# Patient Record
Sex: FEMALE | Race: WHITE | NOT HISPANIC OR LATINO | Employment: OTHER | URBAN - METROPOLITAN AREA
[De-identification: names, ages, dates, MRNs, and addresses within clinical notes are randomized per-mention and may not be internally consistent; named-entity substitution may affect disease eponyms.]

---

## 2017-01-17 ENCOUNTER — GENERIC CONVERSION - ENCOUNTER (OUTPATIENT)
Dept: OTHER | Facility: OTHER | Age: 62
End: 2017-01-17

## 2017-01-17 ENCOUNTER — ALLSCRIPTS OFFICE VISIT (OUTPATIENT)
Dept: OTHER | Facility: OTHER | Age: 62
End: 2017-01-17

## 2017-01-18 DIAGNOSIS — E03.9 HYPOTHYROIDISM: ICD-10-CM

## 2017-01-18 DIAGNOSIS — E78.5 HYPERLIPIDEMIA: ICD-10-CM

## 2017-01-18 DIAGNOSIS — R73.01 IMPAIRED FASTING GLUCOSE: ICD-10-CM

## 2017-01-27 ENCOUNTER — APPOINTMENT (OUTPATIENT)
Dept: LAB | Facility: CLINIC | Age: 62
End: 2017-01-27
Payer: COMMERCIAL

## 2017-01-27 ENCOUNTER — GENERIC CONVERSION - ENCOUNTER (OUTPATIENT)
Dept: FAMILY MEDICINE CLINIC | Facility: CLINIC | Age: 62
End: 2017-01-27

## 2017-01-27 ENCOUNTER — ALLSCRIPTS OFFICE VISIT (OUTPATIENT)
Dept: OTHER | Facility: OTHER | Age: 62
End: 2017-01-27

## 2017-01-27 DIAGNOSIS — R73.01 IMPAIRED FASTING GLUCOSE: ICD-10-CM

## 2017-01-27 DIAGNOSIS — E78.5 HYPERLIPIDEMIA: ICD-10-CM

## 2017-01-27 DIAGNOSIS — E03.9 HYPOTHYROIDISM: ICD-10-CM

## 2017-01-27 LAB
ALBUMIN SERPL BCP-MCNC: 3.4 G/DL (ref 3.5–5)
ALP SERPL-CCNC: 85 U/L (ref 46–116)
ALT SERPL W P-5'-P-CCNC: 35 U/L (ref 12–78)
ANION GAP SERPL CALCULATED.3IONS-SCNC: 8 MMOL/L (ref 4–13)
AST SERPL W P-5'-P-CCNC: 25 U/L (ref 5–45)
BILIRUB SERPL-MCNC: 0.84 MG/DL (ref 0.2–1)
BUN SERPL-MCNC: 21 MG/DL (ref 5–25)
CALCIUM SERPL-MCNC: 9.4 MG/DL (ref 8.3–10.1)
CHLORIDE SERPL-SCNC: 108 MMOL/L (ref 100–108)
CHOLEST SERPL-MCNC: 139 MG/DL (ref 50–200)
CO2 SERPL-SCNC: 25 MMOL/L (ref 21–32)
CREAT SERPL-MCNC: 0.85 MG/DL (ref 0.6–1.3)
GFR SERPL CREATININE-BSD FRML MDRD: >60 ML/MIN/1.73SQ M
GLUCOSE SERPL-MCNC: 104 MG/DL (ref 65–140)
HDLC SERPL-MCNC: 47 MG/DL (ref 40–60)
LDLC SERPL CALC-MCNC: 72 MG/DL (ref 0–100)
POTASSIUM SERPL-SCNC: 4.2 MMOL/L (ref 3.5–5.3)
PROT SERPL-MCNC: 7.4 G/DL (ref 6.4–8.2)
SODIUM SERPL-SCNC: 141 MMOL/L (ref 136–145)
TRIGL SERPL-MCNC: 99 MG/DL
TSH SERPL DL<=0.05 MIU/L-ACNC: 0.19 UIU/ML (ref 0.36–3.74)

## 2017-01-27 PROCEDURE — 36415 COLL VENOUS BLD VENIPUNCTURE: CPT

## 2017-01-27 PROCEDURE — 84443 ASSAY THYROID STIM HORMONE: CPT

## 2017-01-27 PROCEDURE — 80053 COMPREHEN METABOLIC PANEL: CPT

## 2017-01-27 PROCEDURE — 80061 LIPID PANEL: CPT

## 2017-01-30 ENCOUNTER — GENERIC CONVERSION - ENCOUNTER (OUTPATIENT)
Dept: OTHER | Facility: OTHER | Age: 62
End: 2017-01-30

## 2017-02-16 ENCOUNTER — GENERIC CONVERSION - ENCOUNTER (OUTPATIENT)
Dept: OTHER | Facility: OTHER | Age: 62
End: 2017-02-16

## 2017-02-17 ENCOUNTER — ALLSCRIPTS OFFICE VISIT (OUTPATIENT)
Dept: OTHER | Facility: OTHER | Age: 62
End: 2017-02-17

## 2017-02-24 ENCOUNTER — ALLSCRIPTS OFFICE VISIT (OUTPATIENT)
Dept: OTHER | Facility: OTHER | Age: 62
End: 2017-02-24

## 2017-03-07 ENCOUNTER — ANESTHESIA EVENT (OUTPATIENT)
Dept: GASTROENTEROLOGY | Facility: HOSPITAL | Age: 62
End: 2017-03-07
Payer: COMMERCIAL

## 2017-03-07 RX ORDER — THIAMINE MONONITRATE (VIT B1) 100 MG
100 TABLET ORAL DAILY
COMMUNITY
End: 2017-03-22

## 2017-03-07 RX ORDER — ZOLPIDEM TARTRATE 10 MG/1
10 TABLET ORAL
COMMUNITY
End: 2018-02-27 | Stop reason: ALTCHOICE

## 2017-03-07 RX ORDER — LEVOTHYROXINE SODIUM 0.15 MG/1
150 TABLET ORAL DAILY
COMMUNITY
End: 2017-03-22

## 2017-03-07 RX ORDER — ASCORBIC ACID 500 MG
500 TABLET ORAL 2 TIMES DAILY
COMMUNITY
End: 2018-04-12 | Stop reason: ALTCHOICE

## 2017-03-07 RX ORDER — FERROUS SULFATE 325(65) MG
65 TABLET ORAL
COMMUNITY
End: 2018-02-27 | Stop reason: ALTCHOICE

## 2017-03-07 RX ORDER — CLOBETASOL PROPIONATE 0.05 MG/G
1 GEL TOPICAL AS NEEDED
COMMUNITY
End: 2018-02-27 | Stop reason: SDUPTHER

## 2017-03-07 RX ORDER — PAROXETINE HYDROCHLORIDE 20 MG/1
20 TABLET, FILM COATED ORAL EVERY MORNING
COMMUNITY
End: 2018-02-27 | Stop reason: SDUPTHER

## 2017-03-07 RX ORDER — LANOLIN ALCOHOL/MO/W.PET/CERES
1000 CREAM (GRAM) TOPICAL DAILY
COMMUNITY
End: 2017-03-22

## 2017-03-07 RX ORDER — DICYCLOMINE HCL 20 MG
20 TABLET ORAL AS NEEDED
Status: ON HOLD | COMMUNITY
End: 2017-03-08 | Stop reason: ALTCHOICE

## 2017-03-07 RX ORDER — MULTIVITAMIN WITH IRON
100 TABLET ORAL DAILY
COMMUNITY
End: 2017-03-22

## 2017-03-07 RX ORDER — SIMVASTATIN 40 MG
40 TABLET ORAL
COMMUNITY
End: 2018-01-28 | Stop reason: SDUPTHER

## 2017-03-07 RX ORDER — DIAZEPAM 5 MG/1
5 TABLET ORAL EVERY 6 HOURS PRN
Status: ON HOLD | COMMUNITY
End: 2017-03-08 | Stop reason: ALTCHOICE

## 2017-03-07 RX ORDER — ASPIRIN 325 MG
325 TABLET ORAL DAILY
COMMUNITY
End: 2017-03-28 | Stop reason: HOSPADM

## 2017-03-07 RX ORDER — MELOXICAM 15 MG/1
15 TABLET ORAL DAILY
COMMUNITY
End: 2017-03-28 | Stop reason: HOSPADM

## 2017-03-07 RX ORDER — RIZATRIPTAN BENZOATE 10 MG/1
10 TABLET ORAL ONCE AS NEEDED
COMMUNITY
End: 2018-02-27 | Stop reason: SDUPTHER

## 2017-03-08 ENCOUNTER — ANESTHESIA (OUTPATIENT)
Dept: GASTROENTEROLOGY | Facility: HOSPITAL | Age: 62
End: 2017-03-08
Payer: COMMERCIAL

## 2017-03-08 ENCOUNTER — HOSPITAL ENCOUNTER (OUTPATIENT)
Facility: HOSPITAL | Age: 62
Setting detail: OUTPATIENT SURGERY
Discharge: HOME | End: 2017-03-08
Attending: SURGERY | Admitting: SURGERY
Payer: COMMERCIAL

## 2017-03-08 VITALS
DIASTOLIC BLOOD PRESSURE: 72 MMHG | HEART RATE: 6 BPM | WEIGHT: 266 LBS | SYSTOLIC BLOOD PRESSURE: 124 MMHG | BODY MASS INDEX: 40.32 KG/M2 | OXYGEN SATURATION: 96 % | TEMPERATURE: 97.3 F | HEIGHT: 68 IN | RESPIRATION RATE: 18 BRPM

## 2017-03-08 DIAGNOSIS — E66.9 OBESITY: ICD-10-CM

## 2017-03-08 PROCEDURE — 88305 TISSUE EXAM BY PATHOLOGIST: CPT | Performed by: SURGERY

## 2017-03-08 PROCEDURE — 88342 IMHCHEM/IMCYTCHM 1ST ANTB: CPT | Performed by: SURGERY

## 2017-03-08 RX ORDER — LIDOCAINE HYDROCHLORIDE 10 MG/ML
INJECTION, SOLUTION INFILTRATION; PERINEURAL AS NEEDED
Status: DISCONTINUED | OUTPATIENT
Start: 2017-03-08 | End: 2017-03-08 | Stop reason: SURG

## 2017-03-08 RX ORDER — PROPOFOL 10 MG/ML
INJECTION, EMULSION INTRAVENOUS AS NEEDED
Status: DISCONTINUED | OUTPATIENT
Start: 2017-03-08 | End: 2017-03-08 | Stop reason: SURG

## 2017-03-08 RX ORDER — SODIUM CHLORIDE 9 MG/ML
125 INJECTION, SOLUTION INTRAVENOUS CONTINUOUS
Status: DISCONTINUED | OUTPATIENT
Start: 2017-03-08 | End: 2017-03-08 | Stop reason: HOSPADM

## 2017-03-08 RX ADMIN — LIDOCAINE HYDROCHLORIDE 100 MG: 10 INJECTION, SOLUTION INFILTRATION; PERINEURAL at 08:00

## 2017-03-08 RX ADMIN — SODIUM CHLORIDE: 0.9 INJECTION, SOLUTION INTRAVENOUS at 07:57

## 2017-03-08 RX ADMIN — PROPOFOL 120 MG: 10 INJECTION, EMULSION INTRAVENOUS at 08:00

## 2017-03-08 RX ADMIN — SODIUM CHLORIDE 125 ML/HR: 0.9 INJECTION, SOLUTION INTRAVENOUS at 06:30

## 2017-03-17 ENCOUNTER — ALLSCRIPTS OFFICE VISIT (OUTPATIENT)
Dept: OTHER | Facility: OTHER | Age: 62
End: 2017-03-17

## 2017-03-20 ENCOUNTER — GENERIC CONVERSION - ENCOUNTER (OUTPATIENT)
Dept: OTHER | Facility: OTHER | Age: 62
End: 2017-03-20

## 2017-03-22 ENCOUNTER — ANESTHESIA EVENT (OUTPATIENT)
Dept: PERIOP | Facility: HOSPITAL | Age: 62
DRG: 621 | End: 2017-03-22
Payer: COMMERCIAL

## 2017-03-23 ENCOUNTER — ALLSCRIPTS OFFICE VISIT (OUTPATIENT)
Dept: OTHER | Facility: OTHER | Age: 62
End: 2017-03-23

## 2017-03-27 ENCOUNTER — ANESTHESIA (OUTPATIENT)
Dept: PERIOP | Facility: HOSPITAL | Age: 62
DRG: 621 | End: 2017-03-27
Payer: COMMERCIAL

## 2017-03-27 ENCOUNTER — HOSPITAL ENCOUNTER (INPATIENT)
Facility: HOSPITAL | Age: 62
LOS: 1 days | Discharge: HOME | DRG: 621 | End: 2017-03-28
Attending: SURGERY | Admitting: SURGERY
Payer: COMMERCIAL

## 2017-03-27 DIAGNOSIS — E66.01 MORBID (SEVERE) OBESITY DUE TO EXCESS CALORIES (HCC): ICD-10-CM

## 2017-03-27 DIAGNOSIS — E78.5 HYPERLIPIDEMIA: ICD-10-CM

## 2017-03-27 PROCEDURE — A9270 NON-COVERED ITEM OR SERVICE: HCPCS | Performed by: ANESTHESIOLOGY

## 2017-03-27 PROCEDURE — C9113 INJ PANTOPRAZOLE SODIUM, VIA: HCPCS | Performed by: SURGERY

## 2017-03-27 PROCEDURE — 88307 TISSUE EXAM BY PATHOLOGIST: CPT | Performed by: SURGERY

## 2017-03-27 PROCEDURE — 0DB64Z3 EXCISION OF STOMACH, PERCUTANEOUS ENDOSCOPIC APPROACH, VERTICAL: ICD-10-PCS | Performed by: SURGERY

## 2017-03-27 PROCEDURE — 0DJ08ZZ INSPECTION OF UPPER INTESTINAL TRACT, VIA NATURAL OR ARTIFICIAL OPENING ENDOSCOPIC: ICD-10-PCS | Performed by: SURGERY

## 2017-03-27 RX ORDER — MORPHINE SULFATE 4 MG/ML
4 INJECTION, SOLUTION INTRAMUSCULAR; INTRAVENOUS EVERY 2 HOUR PRN
Status: DISCONTINUED | OUTPATIENT
Start: 2017-03-27 | End: 2017-03-28 | Stop reason: HOSPADM

## 2017-03-27 RX ORDER — SODIUM CHLORIDE, SODIUM LACTATE, POTASSIUM CHLORIDE, CALCIUM CHLORIDE 600; 310; 30; 20 MG/100ML; MG/100ML; MG/100ML; MG/100ML
125 INJECTION, SOLUTION INTRAVENOUS CONTINUOUS
Status: DISCONTINUED | OUTPATIENT
Start: 2017-03-27 | End: 2017-03-28

## 2017-03-27 RX ORDER — ACETAMINOPHEN 160 MG/5ML
325 SUSPENSION, ORAL (FINAL DOSE FORM) ORAL EVERY 4 HOURS PRN
Status: DISCONTINUED | OUTPATIENT
Start: 2017-03-27 | End: 2017-03-28 | Stop reason: HOSPADM

## 2017-03-27 RX ORDER — SODIUM CHLORIDE 9 MG/ML
125 INJECTION, SOLUTION INTRAVENOUS CONTINUOUS
Status: DISCONTINUED | OUTPATIENT
Start: 2017-03-27 | End: 2017-03-27

## 2017-03-27 RX ORDER — PANTOPRAZOLE SODIUM 40 MG/1
40 INJECTION, POWDER, FOR SOLUTION INTRAVENOUS
Status: DISCONTINUED | OUTPATIENT
Start: 2017-03-27 | End: 2017-03-28 | Stop reason: HOSPADM

## 2017-03-27 RX ORDER — LIDOCAINE HYDROCHLORIDE 10 MG/ML
INJECTION, SOLUTION INFILTRATION; PERINEURAL AS NEEDED
Status: DISCONTINUED | OUTPATIENT
Start: 2017-03-27 | End: 2017-03-27 | Stop reason: SURG

## 2017-03-27 RX ORDER — OXYCODONE HCL 5 MG/5 ML
5 SOLUTION, ORAL ORAL EVERY 4 HOURS PRN
Status: DISCONTINUED | OUTPATIENT
Start: 2017-03-27 | End: 2017-03-28 | Stop reason: HOSPADM

## 2017-03-27 RX ORDER — GLYCOPYRROLATE 0.2 MG/ML
INJECTION INTRAMUSCULAR; INTRAVENOUS AS NEEDED
Status: DISCONTINUED | OUTPATIENT
Start: 2017-03-27 | End: 2017-03-27 | Stop reason: SURG

## 2017-03-27 RX ORDER — EPHEDRINE SULFATE 50 MG/ML
INJECTION, SOLUTION INTRAVENOUS AS NEEDED
Status: DISCONTINUED | OUTPATIENT
Start: 2017-03-27 | End: 2017-03-27 | Stop reason: SURG

## 2017-03-27 RX ORDER — ONDANSETRON 2 MG/ML
4 INJECTION INTRAMUSCULAR; INTRAVENOUS EVERY 4 HOURS PRN
Status: DISCONTINUED | OUTPATIENT
Start: 2017-03-27 | End: 2017-03-28 | Stop reason: HOSPADM

## 2017-03-27 RX ORDER — ACETAMINOPHEN 160 MG/5ML
320 SUSPENSION, ORAL (FINAL DOSE FORM) ORAL EVERY 4 HOURS PRN
Status: DISCONTINUED | OUTPATIENT
Start: 2017-03-27 | End: 2017-03-28 | Stop reason: HOSPADM

## 2017-03-27 RX ORDER — FENTANYL CITRATE/PF 50 MCG/ML
50 SYRINGE (ML) INJECTION
Status: DISCONTINUED | OUTPATIENT
Start: 2017-03-27 | End: 2017-03-27 | Stop reason: HOSPADM

## 2017-03-27 RX ORDER — BUPIVACAINE HYDROCHLORIDE AND EPINEPHRINE 5; 5 MG/ML; UG/ML
INJECTION, SOLUTION PERINEURAL AS NEEDED
Status: DISCONTINUED | OUTPATIENT
Start: 2017-03-27 | End: 2017-03-27 | Stop reason: HOSPADM

## 2017-03-27 RX ORDER — OXYCODONE HCL 5 MG/5 ML
10 SOLUTION, ORAL ORAL EVERY 4 HOURS PRN
Status: DISCONTINUED | OUTPATIENT
Start: 2017-03-27 | End: 2017-03-28 | Stop reason: HOSPADM

## 2017-03-27 RX ORDER — FENTANYL CITRATE 50 UG/ML
INJECTION, SOLUTION INTRAMUSCULAR; INTRAVENOUS AS NEEDED
Status: DISCONTINUED | OUTPATIENT
Start: 2017-03-27 | End: 2017-03-27 | Stop reason: SURG

## 2017-03-27 RX ORDER — MEPERIDINE HYDROCHLORIDE 50 MG/ML
12.5 INJECTION INTRAMUSCULAR; INTRAVENOUS; SUBCUTANEOUS AS NEEDED
Status: DISCONTINUED | OUTPATIENT
Start: 2017-03-27 | End: 2017-03-27 | Stop reason: HOSPADM

## 2017-03-27 RX ORDER — SCOLOPAMINE TRANSDERMAL SYSTEM 1 MG/1
1 PATCH, EXTENDED RELEASE TRANSDERMAL ONCE AS NEEDED
Status: DISCONTINUED | OUTPATIENT
Start: 2017-03-27 | End: 2017-03-27

## 2017-03-27 RX ORDER — PROMETHAZINE HYDROCHLORIDE 25 MG/ML
25 INJECTION, SOLUTION INTRAMUSCULAR; INTRAVENOUS EVERY 4 HOURS PRN
Status: DISCONTINUED | OUTPATIENT
Start: 2017-03-27 | End: 2017-03-28 | Stop reason: HOSPADM

## 2017-03-27 RX ORDER — MIDAZOLAM HYDROCHLORIDE 1 MG/ML
INJECTION INTRAMUSCULAR; INTRAVENOUS AS NEEDED
Status: DISCONTINUED | OUTPATIENT
Start: 2017-03-27 | End: 2017-03-27 | Stop reason: SURG

## 2017-03-27 RX ORDER — PROPRANOLOL HYDROCHLORIDE 20 MG/1
40 TABLET ORAL DAILY
Status: DISCONTINUED | OUTPATIENT
Start: 2017-03-28 | End: 2017-03-28 | Stop reason: HOSPADM

## 2017-03-27 RX ORDER — MORPHINE SULFATE 2 MG/ML
2 INJECTION, SOLUTION INTRAMUSCULAR; INTRAVENOUS EVERY 2 HOUR PRN
Status: DISCONTINUED | OUTPATIENT
Start: 2017-03-27 | End: 2017-03-28 | Stop reason: HOSPADM

## 2017-03-27 RX ORDER — PROPRANOLOL HYDROCHLORIDE 20 MG/1
20 TABLET ORAL EVERY 12 HOURS SCHEDULED
Status: DISCONTINUED | OUTPATIENT
Start: 2017-03-28 | End: 2017-03-27

## 2017-03-27 RX ORDER — ONDANSETRON 2 MG/ML
INJECTION INTRAMUSCULAR; INTRAVENOUS AS NEEDED
Status: DISCONTINUED | OUTPATIENT
Start: 2017-03-27 | End: 2017-03-27 | Stop reason: SURG

## 2017-03-27 RX ORDER — PROPOFOL 10 MG/ML
INJECTION, EMULSION INTRAVENOUS AS NEEDED
Status: DISCONTINUED | OUTPATIENT
Start: 2017-03-27 | End: 2017-03-27 | Stop reason: SURG

## 2017-03-27 RX ORDER — ROCURONIUM BROMIDE 10 MG/ML
INJECTION, SOLUTION INTRAVENOUS AS NEEDED
Status: DISCONTINUED | OUTPATIENT
Start: 2017-03-27 | End: 2017-03-27 | Stop reason: SURG

## 2017-03-27 RX ADMIN — MORPHINE SULFATE 4 MG: 4 INJECTION, SOLUTION INTRAMUSCULAR; INTRAVENOUS at 11:13

## 2017-03-27 RX ADMIN — SCOPALAMINE 1 PATCH: 1 PATCH, EXTENDED RELEASE TRANSDERMAL at 07:36

## 2017-03-27 RX ADMIN — SODIUM CHLORIDE 125 ML/HR: 0.9 INJECTION, SOLUTION INTRAVENOUS at 07:37

## 2017-03-27 RX ADMIN — NEOSTIGMINE METHYLSULFATE 4 MG: 1 INJECTION INTRAMUSCULAR; INTRAVENOUS; SUBCUTANEOUS at 09:24

## 2017-03-27 RX ADMIN — SODIUM CHLORIDE, SODIUM LACTATE, POTASSIUM CHLORIDE, AND CALCIUM CHLORIDE 125 ML/HR: .6; .31; .03; .02 INJECTION, SOLUTION INTRAVENOUS at 19:44

## 2017-03-27 RX ADMIN — ENOXAPARIN SODIUM 40 MG: 40 INJECTION SUBCUTANEOUS at 07:57

## 2017-03-27 RX ADMIN — FENTANYL CITRATE 100 MCG: 50 INJECTION, SOLUTION INTRAMUSCULAR; INTRAVENOUS at 08:22

## 2017-03-27 RX ADMIN — CEFAZOLIN SODIUM 2000 MG: 2 SOLUTION INTRAVENOUS at 08:38

## 2017-03-27 RX ADMIN — EPHEDRINE SULFATE 10 MG: 50 INJECTION, SOLUTION INTRAMUSCULAR; INTRAVENOUS; SUBCUTANEOUS at 08:34

## 2017-03-27 RX ADMIN — EPHEDRINE SULFATE 10 MG: 50 INJECTION, SOLUTION INTRAMUSCULAR; INTRAVENOUS; SUBCUTANEOUS at 08:39

## 2017-03-27 RX ADMIN — MIDAZOLAM HYDROCHLORIDE 2 MG: 1 INJECTION, SOLUTION INTRAMUSCULAR; INTRAVENOUS at 08:18

## 2017-03-27 RX ADMIN — PROPOFOL 180 MG: 10 INJECTION, EMULSION INTRAVENOUS at 08:28

## 2017-03-27 RX ADMIN — EPHEDRINE SULFATE 15 MG: 50 INJECTION, SOLUTION INTRAMUSCULAR; INTRAVENOUS; SUBCUTANEOUS at 08:33

## 2017-03-27 RX ADMIN — ROCURONIUM BROMIDE 50 MG: 10 INJECTION, SOLUTION INTRAVENOUS at 08:28

## 2017-03-27 RX ADMIN — PANTOPRAZOLE SODIUM 40 MG: 40 INJECTION, POWDER, FOR SOLUTION INTRAVENOUS at 11:13

## 2017-03-27 RX ADMIN — SODIUM CHLORIDE, SODIUM LACTATE, POTASSIUM CHLORIDE, AND CALCIUM CHLORIDE 125 ML/HR: .6; .31; .03; .02 INJECTION, SOLUTION INTRAVENOUS at 10:07

## 2017-03-27 RX ADMIN — LIDOCAINE HYDROCHLORIDE 40 MG: 10 INJECTION, SOLUTION INFILTRATION; PERINEURAL at 08:28

## 2017-03-27 RX ADMIN — GLYCOPYRROLATE 1 MG: 0.2 INJECTION, SOLUTION INTRAMUSCULAR; INTRAVENOUS at 09:24

## 2017-03-27 RX ADMIN — FENTANYL CITRATE 50 MCG: 50 INJECTION, SOLUTION INTRAMUSCULAR; INTRAVENOUS at 09:19

## 2017-03-27 RX ADMIN — FENTANYL CITRATE 50 MCG: 50 INJECTION, SOLUTION INTRAMUSCULAR; INTRAVENOUS at 09:04

## 2017-03-27 RX ADMIN — SODIUM CHLORIDE: 0.9 INJECTION, SOLUTION INTRAVENOUS at 08:35

## 2017-03-27 RX ADMIN — MORPHINE SULFATE 4 MG: 4 INJECTION, SOLUTION INTRAMUSCULAR; INTRAVENOUS at 15:06

## 2017-03-27 RX ADMIN — ONDANSETRON HYDROCHLORIDE 4 MG: 2 INJECTION, SOLUTION INTRAVENOUS at 08:33

## 2017-03-27 RX ADMIN — FENTANYL CITRATE 50 MCG: 50 INJECTION, SOLUTION INTRAMUSCULAR; INTRAVENOUS at 09:35

## 2017-03-27 RX ADMIN — DEXAMETHASONE SODIUM PHOSPHATE 4 MG: 10 INJECTION INTRAMUSCULAR; INTRAVENOUS at 08:33

## 2017-03-27 RX ADMIN — FENTANYL CITRATE 50 MCG: 50 INJECTION, SOLUTION INTRAMUSCULAR; INTRAVENOUS at 08:53

## 2017-03-28 VITALS
HEART RATE: 53 BPM | SYSTOLIC BLOOD PRESSURE: 120 MMHG | DIASTOLIC BLOOD PRESSURE: 59 MMHG | OXYGEN SATURATION: 95 % | RESPIRATION RATE: 18 BRPM | TEMPERATURE: 98.3 F | HEIGHT: 67 IN | BODY MASS INDEX: 35.66 KG/M2 | WEIGHT: 227.2 LBS

## 2017-03-28 LAB
ANION GAP SERPL CALCULATED.3IONS-SCNC: 4 MMOL/L (ref 4–13)
BUN SERPL-MCNC: 15 MG/DL (ref 5–25)
CALCIUM SERPL-MCNC: 8.9 MG/DL (ref 8.3–10.1)
CHLORIDE SERPL-SCNC: 106 MMOL/L (ref 100–108)
CO2 SERPL-SCNC: 28 MMOL/L (ref 21–32)
CREAT SERPL-MCNC: 0.76 MG/DL (ref 0.6–1.3)
ERYTHROCYTE [DISTWIDTH] IN BLOOD BY AUTOMATED COUNT: 13.4 % (ref 11.6–15.1)
GFR SERPL CREATININE-BSD FRML MDRD: >60 ML/MIN/1.73SQ M
GLUCOSE SERPL-MCNC: 102 MG/DL (ref 65–140)
HCT VFR BLD AUTO: 35.9 % (ref 34.8–46.1)
HGB BLD-MCNC: 11.9 G/DL (ref 11.5–15.4)
MCH RBC QN AUTO: 30.6 PG (ref 26.8–34.3)
MCHC RBC AUTO-ENTMCNC: 33.1 G/DL (ref 31.4–37.4)
MCV RBC AUTO: 92 FL (ref 82–98)
PLATELET # BLD AUTO: 249 THOUSANDS/UL (ref 149–390)
PMV BLD AUTO: 11.5 FL (ref 8.9–12.7)
POTASSIUM SERPL-SCNC: 3.9 MMOL/L (ref 3.5–5.3)
RBC # BLD AUTO: 3.89 MILLION/UL (ref 3.81–5.12)
SODIUM SERPL-SCNC: 138 MMOL/L (ref 136–145)
WBC # BLD AUTO: 10.15 THOUSAND/UL (ref 4.31–10.16)

## 2017-03-28 PROCEDURE — 80048 BASIC METABOLIC PNL TOTAL CA: CPT | Performed by: SURGERY

## 2017-03-28 PROCEDURE — A9270 NON-COVERED ITEM OR SERVICE: HCPCS | Performed by: SURGERY

## 2017-03-28 PROCEDURE — A9270 NON-COVERED ITEM OR SERVICE: HCPCS | Performed by: PHYSICIAN ASSISTANT

## 2017-03-28 PROCEDURE — C9113 INJ PANTOPRAZOLE SODIUM, VIA: HCPCS | Performed by: SURGERY

## 2017-03-28 PROCEDURE — 85027 COMPLETE CBC AUTOMATED: CPT | Performed by: SURGERY

## 2017-03-28 RX ORDER — PROPRANOLOL HYDROCHLORIDE 40 MG/1
40 TABLET ORAL DAILY
Qty: 30 TABLET | Refills: 0 | Status: SHIPPED | OUTPATIENT
Start: 2017-03-28 | End: 2018-02-27 | Stop reason: ALTCHOICE

## 2017-03-28 RX ORDER — OMEPRAZOLE 20 MG/1
20 CAPSULE, DELAYED RELEASE ORAL DAILY
Qty: 30 CAPSULE | Refills: 0
Start: 2017-03-28 | End: 2018-02-27 | Stop reason: ALTCHOICE

## 2017-03-28 RX ORDER — OXYCODONE HYDROCHLORIDE AND ACETAMINOPHEN 5; 325 MG/1; MG/1
1 TABLET ORAL EVERY 4 HOURS PRN
Qty: 30 TABLET | Refills: 0
Start: 2017-03-28 | End: 2018-02-27 | Stop reason: ALTCHOICE

## 2017-03-28 RX ADMIN — PANTOPRAZOLE SODIUM 40 MG: 40 INJECTION, POWDER, FOR SOLUTION INTRAVENOUS at 08:34

## 2017-03-28 RX ADMIN — OXYCODONE HYDROCHLORIDE 5 MG: 5 SOLUTION ORAL at 09:41

## 2017-03-28 RX ADMIN — PROPRANOLOL HYDROCHLORIDE 40 MG: 20 TABLET ORAL at 08:33

## 2017-03-28 RX ADMIN — ACETAMINOPHEN 325 MG: 160 SUSPENSION ORAL at 09:39

## 2017-03-28 RX ADMIN — SODIUM CHLORIDE, SODIUM LACTATE, POTASSIUM CHLORIDE, AND CALCIUM CHLORIDE 125 ML/HR: .6; .31; .03; .02 INJECTION, SOLUTION INTRAVENOUS at 03:46

## 2017-03-28 RX ADMIN — ENOXAPARIN SODIUM 40 MG: 40 INJECTION SUBCUTANEOUS at 09:32

## 2017-03-28 RX ADMIN — LEVOTHYROXINE SODIUM 137 MCG: 112 TABLET ORAL at 05:09

## 2017-03-29 ENCOUNTER — GENERIC CONVERSION - ENCOUNTER (OUTPATIENT)
Dept: OTHER | Facility: OTHER | Age: 62
End: 2017-03-29

## 2017-03-31 ENCOUNTER — GENERIC CONVERSION - ENCOUNTER (OUTPATIENT)
Dept: OTHER | Facility: OTHER | Age: 62
End: 2017-03-31

## 2017-04-06 ENCOUNTER — ALLSCRIPTS OFFICE VISIT (OUTPATIENT)
Dept: OTHER | Facility: OTHER | Age: 62
End: 2017-04-06

## 2017-04-06 DIAGNOSIS — E03.9 HYPOTHYROIDISM: ICD-10-CM

## 2017-04-20 ENCOUNTER — GENERIC CONVERSION - ENCOUNTER (OUTPATIENT)
Dept: OTHER | Facility: OTHER | Age: 62
End: 2017-04-20

## 2017-05-09 ENCOUNTER — GENERIC CONVERSION - ENCOUNTER (OUTPATIENT)
Dept: OTHER | Facility: OTHER | Age: 62
End: 2017-05-09

## 2017-05-12 ENCOUNTER — APPOINTMENT (OUTPATIENT)
Dept: LAB | Facility: CLINIC | Age: 62
End: 2017-05-12
Payer: COMMERCIAL

## 2017-05-12 DIAGNOSIS — E03.9 HYPOTHYROIDISM: ICD-10-CM

## 2017-05-12 LAB
T4 FREE SERPL-MCNC: 1.6 NG/DL (ref 0.76–1.46)
TSH SERPL DL<=0.05 MIU/L-ACNC: 0.32 UIU/ML (ref 0.36–3.74)

## 2017-05-12 PROCEDURE — 84439 ASSAY OF FREE THYROXINE: CPT

## 2017-05-12 PROCEDURE — 84443 ASSAY THYROID STIM HORMONE: CPT

## 2017-05-12 PROCEDURE — 36415 COLL VENOUS BLD VENIPUNCTURE: CPT

## 2017-05-14 ENCOUNTER — GENERIC CONVERSION - ENCOUNTER (OUTPATIENT)
Dept: OTHER | Facility: OTHER | Age: 62
End: 2017-05-14

## 2017-06-02 ENCOUNTER — GENERIC CONVERSION - ENCOUNTER (OUTPATIENT)
Dept: OTHER | Facility: OTHER | Age: 62
End: 2017-06-02

## 2017-06-09 ENCOUNTER — ALLSCRIPTS OFFICE VISIT (OUTPATIENT)
Dept: OTHER | Facility: OTHER | Age: 62
End: 2017-06-09

## 2017-06-09 DIAGNOSIS — K91.2 POSTSURGICAL MALABSORPTION, NOT ELSEWHERE CLASSIFIED: ICD-10-CM

## 2017-06-09 DIAGNOSIS — Z98.84 BARIATRIC SURGERY STATUS: ICD-10-CM

## 2017-06-09 DIAGNOSIS — Z00.00 ENCOUNTER FOR GENERAL ADULT MEDICAL EXAMINATION WITHOUT ABNORMAL FINDINGS: ICD-10-CM

## 2017-06-09 DIAGNOSIS — R73.01 IMPAIRED FASTING GLUCOSE: ICD-10-CM

## 2017-06-09 DIAGNOSIS — F41.8 OTHER SPECIFIED ANXIETY DISORDERS: ICD-10-CM

## 2017-06-09 DIAGNOSIS — E78.5 HYPERLIPIDEMIA: ICD-10-CM

## 2017-06-11 ENCOUNTER — GENERIC CONVERSION - ENCOUNTER (OUTPATIENT)
Dept: OTHER | Facility: OTHER | Age: 62
End: 2017-06-11

## 2017-07-12 ENCOUNTER — GENERIC CONVERSION - ENCOUNTER (OUTPATIENT)
Dept: OTHER | Facility: OTHER | Age: 62
End: 2017-07-12

## 2017-07-19 ENCOUNTER — GENERIC CONVERSION - ENCOUNTER (OUTPATIENT)
Dept: OTHER | Facility: OTHER | Age: 62
End: 2017-07-19

## 2017-08-14 ENCOUNTER — APPOINTMENT (OUTPATIENT)
Dept: LAB | Facility: CLINIC | Age: 62
End: 2017-08-14
Payer: COMMERCIAL

## 2017-08-14 ENCOUNTER — ALLSCRIPTS OFFICE VISIT (OUTPATIENT)
Dept: OTHER | Facility: OTHER | Age: 62
End: 2017-08-14

## 2017-08-14 DIAGNOSIS — Z01.419 ENCOUNTER FOR GYNECOLOGICAL EXAMINATION WITHOUT ABNORMAL FINDING: ICD-10-CM

## 2017-08-14 DIAGNOSIS — Z00.00 ENCOUNTER FOR GENERAL ADULT MEDICAL EXAMINATION WITHOUT ABNORMAL FINDINGS: ICD-10-CM

## 2017-08-14 DIAGNOSIS — F41.8 OTHER SPECIFIED ANXIETY DISORDERS: ICD-10-CM

## 2017-08-14 DIAGNOSIS — E03.9 HYPOTHYROIDISM: ICD-10-CM

## 2017-08-14 DIAGNOSIS — K91.2 POSTSURGICAL MALABSORPTION, NOT ELSEWHERE CLASSIFIED: ICD-10-CM

## 2017-08-14 DIAGNOSIS — R73.01 IMPAIRED FASTING GLUCOSE: ICD-10-CM

## 2017-08-14 DIAGNOSIS — E78.5 HYPERLIPIDEMIA: ICD-10-CM

## 2017-08-14 DIAGNOSIS — Z98.84 BARIATRIC SURGERY STATUS: ICD-10-CM

## 2017-08-14 DIAGNOSIS — R79.89 OTHER SPECIFIED ABNORMAL FINDINGS OF BLOOD CHEMISTRY: ICD-10-CM

## 2017-08-14 LAB
25(OH)D3 SERPL-MCNC: 63.8 NG/ML (ref 30–100)
ALBUMIN SERPL BCP-MCNC: 3.4 G/DL (ref 3.5–5)
ALP SERPL-CCNC: 75 U/L (ref 46–116)
ALT SERPL W P-5'-P-CCNC: 15 U/L (ref 12–78)
ANION GAP SERPL CALCULATED.3IONS-SCNC: 10 MMOL/L (ref 4–13)
AST SERPL W P-5'-P-CCNC: 17 U/L (ref 5–45)
BILIRUB SERPL-MCNC: 0.81 MG/DL (ref 0.2–1)
BUN SERPL-MCNC: 16 MG/DL (ref 5–25)
CALCIUM SERPL-MCNC: 9.6 MG/DL (ref 8.3–10.1)
CHLORIDE SERPL-SCNC: 107 MMOL/L (ref 100–108)
CHOLEST SERPL-MCNC: 139 MG/DL (ref 50–200)
CO2 SERPL-SCNC: 24 MMOL/L (ref 21–32)
CREAT SERPL-MCNC: 0.63 MG/DL (ref 0.6–1.3)
ERYTHROCYTE [DISTWIDTH] IN BLOOD BY AUTOMATED COUNT: 13.7 % (ref 11.6–15.1)
EST. AVERAGE GLUCOSE BLD GHB EST-MCNC: 103 MG/DL
FERRITIN SERPL-MCNC: 445 NG/ML (ref 8–388)
FOLATE SERPL-MCNC: >20 NG/ML (ref 3.1–17.5)
GFR SERPL CREATININE-BSD FRML MDRD: 96 ML/MIN/1.73SQ M
GLUCOSE P FAST SERPL-MCNC: 79 MG/DL (ref 65–99)
HBA1C MFR BLD: 5.2 % (ref 4.2–6.3)
HCT VFR BLD AUTO: 38.1 % (ref 34.8–46.1)
HDLC SERPL-MCNC: 47 MG/DL (ref 40–60)
HGB BLD-MCNC: 12.7 G/DL (ref 11.5–15.4)
LDLC SERPL CALC-MCNC: 68 MG/DL (ref 0–100)
MCH RBC QN AUTO: 30.8 PG (ref 26.8–34.3)
MCHC RBC AUTO-ENTMCNC: 33.3 G/DL (ref 31.4–37.4)
MCV RBC AUTO: 92 FL (ref 82–98)
PLATELET # BLD AUTO: 265 THOUSANDS/UL (ref 149–390)
PMV BLD AUTO: 11.8 FL (ref 8.9–12.7)
POTASSIUM SERPL-SCNC: 3.7 MMOL/L (ref 3.5–5.3)
PROT SERPL-MCNC: 7 G/DL (ref 6.4–8.2)
PTH-INTACT SERPL-MCNC: 32.7 PG/ML (ref 14–72)
RBC # BLD AUTO: 4.13 MILLION/UL (ref 3.81–5.12)
SODIUM SERPL-SCNC: 141 MMOL/L (ref 136–145)
TRIGL SERPL-MCNC: 122 MG/DL
VIT B12 SERPL-MCNC: 732 PG/ML (ref 100–900)
WBC # BLD AUTO: 5 THOUSAND/UL (ref 4.31–10.16)

## 2017-08-14 PROCEDURE — 85027 COMPLETE CBC AUTOMATED: CPT

## 2017-08-14 PROCEDURE — 83970 ASSAY OF PARATHORMONE: CPT

## 2017-08-14 PROCEDURE — 82728 ASSAY OF FERRITIN: CPT

## 2017-08-14 PROCEDURE — 82306 VITAMIN D 25 HYDROXY: CPT

## 2017-08-14 PROCEDURE — 80053 COMPREHEN METABOLIC PANEL: CPT

## 2017-08-14 PROCEDURE — 84425 ASSAY OF VITAMIN B-1: CPT

## 2017-08-14 PROCEDURE — G0145 SCR C/V CYTO,THINLAYER,RESCR: HCPCS | Performed by: OBSTETRICS & GYNECOLOGY

## 2017-08-14 PROCEDURE — 82746 ASSAY OF FOLIC ACID SERUM: CPT

## 2017-08-14 PROCEDURE — 83036 HEMOGLOBIN GLYCOSYLATED A1C: CPT

## 2017-08-14 PROCEDURE — 84590 ASSAY OF VITAMIN A: CPT

## 2017-08-14 PROCEDURE — 36415 COLL VENOUS BLD VENIPUNCTURE: CPT

## 2017-08-14 PROCEDURE — 82607 VITAMIN B-12: CPT

## 2017-08-14 PROCEDURE — 80061 LIPID PANEL: CPT

## 2017-08-15 ENCOUNTER — LAB REQUISITION (OUTPATIENT)
Dept: LAB | Facility: HOSPITAL | Age: 62
End: 2017-08-15
Payer: COMMERCIAL

## 2017-08-15 DIAGNOSIS — Z01.419 ENCOUNTER FOR GYNECOLOGICAL EXAMINATION WITHOUT ABNORMAL FINDING: ICD-10-CM

## 2017-08-17 ENCOUNTER — APPOINTMENT (OUTPATIENT)
Dept: LAB | Facility: CLINIC | Age: 62
End: 2017-08-17
Payer: COMMERCIAL

## 2017-08-17 ENCOUNTER — ALLSCRIPTS OFFICE VISIT (OUTPATIENT)
Dept: OTHER | Facility: OTHER | Age: 62
End: 2017-08-17

## 2017-08-17 DIAGNOSIS — E03.9 HYPOTHYROIDISM: ICD-10-CM

## 2017-08-17 LAB
TSH SERPL DL<=0.05 MIU/L-ACNC: 0.38 UIU/ML (ref 0.36–3.74)
VIT A SERPL-MCNC: 32 UG/DL (ref 26–82)
VIT B1 BLD-SCNC: 176 NMOL/L (ref 66.5–200)

## 2017-08-17 PROCEDURE — 36415 COLL VENOUS BLD VENIPUNCTURE: CPT

## 2017-08-17 PROCEDURE — 84443 ASSAY THYROID STIM HORMONE: CPT

## 2017-08-18 ENCOUNTER — GENERIC CONVERSION - ENCOUNTER (OUTPATIENT)
Dept: OTHER | Facility: OTHER | Age: 62
End: 2017-08-18

## 2017-08-21 LAB
LAB AP GYN PRIMARY INTERPRETATION: NORMAL
Lab: NORMAL

## 2017-08-22 ENCOUNTER — GENERIC CONVERSION - ENCOUNTER (OUTPATIENT)
Dept: OTHER | Facility: OTHER | Age: 62
End: 2017-08-22

## 2017-08-24 ENCOUNTER — GENERIC CONVERSION - ENCOUNTER (OUTPATIENT)
Dept: OTHER | Facility: OTHER | Age: 62
End: 2017-08-24

## 2017-09-21 ENCOUNTER — APPOINTMENT (OUTPATIENT)
Dept: LAB | Facility: CLINIC | Age: 62
End: 2017-09-21
Payer: COMMERCIAL

## 2017-09-21 ENCOUNTER — GENERIC CONVERSION - ENCOUNTER (OUTPATIENT)
Dept: OTHER | Facility: OTHER | Age: 62
End: 2017-09-21

## 2017-09-21 DIAGNOSIS — R79.89 OTHER SPECIFIED ABNORMAL FINDINGS OF BLOOD CHEMISTRY: ICD-10-CM

## 2017-09-21 LAB
FERRITIN SERPL-MCNC: 489 NG/ML (ref 8–388)
IRON SERPL-MCNC: 61 UG/DL (ref 50–170)
TRANSFERRIN SERPL-MCNC: 145 MG/DL (ref 200–400)

## 2017-09-21 PROCEDURE — 84466 ASSAY OF TRANSFERRIN: CPT

## 2017-09-21 PROCEDURE — 82728 ASSAY OF FERRITIN: CPT

## 2017-09-21 PROCEDURE — 36415 COLL VENOUS BLD VENIPUNCTURE: CPT

## 2017-09-21 PROCEDURE — 83540 ASSAY OF IRON: CPT

## 2017-10-11 ENCOUNTER — APPOINTMENT (OUTPATIENT)
Dept: LAB | Facility: CLINIC | Age: 62
End: 2017-10-11
Payer: COMMERCIAL

## 2017-10-11 ENCOUNTER — GENERIC CONVERSION - ENCOUNTER (OUTPATIENT)
Dept: OTHER | Facility: OTHER | Age: 62
End: 2017-10-11

## 2017-10-11 ENCOUNTER — ALLSCRIPTS OFFICE VISIT (OUTPATIENT)
Dept: OTHER | Facility: OTHER | Age: 62
End: 2017-10-11

## 2017-10-11 DIAGNOSIS — R79.89 OTHER SPECIFIED ABNORMAL FINDINGS OF BLOOD CHEMISTRY: ICD-10-CM

## 2017-10-11 LAB
FERRITIN SERPL-MCNC: 481 NG/ML (ref 8–388)
IRON SATN MFR SERPL: 19 %
IRON SERPL-MCNC: 41 UG/DL (ref 50–170)
TIBC SERPL-MCNC: 216 UG/DL (ref 250–450)

## 2017-10-11 PROCEDURE — 83550 IRON BINDING TEST: CPT

## 2017-10-11 PROCEDURE — 36415 COLL VENOUS BLD VENIPUNCTURE: CPT

## 2017-10-11 PROCEDURE — 82728 ASSAY OF FERRITIN: CPT

## 2017-10-11 PROCEDURE — 81256 HFE GENE: CPT

## 2017-10-11 PROCEDURE — 83540 ASSAY OF IRON: CPT

## 2017-10-13 NOTE — CONSULTS
Assessment  1  High serum ferritin (790 99) (R79 89)    Plan  High serum ferritin    · (1) HEMOCHROMATOSIS MUTATION; Status:Active; Requested for:11Oct2017;    Perform:[facility]; Due:11Oct2018; Ordered;For:High serum ferritin; Ordered By:Josefina Kaba);   · (1) IRON PANEL; Status:Resulted - Requires Verification;   Done: 04YIX3905 12:10PM   Due:11Oct2018; Ordered;For:High serum ferritin; Ordered By:Josefina Kaba); · Follow-up visit in 1 month Evaluation and Treatment  Follow-up  Status: Complete  Done:  31ETT3064 09:45AM   Ordered; For: High serum ferritin; Ordered By: Amelia Vasquez) Performed:  Due: 58HFR2780; Last Updated By: Barbara Jarvis; 10/11/2017 11:43:05 AM    Discussion/Summary    Increased ferritin after sleeve gastric surgery in March 2017, with normal hemoglobin of 12 7, WBC 5000, platelets 779027, ferritin level in August 2017 of 444, increased to 489 in September 2017 it might be related to fusion bariatric vitamins, I could not find previous ferritin on this patientused to have iron supplements prior to her surgery as welldoes not drink winehistory negative for hemochromatosis however I will do hemochromatosis gene profile, and repeat iron panel with CBC in 6 weeks I will keep you updated        Chief Complaint  Elevated ferritin      History of Present Illness  75-year-old  female with history of hemorrhoidectomy in 2015, dyslipidemia, hypothyroidism, status post sleeve gastric surgery in March 2017, had been on bariatric vitamins 4 time a day with calcium, simvastatin, Paxil and Synthroidwas found to have persistent mildly elevated ferritinSeptember 2017 ferritin of 489 ( 8-388) August 2017 ferritin 445 ( 8-388)61, normal liver enzymes, creatinine, hemoglobin 12 7, hematocrit 38, MCV 92, platelets 479423, WBC 5000, I do not have differentialdoes not drink alcohol , she does not smoke cigarettesfamily history of hemochromatosis such as early coronary artery disease, cirrhosis of the liver, liver cancer, pancreatic insufficiency      Review of Systems    Constitutional: No fever, no chills, feels well, no tiredness, no recent weight gain or weight loss  Eyes: No complaints of eye pain, no red eyes, no eyesight problems, no discharge, no dry eyes, no itching of eyes  ENT: no complaints of earache, no loss of hearing, no nose bleeds, no nasal discharge, no sore throat, no hoarseness  Cardiovascular: No complaints of slow heart rate, no fast heart rate, no chest pain, no palpitations, no leg claudication, no lower extremity edema  Respiratory: No complaints of shortness of breath, no wheezing, no cough, no SOB on exertion, no orthopnea, no PND  Gastrointestinal: No complaints of abdominal pain, no constipation, no nausea or vomiting, no diarrhea, no bloody stools  Genitourinary: No complaints of dysuria, no incontinence, no pelvic pain, no dysmenorrhea, no vaginal discharge or bleeding  Musculoskeletal: No complaints of arthralgias, no myalgias, no joint swelling or stiffness, no limb pain or swelling  Integumentary: No complaints of skin rash or lesions, no itching, no skin wounds, no breast pain or lump  Neurological: No complaints of headache, no confusion, no convulsions, no numbness, no dizziness or fainting, no tingling, no limb weakness, no difficulty walking  Psychiatric: Not suicidal, no sleep disturbance, no anxiety or depression, no change in personality, no emotional problems  Endocrine: No complaints of proptosis, no hot flashes, no muscle weakness, no deepening of the voice, no feelings of weakness  Hematologic/Lymphatic: No complaints of swollen glands, no swollen glands in the neck, does not bleed easily, does not bruise easily  ROS reviewed  Active Problems  1  Depression with anxiety (300 4) (F41 8)   2  Dyslipidemia (272 4) (E78 5)   3  Elevated fasting glucose (790 21) (R73 01)   4   Encounter for routine gynecological examination (V72 31) (Z01 419)   5  Encounter for screening mammogram for malignant neoplasm of breast (V76 12)   (Z12 31)   6  High serum ferritin (790 99) (R79 89)   7  Hypothyroidism (244 9) (E03 9)   8  Insomnia (780 52) (G47 00)   9  Migraine headache (346 90) (G43 909)   10  Pap smear, as part of routine gynecological examination (V76 2) (Z01 419)   11  Postgastrectomy malabsorption (579 3) (K91 2,Z90 3)   12  Preoperative cardiovascular examination (V72 81) (Z01 810)   13  Screening for colon cancer (V76 51) (Z12 11)   14  Screening for lipoid disorders (V77 91) (Z13 220)   15  Seborrheic keratosis (702 19) (L82 1)   16  Status post laparoscopic sleeve gastrectomy (V45 86) (Z98 84)   17  Transient ischemic attack (435 9) (G45 9)   18  Urge incontinence of urine (788 31) (N39 41)   19  Visit for pre-operative examination (V72 84) (Z01 818)   20  Visit for screening mammogram (V76 12) (Z12 31)   21  Vulvar atrophy (624 1) (N90 5)   22  Well adult on routine health check (V70 0) (Z00 00)    Past Medical History  1  History of Acute bronchitis due to other specified organisms (466 0) (J20 8)   2  History of Acute bronchitis, unspecified organism (466 0) (J20 9)   3  History of Acute upper respiratory infection (465 9) (J06 9)   4  History of Allergic dermatitis (692 9) (L23 9)   5  History of Blood tests prior to treatment or procedure (V72 63) (Z01 812)   6  History of Encounter for screening mammogram for malignant neoplasm of breast   (V76 12) (Z12 31)   7  History of acute bronchitis (V12 69) (Z87 09)   8  History of acute conjunctivitis (V12 49) (Z86 69)   9  History of arthritis (V13 4) (Z87 39)   10  History of fatigue (V13 89) (Z87 898)   11  History of insomnia (V13 89) (Z87 898)   12  History of obesity (V13 89) (Z86 39)   13  History of Morbid or severe obesity due to excess calories (278 01) (E66 01)   14  History of Myalgia (729 1) (M79 1)   15   History of Pap smear, as part of routine gynecological examination (V76 2) (Z01 419)    The active problems and past medical history were reviewed and updated today  Surgical History  1  History of Cataract Surgery   2  History of Cholecystectomy Laparoscopic   3  History of Eye Surgery   4  History of Gastrectomy Sleeve   5  History of Hemorrhoidectomy   6  History of Knee Surgery   7  History of Knee Surgery Left    The surgical history was reviewed and updated today  Family History  Mother    1  Family history of diabetes mellitus (V18 0) (Z83 3)   2  Family history of skin cancer (V16 8) (Z80 8)  Father    3  Family history of Alzheimer Disease   4  Family history of Congestive Heart Failure   5  Family history of diabetes mellitus (V18 0) (Z83 3)  Maternal Grandmother    6  Family history of Adenocarcinoma In Situ In Villous Adenoma Of The Breast    The family history was reviewed and updated today  Social History   · Denied: History of Alcohol Use (History)   · Daily Coffee Consumption (___ Cups/Day)   · Denied: History of Drug Use   · Exercise Frequency (Times/Week)   · Never A Smoker   · Two children  The social history was reviewed and updated today  Current Meds   1  Bariatric Fusion Oral Tablet Chewable; Therapy: (Recorded:06Apr2017) to Recorded   2  Calcium + D TABS; Therapy: (Recorded:18Jun2013) to Recorded   3  Clobetasol Propionate 0 05 % External Cream; apply outside vagina bid for 2 wks; Therapy: 71FTQ2918 to (Last EA:84RHK2041)  Requested for: 89BSO5212 Ordered   4  Eszopiclone 2 MG Oral Tablet; TAKE 1 TABLET AT BEDTIME AS NEEDED FOR SLEEP; Therapy: 50FCZ7653 to (Evaluate:31Mar2018); Last Rx:02Oct2017 Ordered   5  Levothyroxine Sodium 125 MCG Oral Tablet; take 1 tablet by mouth daily; Therapy: 86XKZ0245 to (Evaluate:08Jun2018)  Requested for: 87EOY5768; Last   Rx:13Jun2017 Ordered   6  PARoxetine HCl - 20 MG Oral Tablet; take 1 tablet by mouth every day;    Therapy: 79DIK9687 to (021 2033) Requested for: 07RPA5032; Last   GV:83MJK9972 Ordered   7  Rizatriptan Benzoate 10 MG Oral Tablet; TAKE 1 TABLET AS NEEDED FOR HEADACHE,   MAY REPEAT IN 2 HOURS, NO MORE THAN 3 TABLETS PER DAY; Therapy: 49REN7368 to (Last Rx:30Jan2017)  Requested for: 30Jan2017 Ordered   8  Simvastatin 40 MG Oral Tablet; TAKE 1 TABLET BY MOUTH DAILY EVERY EVENING; Therapy: 16XHJ5149 to )  Requested for: 02WWQ2640; Last   Rx:27Jan2017 Ordered    The medication list was reviewed and updated today  Allergies  1  No Known Drug Allergies  2  No Known Environmental Allergies   3  No Known Food Allergies    Vitals  Vital Signs    Recorded: 11BHL8350 11:22AM   Temperature 97 8 F   Heart Rate 76   Respiration 16   Systolic 367   Diastolic 82   Height 5 ft 7 in   Weight 158 lb 6 0 oz   BMI Calculated 24 81   BSA Calculated 1 83   O2 Saturation 99     Physical Exam    Constitutional   General appearance: No acute distress, well appearing and well nourished  Eyes   Conjunctiva and lids: Abnormal   Eye Lids: ptosis on the right-and-lid lag on the right  Pupils and irises: Equal, round and reactive to light  Ears, Nose, Mouth, and Throat   External inspection of ears and nose: Normal     Oropharynx: Normal with no erythema, edema, exudate or lesions  Pulmonary   Auscultation of lungs: Clear to auscultation  Cardiovascular   Auscultation of heart: Normal rate and rhythm, normal S1 and S2, without murmurs  Examination of extremities for edema and/or varicosities: Normal     Carotid pulses: Normal     Abdomen   Abdomen: Non-tender, no masses  Liver and spleen: No hepatomegaly or splenomegaly  Lymphatic   Palpation of lymph nodes in neck: No lymphadenopathy  Musculoskeletal   Gait and station: Normal     Digits and nails: Normal without clubbing or cyanosis  Inspection/palpation of joints, bones, and muscles: Normal     Skin   Skin and subcutaneous tissue: Normal without rashes or lesions  Neurologic   Cranial nerves: Cranial nerves 2-12 intact  Reflexes: 2+ and symmetric  Sensation: No sensory loss  Psychiatric   Orientation to person, place, and time: Normal     Mood and affect: Normal         ECOG 0       Results/Data  (1) IRON PANEL [id number] 12:10PM Ludmila Lees    Order Number: [id number]     Test Name Result Flag Reference   IRON 41 ug/dL L    Patients treated with metal-binding drugs (ie  Deferoxamine) may have depressed iron values  FERRITIN 481 ng/mL H 8-388   TOTAL IRON BINDING CAPACITY 216 ug/dL L 250-450   IRON SATURATION 19 %         Future Appointments    Date/Time Provider Specialty Site   11/08/2017 09:45 AM HENRI Zhou  Hematology Oncology [facility]   12/13/2017 09:30 AM HENRI Traore  [facility]   04/12/2018 10:00 AM Norma Barreto [facility] General Surgery [facility]   08/17/2018 09:00 AM Eusebio Landis MD Obstetrics/Gynecology [facility]     Signatures   Electronically signed by :  HENRI Murphy ; Oct 11 2017  5:28PM EST                       (Author)

## 2017-10-16 LAB — HFE GENE MUT ANL BLD/T: NORMAL

## 2017-11-08 ENCOUNTER — GENERIC CONVERSION - ENCOUNTER (OUTPATIENT)
Dept: OTHER | Facility: OTHER | Age: 62
End: 2017-11-08

## 2017-12-21 ENCOUNTER — GENERIC CONVERSION - ENCOUNTER (OUTPATIENT)
Dept: OTHER | Facility: OTHER | Age: 62
End: 2017-12-21

## 2018-01-09 NOTE — CONSULTS
headache, no confusion, no convulsions, no numbness, no dizziness or fainting, no tingling, no limb weakness, no difficulty walking  Psychiatric: Not suicidal, no sleep disturbance, no anxiety or depression, no change in personality, no emotional problems  Endocrine: No complaints of proptosis, no hot flashes, no muscle weakness, no deepening of the voice, no feelings of weakness  Hematologic/Lymphatic: No complaints of swollen glands, no swollen glands in the neck, does not bleed easily, does not bruise easily  ROS reviewed  Active Problems    1  Depression with anxiety (300 4) (F41 8)   2  Dyslipidemia (272 4) (E78 5)   3  Elevated fasting glucose (790 21) (R73 01)   4  Encounter for routine gynecological examination (V72 31) (Z01 419)   5  Encounter for screening mammogram for malignant neoplasm of breast (V76 12)   (Z12 31)   6  High serum ferritin (790 99) (R79 89)   7  Hypothyroidism (244 9) (E03 9)   8  Insomnia (780 52) (G47 00)   9  Migraine headache (346 90) (G43 909)   10  Pap smear, as part of routine gynecological examination (V76 2) (Z01 419)   11  Postgastrectomy malabsorption (579 3) (K91 2,Z90 3)   12  Preoperative cardiovascular examination (V72 81) (Z01 810)   13  Screening for colon cancer (V76 51) (Z12 11)   14  Screening for lipoid disorders (V77 91) (Z13 220)   15  Seborrheic keratosis (702 19) (L82 1)   16  Status post laparoscopic sleeve gastrectomy (V45 86) (Z98 84)   17  Transient ischemic attack (435 9) (G45 9)   18  Urge incontinence of urine (788 31) (N39 41)   19  Visit for pre-operative examination (V72 84) (Z01 818)   20  Visit for screening mammogram (V76 12) (Z12 31)   21  Vulvar atrophy (624 1) (N90 5)   22   Well adult on routine health check (V70 0) (Z00 00)    Past Medical History    · History of Acute bronchitis due to other specified organisms (466 0) (J20 8)   · History of Acute bronchitis, unspecified organism (466 0) (J20 9)   · History of Acute upper respiratory infection (465 9) (J06 9)   · History of Allergic dermatitis (692 9) (L23 9)   · History of Blood tests prior to treatment or procedure (V72 63) (M11 682)   · History of Encounter for screening mammogram for malignant neoplasm of breast  (V76 12) (Z12 31)   · History of acute bronchitis (V12 69) (Z87 09)   · History of acute conjunctivitis (V12 49) (Z86 69)   · History of arthritis (V13 4) (Z87 39)   · History of fatigue (V13 89) (F89 061)   · History of insomnia (V13 89) (D65 781)   · History of obesity (V13 89) (Z86 39)   · History of Morbid or severe obesity due to excess calories (278 01) (E66 01)   · History of Myalgia (729 1) (M79 1)   · History of Pap smear, as part of routine gynecological examination (V76 2) (Z01 419)    The active problems and past medical history were reviewed and updated today  Surgical History    · History of Cataract Surgery   · History of Cholecystectomy Laparoscopic   · History of Eye Surgery   · History of Gastrectomy Sleeve   · History of Hemorrhoidectomy   · History of Knee Surgery   · History of Knee Surgery Left    The surgical history was reviewed and updated today  Family History    · Family history of diabetes mellitus (V18 0) (Z83 3)   · Family history of skin cancer (V16 8) (Z80 8)    · Family history of Alzheimer Disease   · Family history of Congestive Heart Failure   · Family history of diabetes mellitus (V18 0) (Z83 3)    · Family history of Adenocarcinoma In Situ In Villous Adenoma Of The Breast    The family history was reviewed and updated today  Social History    · Denied: History of Alcohol Use (History)   · Daily Coffee Consumption (___ Cups/Day)   · Denied: History of Drug Use   · Exercise Frequency (Times/Week)   · Never A Smoker   · Two children  The social history was reviewed and updated today  Current Meds   1  Bariatric Fusion Oral Tablet Chewable; Therapy: (Recorded:06Apr2017) to Recorded   2   Calcium + D TABS; Therapy: (Recorded:18Jun2013) to Recorded   3  Clobetasol Propionate 0 05 % External Cream; apply outside vagina bid for 2 wks; Therapy: 22ZIT3534 to (Last OW:04DCA0636)  Requested for: 94HOS7020 Ordered   4  Eszopiclone 2 MG Oral Tablet; TAKE 1 TABLET AT BEDTIME AS NEEDED FOR SLEEP; Therapy: 19DOL9530 to (Evaluate:31Mar2018); Last Rx:02Oct2017 Ordered   5  Levothyroxine Sodium 125 MCG Oral Tablet; take 1 tablet by mouth daily; Therapy: 60EOG1603 to (Evaluate:08Jun2018)  Requested for: 76RFJ0584; Last   Rx:13Jun2017 Ordered   6  PARoxetine HCl - 20 MG Oral Tablet; take 1 tablet by mouth every day; Therapy: 56ELK1518 to (TOCSPEQF:81OPJ2314)  Requested for: 22YQF3089; Last   Rx:27Jan2017 Ordered   7  Rizatriptan Benzoate 10 MG Oral Tablet; TAKE 1 TABLET AS NEEDED FOR HEADACHE,   MAY REPEAT IN 2 HOURS, NO MORE THAN 3 TABLETS PER DAY; Therapy: 63LGC2624 to (Last Rx:30Jan2017)  Requested for: 30Jan2017 Ordered   8  Simvastatin 40 MG Oral Tablet; TAKE 1 TABLET BY MOUTH DAILY EVERY EVENING; Therapy: 12EKY9679 to 97 991270)  Requested for: 70GGT9332; Last   Rx:27Jan2017 Ordered    The medication list was reviewed and updated today  Allergies    1  No Known Drug Allergies    2  No Known Environmental Allergies   3  No Known Food Allergies    Vitals   Recorded: 35SBL4800 11:22AM   Temperature 97 8 F   Heart Rate 76   Respiration 16   Systolic 705   Diastolic 82   Height 5 ft 7 in   Weight 158 lb 6 0 oz   BMI Calculated 24 81   BSA Calculated 1 83   O2 Saturation 99     Physical Exam    Constitutional   General appearance: No acute distress, well appearing and well nourished  Eyes   Conjunctiva and lids: Abnormal   Eye Lids: ptosis on the right and lid lag on the right  Pupils and irises: Equal, round and reactive to light  Ears, Nose, Mouth, and Throat   External inspection of ears and nose: Normal     Oropharynx: Normal with no erythema, edema, exudate or lesions      Pulmonary Auscultation of lungs: Clear to auscultation  Cardiovascular   Auscultation of heart: Normal rate and rhythm, normal S1 and S2, without murmurs  Examination of extremities for edema and/or varicosities: Normal     Carotid pulses: Normal     Abdomen   Abdomen: Non-tender, no masses  Liver and spleen: No hepatomegaly or splenomegaly  Lymphatic   Palpation of lymph nodes in neck: No lymphadenopathy  Musculoskeletal   Gait and station: Normal     Digits and nails: Normal without clubbing or cyanosis  Inspection/palpation of joints, bones, and muscles: Normal     Skin   Skin and subcutaneous tissue: Normal without rashes or lesions  Neurologic   Cranial nerves: Cranial nerves 2-12 intact  Reflexes: 2+ and symmetric  Sensation: No sensory loss  Psychiatric   Orientation to person, place, and time: Normal     Mood and affect: Normal         ECOG 0       Results/Data  (1) IRON PANEL [id number] 12:10PM Jonathan Lees    Order Number: [id number]     Test Name Result Flag Reference   IRON 41 ug/dL L    Patients treated with metal-binding drugs (ie  Deferoxamine) may have depressed iron values  FERRITIN 481 ng/mL H 8-388   TOTAL IRON BINDING CAPACITY 216 ug/dL L 250-450   IRON SATURATION 19 %         Assessment    1  High serum ferritin (790 99) (R79 89)    Plan  High serum ferritin    · (1) HEMOCHROMATOSIS MUTATION; Status:Active; Requested for:11Oct2017;    Perform:[facility]; Due:11Oct2018; Ordered;  For:High serum ferritin; Ordered By:Kaylie Kaba);   · (1) IRON PANEL; Status:Resulted - Requires Verification;   Done: 03QPK8119 12:10PM   Due:11Oct2018; Ordered;  For:High serum ferritin; Ordered By:Kaylie Kaba); · Follow-up visit in 1 month Evaluation and Treatment  Follow-up  Status: Complete  Done:  68IEY2088 09:45AM   Ordered;  For: High serum ferritin; Ordered By: Jory March) Performed:  Due: 53OJJ5546; Last Updated By: Michelle Borden; 10/11/2017 11:43:05 AM    Discussion/Summary    Increased ferritin after sleeve gastric surgery in March 2017, with normal hemoglobin of 12 7, WBC 5000, platelets 203801, ferritin level in August 2017 of 444, increased to 489 in September 2017 it might be related to fusion bariatric vitamins, I could not find previous ferritin on this patient  She used to have iron supplements prior to her surgery as well  She does not drink wine  family history negative for hemochromatosis however I will do hemochromatosis gene profile, and repeat iron panel with CBC in 6 weeks I will keep you updated  Signatures   Electronically signed by :  HENRI Black ; Oct 11 2017  5:28PM EST                       (Author)

## 2018-01-09 NOTE — PROGRESS NOTES
Message  Pt left voicemail message requesting to schedule early morning appt in December  Called pt back, left voicemail message informing pt that I scheduled her for Tuesday, 12/13/16 at 8:00am  Provided contact # if pt needs to reschedule  Active Problems    1  Acute bronchitis, unspecified organism (466 0) (J20 9)   2  Blood tests prior to treatment or procedure (V72 63) (Z01 812)   3  Depression with anxiety (300 4) (F41 8)   4  Dyslipidemia (272 4) (E78 5)   5  Elevated fasting glucose (790 21) (R73 01)   6  Encounter for routine gynecological examination (V72 31) (Z01 419)   7  Encounter for screening mammogram for malignant neoplasm of breast (V76 12)   (Z12 31)   8  Fatigue (780 79) (R53 83)   9  Hypothyroidism (244 9) (E03 9)   10  Insomnia (780 52) (G47 00)   11  Irritable bowel syndrome (564 1) (K58 9)   12  Joint pain, knee (719 46) (M25 569)   13  Migraine headache (346 90) (G43 909)   14  Morbid obesity (278 01) (E66 01)   15  Pap smear, as part of routine gynecological examination (V76 2) (Z01 419)   16  Screening for lipoid disorders (V77 91) (Z13 220)   17  Seborrheic keratosis (702 19) (L82 1)   18  Skin lesions (709 9) (L98 9)   19  Transient ischemic attack (435 9) (G45 9)   20  Urge incontinence of urine (788 31) (N39 41)   21  Visit for pre-operative examination (V72 84) (Z01 818)   22  Visit for screening mammogram (V76 12) (Z12 31)   23  Vulvar atrophy (624 1) (N90 5)   24  Well adult on routine health check (V70 0) (Z00 00)    Current Meds   1  Phyllis Aspirin 325 MG Oral Tablet Delayed Release; Therapy: (Recorded:31Rnn9174) to Recorded   2  Calcium + D TABS; Therapy: (Recorded:38Qtx7473) to Recorded   3  Clobetasol Propionate 0 05 % External Cream (Temovate); apply outside vagina bid for   2 wks; Therapy: 58BNB3297 to (Last BX:88ZBF3711)  Requested for: 18XCF3661 Ordered   4  Cranberry TABS; Therapy: (Recorded:72Lff8281) to Recorded   5   Dicyclomine HCl - 20 MG Oral Tablet; TAKE 1 TABLET 4 TIMES DAILY AS NEEDED; Therapy: 08MFH8521 to (Evaluate:20Jan2017)  Requested for: 32YEB0187; Last   Rx:26Jan2016 Ordered   6  Fish Oil CAPS; Therapy: (Recorded:18Jun2013) to Recorded   7  Iron 65 MG TABS; Therapy: (Recorded:06Oum2799) to Recorded   8  Levothyroxine Sodium 150 MCG Oral Tablet; take 1 tablet by mouth every morning; Therapy: 18XSX0957 to (Evaluate:20Jan2017)  Requested for: 27ZIH2247; Last   Rx:26Jan2016 Ordered   9  Meloxicam 15 MG Oral Tablet; take 1 tablet by mouth daily; Therapy: 44TWV0559 to (Evaluate:13Feb2017)  Requested for: 00QBX2623; Last   Rx:19Feb2016 Ordered   10  PARoxetine HCl - 20 MG Oral Tablet; take 1 tablet by mouth every day; Therapy: 32OUV3579 to ([phone])  Requested for: 38FHA4084; Last    Rx:19Feb2016 Ordered   11  Propranolol HCl ER 80 MG Oral Capsule Extended Release 24 Hour; take 1 capsule by    mouth daily; Therapy: 14RXQ7779 to (Evaluate:20Jan2017)  Requested for: 52IOX5757; Last    Rx:26Jan2016 Ordered   12  Rizatriptan Benzoate 10 MG Oral Tablet (Maxalt); TAKE 1 TABLET AS NEEDED FOR    HEADACHE, MAY REPEAT IN 2 HOURS, NO MORE THAN 3 TABLETS PER DAY; Therapy: 11RDO2082 to (Last Rx:06Jun2016)  Requested for: 06Jun2016 Ordered   13  Simvastatin 40 MG Oral Tablet; TAKE 1 TABLET BY MOUTH DAILY EVERY EVENING; Therapy: 94HNI4140 to (Evaluate:07Jan2017)  Requested for: 12SMH8266; Last    Rx:14Jan2016 Ordered   14  Vitamin B1 TABS; Therapy: (Recorded:41Cyi4306) to Recorded   15  Vitamin B-12 1000 MCG Oral Tablet; Therapy: (Recorded:61Qgj3625) to Recorded   16  Vitamin C 500 MG Oral Capsule; Therapy: (Recorded:46Hkq7966) to Recorded   17  YL Vitamin B-6 100 MG Oral Tablet; Therapy: (Recorded:18Jun2013) to Recorded   18  Zolpidem Tartrate 10 MG Oral Tablet; TAKE 1 TABLET BY MOUTH EVERY NIGHT AT    BEDTIME;     Therapy: 49NZA7783 to (Evaluate:46Uch7514)  Requested for: 71Xdc9290; Last    Rx:61Jvs7029 Ordered    Allergies 1  No Known Drug Allergies    2  No Known Environmental Allergies   3   No Known Food Allergies    Signatures   Electronically signed by : SHONDA Barahona RD; Nov 21 2016 12:02PM EST                       (Author)

## 2018-01-10 NOTE — RESULT NOTES
Discussion/Summary   NORMAL RANGE FOR NOW     KAMERON Ely     Verified Results  (1) TSH WITH FT4 REFLEX 39Xrp6302 11:10AM Coy Duffy    Order Number: [id number]     Test Name Result Flag Reference   TSH 0 383 uIU/mL  0 358-3 740   Patients undergoing fluorescein dye angiography may retain small amounts of fluorescein in the body for 48-72 hours post procedure  Samples containing fluorescein can produce falsely depressed TSH values  If the patient had this procedure,a specimen should be resubmitted post fluorescein clearance            The recommended reference ranges for TSH during pregnancy are as follows:  First trimester 0 1 to 2 5 uIU/mL  Second trimester  0 2 to 3 0 uIU/mL  Third trimester 0 3 to 3 0 uIU/m

## 2018-01-11 NOTE — PROGRESS NOTES
Message  Received referral to schedule pt for 5 of 6  Spoke to pt at checkout and scheduled for Friday, 2/17/17 at 9:30am      Active Problems    1  Acute bronchitis, unspecified organism (466 0) (J20 9)   2  Blood tests prior to treatment or procedure (V72 63) (Z01 812)   3  Depression with anxiety (300 4) (F41 8)   4  Dyslipidemia (272 4) (E78 5)   5  Elevated fasting glucose (790 21) (R73 01)   6  Encounter for routine gynecological examination (V72 31) (Z01 419)   7  Encounter for screening mammogram for malignant neoplasm of breast (V76 12)   (Z12 31)   8  Fatigue (780 79) (R53 83)   9  Hypothyroidism (244 9) (E03 9)   10  Insomnia (780 52) (G47 00)   11  Irritable bowel syndrome (564 1) (K58 9)   12  Joint pain, knee (719 46) (M25 569)   13  Migraine headache (346 90) (G43 909)   14  Morbid obesity (278 01) (E66 01)   15  Pap smear, as part of routine gynecological examination (V76 2) (Z01 419)   16  Screening for lipoid disorders (V77 91) (Z13 220)   17  Seborrheic keratosis (702 19) (L82 1)   18  Skin lesions (709 9) (L98 9)   19  Transient ischemic attack (435 9) (G45 9)   20  Urge incontinence of urine (788 31) (N39 41)   21  Visit for pre-operative examination (V72 84) (Z01 818)   22  Visit for screening mammogram (V76 12) (Z12 31)   23  Vulvar atrophy (624 1) (N90 5)   24  Well adult on routine health check (V70 0) (Z00 00)    Current Meds   1  Phyllis Aspirin 325 MG TBEC; Therapy: (Recorded:07Jdz8318) to Recorded   2  Calcium + D TABS; Therapy: (Recorded:18Jun2013) to Recorded   3  Clobetasol Propionate 0 05 % External Cream (Temovate); apply outside vagina bid for   2 wks; Therapy: 86HDC7041 to (Last SK:23HQH9521)  Requested for: 84BYI6813 Ordered   4  Cranberry TABS; Therapy: (Recorded:18Fym5941) to Recorded   5  Fish Oil CAPS; Therapy: (Recorded:91Eoo8755) to Recorded   6  Iron 65 MG TABS; Therapy: (Recorded:50Cwl0957) to Recorded   7   Levothyroxine Sodium 150 MCG Oral Tablet; take 1 tablet by mouth every morning; Therapy: 64OLU1669 to (Evaluate:20Jan2017)  Requested for: 71XWM3114; Last   Rx:26Jan2016 Ordered   8  Meloxicam 15 MG Oral Tablet; take 1 tablet by mouth daily; Therapy: 88VLM0797 to (Evaluate:13Feb2017)  Requested for: 98ASP6722; Last   Rx:19Feb2016 Ordered   9  PARoxetine HCl - 20 MG Oral Tablet; take 1 tablet by mouth every day; Therapy: 94UWY2251 to ([phone])  Requested for: 22MJZ1555; Last   Rx:19Feb2016 Ordered   10  Propranolol HCl ER 80 MG Oral Capsule Extended Release 24 Hour; take 1 capsule by    mouth daily; Therapy: 69TAK6563 to (Evaluate:20Jan2017)  Requested for: 48WDC7850; Last    Rx:26Jan2016 Ordered   11  Rizatriptan Benzoate 10 MG Oral Tablet (Maxalt); TAKE 1 TABLET AS NEEDED FOR    HEADACHE, MAY REPEAT IN 2 HOURS, NO MORE THAN 3 TABLETS PER DAY; Therapy: 54NAF6895 to (Last Rx:06Jun2016)  Requested for: 06Jun2016 Ordered   12  Simvastatin 40 MG Oral Tablet; TAKE 1 TABLET BY MOUTH DAILY EVERY EVENING; Therapy: 08EZC5525 to (Evaluate:07Jan2017)  Requested for: 44WMM1952; Last    Rx:14Jan2016 Ordered   13  Vitamin B1 TABS; Therapy: (Recorded:11Hwg9797) to Recorded   14  Vitamin B-12 1000 MCG Oral Tablet; Therapy: (Recorded:72Ffa0415) to Recorded   15  Vitamin C 500 MG Oral Capsule; Therapy: (Recorded:11Iwj0295) to Recorded   16  YL Vitamin B-6 100 MG Oral Tablet; Therapy: (Recorded:47Yip0211) to Recorded   17  Zolpidem Tartrate 10 MG Oral Tablet; TAKE 1 TABLET BY MOUTH EVERY NIGHT AT    BEDTIME; Therapy: 50QOM8353 to (Evaluate:70Gza5556)  Requested for: 86Tqh9402; Last    Rx:04Jxx2143 Ordered    Allergies    1  No Known Drug Allergies    2  No Known Environmental Allergies   3   No Known Food Allergies    Signatures   Electronically signed by : SHONDA Pope RD; Jan 17 2017 10:57AM EST                       (Author)

## 2018-01-11 NOTE — PROGRESS NOTES
Discussion/Summary  Discussion Summary:   Assess: 11 1# wt loss since previous visit  Total 34  2# wt loss from start of program  No changes in Dx or Rx noted  PT has been food journaling on her computer on [employer] com  Pt has been decreasing her portion sizes and using measuring cups to portion her food  Pt is including a lean protein food with each meal and snack  Pt is doing well with 30/60 rule, chewing well, and eating slowly  Pt drinks only flavored water, but states she is drinking a total of 4 cups per day  Pt states she gets a good amount of activity during her activities of daily living, but is not yet engaged in structured physical activity  Pt has some knee pain due to B/L TKR  Pt would like to look into local swimming pools and classes  Diagnose: Overweight/Obesity related to positive energy balance as evidenced by BMI >30 and pt's self-reported energy intake  (Continued-Improving)  Intervene: Reviewed post-operative portion sizes with pt using food models and measuring cups  Discussed post-operative diet progression and importance of adequate protein intake  Discussed post-operative taste changes  Provided pt with samples of chewable bariatric vitamins to try  Discussed local aquatic programs pt can look into  Encouraged pt to explore these options prior to next appointment  (Pt lives in [place] and will try [facility])  Reviewed workflow with pt: Pt has weigh-ins until March  Pt has apt with PCP in January to get approval letter  Pt will schedule cardiac clearance for February or March  Pt has EGD scheduled for March  Pt verbalized understanding, no further questions at present, expect good compliance  Monitor/Evaluate: Will monitor food journals, weight, pt's reported compliance with goals at next appointment  Pt has follow-up apt with Dr David Peralta in January  Pt has contact information for future questions/concerns  Active Problems    1   Acute bronchitis, unspecified organism (466 0) (J20 9)   2  Blood tests prior to treatment or procedure (V72 63) (Z01 812)   3  Depression with anxiety (300 4) (F41 8)   4  Dyslipidemia (272 4) (E78 5)   5  Elevated fasting glucose (790 21) (R73 01)   6  Encounter for routine gynecological examination (V72 31) (Z01 419)   7  Encounter for screening mammogram for malignant neoplasm of breast (V76 12)   (Z12 31)   8  Fatigue (780 79) (R53 83)   9  Hypothyroidism (244 9) (E03 9)   10  Insomnia (780 52) (G47 00)   11  Irritable bowel syndrome (564 1) (K58 9)   12  Joint pain, knee (719 46) (M25 569)   13  Migraine headache (346 90) (G43 909)   14  Morbid obesity (278 01) (E66 01)   15  Pap smear, as part of routine gynecological examination (V76 2) (Z01 419)   16  Screening for lipoid disorders (V77 91) (Z13 220)   17  Seborrheic keratosis (702 19) (L82 1)   18  Skin lesions (709 9) (L98 9)   19  Transient ischemic attack (435 9) (G45 9)   20  Urge incontinence of urine (788 31) (N39 41)   21  Visit for pre-operative examination (V72 84) (Z01 818)   22  Visit for screening mammogram (V76 12) (Z12 31)   23  Vulvar atrophy (624 1) (N90 5)   24  Well adult on routine health check (V70 0) (Z00 00)    Past Medical History    1  History of Acute upper respiratory infection (465 9) (J06 9)   2  History of Allergic dermatitis (692 9) (L23 9)   3  History of Encounter for screening mammogram for malignant neoplasm of breast   (V76 12) (Z12 31)   4  History of acute bronchitis (V12 69) (Z87 09)   5  History of acute conjunctivitis (V12 49) (Z86 69)   6  History of arthritis (V13 4) (Z87 39)   7  History of insomnia (V13 89) (Z87 898)   8  History of Myalgia (729 1) (M79 1)   9  History of Pap smear, as part of routine gynecological examination (V76 2) (Z01 419)    Surgical History    1  History of Cataract Surgery   2  History of Cholecystectomy Laparoscopic   3  History of Eye Surgery   4  History of Hemorrhoidectomy   5  History of Knee Surgery   6   History of Knee Surgery Left    Family History  Mother    1  Family history of Diabetes Mellitus (V18 0)   2  Family history of Skin Cancer (V16 8)  Father    3  Family history of Alzheimer Disease   4  Family history of Congestive Heart Failure   5  Family history of Diabetes Mellitus (V18 0)  Maternal Grandmother    6  Family history of Adenocarcinoma In Situ In Villous Adenoma Of The Breast    Social History    · Denied: History of Alcohol Use (History)   · Daily Coffee Consumption (___ Cups/Day)   · Denied: History of Drug Use   · Exercise Frequency (Times/Week)   · Never A Smoker   · Two children    Current Meds   1  Phyllis Aspirin 325 MG TBEC; Therapy: (Recorded:19Sep2016) to Recorded   2  Calcium + D TABS; Therapy: (Recorded:18Jun2013) to Recorded   3  Clobetasol Propionate 0 05 % External Cream; apply outside vagina bid for 2 wks; Therapy: 47FQM9646 to (Last QW:12JHT6439)  Requested for: 10TSQ5635 Ordered   4  Cranberry TABS; Therapy: (Recorded:19Sep2016) to Recorded   5  Dicyclomine HCl - 20 MG Oral Tablet; TAKE 1 TABLET 4 TIMES DAILY AS NEEDED; Therapy: 24XNV4117 to (Evaluate:20Jan2017)  Requested for: 71LXS7532; Last   Rx:26Jan2016 Ordered   6  Fish Oil CAPS; Therapy: (Recorded:18Jun2013) to Recorded   7  Iron 65 MG TABS; Therapy: (Recorded:19Sep2016) to Recorded   8  Levothyroxine Sodium 150 MCG Oral Tablet; take 1 tablet by mouth every morning; Therapy: 21AGL4792 to (Evaluate:20Jan2017)  Requested for: 89LFJ3459; Last   Rx:26Jan2016 Ordered   9  Meloxicam 15 MG Oral Tablet; take 1 tablet by mouth daily; Therapy: 08AQF1570 to (Evaluate:38Lvl9066)  Requested for: 24YZF1571; Last   Rx:19Feb2016 Ordered   10  PARoxetine HCl - 20 MG Oral Tablet; take 1 tablet by mouth every day; Therapy: 27GDC6244 to (67 [phone])  Requested for: 48YTQ8255; Last    Rx:19Feb2016 Ordered   11  Propranolol HCl ER 80 MG Oral Capsule Extended Release 24 Hour; take 1 capsule by    mouth daily;     Therapy: 36Hqz2012 to (Evaluate:20Jan2017)  Requested for: 90CAG0669; Last    Rx:26Jan2016 Ordered   12  Rizatriptan Benzoate 10 MG Oral Tablet; TAKE 1 TABLET AS NEEDED FOR HEADACHE,    MAY REPEAT IN 2 HOURS, NO MORE THAN 3 TABLETS PER DAY; Therapy: 88LPG8779 to (Last Rx:06Jun2016)  Requested for: 06Jun2016 Ordered   13  Simvastatin 40 MG Oral Tablet; TAKE 1 TABLET BY MOUTH DAILY EVERY EVENING; Therapy: 97EYZ9393 to (Evaluate:07Jan2017)  Requested for: 44DUT1516; Last    Rx:14Jan2016 Ordered   14  Vitamin B1 TABS; Therapy: (Recorded:71Abv4952) to Recorded   15  Vitamin B-12 1000 MCG Oral Tablet; Therapy: (Recorded:18Vns3752) to Recorded   16  Vitamin C 500 MG Oral Capsule; Therapy: (Recorded:50Pvm1151) to Recorded   17  YL Vitamin B-6 100 MG Oral Tablet; Therapy: (Recorded:18Jun2013) to Recorded   18  Zolpidem Tartrate 10 MG Oral Tablet; TAKE 1 TABLET BY MOUTH EVERY NIGHT AT    BEDTIME; Therapy: 72DTO7139 to (Evaluate:92Dkd5332)  Requested for: 57Ieg0412; Last    Rx:08Lkz8877 Ordered    Allergies    1  No Known Drug Allergies    2  No Known Environmental Allergies   3  No Known Food Allergies    Vitals  Signs   Recorded: 14Jkn4769 08:39AM   Height: 5 ft 7 5 in  Weight: 247 lb 6 4 oz  BMI Calculated: 38 18  BSA Calculated: 2 23    Future Appointments    Date/Time Provider Specialty Site   01/17/2017 10:00 AM HENRI Pavon   Bariatric Medicine [facility]   08/14/2017 10:30 AM Jerrell Smith MD Obstetrics/Gynecology [facility]   01/27/2017 11:00 AM Iver Closs, DO Family Medicine [facility]     Signatures   Electronically signed by : SHONDA Crabtree RD; Dec 13 2016  8:40AM EST                       (Author)    Electronically signed by : HENRI Ni ; Dec 15 2016  3:19PM EST                       (Validation)

## 2018-01-11 NOTE — RESULT NOTES
Dear Elizabeth David,   Your test results have returned and are listed below:      Discussion/Summary  Your results show some abnormalities  Your iron stores are elevated, which may indicate an inherited iron storage disorder known as hemochromatosis  Please follow up with hematology for further evaluation  Enclosed is a list of hematologists who work with [facility]   After your appointment, please forward your evaluation and treatment plan to our office  There were no other abnormalities  Your next office visit is scheduled for September 14, 2017 at 9:30am       If you have any questions, please don't hesitate to call the office  Sincerely,      Signatures   Electronically signed by : SHONDA Olvera RD;  Aug 22 2017  9:12AM EST                       (Author)    Electronically signed by : HENRI Manrique ; Sep  5 2017 10:05AM EST                       (Validation)

## 2018-01-11 NOTE — PROGRESS NOTES
Chief Complaint  Chief Complaint Free Text Note Form: Guerline Dias was called for her pre-operative check-in  She did not answer  A voicemail was left asking her to call back at her earliest convenience with any behavioral concerns she may be having related to following the liquid diet or implementing behavioral changes after surgery  She was encouraged to check-in at any time with behavioral, dietary, or medical concerns for continued support throughout this process  Active Problems    1  Acute bronchitis due to other specified organisms (466 0) (J20 8)   2  Blood tests prior to treatment or procedure (V72 63) (Z01 812)   3  Depression with anxiety (300 4) (F41 8)   4  Dyslipidemia (272 4) (E78 5)   5  Elevated fasting glucose (790 21) (R73 01)   6  Encounter for routine gynecological examination (V72 31) (Z01 419)   7  Encounter for screening mammogram for malignant neoplasm of breast (V76 12)   (Z12 31)   8  Hypothyroidism (244 9) (E03 9)   9  Insomnia (780 52) (G47 00)   10  Irritable bowel syndrome (564 1) (K58 9)   11  Joint pain, knee (719 46) (M25 569)   12  Migraine headache (346 90) (G43 909)   13  Morbid or severe obesity due to excess calories (278 01) (E66 01)   14  Pap smear, as part of routine gynecological examination (V76 2) (Z01 419)   15  Preoperative cardiovascular examination (V72 81) (Z01 810)   16  Screening for colon cancer (V76 51) (Z12 11)   17  Screening for lipoid disorders (V77 91) (Z13 220)   18  Seborrheic keratosis (702 19) (L82 1)   19  Skin lesions (709 9) (L98 9)   20  Transient ischemic attack (435 9) (G45 9)   21  Urge incontinence of urine (788 31) (N39 41)   22  Visit for pre-operative examination (V72 84) (Z01 818)   23  Visit for screening mammogram (V76 12) (Z12 31)   24  Vulvar atrophy (624 1) (N90 5)   25  Well adult on routine health check (V70 0) (Z00 00)    Past Medical History    1  History of Acute bronchitis, unspecified organism (466 0) (J20 9)   2   History of Acute upper respiratory infection (465 9) (J06 9)   3  History of Allergic dermatitis (692 9) (L23 9)   4  History of Encounter for screening mammogram for malignant neoplasm of breast   (V76 12) (Z12 31)   5  History of acute bronchitis (V12 69) (Z87 09)   6  History of acute conjunctivitis (V12 49) (Z86 69)   7  History of arthritis (V13 4) (Z87 39)   8  History of fatigue (V13 89) (Z87 898)   9  History of insomnia (V13 89) (Z87 898)   10  History of Myalgia (729 1) (M79 1)   11  History of Pap smear, as part of routine gynecological examination (V76 2) (Z01 419)    Surgical History    1  History of Cataract Surgery   2  History of Cholecystectomy Laparoscopic   3  History of Eye Surgery   4  History of Hemorrhoidectomy   5  History of Knee Surgery   6  History of Knee Surgery Left    Family History  Mother    1  Family history of diabetes mellitus (V18 0) (Z83 3)   2  Family history of skin cancer (V16 8) (Z80 8)  Father    3  Family history of Alzheimer Disease   4  Family history of Congestive Heart Failure   5  Family history of diabetes mellitus (V18 0) (Z83 3)  Maternal Grandmother    6  Family history of Adenocarcinoma In Situ In Villous Adenoma Of The Breast    Social History    · Denied: History of Alcohol Use (History)   · Daily Coffee Consumption (___ Cups/Day)   · Denied: History of Drug Use   · Exercise Frequency (Times/Week)   · Never A Smoker   · Two children    Current Meds   1  Phyllis Aspirin 325 MG TBEC; Take 1 tablet daily; Therapy: (Recorded:03Aoi6425) to Recorded   2  Calcium + D TABS; Therapy: (Recorded:18Jun2013) to Recorded   3  Clobetasol Propionate 0 05 % External Cream (Temovate); apply outside vagina bid for 2   wks; Therapy: 32CBJ0397 to (Last BI:72VBG5140)  Requested for: 85NXZ3019 Ordered   4  Cranberry TABS; Therapy: (Recorded:20Wrd9536) to Recorded   5  Fish Oil CAPS; Therapy: (Recorded:18Jun2013) to Recorded   6  Iron 65 MG TABS;    Therapy: (Recorded:01Aob2636) to Recorded 7  Levothyroxine Sodium 137 MCG Oral Tablet; TAKE 1 TABLET DAILY ON AN EMPTY   STOMACH; Therapy: 96EPR9312 to (Last Rx:33Cbh9550)  Requested for: 36PHE9053 Ordered   8  Meloxicam 15 MG Oral Tablet; take 1 tablet by mouth daily; Therapy: 88TJQ8472 to (PZKKKJMS:11FPY5788)  Requested for: 26CCJ7948; Last   Rx:27Jan2017 Ordered   9  PARoxetine HCl - 20 MG Oral Tablet; take 1 tablet by mouth every day; Therapy: 75YTH9676 to (SLLGCNII:97AJC1256)  Requested for: 19ETD8692; Last   Rx:27Jan2017 Ordered   10  Propranolol HCl ER 80 MG Oral Capsule Extended Release 24 Hour; take 1 capsule by    mouth daily; Therapy: 03EDL7387 to (RNDOPQLL:44ACZ5303)  Requested for: 91KOD1210; Last    Rx:27Jan2017 Ordered   11  Rizatriptan Benzoate 10 MG Oral Tablet (Maxalt); TAKE 1 TABLET AS NEEDED FOR    HEADACHE, MAY REPEAT IN 2 HOURS, NO MORE THAN 3 TABLETS PER DAY; Therapy: 73KAA5330 to (Last Rx:30Jan2017)  Requested for: 30Jan2017 Ordered   12  Simvastatin 40 MG Oral Tablet; TAKE 1 TABLET BY MOUTH DAILY EVERY EVENING; Therapy: 66BKT9813 to (DXESECUE:98JFH7312)  Requested for: 24MFG9377; Last    Rx:27Jan2017 Ordered   13  Vitamin B-Complex Oral Tablet; Therapy: (Recorded:42Gwf8094) to Recorded   14  Vitamin C 500 MG Oral Capsule; Therapy: (Recorded:61Wyq7711) to Recorded   15  Zolpidem Tartrate 10 MG Oral Tablet; TAKE 1 TABLET BY MOUTH EVERY NIGHT AT    BEDTIME; Therapy: 93EON6053 to (Rosa Maxim)  Requested for: 01NZA6483; Last    Rx:27Jan2017 Ordered    Allergies    1  No Known Drug Allergies    2  No Known Environmental Allergies   3  No Known Food Allergies    Future Appointments    Date/Time Provider Specialty Site   03/23/2017 01:30 PM HENRI Branham  [address] [place] [address]   04/06/2017 10:45 AM HENRI Branham   [facility] [place]   08/14/2017 10:30 AM Vic Cabrera MD Obstetrics/Gynecology [facility]     Signatures Electronically signed by : Thanh Almonte, ; Mar 20 2017 11:06AM EST                       (Author)

## 2018-01-12 VITALS
HEART RATE: 76 BPM | BODY MASS INDEX: 34.18 KG/M2 | DIASTOLIC BLOOD PRESSURE: 74 MMHG | SYSTOLIC BLOOD PRESSURE: 122 MMHG | WEIGHT: 218.25 LBS | RESPIRATION RATE: 16 BRPM

## 2018-01-12 VITALS
WEIGHT: 236.6 LBS | TEMPERATURE: 98.3 F | BODY MASS INDEX: 35.86 KG/M2 | SYSTOLIC BLOOD PRESSURE: 114 MMHG | HEIGHT: 68 IN | DIASTOLIC BLOOD PRESSURE: 72 MMHG | HEART RATE: 72 BPM

## 2018-01-12 VITALS
WEIGHT: 192 LBS | HEIGHT: 67 IN | TEMPERATURE: 98 F | DIASTOLIC BLOOD PRESSURE: 64 MMHG | RESPIRATION RATE: 18 BRPM | SYSTOLIC BLOOD PRESSURE: 108 MMHG | BODY MASS INDEX: 30.13 KG/M2 | HEART RATE: 70 BPM

## 2018-01-12 NOTE — PROGRESS NOTES
Message  Pt left voicemail message this am returning call to schedule December appointment  Returned pt's call, left another message on voicemail with contact #  Active Problems    1  Acute bronchitis, unspecified organism (466 0) (J20 9)   2  Blood tests prior to treatment or procedure (V72 63) (Z01 812)   3  Depression with anxiety (300 4) (F41 8)   4  Dyslipidemia (272 4) (E78 5)   5  Elevated fasting glucose (790 21) (R73 01)   6  Encounter for routine gynecological examination (V72 31) (Z01 419)   7  Encounter for screening mammogram for malignant neoplasm of breast (V76 12)   (Z12 31)   8  Fatigue (780 79) (R53 83)   9  Hypothyroidism (244 9) (E03 9)   10  Insomnia (780 52) (G47 00)   11  Irritable bowel syndrome (564 1) (K58 9)   12  Joint pain, knee (719 46) (M25 569)   13  Migraine headache (346 90) (G43 909)   14  Morbid obesity (278 01) (E66 01)   15  Pap smear, as part of routine gynecological examination (V76 2) (Z01 419)   16  Screening for lipoid disorders (V77 91) (Z13 220)   17  Seborrheic keratosis (702 19) (L82 1)   18  Skin lesions (709 9) (L98 9)   19  Transient ischemic attack (435 9) (G45 9)   20  Urge incontinence of urine (788 31) (N39 41)   21  Visit for pre-operative examination (V72 84) (Z01 818)   22  Visit for screening mammogram (V76 12) (Z12 31)   23  Vulvar atrophy (624 1) (N90 5)   24  Well adult on routine health check (V70 0) (Z00 00)    Current Meds   1  Phyllis Aspirin 325 MG Oral Tablet Delayed Release; Therapy: (Recorded:60Xhv9054) to Recorded   2  Calcium + D TABS; Therapy: (Recorded:18Jun2013) to Recorded   3  Clobetasol Propionate 0 05 % External Cream (Temovate); apply outside vagina bid for   2 wks; Therapy: 16ZWZ0890 to (Last AT:50IYF1803)  Requested for: 41FLI3035 Ordered   4  Cranberry TABS; Therapy: (Recorded:71Rlt0525) to Recorded   5  Dicyclomine HCl - 20 MG Oral Tablet; TAKE 1 TABLET 4 TIMES DAILY AS NEEDED;    Therapy: 33NZQ8214 to (Evaluate:20Jan2017) Requested for: 54XMF8726; Last   Rx:26Jan2016 Ordered   6  Fish Oil CAPS; Therapy: (Recorded:18Jun2013) to Recorded   7  Iron 65 MG TABS; Therapy: (Recorded:20Pxq6513) to Recorded   8  Levothyroxine Sodium 150 MCG Oral Tablet; take 1 tablet by mouth every morning; Therapy: 01RZQ7566 to (Evaluate:20Jan2017)  Requested for: 94LIT5828; Last   Rx:26Jan2016 Ordered   9  Meloxicam 15 MG Oral Tablet; take 1 tablet by mouth daily; Therapy: 74XYX2730 to (Evaluate:13Feb2017)  Requested for: 85VCN7642; Last   Rx:19Feb2016 Ordered   10  PARoxetine HCl - 20 MG Oral Tablet; take 1 tablet by mouth every day; Therapy: 75NBK2597 to (Evelina Jo)  Requested for: 52GTR5825; Last    Rx:19Feb2016 Ordered   11  Propranolol HCl ER 80 MG Oral Capsule Extended Release 24 Hour; take 1 capsule by    mouth daily; Therapy: 64KRN3931 to (Evaluate:20Jan2017)  Requested for: 51MCK6575; Last    Rx:26Jan2016 Ordered   12  Rizatriptan Benzoate 10 MG Oral Tablet (Maxalt); TAKE 1 TABLET AS NEEDED FOR    HEADACHE, MAY REPEAT IN 2 HOURS, NO MORE THAN 3 TABLETS PER DAY; Therapy: 54YKD9901 to (Last Rx:06Jun2016)  Requested for: 06Jun2016 Ordered   13  Simvastatin 40 MG Oral Tablet; TAKE 1 TABLET BY MOUTH DAILY EVERY EVENING; Therapy: 28FDD4603 to (Evaluate:07Jan2017)  Requested for: 38EVS2967; Last    Rx:14Jan2016 Ordered   14  Vitamin B1 TABS; Therapy: (Recorded:25Kdo6015) to Recorded   15  Vitamin B-12 1000 MCG Oral Tablet; Therapy: (Recorded:71Vxk8713) to Recorded   16  Vitamin C 500 MG Oral Capsule; Therapy: (Recorded:41Ivq2863) to Recorded   17  YL Vitamin B-6 100 MG Oral Tablet; Therapy: (Recorded:18Jun2013) to Recorded   18  Zolpidem Tartrate 10 MG Oral Tablet; TAKE 1 TABLET BY MOUTH EVERY NIGHT AT    BEDTIME; Therapy: 60BYW9874 to (Evaluate:11Feb2017)  Requested for: 53Ycw8371; Last    Rx:69Wye7182 Ordered    Allergies    1  No Known Drug Allergies    2  No Known Environmental Allergies   3   No Known Food Allergies    Signatures   Electronically signed by : SHONDA Burnett RD; Nov 17 2016  2:38PM EST                       (Author)

## 2018-01-12 NOTE — RESULT NOTES
Verified Results  (1) THIN PREP PAP WITH IMAGING 83Bjm7329 11:30AM Pily Powell     Test Name Result Flag Reference   LAB AP CASE REPORT (Report)     Gynecologic Cytology Report            Case: [id number]                  Authorizing Provider: Migue Samuel MD     Collected:      08/14/2017           First Screen:     GABE Yepez Received:      08/17/2017 0908        Rescreen:       Claudean Gaunt, CT                              Specimen:  LIQUID-BASED PAP, SCREENING, Cervix   LAB AP GYN PRIMARY INTERPRETATION      Negative for intraepithelial lesion or malignancy  Electronically signed by Claudean Gaunt, CT on 8/21/2017 at 11:30 AM   LAB AP GYN SPECIMEN ADEQUACY      Satisfactory for evaluation  (See note)   LAB AP GYN NOTE      No endocervical cells identified  It is difficult to differentiate between   squamous metaplastic cells and parabasal cells in atrophic specimens due   to numerous causes including menopause, postpartum changes and   progestational agents  LAB AP GYN ADDITIONAL INFORMATION (Report)     [facility] FDA approved ,  and ThinPrep Imaging System are   utilized with strict adherence to the 's instruction manual to   prepare gynecologic and non-gynecologic cytology specimens for the   production of ThinPrep slides as well as for gynecologic ThinPrep imaging  These processes have been validated by our laboratory and/or by the     The Pap test is not a diagnostic procedure and should not be used as the   sole means to detect cervical cancer  It is only a screening procedure to   aid in the detection of cervical cancer and its precursors  Both   false-negative and false-positive results have been experienced  Your   patient's test result should be interpreted in this context together with   the history and clinical findings     LAB AP LMP

## 2018-01-13 VITALS
HEIGHT: 68 IN | HEART RATE: 68 BPM | DIASTOLIC BLOOD PRESSURE: 64 MMHG | BODY MASS INDEX: 34.98 KG/M2 | WEIGHT: 230.8 LBS | SYSTOLIC BLOOD PRESSURE: 104 MMHG | TEMPERATURE: 96.8 F

## 2018-01-13 VITALS
BODY MASS INDEX: 35.68 KG/M2 | HEIGHT: 68 IN | DIASTOLIC BLOOD PRESSURE: 64 MMHG | SYSTOLIC BLOOD PRESSURE: 100 MMHG | WEIGHT: 235.44 LBS | HEART RATE: 68 BPM

## 2018-01-13 VITALS
DIASTOLIC BLOOD PRESSURE: 66 MMHG | BODY MASS INDEX: 27 KG/M2 | WEIGHT: 172 LBS | HEIGHT: 67 IN | SYSTOLIC BLOOD PRESSURE: 116 MMHG

## 2018-01-13 VITALS — BODY MASS INDEX: 31.7 KG/M2 | WEIGHT: 202.4 LBS

## 2018-01-13 NOTE — PROGRESS NOTES
History of Present Illness  Bariatric MNT [facility] Surgery Screening Preop [facility]:     She was on time  the appointment lasted: 45 minutes  Her surgeon is Dr Delia Jensen  The patient was present at the session  The diagnosis/reason for the appointment is: She has Grade III Obesity with a BMI of 43 4  Diet Order: Nutritional Assesment for Bariatric Surgery  Her goal weight is 197 5#-209 5$    She has the following comorbidities: mixed hyperlipidemia, hypercholesterolemia and arthritis, Hypothyroid, lap elvis, hemorrhoid surgery, knee replacement, Depression  Labs: [facility] She was reminded to have her labs drawn   She does not need to monitor her glucose  She does not have a meter to test her blood glucose  Exercise Frequency:  She does not exercise  Relationship to food: She eats when she is bored and grazes  She knows the difference between being comfortably full and uncomfortably full and knows the difference between being stuffed and comfortably full  She felt/thought they felt her best at 150-180# pounds she last weighed this 28years old  She completed a food journal She drinks 3 cups of water daily She drinks 0 caffienated beverages daily Her motivators are:pt wants to improve health and quality of life  Her obesity/being overweight is related to positive energy balance and is evidenced by: BMI=43 4, pt reports sedentary lifestyle and overeating/grazing  Knowledge Deficit Prior to Education  She is new to the diet  Barriers to education:  She has no barriers to education  Medical Nutrition Therapy Intervention: Individualized Meal Plan, Daily Food /Exercise Diary, Lifestyle /Behavior Modification Techniques, Basic Pathophysiology of Obesity, Checklist of the Overview of Lesson Plans and Surgical changes to Stomach/GI  Area's Reviewed: Post - surgery goal weight, Web forum, Lifestyle Cledaniela Hook Modification Techniques, [employer] in [place] and Pre- surgery goals Angie Cools     Brief Review of Vitamins, diet progression for post-op and Pathophysiology of Obesity  Her comprehension of the presented material was good  Her receptivity to the presented material was good  Her motivation was good  Provided: Nutrition Guidelines for Gastric Surgery, Bariatric Program Pre- Surgery Nutrition and Goals  Goals: Her set goal for physical activity is walk , for 30-60 minutes, 5-7 days a week  Review [employer] in [place]   Post Surgery: She will adhere to the diet progression: remain hydrated, consume adequate protein; and take vitamins as outlined in guidelines  Patient is a 64year old who is here for nutritional evaluation for weight loss surgery  I reviewed co-morbidities and medications with patient  She first recalls having problems with weight gain at the age of [age]  She has dieted in the past with variable success but would regain the weight back  (Refer to diet history for details)  For her personal pre-op goals she will: begin practicing 30/60 rule and begin walking daily  She was not interested in working on a specific number of calories, but plans to food journal to track her intake  She was instructed on the importance of consistent vitamin and mineral intake after her surgery to prevent deficiencies  She currently takes 2000 IU Vitamin D3, 100mg vitamin B6, 100mg Vitamin B1, 1000mcg Vitamin B12, 1200mg Fish Oil twice daily, 500mg Vitamin C twice daily, 1200 mg Calcium daily, cranberry supplement 3 times daily, and 65mg Iron daily  Reviewed importance of support after surgery and discussed the [employer], [employer], pep rally and support group  Patient states adequate knowledge of nutrition, exercise and behavior modification required for long term success  Pt  is recommended for surgery and is aware to practice lifestyle modification, complete all six lesson plans in bariatric manual, and complete two-week total liquid diet to lose weight prior to surgery   Patient is aware that she has 6 months of weigh-ins required by her insurance  Recommendations: She was provided contact information for any questions  She is recommended for surgery  ~He/She needs additional education  She states adequate knowledge of nutrition, exercise, and behavior modification  She will attend a Team Meeting approximately 2-3 weeks prior to surgery  Active Problems    1  Acute bronchitis, unspecified organism (466 0) (J20 9)   2  Depression with anxiety (300 4) (F41 8)   3  Dyslipidemia (272 4) (E78 5)   4  Elevated fasting glucose (790 21) (R73 01)   5  Encounter for routine gynecological examination (V72 31) (Z01 419)   6  Encounter for screening mammogram for malignant neoplasm of breast (V76 12)   (Z12 31)   7  Fatigue (780 79) (R53 83)   8  Hypothyroidism (244 9) (E03 9)   9  Insomnia (780 52) (G47 00)   10  Irritable bowel syndrome (564 1) (K58 9)   11  Joint pain, knee (719 46) (M25 569)   12  Migraine headache (346 90) (G43 909)   13  Morbid obesity (278 01) (E66 01)   14  Pap smear, as part of routine gynecological examination (V76 2) (Z01 419)   15  Screening for lipoid disorders (V77 91) (Z13 220)   16  Seborrheic keratosis (702 19) (L82 1)   17  Skin lesions (709 9) (L98 9)   18  Transient ischemic attack (435 9) (G45 9)   19  Urge incontinence of urine (788 31) (N39 41)   20  Visit for pre-operative examination (V72 84) (Z01 818)   21  Visit for screening mammogram (V76 12) (Z12 31)   22  Vulvar atrophy (624 1) (N90 5)   23  Well adult on routine health check (V70 0) (Z00 00)    Past Medical History    1  History of Acute upper respiratory infection (465 9) (J06 9)   2  History of Allergic dermatitis (692 9) (L23 9)   3  History of Encounter for screening mammogram for malignant neoplasm of breast   (V76 12) (Z12 31)   4  History of acute bronchitis (V12 69) (Z87 09)   5  History of acute conjunctivitis (V12 49) (Z86 69)   6  History of arthritis (V13 4) (Z87 39)   7  History of insomnia (V13 89) (Z87 898)   8   History of Myalgia (729 1) (M79 1)   9  History of Pap smear, as part of routine gynecological examination (V76 2) (Z01 419)    Surgical History    1  History of Cataract Surgery   2  History of Cholecystectomy Laparoscopic   3  History of Eye Surgery   4  History of Hemorrhoidectomy   5  History of Knee Surgery   6  History of Knee Surgery Left    Family History  Mother    1  Family history of Diabetes Mellitus (V18 0)   2  Family history of Skin Cancer (V16 8)  Father    3  Family history of Alzheimer Disease   4  Family history of Congestive Heart Failure   5  Family history of Diabetes Mellitus (V18 0)  Maternal Grandmother    6  Family history of Adenocarcinoma In Situ In Villous Adenoma Of The Breast    Social History    · Denied: History of Alcohol Use (History)   · Daily Coffee Consumption (___ Cups/Day)   · Denied: History of Drug Use   · Exercise Frequency (Times/Week)   · Never A Smoker   · Two children    Current Meds   1  Phyllis Aspirin 325 MG Oral Tablet Delayed Release; Therapy: (Recorded:19Sep2016) to Recorded   2  Calcium + D TABS; Therapy: (Recorded:18Jun2013) to Recorded   3  Clobetasol Propionate 0 05 % External Cream; apply outside vagina bid for 2 wks; Therapy: 40GXG0573 to (Last LL:36DVE9110)  Requested for: 77UND6806 Ordered   4  Cranberry TABS; Therapy: (Recorded:19Sep2016) to Recorded   5  Diazepam 5 MG Oral Tablet; Therapy: 19HAC0508 to Recorded   6  Dicyclomine HCl - 20 MG Oral Tablet; TAKE 1 TABLET 4 TIMES DAILY AS NEEDED; Therapy: 69TWC5640 to (Evaluate:20Jan2017)  Requested for: 84INU7493; Last   Rx:26Jan2016 Ordered   7  Fish Oil CAPS; Therapy: (Recorded:18Jun2013) to Recorded   8  Iron 65 MG TABS; Therapy: (Recorded:19Sep2016) to Recorded   9  Levothyroxine Sodium 150 MCG Oral Tablet; take 1 tablet by mouth every morning; Therapy: 70ZFP8243 to (Evaluate:20Jan2017)  Requested for: 87ASM9571; Last   Rx:26Jan2016 Ordered   10   Meloxicam 15 MG Oral Tablet; take 1 tablet by mouth daily; Therapy: 47NRI2693 to (Evaluate:13Feb2017)  Requested for: 05OAZ7088; Last    Rx:19Feb2016 Ordered   11  PARoxetine HCl - 20 MG Oral Tablet; take 1 tablet by mouth every day; Therapy: 01OEK3280 to (Evaluate:13Feb2017)  Requested for: 77MAU3466; Last    Rx:19Feb2016 Ordered   12  Propranolol HCl ER 80 MG Oral Capsule Extended Release 24 Hour; take 1 capsule by    mouth daily; Therapy: 06YHD1916 to (Evaluate:20Jan2017)  Requested for: 41ZNK3303; Last    Rx:26Jan2016 Ordered   13  Rizatriptan Benzoate 10 MG Oral Tablet; TAKE 1 TABLET AS NEEDED FOR HEADACHE,    MAY REPEAT IN 2 HOURS, NO MORE THAN 3 TABLETS PER DAY; Therapy: 48SPM2747 to (Last Rx:06Jun2016)  Requested for: 06Jun2016 Ordered   14  Simvastatin 40 MG Oral Tablet; TAKE 1 TABLET BY MOUTH DAILY EVERY EVENING; Therapy: 07BIA1701 to (Evaluate:07Jan2017)  Requested for: 94VQJ6595; Last    Rx:14Jan2016 Ordered   15  Vitamin B1 TABS; Therapy: (Recorded:67Ftv4123) to Recorded   16  Vitamin B-12 1000 MCG Oral Tablet; Therapy: (Recorded:51Bwi3578) to Recorded   17  Vitamin C 500 MG Oral Capsule; Therapy: (Recorded:86Zgn7002) to Recorded   18  YL Vitamin B-6 100 MG Oral Tablet; Therapy: (Recorded:94Skw1306) to Recorded   19  Zolpidem Tartrate 10 MG Oral Tablet; TAKE 1 TABLET BY MOUTH EVERY NIGHT AT    BEDTIME; Therapy: 65RPQ9047 to (Evaluate:11Feb2017)  Requested for: 44Mnj8479; Last    Rx:23Hcb5830 Ordered    Allergies    1  No Known Drug Allergies    2  No Known Environmental Allergies   3  No Known Food Allergies    Vitals  Signs   Recorded: 81YJZ3592 11:16AM   Height: 5 ft 7 5 in  Weight: 281 lb 8 oz  BMI Calculated: 43 44  BSA Calculated: 2 35    Future Appointments    Date/Time Provider Specialty Site   10/14/2016 10:30 AM HENRI Lee  General Surgery [facility]   11/15/2016 09:45 AM HENRI Agarwal   Bariatric Medicine [facility]   08/14/2017 10:30 AM Renae Rojas, Sonja Alvarez MD Obstetrics/Gynecology [facility]   01/27/2017 11:00 AM Ailyn Fletcher DO Family Medicine [facility]     Signatures   Electronically signed by : SHONDA Bolivar RD; Sep 19 2016 11:16AM EST                       (Author)    Electronically signed by : HENRI Tierney ; Sep 22 2016  1:03PM EST                       (Validation)

## 2018-01-13 NOTE — MISCELLANEOUS
Message  3/31/2017 @ 1130- post op follow up phone call completed  Pt is sipping liquids, using IS as instructed, reinforced importance of using IS to help prevent pneumonia  Ambulating about home without difficulty  Minimal pain, not using Percocet  Reaffirmed examples of liquid diet over the next week  Pt stated understanding about discharge instructions and medication adjustments  Pt tolerating self administration of lovenox without difficulty  Pt educated [facility] [id number] [facility]  Follow up appt with surgeon scheduled for next week  Instructed to call with any additional questions or concerns  Active Problems    1  Acute bronchitis due to other specified organisms (466 0) (J20 8)   2  Blood tests prior to treatment or procedure (V72 63) (Z01 812)   3  Depression with anxiety (300 4) (F41 8)   4  Dyslipidemia (272 4) (E78 5)   5  Elevated fasting glucose (790 21) (R73 01)   6  Encounter for routine gynecological examination (V72 31) (Z01 419)   7  Encounter for screening mammogram for malignant neoplasm of breast (V76 12)   (Z12 31)   8  Hypothyroidism (244 9) (E03 9)   9  Insomnia (780 52) (G47 00)   10  Irritable bowel syndrome (564 1) (K58 9)   11  Joint pain, knee (719 46) (M25 569)   12  Migraine headache (346 90) (G43 909)   13  Morbid or severe obesity due to excess calories (278 01) (E66 01)   14  Obesity (278 00) (E66 9)   15  Pap smear, as part of routine gynecological examination (V76 2) (Z01 419)   16  Preoperative cardiovascular examination (V72 81) (Z01 810)   17  Screening for colon cancer (V76 51) (Z12 11)   18  Screening for lipoid disorders (V77 91) (Z13 220)   19  Seborrheic keratosis (702 19) (L82 1)   20  Skin lesions (709 9) (L98 9)   21  Transient ischemic attack (435 9) (G45 9)   22  Urge incontinence of urine (788 31) (N39 41)   23  Visit for pre-operative examination (V72 84) (Z01 818)   24  Visit for screening mammogram (V76 12) (Z12 31)   25   Vulvar atrophy (624 1) AT    BEDTIME; Therapy: 71OWF8527 to (Minesh Ochoa)  Requested for: 21RDT1004; Last    Rx:27Jan2017 Ordered    Allergies    1  No Known Drug Allergies    2  No Known Environmental Allergies   3   No Known Food Allergies    Signatures   Electronically signed by : Yisel Zepeda, ; Mar 31 2017 11:37AM EST                       (Author)

## 2018-01-13 NOTE — RESULT NOTES
Discussion/Summary   Your August labs have been reviewed  Your Ferritin (iron stores) looks hihg at 445  This level can be up at times when there is inflammation in the body and may not be an "iron problem"  I would like to repeat your full iron studies in a couple months and will give you a lab slip for this at your September visit  IF you are taking any vitamins other than recommended bariatric supplements, please bring the bottles in so that we can review it with you at your visit  Your folate level- a B vitamin is high at > 20-this is generally safe and o k  Your hemoglobin A 1c is a report card of your blood sugar control over the past 3 months  Your level is in the ânot diabetic rangeâ at 5 2% -this is good unless you are having symptoms of low blood sugars-like feeling shaky between meals which is improved with eating  You can also review this with your PCP or Endocrinologist at a routine visit      Your albumin level is slightly low  at 3 4   Albumin is a protein in the blood which can be affected by many things  SOMETIMES it means you are not eating enough protein in your diet  Please keep your routine office visit  If you do not have a scheduled routine appointment, please call the office to schedule it  Our office number is [phone]  If you have questions about your results, this will be discussed with you at your upcoming  visit  If you have gotten your most recent labs after your recent visit and have questions, please call the office  I look forward to seeing you at your next visit  Most men need at least 70 grams of protein in their diet daily  Most women need at least 60 grams of protein daily          Verified Results  (1) CBC/ PLT (NO DIFF) 73IVH2541 08:08AM [place] [facility] Order Number: [id number]     Test Name Result Flag Reference   HEMATOCRIT 38 1 %  34 8-46 1   HEMOGLOBIN 12 7 g/dL  11 5-15 4   MCHC 33 3 g/dL  31 4-37 4   MCH 30 8 pg 26 8-34 3   MCV 92 fL  82-98   PLATELET COUNT 386 Thousands/uL  149-390   RBC COUNT 4 13 Million/uL  3 81-5 12   RDW 13 7 %  11 6-15 1   WBC COUNT 5 00 Thousand/uL  4 31-10 16   MPV 11 8 fL  8 9-12 7     (1) COMPREHENSIVE METABOLIC PANEL 02BKB3822 38:62WU [facility] Order Number: [id number]     Test Name Result Flag Reference   SODIUM 141 mmol/L  136-145   POTASSIUM 3 7 mmol/L  3 5-5 3   CHLORIDE 107 mmol/L  100-108   CARBON DIOXIDE 24 mmol/L  21-32   ANION GAP (CALC) 10 mmol/L  4-13   BLOOD UREA NITROGEN 16 mg/dL  5-25   CREATININE 0 63 mg/dL  0 60-1 30   Standardized to IDMS reference method   CALCIUM 9 6 mg/dL  8 3-10 1   BILI, TOTAL 0 81 mg/dL  0 20-1 00   ALK PHOSPHATAS 75 U/L     ALT (SGPT) 15 U/L  12-78   Specimen collection should occur prior to Sulfasalazine and/or Sulfapyridine administration due to the potential for falsely depressed results  AST(SGOT) 17 U/L  5-45   Specimen collection should occur prior to Sulfasalazine administration due to the potential for falsely depressed results  ALBUMIN 3 4 g/dL L 3 5-5 0   TOTAL PROTEIN 7 0 g/dL  6 4-8 2   eGFR 96 ml/min/1 73sq m     [facility] Disease Education Program recommendations are as follows:  GFR calculation is accurate only with a steady state creatinine  Chronic Kidney disease less than 60 ml/min/1 73 sq  meters  Kidney failure less than 15 ml/min/1 73 sq  meters  GLUCOSE FASTING 79 mg/dL  65-99   Specimen collection should occur prior to Sulfasalazine administration due to the potential for falsely depressed results  Specimen collection should occur prior to Sulfapyridine administration due to the potential for falsely elevated results       (1) FERRITIN [id number] 08:08AM [facility] Order Number: [id number]     Test Name Result Flag Reference   FERRITIN 445 ng/mL H 8-388     (1) FOLATE [id number] 08:08AM [facility] Order Number: [id number]     Test Name Result Flag Reference   FOLATE >20 0 ng/mL H 3 1-17 5     (1) HEMOGLOBIN A1C 14Aug2017 08:08AM [facility] Do Order Number: [id number]     Test Name Result Flag Reference   HEMOGLOBIN A1C 5 2 %  4 2-6 3   EST  AVG  GLUCOSE 103 mg/dl       (1) LIPID PANEL, FASTING 14Aug2017 08:08AM [facility] Order Number: [id number]     Test Name Result Flag Reference   CHOLESTEROL 139 mg/dL     HDL,DIRECT 47 mg/dL  40-60   Specimen collection should occur prior to Metamizole administration due to the potential for falsley depressed results  LDL CHOLESTEROL CALCULATED 68 mg/dL  0-100   Triglyceride:        Normal ??? ??? ??? ??? ??? ??? ??? <150 mg/dl   ??? ??? ???Borderline High ??? ??? 150-199 mg/dl   ??? ??? ? ?? High ??? ??? ??? ??? ??? ??? ??? 200-499 mg/dl   ??? ??? ? ??Very High ??? ??? ??? ??? ??? >499 mg/dl      Cholesterol:       Desirable ??? ??? ??? ??? <200 mg/dl   ??? ??? Borderline High ??? 200-239 mg/dl   ??? ??? High ??? ??? ??? ??? ??? ??? >239 mg/dl      HDL Cholesterol:       High ??? ???>59 mg/dL   ??? ??? Low ??? ??? <41 mg/dL      This screening LDL is a calculated result  It does not have the accuracy of the Direct Measured LDL in the monitoring of patients with hyperlipidemia and/or statin therapy  Direct Measure LDL (FTJ693) must be ordered separately in these patients  TRIGLYCERIDES 122 mg/dL  <=150   Specimen collection should occur prior to N-Acetylcysteine or Metamizole administration due to the potential for falsely depressed results       (1) PTH N-TERMINAL (INTACT) 39Idq8980 08:08AM [facility] Do Order Number: [id number]     Test Name Result Flag Reference   PARATHYROID HORMONE INTACT 32 7 pg/mL  14 0-72 0     (1) VITAMIN A 71Nvn5771 08:08AM Amber [facility]oca Order Number: [id number]     Test Name Result Flag Reference   VITAMIN A 32 ug/dL  26 - 82   Performed at:  [address] [address] [id number]  : Nils Isbell MD, Phone:  [phone]     (1) VITAMIN B1, WHOLE BLOOD 46JFS0361 08:08AM [facility] Order Number: [id number]     Test Name Result Flag Reference   VITAMIN B1, WHOLE BLOOD 176 0 nmol/L  66 5 - 200 0   Performed at:  [address] [facility]  [address]  [id number]  : Nils Isbell MD, Phone:  [phone]     (1) VITAMIN B12 62MVF7692 08:08AM [facility] Order Number: [id number]     Test Name Result Flag Reference   VITAMIN B12 732 pg/mL  100-900     (1) VITAMIN D 25-HYDROXY [id number] 08:08AM [facility] Order Number: [id number]     Test Name Result Flag Reference   VIT D 25-HYDROX 63 8 ng/mL  30 0-100 0   This assay is a certified procedure of the CDC Vitamin D Standardization Certification Program (VDSCP)     Deficiency <20ng/ml   Insufficiency 20-30ng/ml   Sufficient  ng/ml     *Patients undergoing fluorescein dye angiography may retain small amounts of fluorescein in the body for 48-72 hours post procedure  Samples containing fluorescein can produce falsely elevated Vitamin D values  If the patient had this procedure, a specimen should be resubmitted post fluorescein clearance

## 2018-01-14 VITALS
BODY MASS INDEX: 27.25 KG/M2 | WEIGHT: 173.6 LBS | RESPIRATION RATE: 16 BRPM | HEIGHT: 67 IN | HEART RATE: 68 BPM | SYSTOLIC BLOOD PRESSURE: 86 MMHG | DIASTOLIC BLOOD PRESSURE: 56 MMHG

## 2018-01-14 VITALS
SYSTOLIC BLOOD PRESSURE: 102 MMHG | RESPIRATION RATE: 16 BRPM | BODY MASS INDEX: 36.18 KG/M2 | HEIGHT: 67 IN | TEMPERATURE: 98 F | WEIGHT: 230.5 LBS | HEART RATE: 64 BPM | DIASTOLIC BLOOD PRESSURE: 80 MMHG

## 2018-01-14 VITALS
HEIGHT: 67 IN | OXYGEN SATURATION: 99 % | TEMPERATURE: 97.8 F | SYSTOLIC BLOOD PRESSURE: 120 MMHG | HEART RATE: 76 BPM | DIASTOLIC BLOOD PRESSURE: 82 MMHG | RESPIRATION RATE: 16 BRPM | WEIGHT: 158.38 LBS | BODY MASS INDEX: 24.86 KG/M2

## 2018-01-14 VITALS — BODY MASS INDEX: 35.46 KG/M2 | HEIGHT: 68 IN | WEIGHT: 234 LBS

## 2018-01-14 VITALS
TEMPERATURE: 97.9 F | DIASTOLIC BLOOD PRESSURE: 70 MMHG | HEART RATE: 76 BPM | BODY MASS INDEX: 35.2 KG/M2 | WEIGHT: 232.25 LBS | HEIGHT: 68 IN | SYSTOLIC BLOOD PRESSURE: 100 MMHG | RESPIRATION RATE: 20 BRPM

## 2018-01-14 NOTE — PROGRESS NOTES
Assessment    1  Well adult on routine health check (V70 0) (Z00 00)    Plan  Depression with anxiety    · PARoxetine HCl - 20 MG Oral Tablet; take 1 tablet by mouth every day  Dyslipidemia    · Simvastatin 40 MG Oral Tablet; TAKE 1 TABLET BY MOUTH DAILY EVERY  EVENING  Hypothyroidism    · Levothyroxine Sodium 150 MCG Oral Tablet; take 1 tablet by mouth every  morning  Insomnia    · Zolpidem Tartrate 10 MG Oral Tablet; TAKE 1 TABLET BY MOUTH EVERY  NIGHT AT BEDTIME  Joint pain, knee    · Meloxicam 15 MG Oral Tablet; take 1 tablet by mouth daily  Migraine headache    · Propranolol HCl ER 80 MG Oral Capsule Extended Release 24 Hour; take 1  capsule by mouth daily   · Rizatriptan Benzoate 10 MG Oral Tablet (Maxalt); TAKE 1 TABLET AS NEEDED  FOR HEADACHE, MAY REPEAT IN 2 HOURS, NO MORE THAN 3 TABLETS PER DAY  Well adult on routine health check    · Follow-up visit in 1 year Evaluation and Treatment  Follow-up  Status: Hold For -  Scheduling  Requested for: 45XND5775   · Decreasing the stress in your life may help your condition improve ; Status:Complete;    Done: 12SUJ7412 11:19AM   · Eat a low fat and low cholesterol diet ; Status:Complete;   Done: 07HQR1150 11:19AM   · There are many exercise options for seniors ; Status:Complete;   Done: 07CCZ2844  11:19AM   · Use a sun block product with an SPF of 15 or more ; Status:Complete;   Done:  93VRE4711 11:19AM   · Call [phone] if: You find a new or different kind of lump in your breast ;  Status:Complete;   Done: 18YMO5595 11:19AM   · Call [phone] if: You have any bleeding from the vagina ; Status:Complete;    Done: 03DUK0060 11:19AM   · Call [phone] if: You have any warning signs of skin cancer ; Status:Complete;    Done: 00NIC6743 11:19AM   · Call 911 if:  You experience a new kind of chest pain (angina) or pressure ;  Status:Complete;   Done: 22WSG5776 11:19AM    Discussion/Summary  health maintenance visit Currently, she eats a healthy diet and has an adequate exercise regimen  cervical cancer screening is current Breast cancer screening: mammogram is current  Advice and education were given regarding aerobic exercise  Chief Complaint  Patient here for Annual Wellness exam      History of Present Illness  HM, Adult Female: The patient is being seen for a health maintenance evaluation  General Health: The patient's health since the last visit is described as good  She has regular dental visits  She denies vision problems  She denies hearing loss  Immunizations status: up to date  Lifestyle:  She consumes a diverse and healthy diet  She exercises regularly  She does not use tobacco  She denies alcohol use  She denies drug use  Reproductive health: the patient is postmenopausal    Screening: cancer screening reviewed and updated  Cervical cancer screening includes a pap smear performed last year  Breast cancer screening includes a mammogram performed last year  Colorectal cancer screening includes a colonoscopy performed within the past ten years  metabolic screening reviewed and updated  Metabolic screening includes lipid profile performed within the past five years, glucose screening performed last year and thyroid function test performed last year  Review of Systems    Constitutional: as noted in HPI  Eyes: No complaints of eye pain, no red eyes, no eyesight problems, no discharge, no dry eyes, no itching of eyes  ENT: no complaints of earache, no loss of hearing, no nose bleeds, no nasal discharge, no sore throat, no hoarseness  Cardiovascular: No complaints of slow heart rate, no fast heart rate, no chest pain, no palpitations, no leg claudication, no lower extremity edema  Respiratory: No complaints of shortness of breath, no wheezing, no cough, no SOB on exertion, no orthopnea, no PND  Gastrointestinal: No complaints of abdominal pain, no constipation, no nausea or vomiting, no diarrhea, no bloody stools     Genitourinary: No complaints of dysuria, no incontinence, no pelvic pain, no dysmenorrhea, no vaginal discharge or bleeding  Musculoskeletal: No complaints of arthralgias, no myalgias, no joint swelling or stiffness, no limb pain or swelling  Integumentary: No complaints of skin rash or lesions, no itching, no skin wounds, no breast pain or lump  Neurological: No complaints of headache, no confusion, no convulsions, no numbness, no dizziness or fainting, no tingling, no limb weakness, no difficulty walking  Psychiatric: Not suicidal, no sleep disturbance, no anxiety or depression, no change in personality, no emotional problems  Endocrine: No complaints of proptosis, no hot flashes, no muscle weakness, no deepening of the voice, no feelings of weakness  Hematologic/Lymphatic: No complaints of swollen glands, no swollen glands in the neck, does not bleed easily, does not bruise easily  Active Problems    1  Blood tests prior to treatment or procedure (V72 63) (Z01 812)   2  Depression with anxiety (300 4) (F41 8)   3  Dyslipidemia (272 4) (E78 5)   4  Elevated fasting glucose (790 21) (R73 01)   5  Encounter for routine gynecological examination (V72 31) (Z01 419)   6  Encounter for screening mammogram for malignant neoplasm of breast (V76 12)   (Z12 31)   7  Fatigue (780 79) (R53 83)   8  Hypothyroidism (244 9) (E03 9)   9  Insomnia (780 52) (G47 00)   10  Irritable bowel syndrome (564 1) (K58 9)   11  Joint pain, knee (719 46) (M25 569)   12  Migraine headache (346 90) (G43 909)   13  Morbid obesity (278 01) (E66 01)   14  Pap smear, as part of routine gynecological examination (V76 2) (Z01 419)   15  Screening for lipoid disorders (V77 91) (Z13 220)   16  Seborrheic keratosis (702 19) (L82 1)   17  Skin lesions (709 9) (L98 9)   18  Transient ischemic attack (435 9) (G45 9)   19  Urge incontinence of urine (788 31) (N39 41)   20  Visit for pre-operative examination (V72 84) (Z01 818)   21   Visit for screening mammogram (V76 12) (Z12 31)   22  Vulvar atrophy (624 1) (N90 5)   23  Well adult on routine health check (V70 0) (Z00 00)    Past Medical History    · History of Acute upper respiratory infection (465 9) (J06 9)   · History of Allergic dermatitis (692 9) (L23 9)   · History of Encounter for screening mammogram for malignant neoplasm of breast  (V76 12) (Z12 31)   · History of acute bronchitis (V12 69) (Z87 09)   · History of acute conjunctivitis (V12 49) (Z86 69)   · History of arthritis (V13 4) (Z87 39)   · History of insomnia (V13 89) (Z87 898)   · History of Myalgia (729 1) (M79 1)   · History of Pap smear, as part of routine gynecological examination (V76 2) (Z01 419)    Surgical History    · History of Cataract Surgery   · History of Cholecystectomy Laparoscopic   · History of Eye Surgery   · History of Hemorrhoidectomy   · History of Knee Surgery   · History of Knee Surgery Left    Family History  Mother    · Family history of Diabetes Mellitus (V18 0)   · Family history of Skin Cancer (V16 8)  Father    · Family history of Alzheimer Disease   · Family history of Congestive Heart Failure   · Family history of Diabetes Mellitus (V18 0)  Maternal Grandmother    · Family history of Adenocarcinoma In Situ In Villous Adenoma Of The Breast    Social History    · Denied: History of Alcohol Use (History)   · Daily Coffee Consumption (___ Cups/Day)   · Denied: History of Drug Use   · Exercise Frequency (Times/Week)   · Never A Smoker   · Two children    Current Meds   1  Phyllis Aspirin 325 MG TBEC; Therapy: (Recorded:19Sep2016) to Recorded   2  Calcium + D TABS; Therapy: (Recorded:18Jun2013) to Recorded   3  Clobetasol Propionate 0 05 % External Cream; apply outside vagina bid for 2 wks; Therapy: 85NSZ7968 to (Last KL:27HOE3884)  Requested for: 89BRQ5142 Ordered   4  Cranberry TABS; Therapy: (Recorded:19Sep2016) to Recorded   5  Fish Oil CAPS; Therapy: (Recorded:18Jun2013) to Recorded   6   Iron 65 MG TABS; Therapy: (Recorded:32Bzc2080) to Recorded   7  Levothyroxine Sodium 150 MCG Oral Tablet; take 1 tablet by mouth every morning; Therapy: 69CAM4651 to (Evaluate:20Jan2017)  Requested for: 59SJB7816; Last   Rx:26Jan2016 Ordered   8  Meloxicam 15 MG Oral Tablet; take 1 tablet by mouth daily; Therapy: 91UIC3084 to (Evaluate:13Feb2017)  Requested for: 91TQI2036; Last   Rx:19Feb2016 Ordered   9  PARoxetine HCl - 20 MG Oral Tablet; take 1 tablet by mouth every day; Therapy: 62JSF8339 to ([phone])  Requested for: 16UTD7337; Last   Rx:19Feb2016 Ordered   10  Propranolol HCl ER 80 MG Oral Capsule Extended Release 24 Hour; take 1 capsule by    mouth daily; Therapy: 06XCL0894 to (Evaluate:20Jan2017)  Requested for: 56OEQ8489; Last    Rx:26Jan2016 Ordered   11  Rizatriptan Benzoate 10 MG Oral Tablet; TAKE 1 TABLET AS NEEDED FOR    HEADACHE, MAY REPEAT IN 2 HOURS, NO MORE THAN 3 TABLETS PER DAY; Therapy: 33TIO4548 to (Last Rx:06Jun2016)  Requested for: 06Jun2016 Ordered   12  Simvastatin 40 MG Oral Tablet; TAKE 1 TABLET BY MOUTH DAILY EVERY EVENING; Therapy: 36WYK0704 to (Evaluate:07Jan2017)  Requested for: 84AEX5970; Last    Rx:14Jan2016 Ordered   13  Vitamin B1 TABS; Therapy: (Recorded:58Aes0373) to Recorded   14  Vitamin B-12 1000 MCG Oral Tablet; Therapy: (Recorded:65Ndh9846) to Recorded   15  Vitamin C 500 MG Oral Capsule; Therapy: (Recorded:77Pmc8435) to Recorded   16  YL Vitamin B-6 100 MG Oral Tablet; Therapy: (Recorded:18Jun2013) to Recorded   17  Zolpidem Tartrate 10 MG Oral Tablet; TAKE 1 TABLET BY MOUTH EVERY NIGHT AT    BEDTIME; Therapy: 42SJU8094 to (Evaluate:11Feb2017)  Requested for: 37Okt8395; Last    Rx:87Agy3551 Ordered    Allergies    1  No Known Drug Allergies    2  No Known Environmental Allergies   3   No Known Food Allergies    Vitals   Recorded: 26HIN7562 10:59AM   Heart Rate 76   Respiration 18   Systolic 113   Diastolic 70   Height 5 ft 7 24 in   Weight 236 lb 4 oz BMI Calculated 36 73   BSA Calculated 2 18     Physical Exam    Constitutional   General appearance: No acute distress, well appearing and well nourished  Head and Face   Head and face: Normal     Eyes   Conjunctiva and lids: No swelling, erythema or discharge  Pupils and irises: Equal, round, reactive to light  Ears, Nose, Mouth, and Throat   External inspection of ears and nose: Normal     Otoscopic examination: Tympanic membranes translucent with normal light reflex  Canals patent without erythema  Hearing: Normal     Nasal mucosa, septum, and turbinates: Normal without edema or erythema  Lips, teeth, and gums: Normal, good dentition  Oropharynx: Normal with no erythema, edema, exudate or lesions  Neck   Neck: Supple, symmetric, trachea midline, no masses  Thyroid: Normal, no thyromegaly  Pulmonary   Respiratory effort: No increased work of breathing or signs of respiratory distress  Auscultation of lungs: Clear to auscultation  Cardiovascular   Auscultation of heart: Normal rate and rhythm, normal S1 and S2, no murmurs  Examination of extremities for edema and/or varicosities: Normal     Abdomen   Abdomen: Non-tender, no masses  Liver and spleen: No hepatomegaly or splenomegaly  Lymphatic   Palpation of lymph nodes in neck: No lymphadenopathy  Palpation of lymph nodes in axillae: No lymphadenopathy  Musculoskeletal   Gait and station: Normal     Digits and nails: Normal without clubbing or cyanosis  Joints, bones, and muscles: Normal     Range of motion: Normal     Stability: Normal     Muscle strength/tone: Normal     Skin   Skin and subcutaneous tissue: Normal without rashes or lesions  Palpation of skin and subcutaneous tissue: Normal turgor  Neurologic   Cranial nerves: Cranial nerves II-XII intact  Cortical function: Normal mental status  Reflexes: 2+ and symmetric  Sensation: No sensory loss      Coordination: Normal finger to nose and heel to shin  Psychiatric   Judgment and insight: Normal     Orientation to person, place, and time: Normal     Recent and remote memory: Intact  Mood and affect: Normal        Future Appointments    Date/Time Provider Specialty Site   02/24/2017 01:00 PM HENRI Perez  Cardiology [facility] CARDIOLOGY [place]   03/17/2017 10:00 AM HENRI Smalls   Bariatric Medicine [facility]   08/14/2017 10:30 AM Sully Castillo MD Obstetrics/Gynecology [facility] OB     Signatures   Electronically signed by : Salvatore Martel DO; Jan 27 2017 11:20AM EST                       (Author)

## 2018-01-14 NOTE — MISCELLANEOUS
Chief Complaint  Chief Complaint Free Text Note Form: pt was in the hospital and DC 3/28/2017      History of Present Illness  TCM Communication [facility]: The patient is being contacted for follow-up after hospitalization and 4/6/2017  She was hospitalized at [facility]  The date of discharge: 3/28/2017  Diagnosis: follow up on gastro surgery  She was discharged to home  Communication performed and completed by      Active Problems    1  Acute bronchitis due to other specified organisms (466 0) (J20 8)   2  Blood tests prior to treatment or procedure (V72 63) (Z01 812)   3  Depression with anxiety (300 4) (F41 8)   4  Dyslipidemia (272 4) (E78 5)   5  Elevated fasting glucose (790 21) (R73 01)   6  Encounter for routine gynecological examination (V72 31) (Z01 419)   7  Encounter for screening mammogram for malignant neoplasm of breast (V76 12)   (Z12 31)   8  Hypothyroidism (244 9) (E03 9)   9  Insomnia (780 52) (G47 00)   10  Irritable bowel syndrome (564 1) (K58 9)   11  Joint pain, knee (719 46) (M25 569)   12  Migraine headache (346 90) (G43 909)   13  Morbid or severe obesity due to excess calories (278 01) (E66 01)   14  Obesity (278 00) (E66 9)   15  Pap smear, as part of routine gynecological examination (V76 2) (Z01 419)   16  Preoperative cardiovascular examination (V72 81) (Z01 810)   17  Screening for colon cancer (V76 51) (Z12 11)   18  Screening for lipoid disorders (V77 91) (Z13 220)   19  Seborrheic keratosis (702 19) (L82 1)   20  Skin lesions (709 9) (L98 9)   21  Transient ischemic attack (435 9) (G45 9)   22  Urge incontinence of urine (788 31) (N39 41)   23  Visit for pre-operative examination (V72 84) (Z01 818)   24  Visit for screening mammogram (V76 12) (Z12 31)   25  Vulvar atrophy (624 1) (N90 5)   26  Well adult on routine health check (V70 0) (Z00 00)    Past Medical History    1  History of Acute bronchitis, unspecified organism (466 0) (J20 9)   2   History of Acute upper respiratory infection (465 9) (J06 9)   3  History of Allergic dermatitis (692 9) (L23 9)   4  History of Encounter for screening mammogram for malignant neoplasm of breast   (V76 12) (Z12 31)   5  History of acute bronchitis (V12 69) (Z87 09)   6  History of acute conjunctivitis (V12 49) (Z86 69)   7  History of arthritis (V13 4) (Z87 39)   8  History of fatigue (V13 89) (Z87 898)   9  History of insomnia (V13 89) (Z87 898)   10  History of Myalgia (729 1) (M79 1)   11  History of Pap smear, as part of routine gynecological examination (V76 2) (Z01 419)    Surgical History    1  History of Cataract Surgery   2  History of Cholecystectomy Laparoscopic   3  History of Eye Surgery   4  History of Hemorrhoidectomy   5  History of Knee Surgery   6  History of Knee Surgery Left    Family History  Mother    1  Family history of diabetes mellitus (V18 0) (Z83 3)   2  Family history of skin cancer (V16 8) (Z80 8)  Father    3  Family history of Alzheimer Disease   4  Family history of Congestive Heart Failure   5  Family history of diabetes mellitus (V18 0) (Z83 3)  Maternal Grandmother    6  Family history of Adenocarcinoma In Situ In Villous Adenoma Of The Breast    Social History    · Denied: History of Alcohol Use (History)   · Daily Coffee Consumption (___ Cups/Day)   · Denied: History of Drug Use   · Exercise Frequency (Times/Week)   · Never A Smoker   · Two children    Current Meds   1  Calcium + D TABS; Therapy: (Recorded:18Jun2013) to Recorded   2  Clobetasol Propionate 0 05 % External Cream; apply outside vagina bid for 2 wks; Therapy: 94PPQ8711 to (Last DC:96IGA6607)  Requested for: 87LIZ4328 Ordered   3  Enoxaparin Sodium 40 MG/0 4ML Subcutaneous Solution; inject once daily for 2 weeks   post surgery; Therapy: 71YQQ3693 to (Last Rx:23Mar2017)  Requested for: 23Mar2017 Ordered   4  Levothyroxine Sodium 137 MCG Oral Tablet; TAKE 1 TABLET DAILY ON AN EMPTY   STOMACH;    Therapy: 52CKU7572 to (Last Rx:46Nso0568)  Requested for: 74VDG3764 Ordered   5  Meloxicam 15 MG Oral Tablet; take 1 tablet by mouth daily; Therapy: 55DDS5373 to (SZMXEJHZ:26OEB3804)  Requested for: 29RAI0379; Last   Rx:2017 Ordered   6  Omeprazole 20 MG Oral Capsule Delayed Release; TAKE ONE CAPSULE BY MOUTH   EVERY DAY; Therapy: 74WYI7329 to (Evaluate:53Tvk6823)  Requested for: 17LAF3537; Last   Rx:2017 Ordered   7  PARoxetine HCl - 20 MG Oral Tablet; take 1 tablet by mouth every day; Therapy: 39SCK0974 to (GSMRKFOF:64CUJ6892)  Requested for: 62HZD0773; Last   Rx:2017 Ordered   8  Propranolol HCl - 80 MG Oral Tablet; Take 1 tablet daily; Therapy: 45ZUX1150 to (Evaluate:55Nzb1461)  Requested for: 35JLE9631; Last   Rx:2017 Ordered   9  Rizatriptan Benzoate 10 MG Oral Tablet; TAKE 1 TABLET AS NEEDED FOR HEADACHE,   MAY REPEAT IN 2 HOURS, NO MORE THAN 3 TABLETS PER DAY; Therapy: 93CQR0356 to (Last Rx:2017)  Requested for: 2017 Ordered   10  Simvastatin 40 MG Oral Tablet; TAKE 1 TABLET BY MOUTH DAILY EVERY EVENING; Therapy: 99PHQ5760 to ([id number])  Requested for: 35FHA5399; Last    Rx:2017 Ordered   11  Vitamin C 500 MG Oral Capsule; Therapy: (Recorded:89Kui9366) to Recorded   12  Zolpidem Tartrate 10 MG Oral Tablet; TAKE 1 TABLET BY MOUTH EVERY NIGHT AT    BEDTIME; Therapy: 57ZRY0377 to (Luisito Sima)  Requested for: 56LVC4396; Last    Rx:2017 Ordered    Allergies    1  No Known Drug Allergies    2  No Known Environmental Allergies   3  No Known Food Allergies    Health Management  Screening for colon cancer   COLONOSCOPY; every 5 years; Last 29FRU2294; Next Due: 86EVZ3965; Active    Future Appointments    Date/Time Provider Specialty Site   2017 10:45 AM HENRI Kay   General Surgery [facility]   2017 10:30 AM Cheng Burger MD Obstetrics/Gynecology [facility]   2017 01:15 PM Herberth Holland DO Family Medicine [facility] 05/04/2017 10:30 AM Edad Lawson DO Family Medicine [facility]     Signatures   Electronically signed by : Vanna Hogue DO; Mar 29 2017 12:52PM EST                       (Author)

## 2018-01-15 VITALS
WEIGHT: 217.5 LBS | SYSTOLIC BLOOD PRESSURE: 108 MMHG | DIASTOLIC BLOOD PRESSURE: 66 MMHG | HEART RATE: 70 BPM | HEIGHT: 67 IN | RESPIRATION RATE: 16 BRPM | TEMPERATURE: 98.3 F | BODY MASS INDEX: 34.14 KG/M2

## 2018-01-15 NOTE — RESULT NOTES
Verified Results  (1) FERRITIN 59QOR2928 09:25AM Dougie Handy Order Number: [id number]     Test Name Result Flag Reference   FERRITIN 489 ng/mL H 8-388     (1) TRANSFERRIN 74KEH0850 09:25AM [facility]ie [facility] Order Number: [id number]     Test Name Result Flag Reference   TRANSFERRIN 145 mg/dL L 200-400     (1) IRON 20IZU5077 09:25AM Dougie Handy Order Number: [id number]     Test Name Result Flag Reference   IRON 61 ug/dL     Patients treated with metal-binding drugs (ie  Deferoxamine) may have depressed iron values

## 2018-01-16 NOTE — RESULT NOTES
Verified Results  (1) HEMOGLOBIN A1C 13Kin3791 07:38AM Rossy Hines Order Number: [id number]  TW Order Number: [id number]     Test Name Result Flag Reference   HEMOGLOBIN A1C 5 8 %  4 2-6 3   EST  AVG  GLUCOSE 120 mg/dl       (1) COMPREHENSIVE METABOLIC PANEL 54CXQ4484 36:74QP Rossy Hines Order Number: [id number]  TW Order Number: [id number]     Test Name Result Flag Reference   GLUCOSE,RANDM 108 mg/dL     If the patient is fasting, the ADA then defines impaired fasting glucose as > 100 mg/dL and diabetes as > or equal to 123 mg/dL  SODIUM 140 mmol/L  136-145   POTASSIUM 4 0 mmol/L  3 5-5 3   CHLORIDE 106 mmol/L  100-108   CARBON DIOXIDE 29 mmol/L  21-32   ANION GAP (CALC) 5 mmol/L  4-13   BLOOD UREA NITROGEN 17 mg/dL  5-25   CREATININE 0 90 mg/dL  0 60-1 30   Standardized to IDMS reference method   CALCIUM 9 3 mg/dL  8 3-10 1   BILI, TOTAL 0 79 mg/dL  0 20-1 00   ALK PHOSPHATAS 78 U/L     ALT (SGPT) 46 U/L  12-78   AST(SGOT) 31 U/L  5-45   ALBUMIN 3 2 g/dL L 3 5-5 0   TOTAL PROTEIN 7 4 g/dL  6 4-8 2   eGFR Non-African American      >60 0 ml/min/1 73sq m   [facility] [employer] Disease Education Program recommendations are as follows:  GFR calculation is accurate only with a steady state creatinine  Chronic Kidney disease less than 60 ml/min/1 73 sq  meters  Kidney failure less than 15 ml/min/1 73 sq  meters  Discussion/Summary   OVERALL OK  IN PREDIABETIC RANGE  WILL NEED TO KEEP WATCHING DIET     Jean-Pierre Monzon     Signatures   Electronically signed by : Keturah Chacon DO; Jul 20 2016  7:40AM EST                       (Author)

## 2018-01-16 NOTE — RESULT NOTES
Verified Results  (1) TSH WITH FT4 REFLEX 97UPR3557 09:22AM [facility] Order Number: [id number]     Test Name Result Flag Reference   TSH 0 315 uIU/mL L 0 358-3 740   Patients undergoing fluorescein dye angiography may retain small amounts of fluorescein in the body for 48-72 hours post procedure  Samples containing fluorescein can produce falsely depressed TSH values  If the patient had this procedure,a specimen should be resubmitted post fluorescein clearance            The recommended reference ranges for TSH during pregnancy are as follows:  First trimester 0 1 to 2 5 uIU/mL  Second trimester  0 2 to 3 0 uIU/mL  Third trimester 0 3 to 3 0 uIU/m   T4,FREE 1 60 ng/dL H 0 76-1 46

## 2018-01-17 NOTE — PROGRESS NOTES
Discussion/Summary  Discussion Summary:   5 of 6  Assess: 2 6# wt loss from previous office visit  Total 47 6# wt loss from start of program  No changes in Dx or Rx noted  Pt stated she and her  are walking most days per week and have made many healthy dietary changes  Pt has eliminated desserts, snack foods, and high sugar/high fat foods  Pt has eliminated soda and all caloric/high sugar beverages and now drinks only [employer]  Pt is practicing 30/60 rule and practicing chewing well, eating slowly, and measuring portions  Pt has read entire manual  Pt is taking a daily multivitamin, vitamin D, vitamin B12, and calcium  Pt knows she needs to focus more on adequate hydration and small sips  Pt states she is currently averaging 48-64 oz per day  Diagnose: Overweight/Obesity related to positive energy balance as evidenced by BMI >30 and pt's self-reported energy intake  (Continued-Improving)  Intervene: Congratulated pt on her hard work and lifestyle change successes  Reviewed pre-operative checklist with pt and briefly reviewed nutrition and vitamin chapters in book with pt  Instructed pt to sample chewable multivitamin and calcium supplements prior to surgery  Also reviewed risk of post-operative dehydration, importance of sipping and importance of chewing well and eating slowly  Reviewed workflow with pt: Pt has cardiology appointment scheduled for 2/24/17, EGD scheduled for 3/8/17, and 6 of 6 pre-operative office visit on 3/17/17  Pt verbalized understanding, no further questions at present, expect good compliance  Monitor/Evaluate: Will monitor food journals, weight, pt's reported compliance with goals at next appointment  Pt has contact information for future questions/concerns  Active Problems    1  Blood tests prior to treatment or procedure (V72 63) (Z01 812)   2  Depression with anxiety (300 4) (F41 8)   3  Dyslipidemia (272 4) (E78 5)   4   Elevated fasting glucose (790 21) (R73 01)   5  Encounter for routine gynecological examination (V72 31) (Z01 419)   6  Encounter for screening mammogram for malignant neoplasm of breast (V76 12)   (Z12 31)   7  Fatigue (780 79) (R53 83)   8  Hypothyroidism (244 9) (E03 9)   9  Insomnia (780 52) (G47 00)   10  Irritable bowel syndrome (564 1) (K58 9)   11  Joint pain, knee (719 46) (M25 569)   12  Migraine headache (346 90) (G43 909)   13  Morbid obesity (278 01) (E66 01)   14  Pap smear, as part of routine gynecological examination (V76 2) (Z01 419)   15  Screening for colon cancer (V76 51) (Z12 11)   16  Screening for lipoid disorders (V77 91) (Z13 220)   17  Seborrheic keratosis (702 19) (L82 1)   18  Skin lesions (709 9) (L98 9)   19  Transient ischemic attack (435 9) (G45 9)   20  Urge incontinence of urine (788 31) (N39 41)   21  Visit for pre-operative examination (V72 84) (Z01 818)   22  Visit for screening mammogram (V76 12) (Z12 31)   23  Vulvar atrophy (624 1) (N90 5)   24  Well adult on routine health check (V70 0) (Z00 00)    Past Medical History    1  History of Acute bronchitis, unspecified organism (466 0) (J20 9)   2  History of Acute upper respiratory infection (465 9) (J06 9)   3  History of Allergic dermatitis (692 9) (L23 9)   4  History of Encounter for screening mammogram for malignant neoplasm of breast   (V76 12) (Z12 31)   5  History of acute bronchitis (V12 69) (Z87 09)   6  History of acute conjunctivitis (V12 49) (Z86 69)   7  History of arthritis (V13 4) (Z87 39)   8  History of insomnia (V13 89) (Z87 898)   9  History of Myalgia (729 1) (M79 1)   10  History of Pap smear, as part of routine gynecological examination (V76 2) (Z01 419)    Surgical History    1  History of Cataract Surgery   2  History of Cholecystectomy Laparoscopic   3  History of Eye Surgery   4  History of Hemorrhoidectomy   5  History of Knee Surgery   6  History of Knee Surgery Left    Family History  Mother    1   Family history of Diabetes Mellitus (V18 0)   2  Family history of Skin Cancer (V16 8)  Father    3  Family history of Alzheimer Disease   4  Family history of Congestive Heart Failure   5  Family history of Diabetes Mellitus (V18 0)  Maternal Grandmother    6  Family history of Adenocarcinoma In Situ In Villous Adenoma Of The Breast    Social History    · Denied: History of Alcohol Use (History)   · Daily Coffee Consumption (___ Cups/Day)   · Denied: History of Drug Use   · Exercise Frequency (Times/Week)   · Never A Smoker   · Two children    Current Meds   1  Phyllis Aspirin 325 MG TBEC; Therapy: (Recorded:89Gkd9835) to Recorded   2  Calcium + D TABS; Therapy: (Recorded:18Jun2013) to Recorded   3  Clobetasol Propionate 0 05 % External Cream; apply outside vagina bid for 2 wks; Therapy: 99KTQ5802 to (Last XV:60FGU2764)  Requested for: 56URW6507 Ordered   4  Cranberry TABS; Therapy: (Recorded:20Bjv9682) to Recorded   5  Fish Oil CAPS; Therapy: (Recorded:18Jun2013) to Recorded   6  Iron 65 MG TABS; Therapy: (Recorded:80Wdd7532) to Recorded   7  Levothyroxine Sodium 137 MCG Oral Tablet; TAKE 1 TABLET DAILY ON AN EMPTY   STOMACH; Therapy: 31FYY0023 to (Last Rx:27Fus1891)  Requested for: 27EPM1954 Ordered   8  Meloxicam 15 MG Oral Tablet; take 1 tablet by mouth daily; Therapy: 73VNM2079 to (QGUUPAWE:90YRM5635)  Requested for: 55OVL0231; Last   Rx:27Jan2017 Ordered   9  PARoxetine HCl - 20 MG Oral Tablet; take 1 tablet by mouth every day; Therapy: 52RQA4224 to (YPTYXWBT:58KLL9127)  Requested for: 32VEO2836; Last   Rx:27Jan2017 Ordered   10  Propranolol HCl ER 80 MG Oral Capsule Extended Release 24 Hour; take 1 capsule by    mouth daily; Therapy: 11BWE1863 to (TCKLPEYM:94RKA9124)  Requested for: 64IYV9684; Last    Rx:27Jan2017 Ordered   11  Rizatriptan Benzoate 10 MG Oral Tablet; TAKE 1 TABLET AS NEEDED FOR HEADACHE,    MAY REPEAT IN 2 HOURS, NO MORE THAN 3 TABLETS PER DAY;     Therapy: 00MQL1132 to (Last Rx:30Jan2017)  Requested for: 36GSN8159 Ordered   12  Simvastatin 40 MG Oral Tablet; TAKE 1 TABLET BY MOUTH DAILY EVERY EVENING; Therapy: 99YXF3299 to (VPJQDCKW:39AFA8016)  Requested for: 64SDG7994; Last    Rx:27Jan2017 Ordered   13  Vitamin B1 TABS; Therapy: (Recorded:29Pdw6333) to Recorded   14  Vitamin B-12 1000 MCG Oral Tablet; Therapy: (Recorded:76Jml4197) to Recorded   15  Vitamin C 500 MG Oral Capsule; Therapy: (Recorded:03Oyb5766) to Recorded   16  YL Vitamin B-6 100 MG Oral Tablet; Therapy: (Recorded:18Jun2013) to Recorded   17  Zolpidem Tartrate 10 MG Oral Tablet; TAKE 1 TABLET BY MOUTH EVERY NIGHT AT    BEDTIME; Therapy: 31CAC2830 to (Mabel Willis)  Requested for: 23LHA4700; Last    Rx:27Jan2017 Ordered    Allergies    1  No Known Drug Allergies    2  No Known Environmental Allergies   3  No Known Food Allergies    Vitals  Signs   Recorded: 91KNL2243 03:09PM   Height: 5 ft 7 5 in  Weight: 234 lb   BMI Calculated: 36 11  BSA Calculated: 2 17    Future Appointments    Date/Time Provider Specialty Site   02/24/2017 01:00 PM HENRI Hernandez  Cardiology [facility] CARDIOLOGY [place]   03/17/2017 10:00 AM HENRI Xiong   Bariatric Medicine [facility]   08/14/2017 10:30 AM Todd Shearer MD Obstetrics/Gynecology [facility] OB   07/27/2017 12:30 PM Keith Deleon DO Family Medicine [address] [facility]     Signatures   Electronically signed by : SHONDA Adrian RD; Feb 16 2017  3:09PM EST                       (Author)    Electronically signed by : HENRI Montesinos ; Feb 20 2017  7:41PM EST                       (Validation)

## 2018-01-17 NOTE — PROGRESS NOTES
History of Present Illness  Bariatric Behavioral Health Evaluation [facility]:   She is here today because: increase health, increase mobility, reduce chronic pain, prevent family diseases  She is seeking a Bariatric surgery evaluation  She researched this option for: 4 months  She realizes the post-op requirements has discussed with PCP   Her Psychiatric/Psychological diagnosis: She does not have an outpatient counselor  She does not have a Psychiatrist    She has not had Inpatient Treatment  Family Constellation: Family Constellation: Mother: obesity, diabetes, cancer,  Father: , diabetes, cancer, CHF,  Siblings: 3) obesity, tobacco use history, good health  Spouse: obesity, heart attack, arthritis  Children: 2) good health  She lives withher spouse  Domestic Violence: has not happened  Abuse History: (denies childhood trauma)   Additional Comments: health, weight, chronic pain  Physical/psychological assessment Appearance: appropriate   Sociability: average  Affect: appropriate  Mood: calm  Thought Process: coherent  Speech: normal  Content: no impairment  The patient was oriented to person, oriented to place, oriented to time and normal memory   normal attention span  no decreased concentration ability  normal judgment  Her emotional insight was: fair  Her intellectual insight was: fair  Risk assessment: Symptoms:  no suicidal ideation and no homicidal thoughts    The patient presents with complaints of mild anxiety  The patient presents with complaints of mild depressed mood  No associated symptoms are reported  Sexual history: She does not have a history of STD's  Recommendations: She is recommended for surgery  The Following Ratings are based on my: Obsevation of this individual over the last  Risk of Harm to Self or Others: The following are demographic risk factors associated with harm to self: , [place], or   (n/a)     Recent Specific Risk Factors: Symptoms:  anxiety and depressed mood, but no suicidal ideation  No associated symptoms are reported  Access to Weapons:   She has access to the following weapons: denies access to weapons   Summary and Recommendations:   Low: No thoughts or occasional thoughts of suicide, but no intent or actions  Self inflicted scratches, abrasions, or other self- injurious of behavior where medical attention is typically not warranted  No elements of homicidality or occasional thoughts but no plan, intent, or actions  Active Problems    1  Acute bronchitis, unspecified organism (466 0) (J20 9)   2  Depression with anxiety (300 4) (F41 8)   3  Dyslipidemia (272 4) (E78 5)   4  Elevated fasting glucose (790 21) (R73 01)   5  Encounter for routine gynecological examination (V72 31) (Z01 419)   6  Encounter for screening mammogram for malignant neoplasm of breast (V76 12)   (Z12 31)   7  Fatigue (780 79) (R53 83)   8  Hypothyroidism (244 9) (E03 9)   9  Insomnia (780 52) (G47 00)   10  Irritable bowel syndrome (564 1) (K58 9)   11  Joint pain, knee (719 46) (M25 569)   12  Migraine headache (346 90) (G43 909)   13  Morbid obesity (278 01) (E66 01)   14  Pap smear, as part of routine gynecological examination (V76 2) (Z01 419)   15  Screening for lipoid disorders (V77 91) (Z13 220)   16  Seborrheic keratosis (702 19) (L82 1)   17  Skin lesions (709 9) (L98 9)   18  Transient ischemic attack (435 9) (G45 9)   19  Urge incontinence of urine (788 31) (N39 41)   20  Visit for pre-operative examination (V72 84) (Z01 818)   21  Visit for screening mammogram (V76 12) (Z12 31)   22  Vulvar atrophy (624 1) (N90 5)   23  Well adult on routine health check (V70 0) (Z00 00)    Past Medical History    1  History of Acute upper respiratory infection (465 9) (J06 9)   2  History of Allergic dermatitis (692 9) (L23 9)   3  History of Encounter for screening mammogram for malignant neoplasm of breast   (V76 12) (Z12 31)   4   History of acute bronchitis (V12 69) (Z87 09)   5  History of acute conjunctivitis (V12 49) (Z86 69)   6  History of arthritis (V13 4) (Z87 39)   7  History of insomnia (V13 89) (Z87 898)   8  History of Myalgia (729 1) (M79 1)   9  History of Pap smear, as part of routine gynecological examination (V76 2) (Z01 419)    Surgical History    1  History of Cataract Surgery   2  History of Cholecystectomy Laparoscopic   3  History of Eye Surgery   4  History of Hemorrhoidectomy   5  History of Knee Surgery   6  History of Knee Surgery Left    Family History  Mother    1  Family history of Diabetes Mellitus (V18 0)   2  Family history of Skin Cancer (V16 8)  Father    3  Family history of Alzheimer Disease   4  Family history of Congestive Heart Failure   5  Family history of Diabetes Mellitus (V18 0)  Maternal Grandmother    6  Family history of Adenocarcinoma In Situ In Villous Adenoma Of The Breast    Social History    · Denied: History of Alcohol Use (History)   · Daily Coffee Consumption (___ Cups/Day)   · Denied: History of Drug Use   · Exercise Frequency (Times/Week)   · Never A Smoker   · Two children    Current Meds   1  Phyllis Aspirin 325 MG Oral Tablet Delayed Release; Therapy: (Recorded:19Sep2016) to Recorded   2  Calcium + D TABS; Therapy: (Recorded:18Jun2013) to Recorded   3  Clobetasol Propionate 0 05 % External Cream (Temovate); apply outside vagina bid for 2   wks; Therapy: 39ZKH0291 to (Last SI:31QDF7617)  Requested for: 81WDC0946 Ordered   4  Cranberry TABS; Therapy: (Recorded:19Sep2016) to Recorded   5  Diazepam 5 MG Oral Tablet; Therapy: 71OHD6053 to Recorded   6  Dicyclomine HCl - 20 MG Oral Tablet; TAKE 1 TABLET 4 TIMES DAILY AS NEEDED; Therapy: 86LUX7192 to (Evaluate:20Jan2017)  Requested for: 95HBO7301; Last   Rx:26Jan2016 Ordered   7  Fish Oil CAPS; Therapy: (Recorded:18Jun2013) to Recorded   8  Iron 65 MG TABS; Therapy: (Recorded:19Sep2016) to Recorded   9   Levothyroxine Sodium 150 MCG Oral Tablet; take 1 tablet by mouth every morning; Therapy: 23DUK9471 to (Evaluate:20Jan2017)  Requested for: 23RPF3418; Last   Rx:26Jan2016 Ordered   10  Meloxicam 15 MG Oral Tablet; take 1 tablet by mouth daily; Therapy: 89HUC7099 to (Evaluate:13Feb2017)  Requested for: 86FUD1586; Last    Rx:19Feb2016 Ordered   11  PARoxetine HCl - 20 MG Oral Tablet; take 1 tablet by mouth every day; Therapy: 33NFN1613 to (Evaluate:13Feb2017)  Requested for: 79JGU2605; Last    Rx:19Feb2016 Ordered   12  Propranolol HCl ER 80 MG Oral Capsule Extended Release 24 Hour; take 1 capsule by    mouth daily; Therapy: 70REX6281 to (Evaluate:20Jan2017)  Requested for: 30JVE3522; Last    Rx:26Jan2016 Ordered   13  Rizatriptan Benzoate 10 MG Oral Tablet (Maxalt); TAKE 1 TABLET AS NEEDED FOR    HEADACHE, MAY REPEAT IN 2 HOURS, NO MORE THAN 3 TABLETS PER DAY; Therapy: 24IPM3723 to (Last Rx:06Jun2016)  Requested for: 06Jun2016 Ordered   14  Simvastatin 40 MG Oral Tablet; TAKE 1 TABLET BY MOUTH DAILY EVERY EVENING; Therapy: 91OUY1124 to (Evaluate:07Jan2017)  Requested for: 07HHZ6821; Last    Rx:14Jan2016 Ordered   15  Vitamin B1 TABS; Therapy: (Recorded:32Cqq2306) to Recorded   16  Vitamin B-12 1000 MCG Oral Tablet; Therapy: (Recorded:75Rzl7372) to Recorded   17  Vitamin C 500 MG Oral Capsule; Therapy: (Recorded:34Xjb5833) to Recorded   18  YL Vitamin B-6 100 MG Oral Tablet; Therapy: (Recorded:24Azh7264) to Recorded   19  Zolpidem Tartrate 10 MG Oral Tablet; TAKE 1 TABLET BY MOUTH EVERY NIGHT AT    BEDTIME; Therapy: 27OZO0363 to (Evaluate:11Feb2017)  Requested for: 61Scr0848; Last    Rx:73Xpb1038 Ordered    Allergies    1  No Known Drug Allergies    2  No Known Environmental Allergies   3  No Known Food Allergies     Note   Note:   Patient admits to Axis I diagnosis of depression and anxiety and is currently taking medication prescribed by her PCP   Patient educated regarding the impact of nicotine and alcohol on the post bariatric surgery patient  Patient meets criteria for surgery at this program and is referred to physician  Future Appointments    Date/Time Provider Specialty Site   10/14/2016 10:30 AM HENRI Waterman  General Surgery [facility]   11/15/2016 09:45 AM HENRI Cuevas  Bariatric Medicine [facility]   08/14/2017 10:30 AM Queta Washington MD Obstetrics/Gynecology [facility]   01/27/2017 11:00 AM Jessica Upton DO Family Medicine [address] [facility]     Signatures   Electronically signed by :  Miguel Hollis, LCSWMSWMSW,LCSW; Sep 19 2016 12:03PM EST                       (Author)    Electronically signed by : HENRI Bhagat ; Sep 22 2016  1:03PM EST                       (Validation)

## 2018-01-18 NOTE — PROGRESS NOTES
Message  Received referral from Dr Ortiz Farris to schedule pt for 3 of 6 in December  Called pt, left voicemail message with contact # instructing pt to call back to schedule  Active Problems    1  Acute bronchitis, unspecified organism (466 0) (J20 9)   2  Blood tests prior to treatment or procedure (V72 63) (Z01 812)   3  Depression with anxiety (300 4) (F41 8)   4  Dyslipidemia (272 4) (E78 5)   5  Elevated fasting glucose (790 21) (R73 01)   6  Encounter for routine gynecological examination (V72 31) (Z01 419)   7  Encounter for screening mammogram for malignant neoplasm of breast (V76 12)   (Z12 31)   8  Fatigue (780 79) (R53 83)   9  Hypothyroidism (244 9) (E03 9)   10  Insomnia (780 52) (G47 00)   11  Irritable bowel syndrome (564 1) (K58 9)   12  Joint pain, knee (719 46) (M25 569)   13  Migraine headache (346 90) (G43 909)   14  Morbid obesity (278 01) (E66 01)   15  Pap smear, as part of routine gynecological examination (V76 2) (Z01 419)   16  Screening for lipoid disorders (V77 91) (Z13 220)   17  Seborrheic keratosis (702 19) (L82 1)   18  Skin lesions (709 9) (L98 9)   19  Transient ischemic attack (435 9) (G45 9)   20  Urge incontinence of urine (788 31) (N39 41)   21  Visit for pre-operative examination (V72 84) (Z01 818)   22  Visit for screening mammogram (V76 12) (Z12 31)   23  Vulvar atrophy (624 1) (N90 5)   24  Well adult on routine health check (V70 0) (Z00 00)    Current Meds   1  Phyllis Aspirin 325 MG Oral Tablet Delayed Release; Therapy: (Recorded:66Dsz5772) to Recorded   2  Calcium + D TABS; Therapy: (Recorded:90Fbs1022) to Recorded   3  Clobetasol Propionate 0 05 % External Cream (Temovate); apply outside vagina bid for   2 wks; Therapy: 24AWU5770 to (Last FN:48HXR2184)  Requested for: 89VTG4257 Ordered   4  Cranberry TABS; Therapy: (Recorded:11Peq7958) to Recorded   5  Dicyclomine HCl - 20 MG Oral Tablet; TAKE 1 TABLET 4 TIMES DAILY AS NEEDED;    Therapy: 47YHP1217 to (Evaluate:20Jan2017)  Requested for: 88YRZ0454; Last   Rx:26Jan2016 Ordered   6  Fish Oil CAPS; Therapy: (Recorded:18Jun2013) to Recorded   7  Iron 65 MG TABS; Therapy: (Recorded:15Jxr5425) to Recorded   8  Levothyroxine Sodium 150 MCG Oral Tablet; take 1 tablet by mouth every morning; Therapy: 58FGO5686 to (Evaluate:20Jan2017)  Requested for: 17EZQ2385; Last   Rx:26Jan2016 Ordered   9  Meloxicam 15 MG Oral Tablet; take 1 tablet by mouth daily; Therapy: 09DQK6661 to (Evaluate:13Feb2017)  Requested for: 98LMD7187; Last   Rx:19Feb2016 Ordered   10  PARoxetine HCl - 20 MG Oral Tablet; take 1 tablet by mouth every day; Therapy: 39NSS1656 to ([phone])  Requested for: 51CLC4410; Last    Rx:19Feb2016 Ordered   11  Propranolol HCl ER 80 MG Oral Capsule Extended Release 24 Hour; take 1 capsule by    mouth daily; Therapy: 13VAO6841 to (Evaluate:20Jan2017)  Requested for: 49APX8363; Last    Rx:26Jan2016 Ordered   12  Rizatriptan Benzoate 10 MG Oral Tablet (Maxalt); TAKE 1 TABLET AS NEEDED FOR    HEADACHE, MAY REPEAT IN 2 HOURS, NO MORE THAN 3 TABLETS PER DAY; Therapy: 75IVD1754 to (Last Rx:06Jun2016)  Requested for: 06Jun2016 Ordered   13  Simvastatin 40 MG Oral Tablet; TAKE 1 TABLET BY MOUTH DAILY EVERY EVENING; Therapy: 45RGG9987 to (Evaluate:07Jan2017)  Requested for: 49IMU4614; Last    Rx:14Jan2016 Ordered   14  Vitamin B1 TABS; Therapy: (Recorded:05Bva3164) to Recorded   15  Vitamin B-12 1000 MCG Oral Tablet; Therapy: (Recorded:77Kip0014) to Recorded   16  Vitamin C 500 MG Oral Capsule; Therapy: (Recorded:58Wtl8805) to Recorded   17  YL Vitamin B-6 100 MG Oral Tablet; Therapy: (Recorded:18Jun2013) to Recorded   18  Zolpidem Tartrate 10 MG Oral Tablet; TAKE 1 TABLET BY MOUTH EVERY NIGHT AT    BEDTIME; Therapy: 55SEE1775 to (Evaluate:36Wpw5683)  Requested for: 01Jlw0687; Last    Rx:37Oaw5373 Ordered    Allergies    1  No Known Drug Allergies    2   No Known Environmental Allergies   3   No Known Food Allergies    Signatures   Electronically signed by : SHONDA Harris RD; Nov 15 2016 12:44PM EST                       (Author)

## 2018-01-22 VITALS
RESPIRATION RATE: 18 BRPM | WEIGHT: 158 LBS | BODY MASS INDEX: 24.8 KG/M2 | DIASTOLIC BLOOD PRESSURE: 70 MMHG | HEIGHT: 67 IN | SYSTOLIC BLOOD PRESSURE: 102 MMHG | TEMPERATURE: 98 F | HEART RATE: 70 BPM

## 2018-01-22 VITALS
BODY MASS INDEX: 23.7 KG/M2 | OXYGEN SATURATION: 97 % | RESPIRATION RATE: 16 BRPM | TEMPERATURE: 99.2 F | HEIGHT: 68 IN | SYSTOLIC BLOOD PRESSURE: 98 MMHG | HEART RATE: 81 BPM | DIASTOLIC BLOOD PRESSURE: 60 MMHG | WEIGHT: 156.38 LBS

## 2018-01-22 VITALS
WEIGHT: 236.25 LBS | RESPIRATION RATE: 18 BRPM | SYSTOLIC BLOOD PRESSURE: 104 MMHG | DIASTOLIC BLOOD PRESSURE: 70 MMHG | HEART RATE: 76 BPM | HEIGHT: 67 IN | BODY MASS INDEX: 37.08 KG/M2

## 2018-01-23 NOTE — PROGRESS NOTES
Message  Attended 9 month follow up meeting  Addressed both behavioral and dietary issues  Group discussion included the impact of tobacco use, alcohol use, psychiatric medications, relationships, body image and self esteem issues as it pertains to the weight loss surgery patient  During group discussion, reviewed vitamin recommendations, protein recommendations, portion sizes, balancing diet to include healthy carbohydrates, importance of exercise, and the bariatric rules for success  Overall pleased with weight loss and improved quality of life      Active Problems    1  Depression with anxiety (300 4) (F41 8)   2  Dyslipidemia (272 4) (E78 5)   3  Elevated fasting glucose (790 21) (R73 01)   4  Encounter for routine gynecological examination (V72 31) (Z01 419)   5  Encounter for screening mammogram for malignant neoplasm of breast (V76 12)   (Z12 31)   6  High serum ferritin (790 99) (R79 89)   7  Hypothyroidism (244 9) (E03 9)   8  Insomnia (780 52) (G47 00)   9  Migraine headache (346 90) (G43 909)   10  Pap smear, as part of routine gynecological examination (V76 2) (Z01 419)   11  Postgastrectomy malabsorption (579 3) (K91 2,Z90 3)   12  Preoperative cardiovascular examination (V72 81) (Z01 810)   13  Screening for colon cancer (V76 51) (Z12 11)   14  Screening for lipoid disorders (V77 91) (Z13 220)   15  Seborrheic keratosis (702 19) (L82 1)   16  Status post laparoscopic sleeve gastrectomy (V45 86) (Z98 84)   17  Transient ischemic attack (435 9) (G45 9)   18  Urge incontinence of urine (788 31) (N39 41)   19  Visit for pre-operative examination (V72 84) (Z01 818)   20  Visit for screening mammogram (V76 12) (Z12 31)   21  Vulvar atrophy (624 1) (N90 5)   22  Well adult on routine health check (V70 0) (Z00 00)    Current Meds   1  Bariatric Fusion Oral Tablet Chewable; Therapy: (Recorded:87Jvn0687) to Recorded   2  Calcium + D TABS; Therapy: (Recorded:50Zci9075) to Recorded   3   Clobetasol Propionate 0 05 % External Cream (Temovate); apply outside vagina bid for   2 wks; Therapy: 64SCK4832 to (Last OM:75YDQ4358)  Requested for: 33QEH4134 Ordered   4  Eszopiclone 2 MG Oral Tablet (Lunesta); TAKE 1 TABLET AT BEDTIME AS NEEDED   FOR SLEEP; Therapy: 99XVO7529 to (Evaluate:31Mar2018); Last Rx:02Oct2017 Ordered   5  Levothyroxine Sodium 125 MCG Oral Tablet; take 1 tablet by mouth daily; Therapy: 81NQA9083 to (Evaluate:08Jun2018)  Requested for: 76PFI8005; Last   Rx:13Jun2017 Ordered   6  PARoxetine HCl - 20 MG Oral Tablet; take 1 tablet by mouth every day; Therapy: 13MUQ9967 to (MTNARNUV:18KGH2009)  Requested for: 03BOA1575; Last   Rx:27Jan2017 Ordered   7  Rizatriptan Benzoate 10 MG Oral Tablet (Maxalt); TAKE 1 TABLET AS NEEDED FOR   HEADACHE, MAY REPEAT IN 2 HOURS, NO MORE THAN 3 TABLETS PER DAY; Therapy: 56NZQ5482 to (Last Rx:30Jan2017)  Requested for: 30Jan2017 Ordered   8  Simvastatin 40 MG Oral Tablet; TAKE 1 TABLET BY MOUTH DAILY EVERY EVENING; Therapy: 09EZH3751 to 21 )  Requested for: 69TSA9595; Last   Rx:27Jan2017 Ordered    Allergies    1  No Known Drug Allergies    2  No Known Environmental Allergies   3   No Known Food Allergies    Signatures   Electronically signed by : GRACE Linder; Dec 21 2017  4:28PM EST                       (Author)

## 2018-01-24 NOTE — PROGRESS NOTES
Assessment    1  Well adult on routine health check (V70 0) (Z00 00)    Plan  Depression with anxiety    · PARoxetine HCl - 20 MG Oral Tablet; take 1 tablet by mouth every day  Dyslipidemia    · Simvastatin 40 MG Oral Tablet; TAKE 1 TABLET BY MOUTH DAILY EVERY  EVENING  Hypothyroidism    · Levothyroxine Sodium 150 MCG Oral Tablet; take 1 tablet by mouth every  morning  Insomnia    · Zolpidem Tartrate 10 MG Oral Tablet; TAKE 1 TABLET BY MOUTH EVERY  NIGHT AT BEDTIME  Joint pain, knee    · Meloxicam 15 MG Oral Tablet; take 1 tablet by mouth daily  Migraine headache    · Propranolol HCl ER 80 MG Oral Capsule Extended Release 24 Hour; take 1  capsule by mouth daily   · Rizatriptan Benzoate 10 MG Oral Tablet (Maxalt); TAKE 1 TABLET AS NEEDED  FOR HEADACHE, MAY REPEAT IN 2 HOURS, NO MORE THAN 3 TABLETS PER DAY    Discussion/Summary  Impression: Subsequent Annual Wellness Visit, with preventive exam as well as age and risk appropriate counseling completed  Cardiovascular screening and counseling: screening is current  Diabetes screening and counseling: screening is current  Colorectal cancer screening and counseling: screening is current  Breast cancer screening and counseling: the risks and benefits of screening were discussed  Cervical cancer screening and counseling: screening is current  Osteoporosis screening and counseling: screening is current  Glaucoma screening and counseling: screening is current  HIV screening and counseling: screening is current  Advance Directive Planning: complete and up to date  Advice and education were given regarding increasing physical activity  She was referred to dermatology  Patient Discussion: plan discussed with the patient, follow-up visit needed in one year  Chief Complaint  Patient here for Medicare wellness exam      History of Present Illness  HPI: here for wellness   Welcome to Medicare and Wellness Visits:  The patient is being seen for the subsequent annual wellness visit  Medicare Screening and Risk Factors   Hospitalizations: no previous hospitalizations  Once per lifetime medicare screening tests: ECG  Medicare Screening Tests Risk Questions   Abdominal aortic aneurysm risk assessment: none indicated  Osteoporosis risk assessment: none indicated  HIV risk assessment: none indicated  Drug and Alcohol Use: The patient has never smoked cigarettes  The patient reports never drinking alcohol  She has never used illicit drugs  Diet and Physical Activity: Current diet includes well balanced meals  She exercises daily  Exercise: walking  Mood Disorder and Cognitive Impairment Screening: She denies feeling down, depressed, or hopeless over the past two weeks  She denies feeling little interest or pleasure in doing things over the past two weeks  Cognitive impairment screening: denies difficulty learning/retaining new information, denies difficulty handling complex tasks, denies difficulty with reasoning, denies difficulty with spatial ability and orientation, denies difficulty with language and denies difficulty with behavior  Functional Ability/Level of Safety: Hearing is normal bilaterally  The patient is currently able to do activities of daily living without limitations  Activities of daily living details: does not need help using the phone, no transportation help needed, does not need help shopping, no meal preparation help needed, does not need help doing housework, does not need help doing laundry, does not need help managing medications and does not need help managing money  Fall risk factors:  polypharmacy, antidepressant use and antihypertensive use, but no alcohol use, no mobility impairment, no deconditioning, no postural hypotension, no sedative use, no visual impairment, no urinary incontinence, no cognitive impairment, up and go test was normal and no previous fall   Home safety risk factors:  no unfamiliar surroundings, no loose rugs, no poor household lighting, no uneven floors, no household clutter, grab bars in the bathroom and handrails on the stairs  Advance Directives: Advance directives: living will, durable power of  for health care directives and advance directives  end of life decisions were reviewed with the patient  Co-Managers and Medical Equipment/Suppliers: See Patient Care Team      Patient Care Team    Care Team Member Role Specialty Office Number   Narayan More MD  Cardiology [phone], [phone] [facility] [phone] [address] [facility]  Orthopedic Surgery [phone]   Aracely LUJAN  Specialist Plastic Surgery [phone]   Kesha LUJAN  Specialist Bariatric Medicine [phone]   Milagros LUJAN  Specialist General Surgery [phone]     Review of Systems    Constitutional: negative  Head and Face: negative  Eyes: negative  ENT: negative  Cardiovascular: negative  Respiratory: negative  Gastrointestinal: negative  Genitourinary: negative  Musculoskeletal: negative  Integumentary and Breasts: negative  Neurological: negative  Psychiatric: negative  Endocrine: negative  Hematologic and Lymphatic: negative  Over the past 2 weeks, how often have you been bothered by the following problems? 1 ) Little interest or pleasure in doing things? Not at all    2 ) Feeling down, depressed or hopeless? Not at all    3 ) Trouble falling asleep or sleeping too much? Not at all    4 ) Feeling tired or having little energy? Several days  5 ) Poor appetite or overeating? Not at all    6 ) Feeling bad about yourself, or that you are a failure, or have let yourself or your family down? Not at all    7 ) Trouble concentrating on things, such as reading a newspaper or watching television?  Not at all    8 ) Moving or speaking so slowly that other people could have noticed, or the opposite, moving or speaking faster than usual? Not at all    9 ) Thoughts that you would be better off dead or of hurting yourself in some way? Not at all  severity of depression is mild   Score 1      Active Problems    1  Blood tests prior to treatment or procedure (V72 63) (Z01 812)   2  Depression with anxiety (300 4) (F41 8)   3  Dyslipidemia (272 4) (E78 5)   4  Elevated fasting glucose (790 21) (R73 01)   5  Encounter for routine gynecological examination (V72 31) (Z01 419)   6  Encounter for screening mammogram for malignant neoplasm of breast (V76 12)   (Z12 31)   7  Fatigue (780 79) (R53 83)   8  Hypothyroidism (244 9) (E03 9)   9  Insomnia (780 52) (G47 00)   10  Irritable bowel syndrome (564 1) (K58 9)   11  Joint pain, knee (719 46) (M25 569)   12  Migraine headache (346 90) (G43 909)   13  Morbid obesity (278 01) (E66 01)   14  Pap smear, as part of routine gynecological examination (V76 2) (Z01 419)   15  Screening for lipoid disorders (V77 91) (Z13 220)   16  Seborrheic keratosis (702 19) (L82 1)   17  Skin lesions (709 9) (L98 9)   18  Transient ischemic attack (435 9) (G45 9)   19  Urge incontinence of urine (788 31) (N39 41)   20  Visit for pre-operative examination (V72 84) (Z01 818)   21  Visit for screening mammogram (V76 12) (Z12 31)   22  Vulvar atrophy (624 1) (N90 5)   23   Well adult on routine health check (V70 0) (Z00 00)    Past Medical History    · History of Acute bronchitis, unspecified organism (466 0) (J20 9)   · History of Acute upper respiratory infection (465 9) (J06 9)   · History of Allergic dermatitis (692 9) (L23 9)   · History of Encounter for screening mammogram for malignant neoplasm of breast  (V76 12) (Z12 31)   · History of acute bronchitis (V12 69) (Z87 09)   · History of acute conjunctivitis (V12 49) (Z86 69)   · History of arthritis (V13 4) (Z87 39)   · History of insomnia (V13 89) (Z87 898)   · History of Myalgia (729 1) (M79 1)   · History of Pap smear, as part of routine gynecological examination (V76 2) (Z01 419)    The active problems and past medical history were reviewed and updated today  Surgical History    · History of Cataract Surgery   · History of Cholecystectomy Laparoscopic   · History of Eye Surgery   · History of Hemorrhoidectomy   · History of Knee Surgery   · History of Knee Surgery Left    The surgical history was reviewed and updated today  Family History  Mother    · Family history of Diabetes Mellitus (V18 0)   · Family history of Skin Cancer (V16 8)  Father    · Family history of Alzheimer Disease   · Family history of Congestive Heart Failure   · Family history of Diabetes Mellitus (V18 0)  Maternal Grandmother    · Family history of Adenocarcinoma In Situ In Villous Adenoma Of The Breast    The family history was reviewed and updated today  Social History    · Denied: History of Alcohol Use (History)   · Daily Coffee Consumption (___ Cups/Day)   · Denied: History of Drug Use   · Exercise Frequency (Times/Week)   · Never A Smoker   · Two children  The social history was reviewed and updated today  Current Meds   1  Phyllis Aspirin 325 MG TBEC; Therapy: (Recorded:63Ztq7120) to Recorded   2  Calcium + D TABS; Therapy: (Recorded:18Jun2013) to Recorded   3  Clobetasol Propionate 0 05 % External Cream; apply outside vagina bid for 2 wks; Therapy: 20JRE6724 to (Last WP:17XZC8155)  Requested for: 89JRI9065 Ordered   4  Cranberry TABS; Therapy: (Recorded:52Dtk1572) to Recorded   5  Fish Oil CAPS; Therapy: (Recorded:18Jun2013) to Recorded   6  Iron 65 MG TABS; Therapy: (Recorded:39Zrz8925) to Recorded   7  Levothyroxine Sodium 150 MCG Oral Tablet; take 1 tablet by mouth every morning; Therapy: 07ZJS3084 to (Evaluate:20Jan2017)  Requested for: 13ENB0748; Last   Rx:26Jan2016 Ordered   8  Meloxicam 15 MG Oral Tablet; take 1 tablet by mouth daily; Therapy: 51FXO1999 to (Evaluate:44Kef3244)  Requested for: 77LTC4315; Last   Rx:19Feb2016 Ordered   9   PARoxetine HCl - 20 MG Oral Tablet; take 1 tablet by mouth every day; Therapy: 00FJH9789 to (Marixa Leyva)  Requested for: 81GWN1974; Last   Rx:03Qjd7217 Ordered   10  Propranolol HCl ER 80 MG Oral Capsule Extended Release 24 Hour; take 1 capsule by    mouth daily; Therapy: 27THG5690 to (Evaluate:20Jan2017)  Requested for: 46WYD1237; Last    Rx:26Jan2016 Ordered   11  Rizatriptan Benzoate 10 MG Oral Tablet; TAKE 1 TABLET AS NEEDED FOR    HEADACHE, MAY REPEAT IN 2 HOURS, NO MORE THAN 3 TABLETS PER DAY; Therapy: 95ZTM3547 to (Last Rx:06Jun2016)  Requested for: 06Jun2016 Ordered   12  Simvastatin 40 MG Oral Tablet; TAKE 1 TABLET BY MOUTH DAILY EVERY EVENING; Therapy: 63NEH8457 to (Evaluate:07Jan2017)  Requested for: 26RDK3256; Last    Rx:14Jan2016 Ordered   13  Vitamin B1 TABS; Therapy: (Recorded:67Mxw1191) to Recorded   14  Vitamin B-12 1000 MCG Oral Tablet; Therapy: (Recorded:93Wtk5614) to Recorded   15  Vitamin C 500 MG Oral Capsule; Therapy: (Recorded:57Jut3217) to Recorded   16  YL Vitamin B-6 100 MG Oral Tablet; Therapy: (Recorded:18Jun2013) to Recorded   17  Zolpidem Tartrate 10 MG Oral Tablet; TAKE 1 TABLET BY MOUTH EVERY NIGHT AT    BEDTIME; Therapy: 64EJR0290 to (Evaluate:59Gzb7712)  Requested for: 59Lnb3473; Last    Rx:74Ivd4959 Ordered    The medication list was reviewed and updated today  Allergies    1  No Known Drug Allergies    2  No Known Environmental Allergies   3  No Known Food Allergies    Immunizations   1 2 3 4    Influenza  20-Sep-2011 08-Sep-2014 08-Oct-2015 15-Oct-2016    Tdap  04-Mar-2014        Vitals  Signs    Heart Rate: 76  Respiration: 18  Systolic: 194  Diastolic: 70  Height: 5 ft 7 24 in  Weight: 236 lb 4 oz  BMI Calculated: 36 73  BSA Calculated: 2 18    Physical Exam    Constitutional   General appearance: No acute distress, well appearing and well nourished  Head and Face   Head and face: Normal     Eyes   Conjunctiva and lids: No swelling, erythema or discharge  Pupils and irises: Equal, round, reactive to light  Ears, Nose, Mouth, and Throat   External inspection of ears and nose: Normal     Otoscopic examination: Tympanic membranes translucent with normal light reflex  Canals patent without erythema  Hearing: Normal     Nasal mucosa, septum, and turbinates: Normal without edema or erythema  Lips, teeth, and gums: Normal, good dentition  Oropharynx: Normal with no erythema, edema, exudate or lesions  Neck   Neck: Supple, symmetric, trachea midline, no masses  Thyroid: Normal, no thyromegaly  Pulmonary   Respiratory effort: No increased work of breathing or signs of respiratory distress  Auscultation of lungs: Clear to auscultation  Cardiovascular   Auscultation of heart: Normal rate and rhythm, normal S1 and S2, no murmurs  Examination of extremities for edema and/or varicosities: Normal     Abdomen   Abdomen: Non-tender, no masses  Liver and spleen: No hepatomegaly or splenomegaly  Lymphatic   Palpation of lymph nodes in neck: No lymphadenopathy  Palpation of lymph nodes in axillae: No lymphadenopathy  Musculoskeletal   Gait and station: Normal     Digits and nails: Normal without clubbing or cyanosis  Joints, bones, and muscles: Normal     Range of motion: Normal     Stability: Normal     Muscle strength/tone: Normal     Skin   Skin and subcutaneous tissue: Normal without rashes or lesions  Palpation of skin and subcutaneous tissue: Normal turgor  Neurologic   Cranial nerves: Cranial nerves II-XII intact  Cortical function: Normal mental status  Reflexes: 2+ and symmetric  Sensation: No sensory loss  Coordination: Normal finger to nose and heel to shin  Psychiatric   Judgment and insight: Normal     Orientation to person, place, and time: Normal     Recent and remote memory: Intact      Mood and affect: Normal        Future Appointments    Date/Time Provider Specialty Site   02/24/2017 01:00 PM TRI [facility], [place], M D  Cardiology [facility] CARDIOLOGY [place]   03/17/2017 10:00 AM HENRI Moran   Bariatric Medicine [facility]   08/14/2017 10:30 AM Deeede Alvarado MD Obstetrics/Gynecology [facility] OB     Signatures   Electronically signed by : Mick العراقي DO; Jan 27 2017 11:16AM EST                       (Author)

## 2018-01-28 DIAGNOSIS — E78.5 HYPERLIPIDEMIA, UNSPECIFIED HYPERLIPIDEMIA TYPE: Primary | ICD-10-CM

## 2018-01-29 RX ORDER — SIMVASTATIN 40 MG
TABLET ORAL
Qty: 90 TABLET | Refills: 3 | Status: SHIPPED | OUTPATIENT
Start: 2018-01-29 | End: 2018-02-27 | Stop reason: SDUPTHER

## 2018-01-31 ENCOUNTER — OFFICE VISIT (OUTPATIENT)
Dept: PLASTIC SURGERY | Facility: HOSPITAL | Age: 63
End: 2018-01-31
Payer: COMMERCIAL

## 2018-01-31 VITALS — WEIGHT: 154 LBS | BODY MASS INDEX: 23.34 KG/M2 | HEIGHT: 68 IN

## 2018-01-31 DIAGNOSIS — L98.7 EXCESSIVE AND REDUNDANT SKIN AND SUBCUTANEOUS TISSUE: ICD-10-CM

## 2018-01-31 DIAGNOSIS — E65 ABDOMINAL PANNUS: Primary | ICD-10-CM

## 2018-01-31 PROCEDURE — 99204 OFFICE O/P NEW MOD 45 MIN: CPT | Performed by: SURGERY

## 2018-01-31 RX ORDER — ESZOPICLONE 2 MG/1
1 TABLET, FILM COATED ORAL
COMMUNITY
End: 2018-02-27 | Stop reason: SDUPTHER

## 2018-01-31 RX ORDER — DIPHENOXYLATE HYDROCHLORIDE AND ATROPINE SULFATE 2.5; .025 MG/1; MG/1
1 TABLET ORAL DAILY
COMMUNITY
End: 2018-02-27 | Stop reason: ALTCHOICE

## 2018-01-31 NOTE — PROGRESS NOTES
Assessment/Plan:please see HPI  She is interested in body contouring to address the arms, thighs, trunk and anterior neck  We discussed brachioplasty, thigh lifts, panniculectomy versus abdominoplasty versus lower body lift, as well as facelift /necklift  We discussed the procedures and how they're performed  We reviewed photographs bebeto of the procedures and her questions have been answered to her satisfaction  Given that her weight is only been stable for approximately 3 months, I suggested that she return in 3-4 months other visit, and that ideally body contouring procedures performed following minimum of 12 month wait stability  She understands, and we'll schedule a  follow-up appointment for 3-4 months  Appropriate photographs were obtained at today's visit  this visit encompassed 60 minutes of time, 40 minutes of which were spent in direct patient counseling         Diagnoses and all orders for this visit:    Abdominal pannus    Excessive and redundant skin and subcutaneous tissue    Other orders  -     eszopiclone (LUNESTA) 2 mg tablet; Take 1 mg by mouth daily at bedtime as needed for sleep Take immediately before bedtime  -     multivitamin (THERAGRAN) TABS; Take 1 tablet by mouth daily          Subjective:      Patient ID: Raghu Frost is a 58 y o  female  Status post bariatric surgery, gastric sleeve in March 2017, interested in body contouring  Lila Francois reports that she underwent gastric sleeve surgery in March 2017 with Dr Carol Reeder  Her maximum weight preoperatively was 296 pounds, she lost approximately 70 pounds prior to the sleeve procedure, her preoperative weight was 227 pounds, she is currently 154 pounds, and her weight is stable for 3 months or so  She is interested in body contouring to address the thighs, arms, "stomach" and anterior neck          The following portions of the patient's history were reviewed and updated as appropriate: allergies, current medications, past family history, past medical history, past social history, past surgical history and problem list     Review of Systems   Constitutional: Negative for chills and fever  HENT: Negative for hearing loss  Eyes: Negative for discharge  Blind right eye, wears eyeglasses   Respiratory: Negative for chest tightness and shortness of breath  Cardiovascular: Negative for chest pain and leg swelling  Gastrointestinal: Positive for constipation  Negative for blood in stool, diarrhea, nausea and vomiting  Genitourinary: Negative for dysuria  Musculoskeletal: Negative for gait problem  Skin: Negative for rash  Allergic/Immunologic: Negative for immunocompromised state  Neurological: Negative for seizures and headaches  Hematological: Does not bruise/bleed easily  Psychiatric/Behavioral: Negative for dysphoric mood  The patient is not nervous/anxious  Objective:excess skin, tissue laxity of upper arms, thighs, abdomen and posterior trunk, anterior neck     Physical Exam   Constitutional: She appears well-developed and well-nourished  HENT:   Head: Normocephalic and atraumatic  Eyes: Conjunctivae are normal    Neck: Normal range of motion  Neck supple  Pulmonary/Chest: Effort normal    Abdominal: Soft  She exhibits no distension  There is no tenderness  There is no rebound  Musculoskeletal: Normal range of motion  Skin: Skin is warm and dry     Considerable excess, redundant skin of arms from axilla to elbows, thighs from groin crease to distal knee, anterior trunk with several folds, extending from pubis to xiphoid, flanks and posterior trunk, considerable noticeable volume deficiency of buttocks

## 2018-01-31 NOTE — LETTER
January 31, 2018     Jaylene Child MD  [address] [facility] [address] [facility]    Patient: Liya Belle   YOB: 1955   Date of Visit: 1/31/2018       Dear Dr Tam Nunes: Thank you for referring Sunil Chakraborty to me for evaluation  Below are my notes for this consultation  If you have questions, please do not hesitate to call me  I look forward to following your patient along with you  Sincerely,        Javier Duarte MD        CC: DO Javier Chávez MD  1/31/2018 10:03 AM  Signed  Assessment/Plan:please see HPI  She is interested in body contouring to address the arms, thighs, trunk and anterior neck  We discussed brachioplasty, thigh lifts, panniculectomy versus abdominoplasty versus lower body lift, as well as facelift /necklift  We discussed the procedures and how they're performed  We reviewed photographs bebeto of the procedures and her questions have been answered to her satisfaction  Given that her weight is only been stable for approximately 3 months, I suggested that she return in 3-4 months other visit, and that ideally body contouring procedures performed following minimum of 12 month wait stability  She understands, and we'll schedule a  follow-up appointment for 3-4 months  Appropriate photographs were obtained at today's visit  Diagnoses and all orders for this visit:    Abdominal pannus    Excessive and redundant skin and subcutaneous tissue    Other orders  -     eszopiclone (LUNESTA) 2 mg tablet; Take 1 mg by mouth daily at bedtime as needed for sleep Take immediately before bedtime  -     multivitamin (THERAGRAN) TABS; Take 1 tablet by mouth daily          Subjective:      Patient ID: Liya Belle is a 58 y o  female  Status post bariatric surgery, gastric sleeve in March 2017, interested in body contouring  Santa Evans reports that she underwent gastric sleeve surgery in March 2017 with Dr Timmy Connell    Her maximum weight preoperatively was 296 pounds, she lost approximately 70 pounds prior to the sleeve procedure, her preoperative weight was 227 pounds, she is currently 154 pounds, and her weight is stable for 3 months or so  She is interested in body contouring to address the thighs, arms, "stomach" and anterior neck  The following portions of the patient's history were reviewed and updated as appropriate: allergies, current medications, past family history, past medical history, past social history, past surgical history and problem list     Review of Systems   Constitutional: Negative for chills and fever  HENT: Negative for hearing loss  Eyes: Negative for discharge  Blind right eye, wears eyeglasses   Respiratory: Negative for chest tightness and shortness of breath  Cardiovascular: Negative for chest pain and leg swelling  Gastrointestinal: Positive for constipation  Negative for blood in stool, diarrhea, nausea and vomiting  Genitourinary: Negative for dysuria  Musculoskeletal: Negative for gait problem  Skin: Negative for rash  Allergic/Immunologic: Negative for immunocompromised state  Neurological: Negative for seizures and headaches  Hematological: Does not bruise/bleed easily  Psychiatric/Behavioral: Negative for dysphoric mood  The patient is not nervous/anxious  Objective:excess skin, tissue laxity of upper arms, thighs, abdomen and posterior trunk, anterior neck     Physical Exam   Constitutional: She appears well-developed and well-nourished  HENT:   Head: Normocephalic and atraumatic  Eyes: Conjunctivae are normal    Neck: Normal range of motion  Neck supple  Pulmonary/Chest: Effort normal    Abdominal: Soft  She exhibits no distension  There is no tenderness  There is no rebound  Musculoskeletal: Normal range of motion  Skin: Skin is warm and dry     Considerable excess, redundant skin of arms from axilla to elbows, thighs from groin crease to distal knee, anterior trunk with several folds, extending from pubis to xiphoid, flanks and posterior trunk, considerable noticeable volume deficiency of buttocks

## 2018-02-27 ENCOUNTER — OFFICE VISIT (OUTPATIENT)
Dept: FAMILY MEDICINE CLINIC | Facility: CLINIC | Age: 63
End: 2018-02-27
Payer: COMMERCIAL

## 2018-02-27 VITALS
HEIGHT: 68 IN | DIASTOLIC BLOOD PRESSURE: 80 MMHG | BODY MASS INDEX: 24.31 KG/M2 | WEIGHT: 160.4 LBS | RESPIRATION RATE: 16 BRPM | SYSTOLIC BLOOD PRESSURE: 100 MMHG | HEART RATE: 68 BPM

## 2018-02-27 DIAGNOSIS — Z12.11 SCREEN FOR COLON CANCER: ICD-10-CM

## 2018-02-27 DIAGNOSIS — F41.1 GENERALIZED ANXIETY DISORDER: ICD-10-CM

## 2018-02-27 DIAGNOSIS — G47.00 INSOMNIA, UNSPECIFIED TYPE: Primary | ICD-10-CM

## 2018-02-27 DIAGNOSIS — Z00.00 MEDICARE ANNUAL WELLNESS VISIT, SUBSEQUENT: ICD-10-CM

## 2018-02-27 DIAGNOSIS — Z11.59 NEED FOR HEPATITIS C SCREENING TEST: ICD-10-CM

## 2018-02-27 DIAGNOSIS — Z12.11 SCREENING FOR COLON CANCER: ICD-10-CM

## 2018-02-27 DIAGNOSIS — E03.9 HYPOTHYROIDISM, UNSPECIFIED TYPE: ICD-10-CM

## 2018-02-27 PROBLEM — Z90.3 POSTGASTRECTOMY MALABSORPTION: Status: ACTIVE | Noted: 2017-04-03

## 2018-02-27 PROBLEM — R79.89 HIGH SERUM FERRITIN: Status: ACTIVE | Noted: 2017-08-24

## 2018-02-27 PROBLEM — K91.2 POSTGASTRECTOMY MALABSORPTION: Status: ACTIVE | Noted: 2017-04-03

## 2018-02-27 PROCEDURE — G0439 PPPS, SUBSEQ VISIT: HCPCS | Performed by: FAMILY MEDICINE

## 2018-02-27 PROCEDURE — 3725F SCREEN DEPRESSION PERFORMED: CPT | Performed by: FAMILY MEDICINE

## 2018-02-27 RX ORDER — LEVOTHYROXINE SODIUM 0.12 MG/1
125 TABLET ORAL DAILY
Qty: 90 TABLET | Refills: 3 | Status: SHIPPED | OUTPATIENT
Start: 2018-02-27 | End: 2019-03-06 | Stop reason: SDUPTHER

## 2018-02-27 RX ORDER — ESZOPICLONE 3 MG/1
3 TABLET, FILM COATED ORAL
Qty: 30 TABLET | Refills: 5 | OUTPATIENT
Start: 2018-02-27 | End: 2018-02-28 | Stop reason: SDUPTHER

## 2018-02-27 RX ORDER — SIMVASTATIN 40 MG
TABLET ORAL
COMMUNITY
Start: 2011-07-05 | End: 2018-03-29 | Stop reason: SDUPTHER

## 2018-02-27 RX ORDER — LANOLIN ALCOHOL/MO/W.PET/CERES
CREAM (GRAM) TOPICAL DAILY
COMMUNITY

## 2018-02-27 RX ORDER — ESZOPICLONE 2 MG/1
1 TABLET, FILM COATED ORAL
COMMUNITY
Start: 2017-04-06 | End: 2018-02-27 | Stop reason: ALTCHOICE

## 2018-02-27 RX ORDER — LEVOTHYROXINE SODIUM 0.12 MG/1
1 TABLET ORAL DAILY
COMMUNITY
Start: 2017-02-13 | End: 2018-02-27 | Stop reason: SDUPTHER

## 2018-02-27 RX ORDER — RIZATRIPTAN BENZOATE 10 MG/1
TABLET ORAL DAILY PRN
COMMUNITY
Start: 2013-06-18 | End: 2019-12-19 | Stop reason: SDUPTHER

## 2018-02-27 RX ORDER — CLOBETASOL PROPIONATE 0.5 MG/G
CREAM TOPICAL AS NEEDED
COMMUNITY
Start: 2014-05-02 | End: 2020-09-04 | Stop reason: SDUPTHER

## 2018-02-27 RX ORDER — PAROXETINE HYDROCHLORIDE 20 MG/1
20 TABLET, FILM COATED ORAL DAILY
Qty: 90 TABLET | Refills: 3 | Status: SHIPPED | OUTPATIENT
Start: 2018-02-27 | End: 2018-08-27 | Stop reason: SDUPTHER

## 2018-02-27 RX ORDER — PAROXETINE HYDROCHLORIDE 20 MG/1
1 TABLET, FILM COATED ORAL DAILY
COMMUNITY
Start: 2011-10-06 | End: 2018-02-27 | Stop reason: SDUPTHER

## 2018-02-27 NOTE — PROGRESS NOTES
HPI:  Melany Bustos is a 58 y o  female here for her Subsequent Wellness Visit  Patient Active Problem List   Diagnosis    Morbid obesity ([facility] [address] )    Hyperlipidemia    Hypothyroidism    Blind right eye    Arthritis    Anxiety    Depression    Dyslipidemia    Elevated fasting glucose    High serum ferritin    Insomnia    Migraine headache    Postgastrectomy malabsorption    Seborrheic keratosis    Transient ischemic attack    Urge incontinence of urine    Vulvar atrophy     Past Medical History:   Diagnosis Date    Allergic dermatitis     resolved 01/26/2016    Anxiety     Arthritis     Blind right eye     Depression     Hyperlipidemia     Hypothyroidism (acquired)     hypothyroid    Incontinence of urine in female     Insomnia     Migraine     Morbid obesity ([address] )     TIA (transient ischemic attack)     questionable     Wears glasses      Past Surgical History:   Procedure Laterality Date    CATARACT EXTRACTION Left     CHOLECYSTECTOMY      COLONOSCOPY      ESOPHAGOGASTRODUODENOSCOPY N/A 3/27/2017    Procedure: ESOPHAGOGASTRODUODENOSCOPY (EGD); Surgeon: Ashleigh Lucero MD;  Location: [facility];  Service:     EYE SURGERY      HEMORROIDECTOMY      JOINT REPLACEMENT      [facility]    NH EGD TRANSORAL BIOPSY SINGLE/MULTIPLE N/A 3/8/2017    Procedure: ESOPHAGOGASTRODUODENOSCOPY (EGD); Surgeon: Ashleigh Lucero MD;  Location: [facility];   Service: Bariatrics    NH LAP, NADYA RESTRICT PROC, LONGITUDINAL GASTRECTOMY N/A 3/27/2017    Procedure: GASTRECTOMY SLEEVE LAPAROSCOPIC;  Surgeon: Ashleigh Lucero MD;  Location: [facility];  Service: Bariatrics    ROTATOR CUFF REPAIR Right      Family History   Problem Relation Age of Onset    Diabetes Mother     Skin cancer Mother     Alzheimer's disease Father     Heart failure Father     Diabetes Father     Breast cancer Maternal Grandmother      adenocarcinoma in situ in villous adenoma of the breast     History   Smoking Status    Never Smoker   Smokeless Tobacco    Never Used     History   Alcohol Use No      History   Drug Use No     /80 (BP Location: Left arm, Patient Position: Sitting, Cuff Size: Standard)   Pulse 68   Resp 16   Ht 5' 8" (1 727 m)   Wt 72 8 kg (160 lb 6 4 oz)   BMI 24 39 kg/m²       Current Outpatient Prescriptions   Medication Sig Dispense Refill    b complex vitamins tablet Take 1 tablet by mouth daily      clobetasol (TEMOVATE) 0 05 % cream Apply topically      levothyroxine 125 mcg tablet Take 1 tablet (125 mcg total) by mouth daily for 90 days 90 tablet 3    Multiple Vitamins-Minerals (BARIATRIC FUSION) CHEW Chew      PARoxetine (PAXIL) 20 mg tablet Take 1 tablet (20 mg total) by mouth daily for 90 days 90 tablet 3    rizatriptan (MAXALT) 10 MG tablet Take by mouth      simvastatin (ZOCOR) 40 mg tablet Take by mouth      ascorbic acid (VITAMIN C) 500 mg tablet Take 500 mg by mouth 2 (two) times a day        aspirin 81 MG tablet Take 1 tablet by mouth daily for 30 days 30 tablet 0    calcium citrate-vitamin D (CITRACAL+D) 315-200 MG-UNIT per tablet Take by mouth      eszopiclone (LUNESTA) tablet Take 1 tablet (3 mg total) by mouth daily at bedtime as needed for sleep for up to 30 days Take immediately before bedtime 30 tablet 5     No current facility-administered medications for this visit        No Known Allergies  Immunization History   Administered Date(s) Administered    Influenza Quadrivalent Preservative Free 3 years and older IM 10/08/2015    Influenza TIV (IM) 09/20/2011, 09/08/2014, 10/15/2016    Tdap 03/04/2014       Patient Care Team:  Aguilar Wilder DO as PCP - General  MD Tara Mayer MD Ledora Hanger, MD Rina Roberto MD    Medicare Screening Tests and Risk Assessments:  AWV Clinical     ISAR:   Previous hospitalizations?:  Yes   How many hospitalizations have you had in the last year?:  1-2   Additional Comments:  surgery postop Once in a Lifetime Medicare Screening:   EKG performed?:  Yes Results:  nl   AAA screening performed? (if performed, please add date to Health Maintenance):  No       Medicare Screening Tests and Risk Assessment:   AAA Risk Assessment    None Indicated:  Yes    Osteoporosis Risk Assessment    None indicated:  Yes    HIV Risk Assessment    None indicated:  Yes        Drug and Alcohol Use:   Tobacco use    Cigarettes:  never smoker    Tobacco use duration    Tobacco Cessation Readiness    Alcohol use    Alcohol use:  never    Alcohol Treatment Readiness   Illicit Drug Use    Drug use:  never    Drug type:  no sedative use       Diet & Exercise:   Diet   What is your diet?:  Low Carb   How many servings a day of the following:   Fruits and Vegetables:  1-2 Meat:  1-2   Whole Grains:  1 Simple Carbs:  0   Dairy:  1 Soda:  0   Coffee:  1 Tea:  0   Exercise    Do you currently exercise?:  yes    Minutes per day:  30   Times per week:  5     Type of exercise:  walking       Cognitive Impairment Screening:   Anxiety screenings preformed:   Yes Anxiety screen score:  0   Depression screening preformed:  Yes Depression screen score:  0   Depression screening results:  negative for symptoms   Cognitive Impairment Screening    Do you have difficulty learning or retaining new information?:  No Do you have difficulty handling new tasks?:  No   Do you have difficulty with reasoning?:  No Do you have difficulty with spatial ability and orientation?:  No   Do you have difficulty with language?:  No Do you have difficulty with behavior?:  No       Functional Ability/Level of Safety:   Hearing    Hearing difficulties:  No Bilateral:  normal   Left:  normal Right:  normal   Hearing Impairment Assessment    Hearing status:  No impairment   Current Activities    Status:  unlimited ADL's, unlimited IADL's, unlimited social activities, unlimited driving   Help needed with the folllowing:    Using the phone:  No Transportation:  No Shopping:  No Preparing Meals:  No   Doing Housework:  No Doing Laundry:  No   Managing Medications:  No Managing Money:  No   ADL    Fall Risk   Have you fallen in the last 12 months?:  No    How many times?:  0    Injury History   Polypharmacy:  No Antidepressant Use:  No   Sedative Use:  No Antihypertensive Use:  No   Previous Fall:  No Alcohol Use:  No   Deconditioning:  No Visual Impairment:  No   Cogitive Impairment:  No Mmobility Impairment:  No   Postural Hypotension:  No Urinary Incontinence:  No       Home Safety:   Are there hazards in your environment?:  No   If you fell, would you need help to get back up from the ground?:  No Do you have problems or concerns getting in/out of a bed, chair, tub, or toilet?:  No   Do you feel unsteady when walking?:  No Is your activity limited by pain?:  No   Do you have handrails and grab-bars in the home?:  Yes Are emergency numbers kept by the phone and regularly updated?:  Yes   Are you and/or family members aware of the dangers of smoking in bed?:  Yes    Do you have working smoke alarms and fire extinguisher?:  No    Have you left the stove on unsupervised?:  No    Home Safety Risk Factors   Unfamilar with surroundings:  No Uneven floors:  No   Stairs or handrail saftey risk:  No Loose rugs:  No   Household clutter:  No Poor household lighting:  No   No grab bars in bathroom:  No Further evaluation needed:  No       Advanced Directives:   Advanced Directives    Living Will:  No Durable POA for healthcare:  No   Advanced directive:  No    Patient's End of Life Decisions        Urinary Incontinence:   Do you have urinary incontinence?:  Yes Do you have incomplete emptying?:  No   Do you urinate frequently?:  No Do you have urinary urgency?:  No   Do you have urinary hesitancy?:  No Do you have dysuria (painful and/or difficult urination)?:  No   Do you have nocturia (waking up to urinate)?:  No Do you strain when urinating (have to push to urinate)?:  No   Do you have a weak stream when urinating?:  No Do you have intermittent streaming when urinating?:  No   Do you dribble urine after finishing?:  No    Do you have vaginal pressure?:  No Do you have vaginal dryness?:  No       Glaucoma:            Provider Screening     Preventative Screening/Counseling:   Cardiovascular Screening/Counseling:   (Labs Q5 years, EKG optional one-time)   General:  Screening Current           Diabetes Screening/Counseling:   (2 tests/year if Pre-Diabetes or 1 test/year if no Diabetes)   General:  Screening Current           Colorectal Cancer Screening/Counseling:   (FOBT Q1 yr; Flex Sig Q4 yrs or Q10 yrs after Screening Colonoscopy; Screening Colonoscpy Q2 yrs High Risk or Q10 yrs Low Risk; Barium Enema Q2 yrs High Risk or Q4 yrs Low Risk)   General:  Risks and Benefits Discussed           Prostate Cancer Screening/Counseling:   (Annual)          Breast Cancer Screening/Counseling:   (Baseline Age 28 - 43; Annual Age 36+)   General:  Screening Current          Cervical Cancer Screening/Counseling:   (Annual for High Risk or Childbearing Age with Abnormal Pap in Last 3 yrs; Every 2 all others)   General:  Screening Current           Osteoporosis Screening/Counseling:   (Every 2 Yrs if at risk or more if medically necessary)   General:  Screening Not Indicated           AAA Screening/Counseling:   (Once per Lifetime with risk factors)    General:  Screening Not Indicated           Glaucoma Screening/Counseling:   (Annual)   General:  Screening Current          HIV Screening/Counseling:   (Voluntary; Once annually for high risk OR 3 times for Pregnancy at diagnosis of IUP; 3rd trimester; and at Labor   General:  Screening Not Indicated           Hepatitis C Screening:   Hepatitis C Counseling Provided:  Yes Hepatitis C Screening Accepted: Yes              Immunizations:   Influenza (annual):   Influenza UTD This Year   Zostavax (Medicare D Coverage, Pt >70 yo):  Risks & Benefits Discussed   Tdap (Non-Medicare Wellness Visit required): Tdap Vaccine UTD       Other Preventative Couseling (Non-Medicare Wellness Visit Required): Increased physical activity counseling given       Referrals (Non-Medicare Wellness Visit Required):   Urology, Gynecology, Surgeon (comment)       Medical Equipment/Suppliers:           No exam data present    Physical Exam :  Physical Exam   Constitutional: She is oriented to person, place, and time  She appears well-developed and well-nourished  HENT:   Head: Normocephalic and atraumatic  Right Ear: External ear normal    Left Ear: External ear normal    Nose: Nose normal    Mouth/Throat: Oropharynx is clear and moist    Eyes: Conjunctivae and EOM are normal  Pupils are equal, round, and reactive to light  Neck: Normal range of motion  Neck supple  Cardiovascular: Normal rate, regular rhythm, normal heart sounds and intact distal pulses  Pulmonary/Chest: Effort normal and breath sounds normal    Abdominal: Soft  Bowel sounds are normal    Musculoskeletal: Normal range of motion  Neurological: She is alert and oriented to person, place, and time  Skin: Skin is warm and dry  Capillary refill takes less than 2 seconds  Psychiatric: She has a normal mood and affect  Her behavior is normal  Judgment and thought content normal    Nursing note and vitals reviewed  Reviewed Updated [facility] Prior Wellness Visits:   Last Medicare wellness visit information was reviewed, patient interviewed , no change since last AWVno  Last Medicare wellness visit information was reviewed, patient interviewed and updates made to the record today no    Assessment and Plan:  1  Insomnia, unspecified type  eszopiclone (LUNESTA) tablet   2  Medicare annual wellness visit, subsequent     3  Screen for colon cancer  Ambulatory referral to Colorectal Surgery   4  Need for hepatitis C screening test  Hepatitis C antibody   5  Screening for colon cancer     6   Generalized anxiety disorder PARoxetine (PAXIL) 20 mg tablet   7   Hypothyroidism, unspecified type  levothyroxine 125 mcg tablet       Health Maintenance Due   Topic Date Due    HIV SCREENING  1955    Hepatitis C Screening  1955    COLONOSCOPY  1955    PNEUMOCOCCAL POLYSACCHARIDE VACCINE X 2 AGE 11-64 YEARS IMMUNOCOMPROMISED (1) 03/13/1966    Depression Screening PHQ-9  03/13/1967    DTaP,Tdap,and Td Vaccines (2 - Td) 04/01/2014    INFLUENZA VACCINE  09/01/2017

## 2018-02-27 NOTE — PATIENT INSTRUCTIONS
Obesity   AMBULATORY CARE:   Obesity  is when your body mass index (BMI) is greater than 30  Your healthcare provider will use your height and weight to measure your BMI  The risks of obesity include  many health problems, such as injuries or physical disability  You may need tests to check for the following:  · Diabetes     · High blood pressure or high cholesterol     · Heart disease     · Gallbladder or liver disease     · Cancer of the colon, breast, prostate, liver, or kidney     · Sleep apnea     · Arthritis or gout  Seek care immediately if:   · You have a severe headache, confusion, or difficulty speaking  · You have weakness on one side of your body  · You have chest pain, sweating, or shortness of breath  Contact your healthcare provider if:   · You have symptoms of gallbladder or liver disease, such as pain in your upper abdomen  · You have knee or hip pain and discomfort while walking  · You have symptoms of diabetes, such as intense hunger and thirst, and frequent urination  · You have symptoms of sleep apnea, such as snoring or daytime sleepiness  · You have questions or concerns about your condition or care  Treatment for obesity  focuses on helping you lose weight to improve your health  Even a small decrease in BMI can reduce the risk for many health problems  Your healthcare provider will help you set a weight-loss goal   · Lifestyle changes  are the first step in treating obesity  These include making healthy food choices and getting regular physical activity  Your healthcare provider may suggest a weight-loss program that involves coaching, education, and therapy  · Medicine  may help you lose weight when it is used with a healthy diet and physical activity  · Surgery  can help you lose weight if you are very obese and have other health problems  There are several types of weight-loss surgery  Ask your healthcare provider for more information    Be successful losing weight:   · Set small, realistic goals  An example of a small goal is to walk for 20 minutes 5 days a week  Anther goal is to lose 5% of your body weight  · Tell friends, family members, and coworkers about your goals  and ask for their support  Ask a friend to lose weight with you, or join a weight-loss support group  · Identify foods or triggers that may cause you to overeat , and find ways to avoid them  Remove tempting high-calorie foods from your home and workplace  Place a bowl of fresh fruit on your kitchen counter  If stress causes you to eat, then find other ways to cope with stress  · Keep a diary to track what you eat and drink  Also write down how many minutes of physical activity you do each day  Weigh yourself once a week and record it in your diary  Eating changes: You will need to eat 500 to 1,000 fewer calories each day than you currently eat to lose 1 to 2 pounds a week  The following changes will help you cut calories:  · Eat smaller portions  Use small plates, no larger than 9 inches in diameter  Fill your plate half full of fruits and vegetables  Measure your food using measuring cups until you know what a serving size looks like  · Eat 3 meals and 1 or 2 snacks each day  Plan your meals in advance  Yuli Thomas and eat at home most of the time  Eat slowly  · Eat fruits and vegetables at every meal   They are low in calories and high in fiber, which makes you feel full  Do not add butter, margarine, or cream sauce to vegetables  Use herbs to season steamed vegetables  · Eat less fat and fewer fried foods  Eat more baked or grilled chicken and fish  These protein sources are lower in calories and fat than red meat  Limit fast food  Dress your salads with olive oil and vinegar instead of bottled dressing  · Limit the amount of sugar you eat  Do not drink sugary beverages  Limit alcohol  Activity changes:  Physical activity is good for your body in many ways   It helps you burn calories and build strong muscles  It decreases stress and depression, and improves your mood  It can also help you sleep better  Talk to your healthcare provider before you begin an exercise program   · Exercise for at least 30 minutes 5 days a week  Start slowly  Set aside time each day for physical activity that you enjoy and that is convenient for you  It is best to do both weight training and an activity that increases your heart rate, such as walking, bicycling, or swimming  · Find ways to be more active  Do yard work and housecleaning  Walk up the stairs instead of using elevators  Spend your leisure time going to events that require walking, such as outdoor festivals or fairs  This extra physical activity can help you lose weight and keep it off  Follow up with your healthcare provider as directed: You may need to meet with a dietitian  Write down your questions so you remember to ask them during your visits  © 2017 [employer] Information is for End User's use only and may not be sold, redistributed or otherwise used for commercial purposes  All illustrations and images included in CareNotes® are the copyrighted property of [employer]  or Colin Doty  The above information is an  only  It is not intended as medical advice for individual conditions or treatments  Talk to your doctor, nurse or pharmacist before following any medical regimen to see if it is safe and effective for you  Urinary Incontinence   WHAT YOU NEED TO KNOW:   What is urinary incontinence? Urinary incontinence (UI) is when you lose control of your bladder  What causes UI? UI occurs because your bladder cannot store or empty urine properly  The following are the most common types of UI:  · Stress incontinence  is when you leak urine due to increased bladder pressure  This may happen when you cough, sneeze, or exercise       · Urge incontinence  is when you feel the need to urinate right away and leak urine accidentally  · Mixed incontinence  is when you have both stress and urge UI  What are the signs and symptoms of UI?   · You feel like your bladder does not empty completely when you urinate  · You urinate often and need to urinate immediately  · You leak urine when you sleep, or you wake up with the urge to urinate  · You leak urine when you cough, sneeze, exercise, or laugh  How is UI diagnosed? Your healthcare provider will ask how often you leak urine and whether you have stress or urge symptoms  Tell him which medicines you take, how often you urinate, and how much liquid you drink each day  You may need any of the following tests:  · Urine tests  may show infection or kidney function  · A pelvic exam  may be done to check for blockages  A pelvic exam will also show if your bladder, uterus, or other organs have moved out of place  · An x-ray, ultrasound, or CT  may show problems with parts of your urinary system  You may be given contrast liquid to help your organs show up better in the pictures  Tell the healthcare provider if you have ever had an allergic reaction to contrast liquid  Do not enter the MRI room with anything metal  Metal can cause serious injury  Tell the healthcare provider if you have any metal in or on your body  · A bladder scan  will show how much urine is left in your bladder after you urinate  You will be asked to urinate and then healthcare providers will use a small ultrasound machine to check the urine left in your bladder  · Cystometry  is used to check the function of your urinary system  Your healthcare provider checks the pressure in your bladder while filling it with fluid  Your bladder pressure may also be tested when your bladder is full and while you urinate  How is UI treated? · Medicines  can help strengthen your bladder control      · Electrical stimulation  is used to send a small amount of electrical energy to your pelvic floor muscles  This helps control your bladder function  Electrodes may be placed outside your body or in your rectum  For women, the electrodes may be placed in the vagina  · A bulking agent  may be injected into the wall of your urethra to make it thicker  This helps keep your urethra closed and decreases urine leakage  · Devices  such as a clamp, pessary, or tampon may help stop urine leaks  Ask your healthcare provider for more information about these and other devices  · Surgery  may be needed if other treatments do not work  Several types of surgery can help improve your bladder control  Ask your healthcare provider for more information about the surgery you may need  How can I manage my symptoms? · Do pelvic muscle exercises often  Your pelvic muscles help you stop urinating  Squeeze these muscles tight for 5 seconds, then relax for 5 seconds  Gradually work up to squeezing for 10 seconds  Do 3 sets of 15 repetitions a day, or as directed  This will help strengthen your pelvic muscles and improve bladder control  · A catheter  may be used to help empty your bladder  A catheter is a tiny, plastic tube that is put into your bladder to drain your urine  Your healthcare provider may tell you to use a catheter to prevent your bladder from getting too full and leaking urine  · Keep a UI record  Write down how often you leak urine and how much you leak  Make a note of what you were doing when you leaked urine  · Train your bladder  Go to the bathroom at set times, such as every 2 hours, even if you do not feel the urge to go  You can also try to hold your urine when you feel the urge to go  For example, hold your urine for 5 minutes when you feel the urge to go  As that becomes easier, hold your urine for 10 minutes  · Drink liquids as directed  Ask your healthcare provider how much liquid to drink each day and which liquids are best for you   You may need to limit the amount of liquid you drink to help control your urine leakage  Limit or do not have drinks that contain caffeine or alcohol  Do not drink any liquid right before you go to bed  · Prevent constipation  Eat a variety of high-fiber foods  Good examples are high-fiber cereals, beans, vegetables, and whole-grain breads  Prune juice may help make your bowel movement softer  Walking is the best way to trigger your intestines to have a bowel movement  · Exercise regularly and maintain a healthy weight  Ask your healthcare provider how much you should weigh and about the best exercise plan for you  Weight loss and exercise will decrease pressure on your bladder and help you control your leakage  Ask him to help you create a weight loss plan if you are overweight  When should I seek immediate care? · You have severe pain  · You are confused or cannot think clearly  When should I contact my healthcare provider? · You have a fever  · You see blood in your urine  · You have pain when you urinate  · You have new or worse pain, even after treatment  · Your mouth feels dry or you have vision changes  · Your urine is cloudy or smells bad  · You have questions or concerns about your condition or care  CARE AGREEMENT:   You have the right to help plan your care  Learn about your health condition and how it may be treated  Discuss treatment options with your caregivers to decide what care you want to receive  You always have the right to refuse treatment  The above information is an  only  It is not intended as medical advice for individual conditions or treatments  Talk to your doctor, nurse or pharmacist before following any medical regimen to see if it is safe and effective for you  © 2017 [employer] Information is for End User's use only and may not be sold, redistributed or otherwise used for commercial purposes   All illustrations and images included in CareNotes® are the copyrighted property of [employer]  or Colin Doty  Cigarette Smoking and Your Health   AMBULATORY CARE:   Risks to your health if you smoke:  Nicotine and other chemicals found in tobacco damage every cell in your body  Even if you are a light smoker, you have an increased risk for cancer, heart disease, and lung disease  If you are pregnant or have diabetes, smoking increases your risk for complications  Benefits to your health if you stop smoking:   · You decrease respiratory symptoms such as coughing, wheezing, and shortness of breath  · You reduce your risk for cancers of the lung, mouth, throat, kidney, bladder, pancreas, stomach, and cervix  If you already have cancer, you increase the benefits of chemotherapy  You also reduce your risk for cancer returning or a second cancer from developing  · You reduce your risk for heart disease, blood clots, heart attack, and stroke  · You reduce your risk for lung infections, and diseases such as pneumonia, asthma, chronic bronchitis, and emphysema  · Your circulation improves  More oxygen can be delivered to your body  If you have diabetes, you lower your risk for complications, such as kidney, artery, and eye diseases  You also lower your risk for nerve damage  Nerve damage can lead to amputations, poor vision, and blindness  · You improve your body's ability to heal and to fight infections  Benefits to the health of others if you stop smoking:  Tobacco is harmful to nonsmokers who breathe in your secondhand smoke  The following are ways the health of others around you may improve when you stop smoking:  · You lower the risks for lung cancer and heart disease in nonsmoking adults  · If you are pregnant, you lower the risk for miscarriage, early delivery, low birth weight, and stillbirth  You also lower your baby's risk for SIDS, obesity, developmental delay, and neurobehavioral problems, such as ADHD  · If you have children, you lower their risk for ear infections, colds, pneumonia, bronchitis, and asthma  For more information and support to stop smoking:   · Smokefree  gov  Phone: 4- 108 - 889-4354  Web Address: www smokefree  gov  Follow up with your healthcare provider as directed:  Write down your questions so you remember to ask them during your visits  © 2017 [employer] Information is for End User's use only and may not be sold, redistributed or otherwise used for commercial purposes  All illustrations and images included in CareNotes® are the copyrighted property of [employer]  or Colin Doty  The above information is an  only  It is not intended as medical advice for individual conditions or treatments  Talk to your doctor, nurse or pharmacist before following any medical regimen to see if it is safe and effective for you  Fall Prevention   AMBULATORY CARE:   Fall prevention  includes ways to make your home and other areas safer  It also includes ways you can move more carefully to prevent a fall  Health conditions that cause changes in your blood pressure, vision, or muscle strength and coordination may increase your risk for falls  Medicines may also increase your risk for falls if they make you dizzy, weak, or sleepy  Call 911 or have someone else call if:   · You have fallen and are unconscious  · You have fallen and cannot move part of your body  Contact your healthcare provider if:   · You have fallen and have pain or a headache  · You have questions or concerns about your condition or care  Fall prevention tips:   · Stand or sit up slowly  This may help you keep your balance and prevent falls  · Use assistive devices as directed  Your healthcare provider may suggest that you use a cane or walker to help you keep your balance  You may need to have grab bars put in your bathroom near the toilet or in the shower      · Wear shoes that fit well and have soles that   Wear shoes both inside and outside  Use slippers with good   Do not wear shoes with high heels  · Wear a personal alarm  This is a device that allows you to call 911 if you fall and need help  Ask your healthcare provider for more information  · Stay active  Exercise can help strengthen your muscles and improve your balance  Your healthcare provider may recommend water aerobics or walking  He or she may also recommend physical therapy to improve your coordination  Never start an exercise program without talking to your healthcare provider first      · Manage your medical conditions  Keep all appointments with your healthcare providers  Visit your eye doctor as directed  Home safety tips:   · Add items to prevent falls in the bathroom  Put nonslip strips on your bath or shower floor to prevent you from slipping  Use a bath mat if you do not have carpet in the bathroom  This will prevent you from falling when you step out of the bath or shower  Use a shower seat so you do not need to stand while you shower  Sit on the toilet or a chair in your bathroom to dry yourself and put on clothing  This will prevent you from losing your balance from drying or dressing yourself while you are standing  · Keep paths clear  Remove books, shoes, and other objects from walkways and stairs  Place cords for telephones and lamps out of the way so that you do not need to walk over them  Tape them down if you cannot move them  Remove small rugs  If you cannot remove a rug, secure it with double-sided tape  This will prevent you from tripping  · Install bright lights in your home  Use night lights to help light paths to the bathroom or kitchen  Always turn on the light before you start walking  · Keep items you use often on shelves within reach  Do not use a step stool to help you reach an item  · Paint or place reflective tape on the edges of your stairs    This will help you see the stairs better  Follow up with your healthcare provider as directed:  Write down your questions so you remember to ask them during your visits  © 2017 [employer] Information is for End User's use only and may not be sold, redistributed or otherwise used for commercial purposes  All illustrations and images included in CareNotes® are the copyrighted property of [employer]  or Colin Doty  The above information is an  only  It is not intended as medical advice for individual conditions or treatments  Talk to your doctor, nurse or pharmacist before following any medical regimen to see if it is safe and effective for you  Advance Directives   WHAT YOU NEED TO KNOW:   What are advance directives? Advance directives are legal documents that state your wishes and plans for medical care  These plans are made ahead of time in case you lose your ability to make decisions for yourself  Advance directives can apply to any medical decision, such as the treatments you want, and if you want to donate organs  What are the types of advance directives? There are many types of advance directives, and each state has rules about how to use them  You may choose a combination of any of the following:  · Living will: This is a written record of the treatment you want  You can also choose which treatments you do not want, which to limit, and which to stop at a certain time  This includes surgery, medicine, IV fluid, and tube feedings  · Durable power of  for healthcare [facility]): This is a written record that states who you want to make healthcare choices for you when you are unable to make them for yourself  This person, called a proxy, is usually a family member or a friend  You may choose more than 1 proxy  · Do not resuscitate (DNR) order:  A DNR order is used in case your heart stops beating or you stop breathing   It is a request not to have certain forms of treatment, such as CPR  A DNR order may be included in other types of advance directives  · Medical directive: This covers the care that you want if you are in a coma, near death, or unable to make decisions for yourself  You can list the treatments you want for each condition  Treatment may include pain medicine, surgery, blood transfusions, dialysis, IV or tube feedings, and a ventilator (breathing machine)  · Values history: This document has questions about your views, beliefs, and how you feel and think about life  This information can help others choose the care that you would choose  Why are advance directives important? An advance directive helps you control your care  Although spoken wishes may be used, it is better to have your wishes written down  Spoken wishes can be misunderstood, or not followed  Treatments may be given even if you do not want them  An advance directive may make it easier for your family to make difficult choices about your care  How do I decide what to put in my advance directives? · Make informed decisions:  Make sure you fully understand treatments or care you may receive  Think about the benefits and problems your decisions could cause for you or your family  Talk to healthcare providers if you have concerns or questions before you write down your wishes  You may also want to talk with your [employer] or , or a   Check your state laws to make sure that what you put in your advance directive is legal      · Sign all forms:  Sign and date your advance directive when you have finished  You may also need 2 witnesses to sign the forms  Witnesses cannot be your doctor or his staff, your spouse, heirs or beneficiaries, people you owe money to, or your chosen proxy  Talk to your family, proxy, and healthcare providers about your advance directive  Give each person a copy, and keep one for yourself in a place you can get to easily   Do not keep it hidden or locked away  · Review and revise your plans: You can revise your advance directive at any time, as long as you are able to make decisions  Review your plan every year, and when there are changes in your life, or your health  When you make changes, let your family, proxy, and healthcare providers know  Give each a new copy  Where can I find more information? · [employer]  [address] , [address]  Phone: 2- [phone] - [phone]  Phone: 9- [phone] - [phone]  Web Address: http://www  aafp org  · [address] [facility])  [address] [address] [facility], [address]  Phone: [phone]  Phone: [phone]  Web Address: Lionel sykes  CARE AGREEMENT:   You have the right to help plan your care  To help with this plan, you must learn about your health condition and treatment options  You must also learn about advance directives and how they are used  Work with your healthcare providers to decide what care will be used to treat you  You always have the right to refuse treatment  The above information is an  only  It is not intended as medical advice for individual conditions or treatments  Talk to your doctor, nurse or pharmacist before following any medical regimen to see if it is safe and effective for you  © 2017 [employer] Information is for End User's use only and may not be sold, redistributed or otherwise used for commercial purposes  All illustrations and images included in CareNotes® are the copyrighted property of [employer]  or Colin Doty

## 2018-02-28 DIAGNOSIS — G47.00 INSOMNIA, UNSPECIFIED TYPE: ICD-10-CM

## 2018-03-01 RX ORDER — ESZOPICLONE 3 MG/1
3 TABLET, FILM COATED ORAL
Qty: 30 TABLET | Refills: 0 | OUTPATIENT
Start: 2018-03-01 | End: 2018-03-11 | Stop reason: SDUPTHER

## 2018-03-11 DIAGNOSIS — K91.2 POSTSURGICAL MALABSORPTION, NOT ELSEWHERE CLASSIFIED (CODE): ICD-10-CM

## 2018-03-11 DIAGNOSIS — E78.5 HYPERLIPIDEMIA: ICD-10-CM

## 2018-03-11 DIAGNOSIS — G47.00 INSOMNIA, UNSPECIFIED TYPE: ICD-10-CM

## 2018-03-11 DIAGNOSIS — R79.89 OTHER SPECIFIED ABNORMAL FINDINGS OF BLOOD CHEMISTRY: ICD-10-CM

## 2018-03-11 DIAGNOSIS — Z98.84 BARIATRIC SURGERY STATUS: ICD-10-CM

## 2018-03-15 RX ORDER — ESZOPICLONE 3 MG/1
3 TABLET, FILM COATED ORAL
Qty: 30 TABLET | Refills: 0 | OUTPATIENT
Start: 2018-03-15 | End: 2018-04-09 | Stop reason: SDUPTHER

## 2018-03-27 ENCOUNTER — TRANSCRIBE ORDERS (OUTPATIENT)
Dept: LAB | Facility: CLINIC | Age: 63
End: 2018-03-27

## 2018-03-27 ENCOUNTER — LAB (OUTPATIENT)
Dept: LAB | Facility: CLINIC | Age: 63
End: 2018-03-27
Payer: COMMERCIAL

## 2018-03-27 DIAGNOSIS — R79.89 OTHER SPECIFIED ABNORMAL FINDINGS OF BLOOD CHEMISTRY: ICD-10-CM

## 2018-03-27 DIAGNOSIS — K91.2 POSTSURGICAL MALABSORPTION, NOT ELSEWHERE CLASSIFIED (CODE): ICD-10-CM

## 2018-03-27 DIAGNOSIS — Z11.59 NEED FOR HEPATITIS C SCREENING TEST: ICD-10-CM

## 2018-03-27 DIAGNOSIS — E78.5 HYPERLIPIDEMIA: ICD-10-CM

## 2018-03-27 DIAGNOSIS — Z98.84 BARIATRIC SURGERY STATUS: ICD-10-CM

## 2018-03-27 LAB
25(OH)D3 SERPL-MCNC: 55.4 NG/ML (ref 30–100)
ALBUMIN SERPL BCP-MCNC: 3.6 G/DL (ref 3.5–5)
ALP SERPL-CCNC: 90 U/L (ref 46–116)
ALT SERPL W P-5'-P-CCNC: 34 U/L (ref 12–78)
ANION GAP SERPL CALCULATED.3IONS-SCNC: 5 MMOL/L (ref 4–13)
AST SERPL W P-5'-P-CCNC: 26 U/L (ref 5–45)
BILIRUB SERPL-MCNC: 0.88 MG/DL (ref 0.2–1)
BUN SERPL-MCNC: 18 MG/DL (ref 5–25)
CALCIUM SERPL-MCNC: 9.3 MG/DL (ref 8.3–10.1)
CHLORIDE SERPL-SCNC: 103 MMOL/L (ref 100–108)
CHOLEST SERPL-MCNC: 208 MG/DL (ref 50–200)
CO2 SERPL-SCNC: 32 MMOL/L (ref 21–32)
CREAT SERPL-MCNC: 0.66 MG/DL (ref 0.6–1.3)
ERYTHROCYTE [DISTWIDTH] IN BLOOD BY AUTOMATED COUNT: 13.5 % (ref 11.6–15.1)
FERRITIN SERPL-MCNC: 273 NG/ML (ref 8–388)
FOLATE SERPL-MCNC: >20 NG/ML (ref 3.1–17.5)
GFR SERPL CREATININE-BSD FRML MDRD: 94 ML/MIN/1.73SQ M
GLUCOSE P FAST SERPL-MCNC: 85 MG/DL (ref 65–99)
HCT VFR BLD AUTO: 37.2 % (ref 34.8–46.1)
HCV AB SER QL: NORMAL
HDLC SERPL-MCNC: 60 MG/DL (ref 40–60)
HGB BLD-MCNC: 12.3 G/DL (ref 11.5–15.4)
LDLC SERPL CALC-MCNC: 126 MG/DL (ref 0–100)
MCH RBC QN AUTO: 30.2 PG (ref 26.8–34.3)
MCHC RBC AUTO-ENTMCNC: 33.1 G/DL (ref 31.4–37.4)
MCV RBC AUTO: 91 FL (ref 82–98)
PLATELET # BLD AUTO: 272 THOUSANDS/UL (ref 149–390)
PMV BLD AUTO: 10.8 FL (ref 8.9–12.7)
POTASSIUM SERPL-SCNC: 3.8 MMOL/L (ref 3.5–5.3)
PROT SERPL-MCNC: 7 G/DL (ref 6.4–8.2)
PTH-INTACT SERPL-MCNC: 34.3 PG/ML (ref 18.4–80.1)
RBC # BLD AUTO: 4.07 MILLION/UL (ref 3.81–5.12)
SODIUM SERPL-SCNC: 140 MMOL/L (ref 136–145)
TRIGL SERPL-MCNC: 108 MG/DL
VIT B12 SERPL-MCNC: 350 PG/ML (ref 100–900)
WBC # BLD AUTO: 4.68 THOUSAND/UL (ref 4.31–10.16)

## 2018-03-27 PROCEDURE — 82607 VITAMIN B-12: CPT

## 2018-03-27 PROCEDURE — 85027 COMPLETE CBC AUTOMATED: CPT

## 2018-03-27 PROCEDURE — 80061 LIPID PANEL: CPT

## 2018-03-27 PROCEDURE — 36415 COLL VENOUS BLD VENIPUNCTURE: CPT

## 2018-03-27 PROCEDURE — 83970 ASSAY OF PARATHORMONE: CPT

## 2018-03-27 PROCEDURE — 80053 COMPREHEN METABOLIC PANEL: CPT

## 2018-03-27 PROCEDURE — 82746 ASSAY OF FOLIC ACID SERUM: CPT

## 2018-03-27 PROCEDURE — 84590 ASSAY OF VITAMIN A: CPT

## 2018-03-27 PROCEDURE — 84425 ASSAY OF VITAMIN B-1: CPT

## 2018-03-27 PROCEDURE — 82728 ASSAY OF FERRITIN: CPT

## 2018-03-27 PROCEDURE — 86803 HEPATITIS C AB TEST: CPT

## 2018-03-27 PROCEDURE — 82306 VITAMIN D 25 HYDROXY: CPT

## 2018-03-29 DIAGNOSIS — E78.5 HYPERLIPIDEMIA, UNSPECIFIED HYPERLIPIDEMIA TYPE: Primary | ICD-10-CM

## 2018-03-29 RX ORDER — SIMVASTATIN 40 MG
40 TABLET ORAL EVERY EVENING
Qty: 90 TABLET | Refills: 3 | OUTPATIENT
Start: 2018-03-29 | End: 2019-03-06 | Stop reason: SDUPTHER

## 2018-03-30 LAB — VIT B1 BLD-SCNC: 169.4 NMOL/L (ref 66.5–200)

## 2018-04-01 LAB — VIT A SERPL-MCNC: 69 UG/DL (ref 26–82)

## 2018-04-03 ENCOUNTER — TELEPHONE (OUTPATIENT)
Dept: UROLOGY | Facility: CLINIC | Age: 63
End: 2018-04-03

## 2018-04-03 RX ORDER — ESZOPICLONE 2 MG/1
TABLET, FILM COATED ORAL
COMMUNITY
Start: 2018-03-01 | End: 2018-04-12 | Stop reason: ALTCHOICE

## 2018-04-03 NOTE — TELEPHONE ENCOUNTER
I called pt again to try and see if she is ready to schedule her botox procedure  There was no answer so i did leave another mesg asking pt to call our office back

## 2018-04-07 DIAGNOSIS — G47.00 INSOMNIA, UNSPECIFIED TYPE: ICD-10-CM

## 2018-04-07 RX ORDER — ESZOPICLONE 3 MG/1
3 TABLET, FILM COATED ORAL
Qty: 30 TABLET | Refills: 0 | Status: CANCELLED | OUTPATIENT
Start: 2018-04-07 | End: 2018-05-07

## 2018-04-09 DIAGNOSIS — G47.00 INSOMNIA, UNSPECIFIED TYPE: ICD-10-CM

## 2018-04-09 RX ORDER — ESZOPICLONE 3 MG/1
3 TABLET, FILM COATED ORAL
Qty: 30 TABLET | Refills: 2 | OUTPATIENT
Start: 2018-04-09 | End: 2018-07-03 | Stop reason: SDUPTHER

## 2018-04-12 ENCOUNTER — OFFICE VISIT (OUTPATIENT)
Dept: BARIATRICS | Facility: CLINIC | Age: 63
End: 2018-04-12
Payer: COMMERCIAL

## 2018-04-12 VITALS
HEIGHT: 67 IN | HEART RATE: 67 BPM | TEMPERATURE: 99.1 F | DIASTOLIC BLOOD PRESSURE: 70 MMHG | WEIGHT: 158.5 LBS | BODY MASS INDEX: 24.88 KG/M2 | SYSTOLIC BLOOD PRESSURE: 110 MMHG

## 2018-04-12 DIAGNOSIS — E53.8 LOW VITAMIN B12 LEVEL: ICD-10-CM

## 2018-04-12 DIAGNOSIS — Z98.84 BARIATRIC SURGERY STATUS: Primary | ICD-10-CM

## 2018-04-12 DIAGNOSIS — G45.9 TRANSIENT CEREBRAL ISCHEMIA, UNSPECIFIED TYPE: ICD-10-CM

## 2018-04-12 DIAGNOSIS — Z90.3 POSTGASTRECTOMY MALABSORPTION: ICD-10-CM

## 2018-04-12 DIAGNOSIS — E78.00 PURE HYPERCHOLESTEROLEMIA: ICD-10-CM

## 2018-04-12 DIAGNOSIS — K91.2 POSTGASTRECTOMY MALABSORPTION: ICD-10-CM

## 2018-04-12 PROBLEM — R79.89 LOW VITAMIN B12 LEVEL: Status: ACTIVE | Noted: 2018-04-12

## 2018-04-12 PROBLEM — R79.89 HIGH SERUM FERRITIN: Status: RESOLVED | Noted: 2017-08-24 | Resolved: 2018-04-12

## 2018-04-12 PROCEDURE — 99214 OFFICE O/P EST MOD 30 MIN: CPT | Performed by: PHYSICIAN ASSISTANT

## 2018-04-12 NOTE — ASSESSMENT & PLAN NOTE
-s/p Vertical Sleeve Gastrectomy with Dr Zuleyma Villanueva on 3/27/2017  Overall doing Well  Initial:281 5 lb  Current: 158 5 lb  EWL: 102% which is above average-advised on goal to maintain weight within 5-10 lb now  Yordan: Current  Current BMI is Body mass index is 24 82 kg/m²  Tolerating a regular diet-yes  Eating at least 60 grams of protein per day-yes  Following 30/60 minute rule with liquids-no-but following 30/30 which is fine  Drinking at least 64 ounces of fluid per day-no-advised on ways to boost fluids  Drinking carbonated beverages-no  Sufficient exercise-yes  Using NSAIDs regularly-yes-for history of tia  Using nicotine-no  Using alcohol-no    She has come off her ppi-no stomach irritation  I advised her if she has any gastric irritation she should let us know  Advised she can take an OTC famotidine 20 mg up to twice daily if she develops this  She does not feel she needs this now  She has been off omeprazole for around 6 months without problems  She notes she is looking into excess skin removal surgery and has already met with DR Bismark Mcclure for this  Advised that we generally recommend waiting until patients around around 18 months post-op as she may have further weight changes  Her weight has been relatively stable over the last 6 months  I believe she would be a good candidate for plastic surgery at some point

## 2018-04-12 NOTE — ASSESSMENT & PLAN NOTE
She has history of TIA on baby Asprin for this  She had stopped her ppi around 6 months ago and notes no issues with gastric irritation-advised IF she develops any stomach irritation/heart burn she should let us know and/or could consider taking OTC famotidine 20 mg up to twice daily  -but advised her to review any OTC with pharmacist at the time  BP is within acceptable range in the office today

## 2018-04-12 NOTE — ASSESSMENT & PLAN NOTE
Mildly high LDL but good HDL-she remains on statin/managed by her PCP  Since her weight loss is so good I do not expect lipids to significantly change    She has history of tia and her PCP would likely want her to continue statin for this

## 2018-04-12 NOTE — PROGRESS NOTES
Assessment/Plan:    Low vitamin B12 level  Recommended adding 500 mcg sublingual vitamin B12 daily    Hyperlipidemia  Mildly high LDL but good HDL-she remains on statin/managed by her PCP  Since her weight loss is so good I do not expect lipids to significantly change  She has history of tia and her PCP would likely want her to continue statin for this    Postgastrectomy malabsorption  -At risk for malabsorption of vitamins/minerals secondary to malabsorption and restriction of intake from their procedure  -Currently taking adequate postop bariatric surgery vitamin supplementation-yes  -Last set of bariatric labs completed on 4/18  and are not within acceptable limits   -Next set of bariatric labs ordered for approximately will recheck labs after her annual visit        Bariatric surgery status  -s/p Vertical Sleeve Gastrectomy with Dr Breana Cooper on 3/27/2017  Overall doing Well  Initial:281 5 lb  Current: 158 5 lb  EWL: 102% which is above average-advised on goal to maintain weight within 5-10 lb now  Yordan: Current  Current BMI is Body mass index is 24 82 kg/m²  Tolerating a regular diet-yes  Eating at least 60 grams of protein per day-yes  Following 30/60 minute rule with liquids-no-but following 30/30 which is fine  Drinking at least 64 ounces of fluid per day-no-advised on ways to boost fluids  Drinking carbonated beverages-no  Sufficient exercise-yes  Using NSAIDs regularly-yes-for history of tia  Using nicotine-no  Using alcohol-no    She has come off her ppi-no stomach irritation  I advised her if she has any gastric irritation she should let us know  Advised she can take an OTC famotidine 20 mg up to twice daily if she develops this  She does not feel she needs this now  She has been off omeprazole for around 6 months without problems  She notes she is looking into excess skin removal surgery and has already met with DR Kathy Cordero for this    Advised that we generally recommend waiting until patients around around 18 months post-op as she may have further weight changes  Her weight has been relatively stable over the last 6 months  I believe she would be a good candidate for plastic surgery at some point  Transient ischemic attack  She has history of TIA on baby Asprin for this  She had stopped her ppi around 6 months ago and notes no issues with gastric irritation-advised IF she develops any stomach irritation/heart burn she should let us know and/or could consider taking OTC famotidine 20 mg up to twice daily  -but advised her to review any OTC with pharmacist at the time  Diagnoses and all orders for this visit:    Bariatric surgery status    Postgastrectomy malabsorption    Pure hypercholesterolemia    Low vitamin B12 level    Transient cerebral ischemia, unspecified type          Subjective:      Patient ID: Marcos Apple is a 61 y o  female  She is here in routine follow-up  She is tolerating a regular diet  She is taking vitamins daily and is walking a couple miles/day for exercise  She notes she wants to get plastic surgery for excess skin removal and has already met with plastic surgeon for this  She has no complaints today  The following portions of the patient's history were reviewed and updated as appropriate: allergies, current medications, past family history, past medical history, past social history, past surgical history and problem list     Review of Systems   Constitutional: Negative for chills and fever  Unexpected weight change: planned weight loss  Respiratory: Negative for shortness of breath and wheezing  Cardiovascular: Negative for chest pain and palpitations  Gastrointestinal: Negative for abdominal pain, constipation, diarrhea, nausea and vomiting  Psychiatric/Behavioral: Suicidal ideas: no complait of anxiety or depression           Objective:      /70   Pulse 67   Temp 99 1 °F (37 3 °C)   Ht 5' 7" (1 702 m)   Wt 71 9 kg (158 lb 8 oz)   BMI 24 82 kg/m²          Physical Exam   Constitutional: She is oriented to person, place, and time  She appears well-developed and well-nourished  HENT:   Mouth/Throat: Oropharynx is clear and moist    Eyes: Conjunctivae are normal  No scleral icterus  Cardiovascular: Normal rate and regular rhythm  Pulmonary/Chest: Effort normal and breath sounds normal    Abdominal: Soft  There is no tenderness  No incisional hernias appreciated   Musculoskeletal:   Normal gait   Neurological: She is alert and oriented to person, place, and time  Psychiatric: She has a normal mood and affect  Her behavior is normal  Judgment and thought content normal    Nursing note and vitals reviewed  GOALS: Maintain weight loss with good nutrition intakes    Normal vitamin and mineral levels  Exercise as tolerated    BARRIERS: none identified

## 2018-04-12 NOTE — ASSESSMENT & PLAN NOTE
-At risk for malabsorption of vitamins/minerals secondary to malabsorption and restriction of intake from their procedure  -Currently taking adequate postop bariatric surgery vitamin supplementation-yes  -Last set of bariatric labs completed on 4/18  and are not within acceptable limits   -Next set of bariatric labs ordered for approximately will recheck labs after her annual visit

## 2018-04-12 NOTE — PATIENT INSTRUCTIONS
Follow-up in one year  Follow diet as discussed  Follow  vitamin and mineral recommendations as reviewed with you  ADD an extra 500 mcg sublingual vitamin B12 daily    Exercise as tolerated    Note you will be given a lab slip AFTER  your annual visit next year to check your vitamin and mineral levels  Call our office if he have any problems with abdominal pain especially if associated with fever, chills, nausea, vomiting or any other concerns  All  Post-bariatric surgery patients should be aware that very small quantities of any alcohol  can cause impairment and it is very possible not to feel the effect  The effect can be in the system for several hours  It is also a stomach irritant  It is advised to AVOID alcohol, Nonsteroidal antiinflammatory drugs (NSAIDS) and nicotine of all forms   -EXCEPT your baby aspirin  Any of these can cause stomach irritation/pain  IF you find you are having any stomach irritation, then recommend adding OTC famotidine 20 mg up to twice a day-BUT always review with your pharmacist first to check for any medication interactions

## 2018-04-17 ENCOUNTER — TELEPHONE (OUTPATIENT)
Dept: UROLOGY | Facility: CLINIC | Age: 63
End: 2018-04-17

## 2018-04-17 NOTE — TELEPHONE ENCOUNTER
----- Message from Malissa Maier MA sent at 4/16/2018  3:13 PM EDT -----  Regarding: Call Pt  Contact: [phone]  Pt wants you to give her a call back re: scheduling Botox in April?

## 2018-04-27 ENCOUNTER — OFFICE VISIT (OUTPATIENT)
Dept: PLASTIC SURGERY | Facility: CLINIC | Age: 63
End: 2018-04-27

## 2018-04-27 VITALS — WEIGHT: 161 LBS | HEIGHT: 68 IN | BODY MASS INDEX: 24.4 KG/M2

## 2018-04-27 DIAGNOSIS — E65 ABDOMINAL PANNUS: Primary | ICD-10-CM

## 2018-04-27 PROCEDURE — 99214 OFFICE O/P EST MOD 30 MIN: CPT | Performed by: SURGERY

## 2018-04-27 NOTE — PROGRESS NOTES
Assessment/Plan:  Please see HPI  After discussing thigh lift, brachioplasty, abdominoplasty versus body lift, she is interested in proceeding with lower body lift  We discussed the procedure in detail, how it is performed, as well as potential risks, complications and limitations including, but not limited to infection, bleeding, scarring, asymmetry, contour deformity, wound healing problems, hematoma, seroma, etc   She may benefit from a carrillo delete approach, but we mutually agreed to defer admitting her to a vertically oriented abdominal scar  We will proceed with lower body lift, she will be working with our surgical coordinator to arrange a date  She will be seen the day prior to be marked, she knows to call me should she have any questions regarding the anticipated procedure  There are no diagnoses linked to this encounter  Subjective: Interested in body contouring after weight loss   Patient ID: Aury Winslow is a 61 y o  female  HPI she continues to have issues related to the abdominal pannus with rashes, skin maceration and difficulty with hygiene  she returns to discuss body contouring, panniculectomy has been denied through her insurance carrier  Her weight remains stable, and has been stable for at least 6-8 months  She is interested in proceeding, understanding that she may be best served after weight has been stable for 12 months  The following portions of the patient's history were reviewed and updated as appropriate: allergies, current medications, past family history, past medical history, past social history, past surgical history and problem list     Review of Systems   Constitutional: Negative for chills and fever  HENT: Negative for hearing loss  Eyes: Negative for discharge and visual disturbance  Respiratory: Negative for chest tightness and shortness of breath  Cardiovascular: Negative for chest pain and leg swelling  Genitourinary: Negative for dysuria  Musculoskeletal: Negative for gait problem  Skin: Positive for rash  Allergic/Immunologic: Negative for immunocompromised state  Neurological: Negative for seizures and headaches  Hematological: Does not bruise/bleed easily  Psychiatric/Behavioral: Negative for dysphoric mood  The patient is not nervous/anxious  Objective:      Ht 5' 8" (1 727 m)   Wt 73 kg (161 lb)   BMI 24 48 kg/m²          Physical Exam   Constitutional: She appears well-developed and well-nourished  HENT:   Head: Normocephalic  Eyes: Pupils are equal, round, and reactive to light  Neck: Normal range of motion  Pulmonary/Chest: Effort normal and breath sounds normal    Abdominal: Soft  She exhibits no distension  There is no tenderness  There is no rebound and no guarding     Excess soft tissue of abdomen, fullness extending from pubis to epigastrium, laterally through flanks and posterior trunk, minimal to moderate diastasis recti

## 2018-04-30 PROBLEM — Z41.1 ENCOUNTER FOR COSMETIC SURGERY: Status: ACTIVE | Noted: 2018-04-30

## 2018-06-12 ENCOUNTER — APPOINTMENT (OUTPATIENT)
Dept: LAB | Facility: CLINIC | Age: 63
End: 2018-06-12
Payer: COMMERCIAL

## 2018-06-12 DIAGNOSIS — Z01.818 ENCOUNTER FOR PREADMISSION TESTING: ICD-10-CM

## 2018-06-12 LAB
ANION GAP SERPL CALCULATED.3IONS-SCNC: 6 MMOL/L (ref 4–13)
BASOPHILS # BLD AUTO: 0.04 THOUSANDS/ΜL (ref 0–0.1)
BASOPHILS NFR BLD AUTO: 1 % (ref 0–1)
BUN SERPL-MCNC: 23 MG/DL (ref 5–25)
CALCIUM SERPL-MCNC: 9.6 MG/DL (ref 8.3–10.1)
CHLORIDE SERPL-SCNC: 106 MMOL/L (ref 100–108)
CO2 SERPL-SCNC: 29 MMOL/L (ref 21–32)
CREAT SERPL-MCNC: 0.73 MG/DL (ref 0.6–1.3)
EOSINOPHIL # BLD AUTO: 0.19 THOUSAND/ΜL (ref 0–0.61)
EOSINOPHIL NFR BLD AUTO: 4 % (ref 0–6)
ERYTHROCYTE [DISTWIDTH] IN BLOOD BY AUTOMATED COUNT: 12.5 % (ref 11.6–15.1)
GFR SERPL CREATININE-BSD FRML MDRD: 88 ML/MIN/1.73SQ M
GLUCOSE P FAST SERPL-MCNC: 85 MG/DL (ref 65–99)
HCT VFR BLD AUTO: 39.2 % (ref 34.8–46.1)
HGB BLD-MCNC: 12.2 G/DL (ref 11.5–15.4)
IMM GRANULOCYTES # BLD AUTO: 0.01 THOUSAND/UL (ref 0–0.2)
IMM GRANULOCYTES NFR BLD AUTO: 0 % (ref 0–2)
LYMPHOCYTES # BLD AUTO: 2.06 THOUSANDS/ΜL (ref 0.6–4.47)
LYMPHOCYTES NFR BLD AUTO: 46 % (ref 14–44)
MCH RBC QN AUTO: 30.1 PG (ref 26.8–34.3)
MCHC RBC AUTO-ENTMCNC: 31.1 G/DL (ref 31.4–37.4)
MCV RBC AUTO: 97 FL (ref 82–98)
MONOCYTES # BLD AUTO: 0.41 THOUSAND/ΜL (ref 0.17–1.22)
MONOCYTES NFR BLD AUTO: 9 % (ref 4–12)
NEUTROPHILS # BLD AUTO: 1.83 THOUSANDS/ΜL (ref 1.85–7.62)
NEUTS SEG NFR BLD AUTO: 40 % (ref 43–75)
NRBC BLD AUTO-RTO: 0 /100 WBCS
PLATELET # BLD AUTO: 250 THOUSANDS/UL (ref 149–390)
PMV BLD AUTO: 11.1 FL (ref 8.9–12.7)
POTASSIUM SERPL-SCNC: 4.5 MMOL/L (ref 3.5–5.3)
RBC # BLD AUTO: 4.05 MILLION/UL (ref 3.81–5.12)
SODIUM SERPL-SCNC: 141 MMOL/L (ref 136–145)
WBC # BLD AUTO: 4.54 THOUSAND/UL (ref 4.31–10.16)

## 2018-06-12 PROCEDURE — 36415 COLL VENOUS BLD VENIPUNCTURE: CPT

## 2018-06-12 PROCEDURE — 80048 BASIC METABOLIC PNL TOTAL CA: CPT

## 2018-06-12 PROCEDURE — 85025 COMPLETE CBC W/AUTO DIFF WBC: CPT

## 2018-06-18 ENCOUNTER — CLINICAL SUPPORT (OUTPATIENT)
Dept: UROLOGY | Facility: CLINIC | Age: 63
End: 2018-06-18
Payer: COMMERCIAL

## 2018-06-18 DIAGNOSIS — N39.0 URINARY TRACT INFECTION WITHOUT HEMATURIA, SITE UNSPECIFIED: Primary | ICD-10-CM

## 2018-06-18 DIAGNOSIS — Z01.818 PRE-OP EXAM: Primary | ICD-10-CM

## 2018-06-18 LAB
SL AMB  POCT GLUCOSE, UA: NORMAL
SL AMB LEUKOCYTE ESTERASE,UA: NORMAL
SL AMB POCT BILIRUBIN,UA: NORMAL
SL AMB POCT BLOOD,UA: NORMAL
SL AMB POCT CLARITY,UA: CLEAR
SL AMB POCT COLOR,UA: YELLOW
SL AMB POCT KETONES,UA: NORMAL
SL AMB POCT NITRITE,UA: NORMAL
SL AMB POCT PH,UA: 5
SL AMB POCT SPECIFIC GRAVITY,UA: 1.02
SL AMB POCT URINE PROTEIN: NORMAL
SL AMB POCT UROBILINOGEN: NORMAL

## 2018-06-18 PROCEDURE — P9612 CATHETERIZE FOR URINE SPEC: HCPCS | Performed by: UROLOGY

## 2018-06-18 PROCEDURE — 81002 URINALYSIS NONAUTO W/O SCOPE: CPT | Performed by: PHYSICIAN ASSISTANT

## 2018-06-18 PROCEDURE — 99211 OFF/OP EST MAY X REQ PHY/QHP: CPT | Performed by: UROLOGY

## 2018-06-18 PROCEDURE — 87086 URINE CULTURE/COLONY COUNT: CPT | Performed by: PHYSICIAN ASSISTANT

## 2018-06-18 RX ORDER — DIAZEPAM 5 MG/1
TABLET ORAL
Qty: 2 TABLET | Refills: 0 | Status: SHIPPED | OUTPATIENT
Start: 2018-06-18 | End: 2018-08-14 | Stop reason: ALTCHOICE

## 2018-06-18 RX ORDER — DIAZEPAM 5 MG/1
TABLET ORAL
Qty: 2 TABLET | Refills: 0 | Status: SHIPPED | OUTPATIENT
Start: 2018-06-18 | End: 2018-06-18 | Stop reason: SDUPTHER

## 2018-06-18 NOTE — PROGRESS NOTES
Patient presents today for Cath C&S  Patient is having Cysto/Botox next week on 06/26/18  Catheter specimen was obtained and sent for culture  Awaiting results

## 2018-06-19 ENCOUNTER — OFFICE VISIT (OUTPATIENT)
Dept: FAMILY MEDICINE CLINIC | Facility: CLINIC | Age: 63
End: 2018-06-19
Payer: COMMERCIAL

## 2018-06-19 VITALS
WEIGHT: 160.6 LBS | DIASTOLIC BLOOD PRESSURE: 70 MMHG | HEART RATE: 78 BPM | BODY MASS INDEX: 24.42 KG/M2 | RESPIRATION RATE: 16 BRPM | SYSTOLIC BLOOD PRESSURE: 114 MMHG

## 2018-06-19 DIAGNOSIS — L98.7 EXCESS SKIN OF ABDOMEN: Primary | Chronic | ICD-10-CM

## 2018-06-19 DIAGNOSIS — Z98.84 BARIATRIC SURGERY STATUS: ICD-10-CM

## 2018-06-19 LAB — BACTERIA UR CULT: NORMAL

## 2018-06-19 PROCEDURE — 99215 OFFICE O/P EST HI 40 MIN: CPT | Performed by: FAMILY MEDICINE

## 2018-06-19 PROCEDURE — 93000 ELECTROCARDIOGRAM COMPLETE: CPT | Performed by: FAMILY MEDICINE

## 2018-06-19 NOTE — PROGRESS NOTES
Assessment/Plan:    No problem-specific Assessment & Plan notes found for this encounter  Diagnoses and all orders for this visit:    Excess skin of abdomen  Comments:  [facility] is stable / healthy on exam, and cleared medically for her surgery next week  Her pre-op testing was normal   Orders:  -     POCT ECG    Bariatric surgery status          Subjective:      Patient ID: Nuvia Arita is a 61 y o  female  Feeling well  Abdominal lift due to redundant skin -> post-gastrectomy sleeve in 2017 (she has lost 70+ lbs since the surgery)  Will have done 6/28/18 with Dr Royce Linton of Plastic Surgery  Pre-op labs reviewed, and reassuring; ECG today in clinic normal, showing NSR at approx 75bpm   No prior hx for the the pt (or in her family) for adverse reactions with general anesthesia  The following portions of the patient's history were reviewed and updated as appropriate: allergies, current medications, past family history, past social history, past surgical history and problem list     Past Medical History:   Diagnosis Date    Allergic dermatitis     resolved 01/26/2016    Anxiety     Arthritis     Blind right eye     Depression     Hyperlipidemia     Hypothyroidism (acquired)     hypothyroid    Incontinence of urine in female     Insomnia     Migraine     Morbid obesity ([address] )     TIA (transient ischemic attack)     questionable     Wears glasses      Past Surgical History:   Procedure Laterality Date    CATARACT EXTRACTION Left     CHOLECYSTECTOMY      COLONOSCOPY      ESOPHAGOGASTRODUODENOSCOPY N/A 3/27/2017    Procedure: ESOPHAGOGASTRODUODENOSCOPY (EGD); Surgeon: Rosalio Brady MD;  Location: [facility];  Service:     EYE SURGERY      HEMORROIDECTOMY      JOINT REPLACEMENT      [facility] TKR    TX EGD TRANSORAL BIOPSY SINGLE/MULTIPLE N/A 3/8/2017    Procedure: ESOPHAGOGASTRODUODENOSCOPY (EGD); Surgeon: Rosalio Brady MD;  Location: [facility];   Service: Bariatrics    TX LAP, NADYA RESTRICT PROC, LONGITUDINAL GASTRECTOMY N/A 3/27/2017    Procedure: GASTRECTOMY SLEEVE LAPAROSCOPIC;  Surgeon: Rosalio Brady MD;  Location: [facility];  Service: [address] [place] [address] Right        Current Outpatient Prescriptions:     aspirin 81 MG tablet, Take 1 tablet by mouth daily for 30 days, Disp: 30 tablet, Rfl: 0    calcium citrate-vitamin D (CITRACAL+D) 315-200 MG-UNIT per tablet, Take by mouth, Disp: , Rfl:     clobetasol (TEMOVATE) 0 05 % cream, Apply topically, Disp: , Rfl:     diazepam (VALIUM) 5 mg tablet, Take both tablets 1 hour prior to the procedure, Disp: 2 tablet, Rfl: 0    eszopiclone (LUNESTA) tablet, Take 1 tablet (3 mg total) by mouth daily at bedtime as needed for sleep for up to 90 days Take immediately before bedtime, Disp: 30 tablet, Rfl: 2    levothyroxine 125 mcg tablet, Take 1 tablet (125 mcg total) by mouth daily for 90 days, Disp: 90 tablet, Rfl: 3    Multiple Vitamins-Minerals (BARIATRIC FUSION) CHEW, Chew, Disp: , Rfl:     PARoxetine (PAXIL) 20 mg tablet, Take 1 tablet (20 mg total) by mouth daily for 90 days, Disp: 90 tablet, Rfl: 3    rizatriptan (MAXALT) 10 MG tablet, Take by mouth, Disp: , Rfl:     simvastatin (ZOCOR) 40 mg tablet, Take 1 tablet (40 mg total) by mouth every evening for 360 days, Disp: 90 tablet, Rfl: 3    No Known Allergies    Family History   Problem Relation Age of Onset    Diabetes Mother     Skin cancer Mother     Alzheimer's disease Father     Heart failure Father     Diabetes Father     Breast cancer Maternal Grandmother         adenocarcinoma in situ in villous adenoma of the breast     Social History   Substance Use Topics    Smoking status: Never Smoker    Smokeless tobacco: Never Used    Alcohol use No         Review of Systems   Constitutional: Negative for activity change and fever  Eyes: Negative for visual disturbance  Respiratory: Negative for shortness of breath  Cardiovascular: Negative for chest pain  No CP/SOB when walking 4 city blocks, or with climbing 2 flights of stairs  Gastrointestinal: Negative for abdominal pain and blood in stool  Genitourinary: Negative for dysuria  Musculoskeletal: Negative for back pain  Neurological: Negative for headaches  Objective:      /70 (BP Location: Right arm, Patient Position: Sitting, Cuff Size: Standard)   Pulse 78   Resp 16   Wt 72 8 kg (160 lb 9 6 oz)   BMI 24 42 kg/m²          Physical Exam   Constitutional: She is oriented to person, place, and time  She appears well-developed and well-nourished  No distress  HENT:   Head: Normocephalic and atraumatic  Right Ear: Hearing, tympanic membrane, external ear and ear canal normal    Left Ear: Hearing, tympanic membrane, external ear and ear canal normal    Mouth/Throat: Oropharynx is clear and moist  No oropharyngeal exudate  Eyes: Conjunctivae are normal    Pt with chronic amblyopia of the right eye (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)  Neck: Normal range of motion  Neck supple  No thyromegaly present  Cardiovascular: Normal rate, regular rhythm and normal heart sounds  Exam reveals no gallop and no friction rub  No murmur heard  Pulmonary/Chest: Effort normal and breath sounds normal  No respiratory distress  She has no wheezes  She has no rales  Abdominal: Soft  Bowel sounds are normal  She exhibits no distension and no mass  There is no tenderness  There is no rebound and no guarding  Pt does have significant redundant skin across the lower abdomen  Lymphadenopathy:     She has no cervical adenopathy  Neurological: She is alert and oriented to person, place, and time  Skin: She is not diaphoretic  Psychiatric: She has a normal mood and affect  Her behavior is normal  Judgment and thought content normal    Nursing note and vitals reviewed

## 2018-06-22 ENCOUNTER — TELEPHONE (OUTPATIENT)
Dept: UROLOGY | Facility: CLINIC | Age: 63
End: 2018-06-22

## 2018-06-22 NOTE — PRE-PROCEDURE INSTRUCTIONS
Pre-Surgery Instructions:   Medication Instructions    CALCIUM PO Instructed patient per Anesthesia Guidelines   clobetasol (TEMOVATE) 0 05 % cream Instructed patient per Anesthesia Guidelines   eszopiclone (LUNESTA) tablet Instructed patient per Anesthesia Guidelines   levothyroxine 125 mcg tablet Instructed patient per Anesthesia Guidelines   PARoxetine (PAXIL) 20 mg tablet Instructed patient per Anesthesia Guidelines   rizatriptan (MAXALT) 10 MG tablet Instructed patient per Anesthesia Guidelines   simvastatin (ZOCOR) 40 mg tablet Instructed patient per Anesthesia Guidelines  Before your operation, you play an important role in decreasing your risk for infection by washing with special antiseptic soap  This is an effective way to reduce bacteria on the skin which may help to prevent infections at the surgical site  Please read the following directions in advance  1  In the week before your operation purchase a 4 ounce bottle of antiseptic soap containing chlorhexidine gluconate 4%  Some brand names include: Aplicare, Endure, and Hibiclens  The cost is usually less than $5 00  · For your convenience, the [employer] carries the soap  · It may also be available at your doctor's office or pre-admission testing center, and at most retail pharmacies  · If you are allergic or sensitive to soaps containing chlorhexidine gluconate (CHG), please let your doctor know so another antiseptic soap can be suggested  · CHG antiseptic soap is for external use only  2      The day before your operation follow these directions carefully to get ready  · Place clean lines (sheets) on your bed; you should sleep on clean sheets after your evening shower  · Get clean towels and washcloths ready - you need enough for 2 showers  · Set aside clean underwear, pajamas, and clothing to wear after the shower      Reminders:  · DO NOT use any other soap or body rinse on your skin during or after the antiseptic showers  · DO NOT use lotion , powder, deodorant, or perfume/aftershave of any kind on your skin after your antiseptic shower  · DO NOT shave any body parts in the 24 hours/the day before your operation  · DO NOT get the antiseptic soap in your eyes, ears, nose, mouth, or vaginal area  3      You will need to shower the night before AND the morning of your Surgery  Shower 1:  · The evening before your operation, take the fist shower  · First, shampoo your hair with regular shampoo and rinse it completely before you use the anitseptic soap  After washing and rinsing your hair, rinse your body  · Next, use a clean wash cloth to apply the antiseptic soap and wash your body from the neck down to your toes using 1/2 bottle of the antiseptic soap  You will use the other 1/2 bottle for the second shower  · Clean the area where your incision will be; later this area well for about 2 minutes  · If you ar having head or neck surgery, wash areas with the antiseptic soap  · Rinse yourself completely with running water  · Use a clean towel to dry off  · Wear clean underwear and clothing/pajamas  Shower 2:  · The Morning of your operation, take the second shower following the same steps as Shower 1 using the second 1/2 of the bottle of antiseptic soap  · Use clean cloths and towels to was and dry yourself off  · Wear clean underwear and clothing

## 2018-06-22 NOTE — TELEPHONE ENCOUNTER
Patient called for her urine culture results  Culture showed no growth  Patient is okay for her botox injection on 6/26/18

## 2018-06-22 NOTE — H&P
Assessment/Plan:  Please see HPI  After discussing thigh lift, brachioplasty, abdominoplasty versus body lift, she is interested in proceeding with lower body lift  We discussed the procedure in detail, how it is performed, as well as potential risks, complications and limitations including, but not limited to infection, bleeding, scarring, asymmetry, contour deformity, wound healing problems, hematoma, seroma, etc   She may benefit from a carrillo delete approach, but we mutually agreed to defer admitting her to a vertically oriented abdominal scar  We will proceed with lower body lift, she will be working with our surgical coordinator to arrange a date  She will be seen the day prior to be marked, she knows to call me should she have any questions regarding the anticipated procedure          There are no diagnoses linked to this encounter        Subjective: Interested in body contouring after weight loss   Patient ID: Tomas Harris is a 61 y o  female      HPI she continues to have issues related to the abdominal pannus with rashes, skin maceration and difficulty with hygiene  she returns to discuss body contouring, panniculectomy has been denied through her insurance carrier  Her weight remains stable, and has been stable for at least 6-8 months  She is interested in proceeding, understanding that she may be best served after weight has been stable for 12 months      The following portions of the patient's history were reviewed and updated as appropriate: allergies, current medications, past family history, past medical history, past social history, past surgical history and problem list      Review of Systems   Constitutional: Negative for chills and fever  HENT: Negative for hearing loss  Eyes: Negative for discharge and visual disturbance  Respiratory: Negative for chest tightness and shortness of breath  Cardiovascular: Negative for chest pain and leg swelling     Genitourinary: Negative for dysuria  Musculoskeletal: Negative for gait problem  Skin: Positive for rash  Allergic/Immunologic: Negative for immunocompromised state  Neurological: Negative for seizures and headaches  Hematological: Does not bruise/bleed easily  Psychiatric/Behavioral: Negative for dysphoric mood  The patient is not nervous/anxious            Objective:        Ht 5' 8" (1 727 m)   Wt 73 kg (161 lb)   BMI 24 48 kg/m²             Physical Exam   Constitutional: She appears well-developed and well-nourished  HENT:   Head: Normocephalic  Eyes: Pupils are equal, round, and reactive to light  Neck: Normal range of motion  Pulmonary/Chest: Effort normal and breath sounds normal    Heart: RRR without murmurs  Abdominal: Soft  She exhibits no distension  There is no tenderness  There is no rebound and no guarding     Excess soft tissue of abdomen, fullness extending from pubis to epigastrium, laterally through flanks and posterior trunk, minimal to moderate diastasis recti

## 2018-06-25 ENCOUNTER — ANESTHESIA EVENT (OUTPATIENT)
Dept: PERIOP | Facility: HOSPITAL | Age: 63
End: 2018-06-25
Payer: SELF-PAY

## 2018-06-26 ENCOUNTER — PROCEDURE VISIT (OUTPATIENT)
Dept: UROLOGY | Facility: CLINIC | Age: 63
End: 2018-06-26
Payer: COMMERCIAL

## 2018-06-26 VITALS — HEIGHT: 68 IN | BODY MASS INDEX: 24.1 KG/M2 | WEIGHT: 159 LBS

## 2018-06-26 DIAGNOSIS — N39.41 URGE INCONTINENCE OF URINE: Primary | ICD-10-CM

## 2018-06-26 LAB
SL AMB  POCT GLUCOSE, UA: NORMAL
SL AMB LEUKOCYTE ESTERASE,UA: NORMAL
SL AMB POCT BILIRUBIN,UA: NORMAL
SL AMB POCT BLOOD,UA: NORMAL
SL AMB POCT CLARITY,UA: CLEAR
SL AMB POCT COLOR,UA: YELLOW
SL AMB POCT KETONES,UA: NORMAL
SL AMB POCT NITRITE,UA: NORMAL
SL AMB POCT PH,UA: 5
SL AMB POCT SPECIFIC GRAVITY,UA: NORMAL
SL AMB POCT URINE PROTEIN: NORMAL
SL AMB POCT UROBILINOGEN: NORMAL

## 2018-06-26 PROCEDURE — 81002 URINALYSIS NONAUTO W/O SCOPE: CPT | Performed by: UROLOGY

## 2018-06-26 PROCEDURE — 52287 CYSTOSCOPY CHEMODENERVATION: CPT | Performed by: UROLOGY

## 2018-06-26 PROCEDURE — P9612 CATHETERIZE FOR URINE SPEC: HCPCS | Performed by: UROLOGY

## 2018-06-26 PROCEDURE — 87086 URINE CULTURE/COLONY COUNT: CPT | Performed by: UROLOGY

## 2018-06-26 NOTE — LETTER
June 26, 2018     Suyapa Muñoz, [address] [facility]  [address] [address],[address]  [address] [address]    Patient: Tomas Harris   YOB: 1955   Date of Visit: 6/26/2018       Dear Dr Aretha Gandhi:    Thank you for referring Nelli Valerio to me for evaluation  Below are my notes for this consultation  If you have questions, please do not hesitate to call me  I look forward to following your patient along with you  Sincerely,        Stefan Cranker, MD        CC: No Recipients  Stefan Cranker, MD  6/26/2018  9:30 AM  Sign at close encounter        [address] PROCEDURE      6/26/2018    Tomas Press  1955         This patient presents to the Procedure Unit  The patient was identified by name  The procedure was confirmed with the patient and the staff  The patient was then placed on the cysto table  The genitalia are prepped with Betadine  The nursing staff then inserts a catheter and 50 mL of 1% plain lidocaine is instilled into the bladder  Lidocaine gel is also administered into the urethra  The patient was given preop medication for relaxation and pain control  The patient was given oral medication to take prior to the  procedure  After waiting several minutes, the patient was re-prepped and draped  A 19 Iraqi cystoscope was introduced  The bladder was inspected with 30 degree and 70 degree lenses  The ureteral orifices and trigone are identified  The bladder was inspected  No pathology noted  The mucosa was unremarkable  The ureteral orifice were unremarkable  At this time, using the 30 degree lens, 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

## 2018-06-26 NOTE — PROGRESS NOTES
[facility] UROLOGY  BOTOX PROCEDURE      6/26/2018    Daniel [facility]  1955         This patient presents to the Procedure Unit  The patient was identified by name  The procedure was confirmed with the patient and the staff  The patient was then placed on the cysto table  The genitalia are prepped with Betadine  The nursing staff then inserts a catheter and 50 mL of 1% plain lidocaine is instilled into the bladder  Lidocaine gel is also administered into the urethra  The patient was given preop medication for relaxation and pain control  The patient was given oral medication to take prior to the  procedure  After waiting several minutes, the patient was re-prepped and draped  A 19 Swedish cystoscope was introduced  The bladder was inspected with 30 degree and 70 degree lenses  The ureteral orifices and trigone are identified  The bladder was inspected  No pathology noted  The mucosa was unremarkable  The ureteral orifice were unremarkable  At this time, using the 30 degree lens, 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

## 2018-06-27 ENCOUNTER — OFFICE VISIT (OUTPATIENT)
Dept: PLASTIC SURGERY | Facility: HOSPITAL | Age: 63
End: 2018-06-27

## 2018-06-27 VITALS — WEIGHT: 159.2 LBS | BODY MASS INDEX: 24.13 KG/M2 | HEIGHT: 68 IN

## 2018-06-27 DIAGNOSIS — L98.7 EXCESSIVE AND REDUNDANT SKIN AND SUBCUTANEOUS TISSUE: Primary | ICD-10-CM

## 2018-06-27 LAB — BACTERIA UR CULT: NORMAL

## 2018-06-27 PROCEDURE — 99024 POSTOP FOLLOW-UP VISIT: CPT | Performed by: SURGERY

## 2018-06-27 NOTE — PROGRESS NOTES
Assessment/Plan:  Please see HPI  We again discussed lower body lift, how it is performed, as well as potential risks, complications limitations  She was marked for the procedure, her questions were answered to her satisfaction  Consent was obtained  The procedure was scheduled to be performed tomorrow  There are no diagnoses linked to this encounter  Subjective:   Preoperative visit     Patient ID: Sam Lim is a 61 y o  female  HPI she is scheduled tomorrow to undergo lower body lift, she is here to be marked    The following portions of the patient's history were reviewed and updated as appropriate: allergies, current medications, past family history, past medical history, past social history, past surgical history and problem list     Review of Systems   Constitutional: Negative for chills and fever  HENT: Negative for hearing loss  Eyes: Positive for visual disturbance  Negative for discharge  Wears eyeglasses   Respiratory: Negative for chest tightness and shortness of breath  Cardiovascular: Negative for chest pain and leg swelling  Genitourinary: Negative for dysuria  Musculoskeletal: Negative for gait problem  Skin: Negative for rash  Allergic/Immunologic: Negative for immunocompromised state  Neurological: Negative for seizures and headaches  Hematological: Does not bruise/bleed easily  Psychiatric/Behavioral: Negative for dysphoric mood  The patient is not nervous/anxious  Objective:      Ht 5' 8" (1 727 m)   Wt 72 2 kg (159 lb 3 2 oz)   BMI 24 21 kg/m²          Physical Exam   Constitutional: She appears well-developed and well-nourished  HENT:   Head: Normocephalic  Eyes: Pupils are equal, round, and reactive to light  Neck: Normal range of motion  Neck supple  Pulmonary/Chest: Effort normal    Abdominal: Soft  Musculoskeletal: Normal range of motion  Neurological: She is alert  Skin: Skin is warm and dry      Excess, redundant skin anterior trunk, pubis to epigastrium, extending to flanks and posterior trunk   Psychiatric: She has a normal mood and affect

## 2018-06-28 ENCOUNTER — HOSPITAL ENCOUNTER (OUTPATIENT)
Facility: HOSPITAL | Age: 63
Discharge: HOME | End: 2018-06-29
Attending: SURGERY | Admitting: SURGERY
Payer: SELF-PAY

## 2018-06-28 ENCOUNTER — ANESTHESIA (OUTPATIENT)
Dept: PERIOP | Facility: HOSPITAL | Age: 63
End: 2018-06-28
Payer: SELF-PAY

## 2018-06-28 DIAGNOSIS — Z41.1 ENCOUNTER FOR COSMETIC SURGERY: Primary | ICD-10-CM

## 2018-06-28 PROCEDURE — 15835 EXC EXCESSIVE SKIN BUTTOCK: CPT | Performed by: SURGERY

## 2018-06-28 PROCEDURE — 15834 EXC EXCESSIVE SKIN HIP: CPT | Performed by: SURGERY

## 2018-06-28 PROCEDURE — 15847 EXC SKIN ABD ADD-ON: CPT | Performed by: SURGERY

## 2018-06-28 PROCEDURE — 15830 EXC EXCESSIVE SKIN ABDOMEN: CPT | Performed by: SURGERY

## 2018-06-28 PROCEDURE — 15832 EXC EXCESSIVE SKIN THIGH: CPT | Performed by: SURGERY

## 2018-06-28 RX ORDER — ROCURONIUM BROMIDE 10 MG/ML
INJECTION, SOLUTION INTRAVENOUS AS NEEDED
Status: DISCONTINUED | OUTPATIENT
Start: 2018-06-28 | End: 2018-06-28 | Stop reason: SURG

## 2018-06-28 RX ORDER — PAROXETINE HYDROCHLORIDE 20 MG/1
20 TABLET, FILM COATED ORAL DAILY
Status: DISCONTINUED | OUTPATIENT
Start: 2018-06-28 | End: 2018-06-29 | Stop reason: HOSPADM

## 2018-06-28 RX ORDER — PROPOFOL 10 MG/ML
INJECTION, EMULSION INTRAVENOUS CONTINUOUS PRN
Status: DISCONTINUED | OUTPATIENT
Start: 2018-06-28 | End: 2018-06-28 | Stop reason: SURG

## 2018-06-28 RX ORDER — FENTANYL CITRATE 50 UG/ML
INJECTION, SOLUTION INTRAMUSCULAR; INTRAVENOUS AS NEEDED
Status: DISCONTINUED | OUTPATIENT
Start: 2018-06-28 | End: 2018-06-28 | Stop reason: SURG

## 2018-06-28 RX ORDER — OXYCODONE HYDROCHLORIDE AND ACETAMINOPHEN 5; 325 MG/1; MG/1
1 TABLET ORAL EVERY 4 HOURS PRN
Status: DISCONTINUED | OUTPATIENT
Start: 2018-06-28 | End: 2018-06-29 | Stop reason: HOSPADM

## 2018-06-28 RX ORDER — PROPOFOL 10 MG/ML
INJECTION, EMULSION INTRAVENOUS AS NEEDED
Status: DISCONTINUED | OUTPATIENT
Start: 2018-06-28 | End: 2018-06-28 | Stop reason: SURG

## 2018-06-28 RX ORDER — GLYCOPYRROLATE 0.2 MG/ML
INJECTION INTRAMUSCULAR; INTRAVENOUS AS NEEDED
Status: DISCONTINUED | OUTPATIENT
Start: 2018-06-28 | End: 2018-06-28 | Stop reason: SURG

## 2018-06-28 RX ORDER — CEFAZOLIN SODIUM 1 G/3ML
INJECTION, POWDER, FOR SOLUTION INTRAMUSCULAR; INTRAVENOUS AS NEEDED
Status: DISCONTINUED | OUTPATIENT
Start: 2018-06-28 | End: 2018-06-28 | Stop reason: SURG

## 2018-06-28 RX ORDER — SCOLOPAMINE TRANSDERMAL SYSTEM 1 MG/1
1 PATCH, EXTENDED RELEASE TRANSDERMAL
Status: DISCONTINUED | OUTPATIENT
Start: 2018-06-28 | End: 2018-06-28 | Stop reason: HOSPADM

## 2018-06-28 RX ORDER — ONDANSETRON 2 MG/ML
INJECTION INTRAMUSCULAR; INTRAVENOUS AS NEEDED
Status: DISCONTINUED | OUTPATIENT
Start: 2018-06-28 | End: 2018-06-28 | Stop reason: SURG

## 2018-06-28 RX ORDER — MORPHINE SULFATE 2 MG/ML
2 INJECTION, SOLUTION INTRAMUSCULAR; INTRAVENOUS EVERY 4 HOURS PRN
Status: DISCONTINUED | OUTPATIENT
Start: 2018-06-28 | End: 2018-06-29 | Stop reason: HOSPADM

## 2018-06-28 RX ORDER — METOCLOPRAMIDE HYDROCHLORIDE 5 MG/ML
10 INJECTION INTRAMUSCULAR; INTRAVENOUS ONCE AS NEEDED
Status: DISCONTINUED | OUTPATIENT
Start: 2018-06-28 | End: 2018-06-28 | Stop reason: HOSPADM

## 2018-06-28 RX ORDER — ONDANSETRON 2 MG/ML
4 INJECTION INTRAMUSCULAR; INTRAVENOUS EVERY 6 HOURS PRN
Status: DISCONTINUED | OUTPATIENT
Start: 2018-06-28 | End: 2018-06-29 | Stop reason: HOSPADM

## 2018-06-28 RX ORDER — OXYCODONE HYDROCHLORIDE AND ACETAMINOPHEN 5; 325 MG/1; MG/1
1 TABLET ORAL EVERY 4 HOURS PRN
Qty: 24 TABLET | Refills: 0 | Status: SHIPPED | OUTPATIENT
Start: 2018-06-28 | End: 2018-07-08

## 2018-06-28 RX ORDER — NICOTINE POLACRILEX 2 MG
GUM BUCCAL DAILY
COMMUNITY
End: 2019-01-02 | Stop reason: ALTCHOICE

## 2018-06-28 RX ORDER — MIDAZOLAM HYDROCHLORIDE 1 MG/ML
INJECTION INTRAMUSCULAR; INTRAVENOUS AS NEEDED
Status: DISCONTINUED | OUTPATIENT
Start: 2018-06-28 | End: 2018-06-28 | Stop reason: SURG

## 2018-06-28 RX ORDER — BUPIVACAINE HYDROCHLORIDE 2.5 MG/ML
INJECTION, SOLUTION INFILTRATION; PERINEURAL AS NEEDED
Status: DISCONTINUED | OUTPATIENT
Start: 2018-06-28 | End: 2018-06-28 | Stop reason: HOSPADM

## 2018-06-28 RX ORDER — HYDROMORPHONE HYDROCHLORIDE 2 MG/ML
INJECTION, SOLUTION INTRAMUSCULAR; INTRAVENOUS; SUBCUTANEOUS AS NEEDED
Status: DISCONTINUED | OUTPATIENT
Start: 2018-06-28 | End: 2018-06-28 | Stop reason: SURG

## 2018-06-28 RX ORDER — SODIUM CHLORIDE, SODIUM LACTATE, POTASSIUM CHLORIDE, CALCIUM CHLORIDE 600; 310; 30; 20 MG/100ML; MG/100ML; MG/100ML; MG/100ML
125 INJECTION, SOLUTION INTRAVENOUS CONTINUOUS
Status: DISCONTINUED | OUTPATIENT
Start: 2018-06-28 | End: 2018-06-29 | Stop reason: HOSPADM

## 2018-06-28 RX ORDER — DOCUSATE SODIUM 100 MG/1
100 CAPSULE, LIQUID FILLED ORAL 2 TIMES DAILY
Status: DISCONTINUED | OUTPATIENT
Start: 2018-06-28 | End: 2018-06-29 | Stop reason: HOSPADM

## 2018-06-28 RX ADMIN — PROPOFOL 200 MG: 10 INJECTION, EMULSION INTRAVENOUS at 07:31

## 2018-06-28 RX ADMIN — SODIUM CHLORIDE, SODIUM LACTATE, POTASSIUM CHLORIDE, AND CALCIUM CHLORIDE: .6; .31; .03; .02 INJECTION, SOLUTION INTRAVENOUS at 12:50

## 2018-06-28 RX ADMIN — ROCURONIUM BROMIDE 10 MG: 10 SOLUTION INTRAVENOUS at 11:23

## 2018-06-28 RX ADMIN — ROCURONIUM BROMIDE 20 MG: 10 SOLUTION INTRAVENOUS at 09:33

## 2018-06-28 RX ADMIN — FENTANYL CITRATE 50 MCG: 50 INJECTION, SOLUTION INTRAMUSCULAR; INTRAVENOUS at 11:14

## 2018-06-28 RX ADMIN — HYDROMORPHONE HYDROCHLORIDE 0.5 MG: 2 INJECTION, SOLUTION INTRAMUSCULAR; INTRAVENOUS; SUBCUTANEOUS at 13:07

## 2018-06-28 RX ADMIN — FENTANYL CITRATE 50 MCG: 50 INJECTION, SOLUTION INTRAMUSCULAR; INTRAVENOUS at 08:43

## 2018-06-28 RX ADMIN — ROCURONIUM BROMIDE 50 MG: 10 SOLUTION INTRAVENOUS at 07:31

## 2018-06-28 RX ADMIN — SODIUM CHLORIDE, SODIUM LACTATE, POTASSIUM CHLORIDE, AND CALCIUM CHLORIDE 125 ML/HR: .6; .31; .03; .02 INJECTION, SOLUTION INTRAVENOUS at 14:25

## 2018-06-28 RX ADMIN — PROPOFOL 20 MCG/KG/MIN: 10 INJECTION, EMULSION INTRAVENOUS at 08:10

## 2018-06-28 RX ADMIN — FENTANYL CITRATE 50 MCG: 50 INJECTION, SOLUTION INTRAMUSCULAR; INTRAVENOUS at 08:05

## 2018-06-28 RX ADMIN — ROCURONIUM BROMIDE 10 MG: 10 SOLUTION INTRAVENOUS at 10:31

## 2018-06-28 RX ADMIN — CEFAZOLIN SODIUM 1000 MG: 1 SOLUTION INTRAVENOUS at 21:03

## 2018-06-28 RX ADMIN — CEFAZOLIN SODIUM 1000 MG: 1 SOLUTION INTRAVENOUS at 14:25

## 2018-06-28 RX ADMIN — MIDAZOLAM HYDROCHLORIDE 2 MG: 1 INJECTION, SOLUTION INTRAMUSCULAR; INTRAVENOUS at 07:31

## 2018-06-28 RX ADMIN — MORPHINE SULFATE 2 MG: 2 INJECTION, SOLUTION INTRAMUSCULAR; INTRAVENOUS at 14:59

## 2018-06-28 RX ADMIN — SODIUM CHLORIDE, SODIUM LACTATE, POTASSIUM CHLORIDE, AND CALCIUM CHLORIDE 125 ML/HR: .6; .31; .03; .02 INJECTION, SOLUTION INTRAVENOUS at 06:37

## 2018-06-28 RX ADMIN — NEOSTIGMINE METHYLSULFATE 2 MG: 1 INJECTION, SOLUTION INTRAMUSCULAR; INTRAVENOUS; SUBCUTANEOUS at 12:42

## 2018-06-28 RX ADMIN — HYDROMORPHONE HYDROCHLORIDE 0.5 MG: 2 INJECTION, SOLUTION INTRAMUSCULAR; INTRAVENOUS; SUBCUTANEOUS at 12:59

## 2018-06-28 RX ADMIN — SCOPALAMINE 1 PATCH: 1 PATCH, EXTENDED RELEASE TRANSDERMAL at 07:12

## 2018-06-28 RX ADMIN — CEFAZOLIN 1000 MG: 1 INJECTION, POWDER, FOR SOLUTION INTRAVENOUS at 11:30

## 2018-06-28 RX ADMIN — ONDANSETRON 4 MG: 2 INJECTION INTRAMUSCULAR; INTRAVENOUS at 12:05

## 2018-06-28 RX ADMIN — SODIUM CHLORIDE, SODIUM LACTATE, POTASSIUM CHLORIDE, AND CALCIUM CHLORIDE: .6; .31; .03; .02 INJECTION, SOLUTION INTRAVENOUS at 10:12

## 2018-06-28 RX ADMIN — CEFAZOLIN SODIUM 1000 MG: 1 SOLUTION INTRAVENOUS at 07:36

## 2018-06-28 RX ADMIN — FENTANYL CITRATE 50 MCG: 50 INJECTION, SOLUTION INTRAMUSCULAR; INTRAVENOUS at 09:38

## 2018-06-28 RX ADMIN — FENTANYL CITRATE 100 MCG: 50 INJECTION, SOLUTION INTRAMUSCULAR; INTRAVENOUS at 07:31

## 2018-06-28 RX ADMIN — GLYCOPYRROLATE 0.4 MG: 0.2 INJECTION, SOLUTION INTRAMUSCULAR; INTRAVENOUS at 12:42

## 2018-06-28 RX ADMIN — OXYCODONE HYDROCHLORIDE AND ACETAMINOPHEN 1 TABLET: 5; 325 TABLET ORAL at 19:49

## 2018-06-28 RX ADMIN — DOCUSATE SODIUM 100 MG: 100 CAPSULE, LIQUID FILLED ORAL at 17:03

## 2018-06-28 NOTE — PLAN OF CARE
Problem: GASTROINTESTINAL - ADULT  Goal: Minimal or absence of nausea and/or vomiting  INTERVENTIONS:  - Administer IV fluids as ordered to ensure adequate hydration  - Maintain NPO status until nausea and vomiting are resolved  - Nasogastric tube as ordered  - Administer ordered antiemetic medications as needed  - Provide nonpharmacologic comfort measures as appropriate  - Advance diet as tolerated, if ordered  - Nutrition services referral to assist patient with adequate nutrition and appropriate food choices  Outcome: Progressing      Problem: METABOLIC, FLUID AND ELECTROLYTES - ADULT  Goal: Electrolytes maintained within normal limits  INTERVENTIONS:  - Monitor labs and assess patient for signs and symptoms of electrolyte imbalances  - Administer electrolyte replacement as ordered  - Monitor response to electrolyte replacements, including repeat lab results as appropriate  - Instruct patient on fluid and nutrition as appropriate  Outcome: Progressing    Goal: Fluid balance maintained  INTERVENTIONS:  - Monitor labs and assess for signs and symptoms of volume excess or deficit  - Monitor I/O and WT  - Instruct patient on fluid and nutrition as appropriate  Outcome: Progressing      Problem: SKIN/TISSUE INTEGRITY - ADULT  Goal: Skin integrity remains intact  INTERVENTIONS  - Identify patients at risk for skin breakdown  - Assess and monitor skin integrity  - Assess and monitor nutrition and hydration status  - Monitor labs (i e  albumin)  - Assess for incontinence   - Turn and reposition patient  - Assist with mobility/ambulation  - Relieve pressure over bony prominences  - Avoid friction and shearing  - Provide appropriate hygiene as needed including keeping skin clean and dry  - Evaluate need for skin moisturizer/barrier cream  - Collaborate with interdisciplinary team (i e  Nutrition, Rehabilitation, etc )   - Patient/family teaching  Outcome: Progressing    Goal: Incision(s), wounds(s) or drain site(s) healing without S/S of infection  INTERVENTIONS  - Assess and document risk factors for skin impairment   - Assess and document dressing, incision, wound bed, drain sites and surrounding tissue  - Initiate Nutrition services consult and/or wound management as needed  Outcome: Progressing      Problem: HEMATOLOGIC - ADULT  Goal: Maintains hematologic stability  INTERVENTIONS  - Assess for signs and symptoms of bleeding or hemorrhage  - Monitor labs  - Administer supportive blood products/factors as ordered and appropriate  Outcome: Progressing      Problem: MUSCULOSKELETAL - ADULT  Goal: Maintain or return mobility to safest level of function  INTERVENTIONS:  - Assess patient's ability to carry out ADLs; assess patient's baseline for ADL function and identify physical deficits which impact ability to perform ADLs (bathing, care of mouth/teeth, toileting, grooming, dressing, etc )  - Assess/evaluate cause of self-care deficits   - Assess range of motion  - Assess patient's mobility; develop plan if impaired  - Assess patient's need for assistive devices and provide as appropriate  - Encourage maximum independence but intervene and supervise when necessary  - Involve family in performance of ADLs  - Assess for home care needs following discharge   - Request OT consult to assist with ADL evaluation and planning for discharge  - Provide patient education as appropriate  Outcome: Progressing      Problem: Nutrition/Hydration-ADULT  Goal: Nutrient/Hydration intake appropriate for improving, restoring or maintaining nutritional needs  Monitor and assess patient's nutrition/hydration status for malnutrition (ex- brittle hair, bruises, dry skin, pale skin and conjunctiva, muscle wasting, smooth red tongue, and disorientation)  Collaborate with interdisciplinary team and initiate plan and interventions as ordered  Monitor patient's weight and dietary intake as ordered or per policy   Utilize nutrition screening tool and intervene per policy  Determine patient's food preferences and provide high-protein, high-caloric foods as appropriate       INTERVENTIONS:  - Monitor oral intake, urinary output, labs, and treatment plans  - Assess nutrition and hydration status and recommend course of action  - Evaluate amount of meals eaten  - Assist patient with eating if necessary   - Allow adequate time for meals  - Recommend/ encourage appropriate diets, oral nutritional supplements, and vitamin/mineral supplements  - Order, calculate, and assess calorie counts as needed  - Recommend, monitor, and adjust tube feedings and TPN/PPN based on assessed needs  - Assess need for intravenous fluids  - Provide specific nutrition/hydration education as appropriate  - Include patient/family/caregiver in decisions related to nutrition  Outcome: Progressing      Problem: PAIN - ADULT  Goal: Verbalizes/displays adequate comfort level or baseline comfort level  Interventions:  - Encourage patient to monitor pain and request assistance  - Assess pain using appropriate pain scale  - Administer analgesics based on type and severity of pain and evaluate response  - Implement non-pharmacological measures as appropriate and evaluate response  - Consider cultural and social influences on pain and pain management  - Notify physician/advanced practitioner if interventions unsuccessful or patient reports new pain  Outcome: Progressing      Problem: INFECTION - ADULT  Goal: Absence or prevention of progression during hospitalization  INTERVENTIONS:  - Assess and monitor for signs and symptoms of infection  - Monitor lab/diagnostic results  - Monitor all insertion sites, i e  indwelling lines, tubes, and drains  - Monitor endotracheal (as able) and nasal secretions for changes in amount and color  - Phil[place] appropriate cooling/warming therapies per order  - Administer medications as ordered  - Instruct and encourage patient and family to use good hand hygiene technique  - Identify and instruct in appropriate isolation precautions for identified infection/condition  Outcome: Progressing      Problem: SAFETY ADULT  Goal: Patient will remain free of falls  INTERVENTIONS:  - Assess patient frequently for physical needs  -  Identify cognitive and physical deficits and behaviors that affect risk of falls  -  [place] fall precautions as indicated by assessment   - Educate patient/family on patient safety including physical limitations  - Instruct patient to call for assistance with activity based on assessment  - Modify environment to reduce risk of injury  - Consider OT/PT consult to assist with strengthening/mobility  Outcome: Progressing      Problem: DISCHARGE PLANNING  Goal: Discharge to home or other facility with appropriate resources  INTERVENTIONS:  - Identify barriers to discharge w/patient and caregiver  - Arrange for needed discharge resources and transportation as appropriate  - Identify discharge learning needs (meds, wound care, etc )  - Arrange for interpretive services to assist at discharge as needed  - Refer to Case Management Department for coordinating discharge planning if the patient needs post-hospital services based on physician/advanced practitioner order or complex needs related to functional status, cognitive ability, or social support system  Outcome: Progressing      Problem: Knowledge Deficit  Goal: Patient/family/caregiver demonstrates understanding of disease process, treatment plan, medications, and discharge instructions  Complete learning assessment and assess knowledge base    Interventions:  - Provide teaching at level of understanding  - Provide teaching via preferred learning methods  Outcome: Progressing

## 2018-06-28 NOTE — ANESTHESIA PREPROCEDURE EVALUATION
Review of Systems/Medical History          Cardiovascular  Hyperlipidemia,    Pulmonary       GI/Hepatic            Endo/Other  History of thyroid disease , hypothyroidism,      GYN       Hematology   Musculoskeletal    Arthritis     Neurology    TIA, Headaches,    Psychology   Anxiety, Depression ,              Physical Exam    Airway    Mallampati score: I         Dental       Cardiovascular  Rhythm: regular, Rate: normal,     Pulmonary  Breath sounds clear to auscultation,     Other Findings        Anesthesia Plan  ASA Score- 3     Anesthesia Type-     Plan Factors-    Induction- intravenous  Postoperative Plan- Plan for postoperative opioid use  Informed Consent- Anesthetic plan and risks discussed with patient  I personally reviewed this patient with the CRNA  Discussed and agreed on the Anesthesia Plan with the CRNA  Mery Busby

## 2018-06-28 NOTE — DISCHARGE INSTRUCTIONS
[facility]  Dr Esperanza Parisi   [address] [facility] [address], [address]  Phone: [phone]     Postoperative Instructions for Outpatient Surgery     These instructions are being provided by your doctor to give you basic guidelines during your post-op recovery  Please let our office know if your contact information has changed       Please call the office today for an appointment tomorrow for dressing removal      Dressings: Keep clean and dry      Activity Restrictions: Nothing strenuous      Bathing:  None      Medications:    Resume pre-op medications  You may take tylenol, aleve, or ibuprofen for pain control             Percocet for pain  Other: Ice as needed for pain ans swelling  Binder at all times  Track drain output

## 2018-06-28 NOTE — OP NOTE
OPERATIVE REPORT  PATIENT NAME: Marcos Apple    :  1955  MRN: [id number]  Pt Location: [facility] OR ROOM 02    SURGERY DATE: 2018    Surgeon(s) and Role:     * Shania Perdomo MD - Primary     * Krysten Gonsales PA-C - Assisting    Preop Diagnosis:  Encounter for cosmetic surgery [Z41 1]    Post-Op Diagnosis Codes:     * Encounter for cosmetic surgery [Z41 1]    Procedure(s) (LRB):  LOWER BODY LIFT (N/A)    Specimen(s):  * No specimens in log *    Estimated Blood Loss:   Minimal    Drains:  Closed/Suction Drain Right; Anterior Hip Bulb 15 Fr  (Active)   Site Description Unable to view 2018  1:02 PM   Dressing Status Clean;Dry; Intact 2018  1:02 PM   Drainage Appearance Bloody 2018  1:02 PM   Status To bulb suction 2018  1:42 PM   Output (mL) 5 mL 2018  1:42 PM   Number of days: 0       Closed/Suction Drain Left;Posterior Hip Bulb 15 Fr  (Active)   Site Description Unable to view 2018  1:02 PM   Dressing Status Clean;Dry; Intact 2018  1:02 PM   Drainage Appearance Bloody 2018  1:02 PM   Status To bulb suction 2018  1:42 PM   Output (mL) 25 mL 2018  1:42 PM   Number of days: 0       [REMOVED] Urethral Catheter Latex 16 Fr  (Removed)   Site Assessment Skin intact 2018  8:04 AM   Collection Container Standard drainage bag 2018  8:04 AM   Number of days: 0       Anesthesia Type:   General    Operative Indications:  Encounter for cosmetic surgery [Z41 1]  Excess skin of trunk    Operative Findings:  As above    Complications:   None    Procedure and Technique:  Eight the patient was seen in the office the day prior to the procedure, she was marked in anticipation of lower body lift  We discussed the procedure, how would be performed, as well as potential risks, complications, and limitations    The day of surgery, the marks were reinforced, the patient was taken to the operating room underwent induction of general anesthesia per the operative field was prepped and draped in sterile fashion and a proper time-out was performed  Patient had been placed in the prone position, well-padded, in the inferior line of resection was infiltrated with xylocaine with epinephrine  Fifteen blade used to create skin incision is carried down through the dermis the Bovie cautery was then used to dissect through the subcutaneous tissue down through Win's fascia down to the underlying deep fascia  The flap was elevated from inferior to superior toward the superior line of resection, perforating vessels were clamped and cauterized prior to dividing them, and Bovie cautery was used for hemostasis throughout the procedure  Once adequate elevation of the flap had been performed, the amount to be resected was determined 1st by applying downward traction on the upper flap, and upward traction on the lower flap  These were then marked where the planned resection would be appropriate  Flap was split centrally with a 15 blade Bovie cautery, temporarily stapled at this level  With gentle upward pressure on the buttocks, the superior line of resection was then marked carefully utilizing is sterile surgical marking pen  The resection was then completed with a 15 blade and Bovie cautery, again hemostasis was assured with the Bovie cautery  The wound was thoroughly irrigated and hemostasis assured prior temporarily closing the wound with skin staples  After lining up the edges with skin staples closure was then accomplished with 2-0 Vicryl and 2-0 PDS sutures alternating at the level of Win's fascia, prior to placing the last sutures at this level a 15 [place] drain was placed deep to Win's fascia and brought out through a stab wound in the left lateral hip  The drain was secured with a 3-0 nylon suture  The remainder of the closure consisted of interrupted 3-0 Vicryl sutures  At the level of the deep dermis, this was followed by running subcuticular strata fix    History Krill and dry sterile dressings were applied prior to placing the patient in the supine position  The operative field was then re-prepped and draped in sterile fashion a similar approach was used anteriorly  Inferior line of resection was infiltrated with xylocaine with epinephrine, 15 blade was used to create skin incision, this was carried down through the dermis the Bovie cautery was then used to dissect through the subcutaneous tissue and Win's fascia down to the deep abdominal fascia  The flap was then elevated from inferior to superior toward the umbilicus, once the umbilicus was approached, it was circumscribed with a 15 blade, and dissected out along its stock utilizing curved iris scissors  After skeletonizing the umbilicus, further elevation was completed to the xiphoid process and costal margins bilaterally  Again, hemostasis was obtained with the Bovie cautery throughout  Once the flap had been fully elevated to the level as described, hemostasis was adequate and repair of the diastasis was performed  Initially figure-of-eight sutures utilizing 0 Prolene were placed, this was taken from the xiphoid to the superior aspect of the umbilicus, several additional interrupted figure-of-eight sutures were then placed from the inferior aspect of the umbilicus down to the pubis  These areas were then reinforced with a running 1  Prolene  Skin nice repair, the abdominal wall was infiltrated with 0 25% Marcaine and the wound was thoroughly irrigated  The patient was then placed in the seated, beach chair position and the upper flap draped over the lower flap with gentle traction upward on the lower flap the amount to be resected was carefully determined, marked and then the resection performed utilizing a 15 blade the Bovie cautery  Again, hemostasis was assured the Bovie cautery   Temporary closure was then accomplished with skin staples, the umbilicus was sited through its new location in the upper abdominal flap, a crescentic excision of skin was performed overlying the umbilicus and it was secured at the 12 and 6:00 position with 2-0 Vicryl sutures which were also secured to the abdominal wall  Additional 3-0 Vicryl sutures were then placed at the 248 and 10:00 position  The remainder of the closure of the umbilicus consisted of half buried mattress sutures of 4-0 nylon  The abdominal wound was closed as described for the posterior trunk, I initially utilizing 2-0 Vicryl and 2-0 PDS at the level of Win's fascia, a drain was placed and brought out through a separate stab wound in the right lateral hip/thigh, the drain was secured with 3-0 nylon  The remainder of the closure was as described for the posterior trunk utilizing 3-0 Vicryl buried at the level of the deep dermis this was followed by running subcuticular 3-0 strata fix  Prior to placing the last sutures at the level of Win's fascia Tisseel fibrin sealant was sprayed  After completion of the skin close year histoacrylsealant was placed followed by dry sterile dressings and an abdominal binder  The patient was transferred to a surgical bed in the beach chair position and transferred to recovery in stable condition     I was present for the entire procedure and A qualified resident physician was not available    Patient Disposition:  PACU     SIGNATURE: Anuradha Landon MD  DATE: June 28, 2018  TIME: 1:52 PM

## 2018-06-28 NOTE — INTERIM OP NOTE
LOWER BODY LIFT  Postoperative Note  PATIENT NAME: Kristina Bryant  : 1955  MRN: [id number]  [facility] OR ROOM 02    Surgery Date: 2018    Preop Diagnosis:  Encounter for cosmetic surgery [Z41 1]    Post-Op Diagnosis Codes:     * Encounter for cosmetic surgery [Z41 1]    Procedure(s) (LRB):  LOWER BODY LIFT (N/A)    Surgeon(s) and Role:     * Bob oCrbin MD - Primary     * Kindra Buck PA-C - Assisting    Specimens:  * No specimens in log *    Estimated Blood Loss:   Minimal    Anesthesia Type:   General     Findings:    None  Complications:   None    SIGNATURE: Kindra Buck PA-C   DATE: 2018   TIME: 1:06 PM

## 2018-06-29 ENCOUNTER — OFFICE VISIT (OUTPATIENT)
Dept: PLASTIC SURGERY | Facility: CLINIC | Age: 63
End: 2018-06-29

## 2018-06-29 VITALS
HEIGHT: 68 IN | OXYGEN SATURATION: 96 % | RESPIRATION RATE: 18 BRPM | BODY MASS INDEX: 23.83 KG/M2 | WEIGHT: 157.25 LBS | DIASTOLIC BLOOD PRESSURE: 53 MMHG | SYSTOLIC BLOOD PRESSURE: 90 MMHG | HEART RATE: 74 BPM | TEMPERATURE: 98.1 F

## 2018-06-29 DIAGNOSIS — Z98.890 POST-OPERATIVE STATE: Primary | ICD-10-CM

## 2018-06-29 LAB
ANION GAP SERPL CALCULATED.3IONS-SCNC: 5 MMOL/L (ref 4–13)
BUN SERPL-MCNC: 15 MG/DL (ref 5–25)
CALCIUM SERPL-MCNC: 8 MG/DL (ref 8.3–10.1)
CHLORIDE SERPL-SCNC: 106 MMOL/L (ref 100–108)
CO2 SERPL-SCNC: 30 MMOL/L (ref 21–32)
CREAT SERPL-MCNC: 0.7 MG/DL (ref 0.6–1.3)
ERYTHROCYTE [DISTWIDTH] IN BLOOD BY AUTOMATED COUNT: 12.1 % (ref 11.6–15.1)
GFR SERPL CREATININE-BSD FRML MDRD: 93 ML/MIN/1.73SQ M
GLUCOSE P FAST SERPL-MCNC: 95 MG/DL (ref 65–99)
GLUCOSE SERPL-MCNC: 95 MG/DL (ref 65–140)
HCT VFR BLD AUTO: 29.8 % (ref 34.8–46.1)
HGB BLD-MCNC: 9.5 G/DL (ref 11.5–15.4)
MCH RBC QN AUTO: 29.9 PG (ref 26.8–34.3)
MCHC RBC AUTO-ENTMCNC: 31.9 G/DL (ref 31.4–37.4)
MCV RBC AUTO: 94 FL (ref 82–98)
PLATELET # BLD AUTO: 176 THOUSANDS/UL (ref 149–390)
PMV BLD AUTO: 11.4 FL (ref 8.9–12.7)
POTASSIUM SERPL-SCNC: 4.2 MMOL/L (ref 3.5–5.3)
RBC # BLD AUTO: 3.18 MILLION/UL (ref 3.81–5.12)
SODIUM SERPL-SCNC: 141 MMOL/L (ref 136–145)
WBC # BLD AUTO: 5.83 THOUSAND/UL (ref 4.31–10.16)

## 2018-06-29 PROCEDURE — 99024 POSTOP FOLLOW-UP VISIT: CPT

## 2018-06-29 PROCEDURE — 85027 COMPLETE CBC AUTOMATED: CPT | Performed by: PHYSICIAN ASSISTANT

## 2018-06-29 PROCEDURE — 80048 BASIC METABOLIC PNL TOTAL CA: CPT | Performed by: PHYSICIAN ASSISTANT

## 2018-06-29 RX ORDER — DOCUSATE SODIUM 100 MG/1
100 CAPSULE, LIQUID FILLED ORAL 2 TIMES DAILY
Qty: 10 CAPSULE | Refills: 0 | Status: SHIPPED | OUTPATIENT
Start: 2018-06-29 | End: 2018-08-14 | Stop reason: ALTCHOICE

## 2018-06-29 RX ADMIN — OXYCODONE HYDROCHLORIDE AND ACETAMINOPHEN 1 TABLET: 5; 325 TABLET ORAL at 09:37

## 2018-06-29 RX ADMIN — DOCUSATE SODIUM 100 MG: 100 CAPSULE, LIQUID FILLED ORAL at 08:21

## 2018-06-29 RX ADMIN — PAROXETINE HYDROCHLORIDE 20 MG: 20 TABLET, FILM COATED ORAL at 08:21

## 2018-06-29 RX ADMIN — OXYCODONE HYDROCHLORIDE AND ACETAMINOPHEN 1 TABLET: 5; 325 TABLET ORAL at 03:13

## 2018-06-29 RX ADMIN — ENOXAPARIN SODIUM 40 MG: 100 INJECTION SUBCUTANEOUS at 08:23

## 2018-06-29 RX ADMIN — LEVOTHYROXINE SODIUM 125 MCG: 100 TABLET ORAL at 05:13

## 2018-06-29 NOTE — CASE MANAGEMENT
Initial Clinical Review    Admission: Date/Time/Statement: 06/28/2018 @ 13:20   Outpatient Procedure No Charge Bed      History of Illness: A 62 y/o female stayed in the hospital after elective procedure  Procedures: LOWER BODY LIFT       Temperature Pulse Respirations Blood Pressure SpO2   06/28/18 0631 06/28/18 0631 06/28/18 0631 06/28/18 0631 06/28/18 0631   98 °F (36 7 °C) 64 18 110/61 99 %      Temp Source Heart Rate Source Patient Position - Orthostatic VS BP Location FiO2 (%)   06/28/18 0631 06/28/18 0631 06/28/18 1415 06/28/18 1415 --   Oral Monitor Sitting Left arm       Pain Score       06/28/18 1302       5        Wt Readings from Last 1 Encounters:   06/28/18 71 3 kg (157 lb 4 oz)     Past Medical/Surgical History: Active Ambulatory Problems     Diagnosis Date Noted    Hyperlipidemia     Hypothyroidism     Blind right eye     Arthritis     Anxiety     Depression     Insomnia 06/07/2012    Migraine headache 06/06/2012    Postgastrectomy malabsorption 04/03/2017    Seborrheic keratosis 07/13/2015    Transient ischemic attack 06/06/2012    Urge incontinence of urine 10/05/2012    Vulvar atrophy 05/02/2014    Bariatric surgery status 04/12/2018    Low vitamin B12 level 04/12/2018    Excess skin of abdomen 06/19/2018     Resolved Ambulatory Problems     Diagnosis Date Noted    Morbid obesity ([address] )     Dyslipidemia 06/06/2012    Elevated fasting glucose 01/26/2016    High serum ferritin 08/24/2017     Past Medical History:   Diagnosis Date    Allergic dermatitis     Anxiety     Arthritis     Blind right eye     Depression     Hyperlipidemia     Hypothyroidism (acquired)     Incontinence of urine in female     Insomnia     Migraine     Morbid obesity ([address] )     TIA (transient ischemic attack)     Wears glasses        Admitting Diagnosis: Encounter for cosmetic surgery [Z41 1]    Age/Sex: 61 y o  female        Admission Orders:   Outpatient Procedure No Bed Charge/MedSurg  Post Op Care per Protocol  Bilateral Sequential Compression Device  LR @ 125    Scheduled Meds:   Current Facility-Administered Medications:  docusate sodium 100 mg Oral BID   enoxaparin 40 mg Subcutaneous Daily   levothyroxine 125 mcg Oral Early Morning   PARoxetine 20 mg Oral Daily     IV Morphine 2 mg x 1 thus far  Percocet po x 2 thus far

## 2018-06-29 NOTE — PROGRESS NOTES
Patient presents for first post op dressing change s/p low body lift 6/28/18  Incisions CDI  Blisters noted along edges of tegaderm dressing  Xeroform dressing changed in umbilical area with instructions to remove tomorrow  and apply Bacitracin to area twice daily  Post op instructions reviewed as noted in Patient Instructions and given in AVS  Verbalized understanding and will follow up in 1 week for drain assessment  Encouraged to call sooner with any questions or concerns

## 2018-06-29 NOTE — POST OP PROGRESS NOTES
Progress Note - Plastic Surgery   Lelo Calzada 61 y o  female MRN: [id number]  Unit/Bed#: [address] [place] [address] Encounter: [id number]    Assessment/Plan:  1  POD#1 s/p lower body lift  -Doing well, tolerating diet, pain well controlled, ambulating hallways  -d/c to home  -f/u with Dr Yoly Grider office today at 1500    Subjective/Objective   Chief Complaint: Feeling good    Subjective: Feeling good, tolerating pain meds and pain well controlled  Ambulating the hallways without difficulty  Comfortable using her drains  Objective:     Blood pressure 90/53, pulse 74, temperature 98 1 °F (36 7 °C), temperature source Oral, resp  rate 18, height 5' 8" (1 727 m), weight 71 3 kg (157 lb 4 oz), SpO2 96 %, not currently breastfeeding  ,Body mass index is 23 91 kg/m²  Intake/Output Summary (Last 24 hours) at 06/29/18 0914  Last data filed at 06/29/18 0518   Gross per 24 hour   Intake             2000 ml   Output              904 ml   Net             1096 ml       Invasive Devices     Peripheral Intravenous Line            Peripheral IV 06/28/18 Left Hand 1 day          Drain            Closed/Suction Drain Left;Posterior Hip Bulb 15 Fr  1 day    Closed/Suction Drain Right; Anterior Hip Bulb 15 Fr  1 day                Physical Exam: General appearance: alert and oriented, in no acute distress  Abdomen: soft, non-tender; bowel sounds normal; no masses,  no organomegaly and dressing left intact, bilateral GRISELDA drains with ss drainage    Lab, Imaging and other studies:  CBC:   Lab Results   Component Value Date    WBC 5 83 06/29/2018    HGB 9 5 (L) 06/29/2018    HCT 29 8 (L) 06/29/2018    MCV 94 06/29/2018     06/29/2018    MCH 29 9 06/29/2018    MCHC 31 9 06/29/2018    RDW 12 1 06/29/2018    MPV 11 4 06/29/2018   , CMP:   Lab Results   Component Value Date     06/29/2018    K 4 2 06/29/2018     06/29/2018    CO2 30 06/29/2018    ANIONGAP 5 06/29/2018    BUN 15 06/29/2018    CREATININE 0 70 06/29/2018    GLUCOSE 95 06/29/2018    CALCIUM 8 0 (L) 06/29/2018    EGFR 93 06/29/2018     VTE Pharmacologic Prophylaxis: Enoxaparin (Lovenox)  VTE Mechanical Prophylaxis: sequential compression device

## 2018-06-29 NOTE — PATIENT INSTRUCTIONS
Continue to wear abdominal binder at all times using ABD pads or gauze over incisions as needed  May wear binder over an old t-shirt or tank top  You may shower tomorrow  Apply bacitracin to belly button twice daily  Apply bacitracin to drain insertion site daily and cover with dry gauze dressing  Continue to monitor drain output twice daily  Drain may be removed when output is less than 20 ml per day for 24 hours for 2 days in a row

## 2018-07-02 DIAGNOSIS — G47.00 INSOMNIA, UNSPECIFIED TYPE: ICD-10-CM

## 2018-07-02 RX ORDER — ESZOPICLONE 3 MG/1
3 TABLET, FILM COATED ORAL
Qty: 30 TABLET | Refills: 0 | Status: CANCELLED | OUTPATIENT
Start: 2018-07-02 | End: 2018-09-30

## 2018-07-03 DIAGNOSIS — G47.00 INSOMNIA, UNSPECIFIED TYPE: ICD-10-CM

## 2018-07-03 RX ORDER — ESZOPICLONE 3 MG/1
3 TABLET, FILM COATED ORAL
Qty: 90 TABLET | Refills: 1 | Status: SHIPPED | OUTPATIENT
Start: 2018-07-03 | End: 2019-01-05 | Stop reason: SDUPTHER

## 2018-07-06 ENCOUNTER — OFFICE VISIT (OUTPATIENT)
Dept: PLASTIC SURGERY | Facility: CLINIC | Age: 63
End: 2018-07-06

## 2018-07-06 DIAGNOSIS — Z09 AFTERCARE INVOLVING THE USE OF PLASTIC SURGERY: Primary | ICD-10-CM

## 2018-07-06 PROCEDURE — 99024 POSTOP FOLLOW-UP VISIT: CPT | Performed by: PHYSICIAN ASSISTANT

## 2018-07-06 NOTE — PROGRESS NOTES
Assessment/Plan:   Layo Grimes is a pleasant 60-year-old female who is 1 week status post lower body lift with abdominoplasty  Please see HPI  One drain was removed today  We will see her back in 1 week to remove sutures and evaluate the left drain output  Diagnoses and all orders for this visit:    Aftercare involving the use of plastic surgery          Subjective:     Patient ID: Jose Garcia is a 61 y o  female  HPI   Layo Grimes is a pleasant 60-year-old female who is 1 week status post lower body lift with abdominoplasty  She is feeling well  She reported having some issues moving her bowels however she has taken a stool softener and and it is feeling much better  She reports some tape burns on her back  She also reports that her right drain is putting out approximately 5 cc per day and the left drain is putting out over 70 cc of fluid daily  Review of Systems   Skin:        As per HPI  Objective:     Physical Exam   Skin:   Incisions are clean, dry and intact  They are healing well  She does have a few tape burns located on her back  One drain removed today  Both are serosanguineous  Please see photos

## 2018-07-16 ENCOUNTER — OFFICE VISIT (OUTPATIENT)
Dept: PLASTIC SURGERY | Facility: CLINIC | Age: 63
End: 2018-07-16

## 2018-07-16 DIAGNOSIS — Z48.89 POSTOPERATIVE VISIT: Primary | ICD-10-CM

## 2018-07-16 PROCEDURE — 99024 POSTOP FOLLOW-UP VISIT: CPT | Performed by: SURGERY

## 2018-07-16 NOTE — PROGRESS NOTES
Assessment/Plan:  Please see HP I  She is now 19 days status post lower body lift  The remaining sutures have been removed, the surgical sites are healing nicely  The remaining drain has  been patent for a sufficient quantity such that is not yet ready to be removed  There is some inflammation around the drain site, as expected, and I suggested that she apply Neosporin or bacitracin to the site  She will continue to wear the abdominal binder  She will be seen again at the end of the week for drain removal       There are no diagnoses linked to this encounter  Subjective: Follow-up visit   Patient ID: Alin Soler is a 61 y o  female  HPI she is now 19 days status post lower body      Review of Systems      Objective:     Physical Exam

## 2018-07-19 DIAGNOSIS — Z12.31 ENCOUNTER FOR SCREENING MAMMOGRAM FOR MALIGNANT NEOPLASM OF BREAST: ICD-10-CM

## 2018-07-20 ENCOUNTER — OFFICE VISIT (OUTPATIENT)
Dept: PLASTIC SURGERY | Facility: CLINIC | Age: 63
End: 2018-07-20

## 2018-07-20 VITALS — BODY MASS INDEX: 23.79 KG/M2 | HEIGHT: 68 IN | WEIGHT: 157 LBS

## 2018-07-20 DIAGNOSIS — Z48.89 POSTOPERATIVE VISIT: Primary | ICD-10-CM

## 2018-07-20 PROCEDURE — 99024 POSTOP FOLLOW-UP VISIT: CPT | Performed by: SURGERY

## 2018-07-20 NOTE — PROGRESS NOTES
Assessment/Plan:  Please see HPI  Overall, she is doing well  She is happy with the result  The volume of drainage has diminished such that the drain was removed at today's visit without difficulty  We talked about the importance of utilizing silicone to help with decreasing the risk of scar hypertrophy, and she has been given the [place]  We will see her for a follow-up visit in 4-6 weeks  There are no diagnoses linked to this encounter  Subjective: Follow-up visit     Patient ID: Anastasia Burris is a 61 y o  female  HPI she underwent lower body lift on June 28, 2018, she presents today for a follow-up visit      Review of Systems      Objective:     Physical Exam  well-healed lower body lift incisions, umbilicus viable, no evidence of seroma

## 2018-08-13 ENCOUNTER — OFFICE VISIT (OUTPATIENT)
Dept: PLASTIC SURGERY | Facility: CLINIC | Age: 63
End: 2018-08-13

## 2018-08-13 VITALS — WEIGHT: 157 LBS | HEIGHT: 68 IN | BODY MASS INDEX: 23.79 KG/M2

## 2018-08-13 DIAGNOSIS — Z41.1 ENCOUNTER FOR COSMETIC SURGERY: Primary | ICD-10-CM

## 2018-08-13 PROCEDURE — 99024 POSTOP FOLLOW-UP VISIT: CPT | Performed by: PHYSICIAN ASSISTANT

## 2018-08-13 NOTE — PROGRESS NOTES
Assessment/Plan:   Carmen Quiros is a pleasant 66-year-old female who is 6 weeks status post lower body lift with abdominoplasty  Please see HPI  She was seen with Dr Deja De Leon as well  She will follow up with him for a cosmetic consultation to decide the next steps  She is encouraged to follow up sooner with any concerns regarding her scar  Diagnoses and all orders for this visit:    Encounter for cosmetic surgery          Subjective:     Patient ID: Oni Andujar is a 61 y o  female  HPI   Carmen Quiros is a pleasant 66-year-old female who is 6 weeks status post lower body lift with abdominoplasty  She reports some scabs and open areas on her scar  Otherwise, she is very pleased with her results and is interested in moving forward with more surgery  Review of Systems   Skin:        As per HPI  Objective:     Physical Exam   Skin:   A few eschars noted around her scar  One area on the left with granulation tissue noted  Silver nitrate used to stimulate new healing  Photos taken

## 2018-08-14 ENCOUNTER — OFFICE VISIT (OUTPATIENT)
Dept: UROLOGY | Facility: CLINIC | Age: 63
End: 2018-08-14
Payer: COMMERCIAL

## 2018-08-14 VITALS
DIASTOLIC BLOOD PRESSURE: 66 MMHG | BODY MASS INDEX: 23.04 KG/M2 | HEIGHT: 68 IN | WEIGHT: 152 LBS | SYSTOLIC BLOOD PRESSURE: 106 MMHG

## 2018-08-14 DIAGNOSIS — N39.41 URGE INCONTINENCE OF URINE: Primary | ICD-10-CM

## 2018-08-14 LAB
POST-VOID RESIDUAL VOLUME, ML POC: 28 ML
SL AMB  POCT GLUCOSE, UA: NORMAL
SL AMB LEUKOCYTE ESTERASE,UA: NORMAL
SL AMB POCT BILIRUBIN,UA: NORMAL
SL AMB POCT BLOOD,UA: NORMAL
SL AMB POCT CLARITY,UA: CLEAR
SL AMB POCT COLOR,UA: YELLOW
SL AMB POCT KETONES,UA: NORMAL
SL AMB POCT NITRITE,UA: NORMAL
SL AMB POCT PH,UA: 5
SL AMB POCT SPECIFIC GRAVITY,UA: 1.02
SL AMB POCT URINE PROTEIN: NORMAL
SL AMB POCT UROBILINOGEN: NORMAL

## 2018-08-14 PROCEDURE — 51798 US URINE CAPACITY MEASURE: CPT | Performed by: UROLOGY

## 2018-08-14 PROCEDURE — 81002 URINALYSIS NONAUTO W/O SCOPE: CPT | Performed by: UROLOGY

## 2018-08-14 PROCEDURE — 99212 OFFICE O/P EST SF 10 MIN: CPT | Performed by: UROLOGY

## 2018-08-14 NOTE — PROGRESS NOTES
Progress Note - Urology  Lynn Cespedes 61 y o  female MRN: [id number]  Encounter: [id number]      Chief Complaint:   Chief Complaint   Patient presents with    Urinary Incontinence     5 Week FU / Botox       HPI:  Postop Botox installation from June 26  Doing very well  All symptoms have resolved and she is very happy  No UTI symptoms  No urinary incontinence        MEDS:    Current Outpatient Prescriptions:     Biotin 1 MG CAPS, Take by mouth daily  , Disp: , Rfl:     calcium citrate-vitamin D (CITRACAL+D) 315-200 MG-UNIT per tablet, Take by mouth daily  , Disp: , Rfl:     CALCIUM PO, Take by mouth daily  , Disp: , Rfl:     clobetasol (TEMOVATE) 0 05 % cream, Apply topically, Disp: , Rfl:     eszopiclone (LUNESTA) tablet, Take 1 tablet (3 mg total) by mouth daily at bedtime for 90 days Take immediately before bedtime, Disp: 90 tablet, Rfl: 1    Multiple Vitamins-Minerals (BARIATRIC FUSION) CHEW, Chew 2 tablets daily  , Disp: , Rfl:     rizatriptan (MAXALT) 10 MG tablet, Take by mouth daily as needed  , Disp: , Rfl:     simvastatin (ZOCOR) 40 mg tablet, Take 1 tablet (40 mg total) by mouth every evening for 360 days, Disp: 90 tablet, Rfl: 3    aspirin 81 MG tablet, Take 1 tablet by mouth daily for 30 days, Disp: 30 tablet, Rfl: 0    levothyroxine 125 mcg tablet, Take 1 tablet (125 mcg total) by mouth daily for 90 days, Disp: 90 tablet, Rfl: 3    PARoxetine (PAXIL) 20 mg tablet, Take 1 tablet (20 mg total) by mouth daily for 90 days, Disp: 90 tablet, Rfl: 3      PMH:  Past Medical History:   Diagnosis Date    Allergic dermatitis     resolved 01/26/2016    Anxiety     Arthritis     Blind right eye     Depression     Hyperlipidemia     Hypothyroidism (acquired)     hypothyroid    Incontinence of urine in female     Insomnia     Migraine     Morbid obesity ([address] )     TIA (transient ischemic attack)     questionable     Wears glasses          PSH  Past Surgical History:   Procedure Laterality Date    BODY LIFT LOWER N/A 6/28/2018    Procedure: LOWER BODY LIFT;  Surgeon: Stephania Palacios MD;  Location:  MAIN OR;  Service: Plastics    CATARACT EXTRACTION Left     CHOLECYSTECTOMY      COLONOSCOPY      ESOPHAGOGASTRODUODENOSCOPY N/A 3/27/2017    Procedure: ESOPHAGOGASTRODUODENOSCOPY (EGD); Surgeon: Gayatri Pruitt MD;  Location: [facility];  Service:     EYE SURGERY      HEMORROIDECTOMY      JOINT REPLACEMENT      [facility] TKR    SC EGD TRANSORAL BIOPSY SINGLE/MULTIPLE N/A 3/8/2017    Procedure: ESOPHAGOGASTRODUODENOSCOPY (EGD); Surgeon: Gayatri Pruitt MD;  Location: [facility]; Service: Bariatrics    SC LAP, NADYA RESTRICT PROC, LONGITUDINAL GASTRECTOMY N/A 3/27/2017    Procedure: GASTRECTOMY SLEEVE LAPAROSCOPIC;  Surgeon: Gayatri Pruitt MD;  Location: [facility];  Service: Bariatrics    ROTATOR CUFF REPAIR Right          ROS:  Review of Systems      Vitals:  Blood pressure 106/66, height 5' 8" (1 727 m), weight 68 9 kg (152 lb), not currently breastfeeding  Physical Exam:     No clinical examination at this time         Lab, Imaging and other studies:  Recent Results (from the past 672 hour(s))   POCT urine dip    Collection Time: 08/14/18 10:19 AM   Result Value Ref Range    LEUKOCYTE ESTERASE,UA NEG      NITRITE,UA NEG     SL AMB POCT UROBILINOGEN NEG     SL AMB POCT URINE PROTEIN NEG      PH,UA 5      BLOOD,UA NEG      SPECIFIC GRAVITY,UA 1 025      KETONES,UA NEG      BILIRUBIN,UA NEG     GLUCOSE, UA NEG      COLOR,UA Yellow      CLARITY,UA Clear    POCT Measure PVR    Collection Time: 08/14/18 10:36 AM   Result Value Ref Range    POST-VOID RESIDUAL VOLUME, ML POC 28 mL           IMPRESSION:   voiding dysfunction    PLAN:   good result with Botox installation  I will see her in 6 months  She will contact me sooner should this voiding dysfunction return  Her residual is only 28 m L  Urine is clear

## 2018-08-17 ENCOUNTER — ANNUAL EXAM (OUTPATIENT)
Dept: OBGYN CLINIC | Facility: CLINIC | Age: 63
End: 2018-08-17
Payer: COMMERCIAL

## 2018-08-17 VITALS
HEIGHT: 68 IN | DIASTOLIC BLOOD PRESSURE: 66 MMHG | SYSTOLIC BLOOD PRESSURE: 114 MMHG | BODY MASS INDEX: 23.04 KG/M2 | WEIGHT: 152 LBS

## 2018-08-17 DIAGNOSIS — Z01.419 ENCOUNTER FOR GYNECOLOGICAL EXAMINATION (GENERAL) (ROUTINE) WITHOUT ABNORMAL FINDINGS: ICD-10-CM

## 2018-08-17 DIAGNOSIS — Z12.31 ENCOUNTER FOR SCREENING MAMMOGRAM FOR MALIGNANT NEOPLASM OF BREAST: Primary | ICD-10-CM

## 2018-08-17 PROCEDURE — G0145 SCR C/V CYTO,THINLAYER,RESCR: HCPCS | Performed by: OBSTETRICS & GYNECOLOGY

## 2018-08-17 PROCEDURE — S0612 ANNUAL GYNECOLOGICAL EXAMINA: HCPCS | Performed by: OBSTETRICS & GYNECOLOGY

## 2018-08-17 NOTE — PATIENT INSTRUCTIONS
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

## 2018-08-17 NOTE — PROGRESS NOTES
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 which she treats with Temovate cream on a 1 day basis 1 needed for vulvar itching  She has no chronic headaches fainting spells hot flashes  She sleeps about 7 hours at night and does take medication for sleep  She recently had excessive skin removed following her previous bariatric surgery  Her bowel function is normal and she does not bleed with bowel movements  She gets Botox injections from her urologist in the periurethral area and this does help with urine stress incontinence  She has had no urinary tract infections over the year  Review of Systems   Constitutional: Negative  HENT: Negative  Eyes: Negative  Respiratory: Negative  Cardiovascular: Negative  Gastrointestinal: Negative  Endocrine: Negative  Genitourinary: Negative  Musculoskeletal: Negative  Skin: Negative  Allergic/Immunologic: Negative  Neurological: Negative  Hematological: Negative  Psychiatric/Behavioral: Negative  Objective:      /66 (BP Location: Left arm, Patient Position: Sitting, Cuff Size: Standard)   Ht 5' 8" (1 727 m)   Wt 68 9 kg (152 lb)   BMI 23 11 kg/m²          Physical Exam   Constitutional: She is oriented to person, place, and time  She appears well-developed and well-nourished  HENT:   Head: Normocephalic  Neck: Normal range of motion  Neck supple  Cardiovascular: Normal rate, regular rhythm, normal heart sounds and intact distal pulses  Pulmonary/Chest: Effort normal and breath sounds normal    Abdominal: Soft   Bowel sounds are normal    Genitourinary: Uterus normal    Musculoskeletal: Normal range of motion  Neurological: She is alert and oriented to person, place, and time  Skin: Skin is warm and dry  Psychiatric: She has a normal mood and affect  Nursing note and vitals reviewed  pelvic exam reveals uterus to be anterior mobile normal size and well supported  No adnexal masses are identified  There is no blood or discharge in the vagina  The vulva does have leukoplakia bilaterally  There are no ulcers or bleeding spots identified  Rectal exam shows no bladder masses in the rectum and no nodularity in the cul-de-sac   Breast exam is normal

## 2018-08-21 LAB
LAB AP GYN PRIMARY INTERPRETATION: NORMAL
Lab: NORMAL

## 2018-08-27 ENCOUNTER — OFFICE VISIT (OUTPATIENT)
Dept: FAMILY MEDICINE CLINIC | Facility: CLINIC | Age: 63
End: 2018-08-27
Payer: COMMERCIAL

## 2018-08-27 VITALS
HEART RATE: 72 BPM | HEIGHT: 67 IN | TEMPERATURE: 99.2 F | WEIGHT: 155.4 LBS | SYSTOLIC BLOOD PRESSURE: 110 MMHG | DIASTOLIC BLOOD PRESSURE: 80 MMHG | RESPIRATION RATE: 16 BRPM | OXYGEN SATURATION: 97 % | BODY MASS INDEX: 24.39 KG/M2

## 2018-08-27 DIAGNOSIS — E78.00 PURE HYPERCHOLESTEROLEMIA: Primary | ICD-10-CM

## 2018-08-27 DIAGNOSIS — E03.9 ACQUIRED HYPOTHYROIDISM: ICD-10-CM

## 2018-08-27 DIAGNOSIS — F41.1 GENERALIZED ANXIETY DISORDER: ICD-10-CM

## 2018-08-27 DIAGNOSIS — F41.9 ANXIETY: ICD-10-CM

## 2018-08-27 PROBLEM — L98.7 EXCESS SKIN OF ABDOMEN: Chronic | Status: RESOLVED | Noted: 2018-06-19 | Resolved: 2018-08-27

## 2018-08-27 PROCEDURE — 99214 OFFICE O/P EST MOD 30 MIN: CPT | Performed by: FAMILY MEDICINE

## 2018-08-27 RX ORDER — PAROXETINE 10 MG/1
10 TABLET, FILM COATED ORAL DAILY
Qty: 90 TABLET | Refills: 3 | Status: SHIPPED | OUTPATIENT
Start: 2018-08-27 | End: 2018-08-29 | Stop reason: SDUPTHER

## 2018-08-27 NOTE — PATIENT INSTRUCTIONS

## 2018-08-27 NOTE — PROGRESS NOTES
Assessment/Plan:    Problem List Items Addressed This Visit     Hyperlipidemia - Primary     On zocor and doing well         Relevant Orders    Comprehensive metabolic panel    Lipid Panel with Direct LDL reflex    Hypothyroidism     On levothyroxine         Relevant Orders    TSH, 3rd generation with Free T4 reflex    Anxiety     Will decrease 10mg and eventually titrate off           Other Visit Diagnoses     Generalized anxiety disorder        Relevant Medications    PARoxetine (PAXIL) 10 mg tablet          Patient Instructions   Hyperlipidemia   AMBULATORY CARE:   Hyperlipidemia  is a high level of lipids (fats) in your blood  These lipids include cholesterol or triglycerides  Lipids are made by your body  They also come from the foods you eat  Your body needs lipids to work properly, but high levels increase your risk for heart disease, heart attack, and stroke  Call 911 for any of the following:   · You have any of the following signs of a heart attack:      ¨ Squeezing, pressure, or pain in your chest that lasts longer than 5 minutes or returns    ¨ Discomfort or pain in your back, neck, jaw, stomach, or arm     ¨ Trouble breathing    ¨ Nausea or vomiting    ¨ Lightheadedness or a sudden cold sweat, especially with chest pain or trouble breathing    · You have any of the following signs of a stroke:      ¨ Numbness or drooping on one side of your face     ¨ Weakness in an arm or leg    ¨ Confusion or difficulty speaking    ¨ Dizziness, a severe headache, or vision loss  Contact your healthcare provider if:   · You have questions or concerns about your condition or care  Treatment of hyperlipidemia  may first include lifestyle changes to help decrease your lipid levels  You may also need to take medicine to lower your lipid levels  Some of the lifestyle changes you may need to make include the following:  · Maintain a healthy weight  Ask your healthcare provider how much you should weigh   Ask him or her to help you create a weight loss plan if you are overweight  Weight loss can decrease your cholesterol and triglyceride levels  · Exercise as directed  Exercise lowers your cholesterol levels and helps you maintain a healthy weight  Get 40 minutes or more of moderate exercise 3 to 4 days each week  You can split your exercise into four 10-minute workouts instead of 40 minutes at one time  Examples of moderate exercises include walking briskly, swimming, or riding a bike  Work with your healthcare provider to plan the best exercise program for you  · Do not smoke  Nicotine and other chemicals in cigarettes and cigars can increase your risk for a heart attack and stroke  Ask your healthcare provider for information if you currently smoke and need help to quit  E-cigarettes or smokeless tobacco still contain nicotine  Talk to your healthcare provider before you use these products  · Eat heart-healthy foods  Talk to your dietitian about a heart-healthy diet  The following will help you manage hyperlipidemia:     ¨ Decrease the total amount of fat you eat  Choose lean meats, fat-free or 1% fat milk, and low-fat dairy products, such as yogurt and cheese  Limit or do not eat red meat  Red meats are high in fat and cholesterol  ¨ Replace unhealthy fats with healthy fats  Unhealthy fats include saturated fat, trans fat, and cholesterol  Choose soft margarines that are low in saturated fat and have little or no trans fat  Monounsaturated fats are healthy fats  These are found in olive oil, canola oil, avocado, and nuts  Polyunsaturated fats are also healthy  These are found in fish, flaxseed, walnuts, and soybeans  ¨ Eat fruits and vegetables every day  They are low in calories and fat and a good source of essential vitamins  Include dark green, red, and orange vegetables  Examples include spinach, kale, broccoli, and carrots  ¨ Eat foods high in fiber  Choose whole grain, high-fiber foods   Good choices include whole-wheat breads or cereals, beans, peas, fruits, and vegetables  · Ask your healthcare provider if it is safe for you to drink alcohol  Alcohol can increase your cholesterol and triglyceride levels  A drink of alcohol is 12 ounces of beer, 5 ounces of wine, or 1½ ounces of liquor  Follow up with your healthcare provider as directed: You may need to return for more tests  Your healthcare provider may refer you to a dietitian  Write down your questions so you remember to ask them during your visits  © 2017 [facility] Information is for End User's use only and may not be sold, redistributed or otherwise used for commercial purposes  All illustrations and images included in CareNotes® are the copyrighted property of A D A M , Inc  or Colin Doty  The above information is an  only  It is not intended as medical advice for individual conditions or treatments  Talk to your doctor, nurse or pharmacist before following any medical regimen to see if it is safe and effective for you  No Follow-up on file  Subjective:      Patient ID: Karen Becerril is a 61 y o  female  Chief Complaint   Patient presents with    Follow-up     Patient here for 6 month follow up on Insomnia, Hyperlipidemia        Here for follow up  Recent skin surgery to remove excess  Discussed titrating off paxil - will do 10mg      Hyperlipidemia   This is a chronic problem  The current episode started more than 1 year ago  The problem is controlled  Recent lipid tests were reviewed and are normal  Current antihyperlipidemic treatment includes statins  The current treatment provides significant improvement of lipids  There are no compliance problems          The following portions of the patient's history were reviewed and updated as appropriate: allergies, current medications, past family history, past medical history, past social history, past surgical history and problem list     Review of Systems   Constitutional: Negative  HENT: Negative  Eyes: Negative  Respiratory: Negative  Cardiovascular: Negative  Gastrointestinal: Negative  Endocrine: Negative  Genitourinary: Negative  Musculoskeletal: Negative  Skin: Negative  Allergic/Immunologic: Negative  Neurological: Negative  Hematological: Negative  Psychiatric/Behavioral: Negative  Current Outpatient Prescriptions   Medication Sig Dispense Refill    Biotin 1 MG CAPS Take by mouth daily        calcium citrate-vitamin D (CITRACAL+D) 315-200 MG-UNIT per tablet Take by mouth daily        clobetasol (TEMOVATE) 0 05 % cream Apply topically      eszopiclone (LUNESTA) tablet Take 1 tablet (3 mg total) by mouth daily at bedtime for 90 days Take immediately before bedtime 90 tablet 1    Multiple Vitamins-Minerals (BARIATRIC FUSION) CHEW Chew 2 tablets daily        rizatriptan (MAXALT) 10 MG tablet Take by mouth daily as needed        simvastatin (ZOCOR) 40 mg tablet Take 1 tablet (40 mg total) by mouth every evening for 360 days 90 tablet 3    aspirin 81 MG tablet Take 1 tablet by mouth daily for 30 days 30 tablet 0    CALCIUM PO Take by mouth daily        levothyroxine 125 mcg tablet Take 1 tablet (125 mcg total) by mouth daily for 90 days 90 tablet 3    PARoxetine (PAXIL) 10 mg tablet Take 1 tablet (10 mg total) by mouth daily for 90 days 90 tablet 3     No current facility-administered medications for this visit  Objective:    /80   Pulse 72   Temp 99 2 °F (37 3 °C)   Resp 16   Ht 5' 7 13" (1 705 m)   Wt 70 5 kg (155 lb 6 4 oz)   SpO2 97%   BMI 24 25 kg/m²        Physical Exam   Constitutional: She appears well-developed and well-nourished  Eyes: Pupils are equal, round, and reactive to light  Neck: Normal range of motion  Neck supple  Cardiovascular: Normal rate, regular rhythm, normal heart sounds and intact distal pulses      Pulmonary/Chest: Effort normal and breath sounds normal    Abdominal: Soft  Bowel sounds are normal    Musculoskeletal: Normal range of motion  Neurological: She is alert  Skin: Skin is warm and dry  Psychiatric: She has a normal mood and affect  Her behavior is normal  Judgment and thought content normal    Nursing note and vitals reviewed               Bimal Del Rosario DO

## 2018-08-29 DIAGNOSIS — F41.1 GENERALIZED ANXIETY DISORDER: ICD-10-CM

## 2018-08-29 RX ORDER — PAROXETINE 10 MG/1
10 TABLET, FILM COATED ORAL DAILY
Qty: 90 TABLET | Refills: 3 | Status: SHIPPED | OUTPATIENT
Start: 2018-08-29 | End: 2019-11-28 | Stop reason: SDUPTHER

## 2018-09-11 ENCOUNTER — TELEPHONE (OUTPATIENT)
Dept: PLASTIC SURGERY | Facility: CLINIC | Age: 63
End: 2018-09-11

## 2018-09-11 NOTE — TELEPHONE ENCOUNTER
Patient concerned that she recently developed a small open area on left end of incision  States  that was initially "poking her" and has since become open  States area is about the size of a pea  Reports scant amount of "discharge" on band-aid  Denies erythema, edema, fever, body aches/chills, or pain  Assured patient that it was nothing to be concerned with at this time  Instructed to keep area clean and covered with dry dressing until healed  Next office visit 10/10/18  Instructed to call with worsening symptoms for sooner appt

## 2018-09-24 ENCOUNTER — APPOINTMENT (OUTPATIENT)
Dept: LAB | Facility: CLINIC | Age: 63
End: 2018-09-24
Payer: COMMERCIAL

## 2018-09-24 DIAGNOSIS — E03.9 ACQUIRED HYPOTHYROIDISM: ICD-10-CM

## 2018-09-24 DIAGNOSIS — E78.00 PURE HYPERCHOLESTEROLEMIA: ICD-10-CM

## 2018-09-24 LAB
ALBUMIN SERPL BCP-MCNC: 3.4 G/DL (ref 3.5–5)
ALP SERPL-CCNC: 97 U/L (ref 46–116)
ALT SERPL W P-5'-P-CCNC: 39 U/L (ref 12–78)
ANION GAP SERPL CALCULATED.3IONS-SCNC: 5 MMOL/L (ref 4–13)
AST SERPL W P-5'-P-CCNC: 29 U/L (ref 5–45)
BILIRUB SERPL-MCNC: 0.64 MG/DL (ref 0.2–1)
BUN SERPL-MCNC: 23 MG/DL (ref 5–25)
CALCIUM SERPL-MCNC: 9.3 MG/DL (ref 8.3–10.1)
CHLORIDE SERPL-SCNC: 105 MMOL/L (ref 100–108)
CHOLEST SERPL-MCNC: 165 MG/DL (ref 50–200)
CO2 SERPL-SCNC: 30 MMOL/L (ref 21–32)
CREAT SERPL-MCNC: 0.66 MG/DL (ref 0.6–1.3)
GFR SERPL CREATININE-BSD FRML MDRD: 94 ML/MIN/1.73SQ M
GLUCOSE P FAST SERPL-MCNC: 92 MG/DL (ref 65–99)
HDLC SERPL-MCNC: 72 MG/DL (ref 40–60)
LDLC SERPL CALC-MCNC: 78 MG/DL (ref 0–100)
POTASSIUM SERPL-SCNC: 4.3 MMOL/L (ref 3.5–5.3)
PROT SERPL-MCNC: 7.2 G/DL (ref 6.4–8.2)
SODIUM SERPL-SCNC: 140 MMOL/L (ref 136–145)
T4 FREE SERPL-MCNC: 1.02 NG/DL (ref 0.76–1.46)
TRIGL SERPL-MCNC: 73 MG/DL
TSH SERPL DL<=0.05 MIU/L-ACNC: 0.16 UIU/ML (ref 0.36–3.74)

## 2018-09-24 PROCEDURE — 84439 ASSAY OF FREE THYROXINE: CPT

## 2018-09-24 PROCEDURE — 80053 COMPREHEN METABOLIC PANEL: CPT

## 2018-09-24 PROCEDURE — 36415 COLL VENOUS BLD VENIPUNCTURE: CPT

## 2018-09-24 PROCEDURE — 80061 LIPID PANEL: CPT

## 2018-09-24 PROCEDURE — 84443 ASSAY THYROID STIM HORMONE: CPT

## 2018-10-09 DIAGNOSIS — R30.0 DYSURIA: Primary | ICD-10-CM

## 2018-10-10 ENCOUNTER — OFFICE VISIT (OUTPATIENT)
Dept: PLASTIC SURGERY | Facility: HOSPITAL | Age: 63
End: 2018-10-10

## 2018-10-10 ENCOUNTER — APPOINTMENT (OUTPATIENT)
Dept: LAB | Facility: CLINIC | Age: 63
End: 2018-10-10
Payer: COMMERCIAL

## 2018-10-10 VITALS — WEIGHT: 162.8 LBS | HEIGHT: 67 IN | BODY MASS INDEX: 25.55 KG/M2

## 2018-10-10 DIAGNOSIS — L98.7 EXCESSIVE AND REDUNDANT SKIN AND SUBCUTANEOUS TISSUE: Primary | ICD-10-CM

## 2018-10-10 LAB
BACTERIA UR QL AUTO: ABNORMAL /HPF
BILIRUB UR QL STRIP: NEGATIVE
CLARITY UR: ABNORMAL
COLOR UR: YELLOW
GLUCOSE UR STRIP-MCNC: NEGATIVE MG/DL
HGB UR QL STRIP.AUTO: ABNORMAL
HYALINE CASTS #/AREA URNS LPF: ABNORMAL /LPF
KETONES UR STRIP-MCNC: NEGATIVE MG/DL
LEUKOCYTE ESTERASE UR QL STRIP: ABNORMAL
NITRITE UR QL STRIP: POSITIVE
NON-SQ EPI CELLS URNS QL MICRO: ABNORMAL /HPF
PH UR STRIP.AUTO: 7.5 [PH] (ref 4.5–8)
PROT UR STRIP-MCNC: ABNORMAL MG/DL
RBC #/AREA URNS AUTO: ABNORMAL /HPF
SP GR UR STRIP.AUTO: 1.01 (ref 1–1.03)
UROBILINOGEN UR QL STRIP.AUTO: 0.2 E.U./DL
WBC #/AREA URNS AUTO: ABNORMAL /HPF

## 2018-10-10 PROCEDURE — 87086 URINE CULTURE/COLONY COUNT: CPT | Performed by: FAMILY MEDICINE

## 2018-10-10 PROCEDURE — 81001 URINALYSIS AUTO W/SCOPE: CPT | Performed by: FAMILY MEDICINE

## 2018-10-10 PROCEDURE — 87186 SC STD MICRODIL/AGAR DIL: CPT | Performed by: FAMILY MEDICINE

## 2018-10-10 PROCEDURE — 99024 POSTOP FOLLOW-UP VISIT: CPT | Performed by: SURGERY

## 2018-10-10 PROCEDURE — 87077 CULTURE AEROBIC IDENTIFY: CPT | Performed by: FAMILY MEDICINE

## 2018-10-10 NOTE — PROGRESS NOTES
[facility] remains thrilled with the results of her body lift/abdominoplasty, and she is interested in brachioplasty/medial thigh lift  Her preference would be to undergo the thigh lift as the next procedure  We discussed medial thigh lift in detail, how the procedure is performed, as well as potential risks, complications and limitations including, but not limited to infection, bleeding, scarring, unfavorable scarring, wound healing problems, there will be residual, excess skin around the knee, and along the distal thigh  She understands, and she is interested in proceeding and will work with our surgical coordinator in regard to scheduling a date for the procedure  I would like to see her the day prior to surgery so that I may mni her for medial thigh lift  Consent was obtained  Her questions were answered to her satisfaction

## 2018-10-10 NOTE — LETTER
October 10, 2018     Eric Landaverde, [address] [facility]  [address] [address]  [address] [address]    Patient: Nathalia Lomax   YOB: 1955   Date of Visit: 10/10/2018       Dear Dr Richards Postal:    Thank you for referring Axel Butcher to me for evaluation  Below are my notes for this consultation  If you have questions, please do not hesitate to call me  I look forward to following your patient along with you  Sincerely,        Nell Sanders MD        CC: No Recipients  Nell Sanders MD  10/10/2018 10:34 AM  Sign at close encounter  Luis Bui remains thrilled with the results of her body lift/abdominoplasty, and she is interested in brachioplasty/medial thigh lift  Her preference would be to undergo the thigh lift as the next procedure  We discussed medial thigh lift in detail, how the procedure is performed, as well as potential risks, complications and limitations including, but not limited to infection, bleeding, scarring, unfavorable scarring, wound healing problems, there will be residual, excess skin around the knee, and along the distal thigh  She understands, and she is interested in proceeding and will work with our surgical coordinator in regard to scheduling a date for the procedure  I would like to see her the day prior to surgery so that I may min her for medial thigh lift  Consent was obtained  Her questions were answered to her satisfaction

## 2018-10-11 DIAGNOSIS — B96.20 E-COLI UTI: Primary | ICD-10-CM

## 2018-10-11 DIAGNOSIS — N39.0 E-COLI UTI: Primary | ICD-10-CM

## 2018-10-11 RX ORDER — CIPROFLOXACIN 500 MG/1
500 TABLET, FILM COATED ORAL EVERY 12 HOURS SCHEDULED
Qty: 10 TABLET | Refills: 0 | Status: SHIPPED | OUTPATIENT
Start: 2018-10-11 | End: 2018-10-16

## 2018-10-11 NOTE — PROGRESS NOTES
Spoke to patient advised of results she has no allergies to medications and she uses [facility]

## 2018-10-12 LAB — BACTERIA UR CULT: ABNORMAL

## 2018-12-20 ENCOUNTER — COSMETIC (OUTPATIENT)
Dept: PLASTIC SURGERY | Facility: CLINIC | Age: 63
End: 2018-12-20

## 2018-12-20 DIAGNOSIS — Z41.1 ENCOUNTER FOR COSMETIC SURGERY: Primary | ICD-10-CM

## 2018-12-20 PROCEDURE — RECHECK

## 2018-12-20 NOTE — PROGRESS NOTES
Patient here to obtain measurements for compression garment for 1/10/19 procedure  Waist: 35 in  Hips: 39 5 in

## 2019-01-02 NOTE — H&P
Progress Notes        Sho Holden remains thrilled with the results of her body lift/abdominoplasty, and she is interested in brachioplasty/medial thigh lift  Her preference would be to undergo the thigh lift as the next procedure  We discussed medial thigh lift in detail, how the procedure is performed, as well as potential risks, complications and limitations including, but not limited to infection, bleeding, scarring, unfavorable scarring, wound healing problems, there will be residual, excess skin around the knee, and along the distal thigh  She understands, and she is interested in proceeding and will work with our surgical coordinator in regard to scheduling a date for the procedure  I would like to see her the day prior to surgery so that I may min her for medial thigh lift  Consent was obtained  Her questions were answered to her satisfaction  The following portions of the patient's history were reviewed and updated as appropriate: allergies, current medications, past family history, past medical history, past social history, past surgical history and problem list      Review of Systems   Constitutional: Negative for chills and fever  HENT: Negative for hearing loss  Eyes: Positive for visual disturbance  Negative for discharge  Wears eyeglasses   Respiratory: Negative for chest tightness and shortness of breath  Cardiovascular: Negative for chest pain and leg swelling  Genitourinary: Negative for dysuria  Musculoskeletal: Negative for gait problem  Skin: Negative for rash  Allergic/Immunologic: Negative for immunocompromised state  Neurological: Negative for seizures and headaches  Hematological: Does not bruise/bleed easily  Psychiatric/Behavioral: Negative for dysphoric mood  The patient is not nervous/anxious  Additional Documentation     Vitals:    Ht 5' 7 13" (1 705 m)    Wt 73 8 kg (162 lb 12 8 oz)    BMI 25 40 kg/m²    BSA 1 85 m²    Waist: 35 in    Hips: 39 5 in        Objective:     Physical Exam   Constitutional: She appears well-developed and well-nourished  HENT:   Head: Normocephalic  Eyes: Pupils are equal, round, and reactive to light  Neck: Normal range of motion  Neck supple  Pulmonary/Chest: Effort normal  CTAB  Heart: RRR  Abdominal: Soft  Musculoskeletal: Normal range of motion  Neurological: She is alert  Skin: Skin is warm and dry  Excess, redundant skin bilateral thighs  Psychiatric: She has a normal mood and affect

## 2019-01-02 NOTE — PRE-PROCEDURE INSTRUCTIONS
Pre-Surgery Instructions:   Medication Instructions    aspirin 81 MG tablet Patient was instructed by Physician and understands   calcium citrate-vitamin D (CITRACAL+D) 315-200 MG-UNIT per tablet Patient was instructed by Physician and understands   clobetasol (TEMOVATE) 0 05 % cream Patient was instructed by Physician and understands   eszopiclone (LUNESTA) tablet Patient was instructed by Physician and understands   levothyroxine 125 mcg tablet Instructed patient per Anesthesia Guidelines   Multiple Vitamins-Minerals (BARIATRIC FUSION) CHEW Patient was instructed by Physician and understands   PARoxetine (PAXIL) 10 mg tablet Instructed patient per Anesthesia Guidelines   rizatriptan (MAXALT) 10 MG tablet Instructed patient per Anesthesia Guidelines   simvastatin (ZOCOR) 40 mg tablet Instructed patient per Anesthesia Guidelines  Before your operation, you play an important role in decreasing your risk for infection by washing with special antiseptic soap  This is an effective way to reduce bacteria on the skin which may help to prevent infections at the surgical site  Please read the following directions in advance  1  In the week before your operation purchase a 4 ounce bottle of antiseptic soap containing chlorhexidine gluconate 4%  Some brand names include: Aplicare, Endure, and Hibiclens  The cost is usually less than $5 00  · For your convenience, the [employer] carries the soap  · It may also be available at your doctor's office or pre-admission testing center, and at most retail pharmacies  · If you are allergic or sensitive to soaps containing chlorhexidine gluconate (CHG), please let your doctor know so another antiseptic soap can be suggested  · CHG antiseptic soap is for external use only  2      The day before your operation follow these directions carefully to get ready    · Place clean lines (sheets) on your bed; you should sleep on clean sheets after your evening shower  · Get clean towels and washcloths ready - you need enough for 2 showers  · Set aside clean underwear, pajamas, and clothing to wear after the shower  Reminders:  · DO NOT use any other soap or body rinse on your skin during or after the antiseptic showers  · DO NOT use lotion , powder, deodorant, or perfume/aftershave of any kind on your skin after your antiseptic shower  · DO NOT shave any body parts in the 24 hours/the day before your operation  · DO NOT get the antiseptic soap in your eyes, ears, nose, mouth, or vaginal area  3      You will need to shower the night before AND the morning of your Surgery  Shower 1:  · The evening before your operation, take the fist shower  · First, shampoo your hair with regular shampoo and rinse it completely before you use the anitseptic soap  After washing and rinsing your hair, rinse your body  · Next, use a clean wash cloth to apply the antiseptic soap and wash your body from the neck down to your toes using 1/2 bottle of the antiseptic soap  You will use the other 1/2 bottle for the second shower  · Clean the area where your incision will be; later this area well for about 2 minutes  · If you ar having head or neck surgery, wash areas with the antiseptic soap  · Rinse yourself completely with running water  · Use a clean towel to dry off  · Wear clean underwear and clothing/pajamas  Shower 2:  · The Morning of your operation, take the second shower following the same steps as Shower 1 using the second 1/2 of the bottle of antiseptic soap  · Use clean cloths and towels to was and dry yourself off  · Wear clean underwear and clothing

## 2019-01-03 ENCOUNTER — LAB (OUTPATIENT)
Dept: LAB | Facility: CLINIC | Age: 64
End: 2019-01-03
Payer: COMMERCIAL

## 2019-01-03 DIAGNOSIS — Z01.818 ENCOUNTER FOR PREADMISSION TESTING: ICD-10-CM

## 2019-01-03 LAB
ANION GAP SERPL CALCULATED.3IONS-SCNC: 7 MMOL/L (ref 4–13)
BASOPHILS # BLD AUTO: 0.03 THOUSANDS/ΜL (ref 0–0.1)
BASOPHILS NFR BLD AUTO: 1 % (ref 0–1)
BUN SERPL-MCNC: 22 MG/DL (ref 5–25)
CALCIUM SERPL-MCNC: 9.5 MG/DL (ref 8.3–10.1)
CHLORIDE SERPL-SCNC: 106 MMOL/L (ref 100–108)
CO2 SERPL-SCNC: 30 MMOL/L (ref 21–32)
CREAT SERPL-MCNC: 0.7 MG/DL (ref 0.6–1.3)
EOSINOPHIL # BLD AUTO: 0.29 THOUSAND/ΜL (ref 0–0.61)
EOSINOPHIL NFR BLD AUTO: 6 % (ref 0–6)
ERYTHROCYTE [DISTWIDTH] IN BLOOD BY AUTOMATED COUNT: 13.1 % (ref 11.6–15.1)
GFR SERPL CREATININE-BSD FRML MDRD: 93 ML/MIN/1.73SQ M
GLUCOSE SERPL-MCNC: 92 MG/DL (ref 65–140)
HCT VFR BLD AUTO: 38.1 % (ref 34.8–46.1)
HGB BLD-MCNC: 12.2 G/DL (ref 11.5–15.4)
IMM GRANULOCYTES # BLD AUTO: 0.01 THOUSAND/UL (ref 0–0.2)
IMM GRANULOCYTES NFR BLD AUTO: 0 % (ref 0–2)
LYMPHOCYTES # BLD AUTO: 2.08 THOUSANDS/ΜL (ref 0.6–4.47)
LYMPHOCYTES NFR BLD AUTO: 45 % (ref 14–44)
MCH RBC QN AUTO: 29.4 PG (ref 26.8–34.3)
MCHC RBC AUTO-ENTMCNC: 32 G/DL (ref 31.4–37.4)
MCV RBC AUTO: 92 FL (ref 82–98)
MONOCYTES # BLD AUTO: 0.34 THOUSAND/ΜL (ref 0.17–1.22)
MONOCYTES NFR BLD AUTO: 7 % (ref 4–12)
NEUTROPHILS # BLD AUTO: 1.89 THOUSANDS/ΜL (ref 1.85–7.62)
NEUTS SEG NFR BLD AUTO: 41 % (ref 43–75)
NRBC BLD AUTO-RTO: 0 /100 WBCS
PLATELET # BLD AUTO: 268 THOUSANDS/UL (ref 149–390)
PMV BLD AUTO: 10 FL (ref 8.9–12.7)
POTASSIUM SERPL-SCNC: 4.6 MMOL/L (ref 3.5–5.3)
RBC # BLD AUTO: 4.15 MILLION/UL (ref 3.81–5.12)
SODIUM SERPL-SCNC: 143 MMOL/L (ref 136–145)
WBC # BLD AUTO: 4.64 THOUSAND/UL (ref 4.31–10.16)

## 2019-01-03 PROCEDURE — 93005 ELECTROCARDIOGRAM TRACING: CPT

## 2019-01-03 PROCEDURE — 85025 COMPLETE CBC W/AUTO DIFF WBC: CPT

## 2019-01-03 PROCEDURE — 36415 COLL VENOUS BLD VENIPUNCTURE: CPT

## 2019-01-03 PROCEDURE — 80048 BASIC METABOLIC PNL TOTAL CA: CPT

## 2019-01-04 LAB
ATRIAL RATE: 73 BPM
P AXIS: -8 DEGREES
PR INTERVAL: 152 MS
QRS AXIS: 30 DEGREES
QRSD INTERVAL: 90 MS
QT INTERVAL: 390 MS
QTC INTERVAL: 429 MS
T WAVE AXIS: 26 DEGREES
VENTRICULAR RATE: 73 BPM

## 2019-01-04 PROCEDURE — 93010 ELECTROCARDIOGRAM REPORT: CPT | Performed by: INTERNAL MEDICINE

## 2019-01-05 DIAGNOSIS — G47.00 INSOMNIA, UNSPECIFIED TYPE: ICD-10-CM

## 2019-01-06 RX ORDER — ESZOPICLONE 3 MG/1
3 TABLET, FILM COATED ORAL
Qty: 90 TABLET | Refills: 1 | Status: SHIPPED | OUTPATIENT
Start: 2019-01-06 | End: 2019-07-03 | Stop reason: SDUPTHER

## 2019-01-08 ENCOUNTER — ANESTHESIA EVENT (OUTPATIENT)
Dept: PERIOP | Facility: HOSPITAL | Age: 64
End: 2019-01-08
Payer: SELF-PAY

## 2019-01-09 ENCOUNTER — OFFICE VISIT (OUTPATIENT)
Dept: PLASTIC SURGERY | Facility: HOSPITAL | Age: 64
End: 2019-01-09

## 2019-01-09 VITALS — BODY MASS INDEX: 26.65 KG/M2 | HEIGHT: 67 IN | WEIGHT: 169.8 LBS

## 2019-01-09 DIAGNOSIS — L98.7 EXCESSIVE AND REDUNDANT SKIN AND SUBCUTANEOUS TISSUE: Primary | ICD-10-CM

## 2019-01-09 PROCEDURE — RECHECK: Performed by: SURGERY

## 2019-01-09 NOTE — PROGRESS NOTES
Allie Riccardo presents today to be marked prior to tomorrow's bilateral medial thigh lift  She was marked in the usual fashion, we discussed the procedure, how it is performed, as well as potential risks, complications and limitations including, but not limited to infection, bleeding, scarring, asymmetry, contour deformity, wound healing problems, dehiscence, she is aware that there will be redundant tissue of the lateral thigh, buttocks, and at the level of the knee  Her questions were answered to her satisfaction and consent was obtained

## 2019-01-10 ENCOUNTER — HOSPITAL ENCOUNTER (OUTPATIENT)
Facility: HOSPITAL | Age: 64
Setting detail: OUTPATIENT SURGERY
Discharge: HOME | End: 2019-01-10
Attending: SURGERY | Admitting: SURGERY
Payer: SELF-PAY

## 2019-01-10 ENCOUNTER — ANESTHESIA (OUTPATIENT)
Dept: PERIOP | Facility: HOSPITAL | Age: 64
End: 2019-01-10
Payer: SELF-PAY

## 2019-01-10 VITALS
OXYGEN SATURATION: 100 % | WEIGHT: 167.5 LBS | SYSTOLIC BLOOD PRESSURE: 128 MMHG | TEMPERATURE: 97.9 F | HEART RATE: 86 BPM | BODY MASS INDEX: 26.29 KG/M2 | HEIGHT: 67 IN | RESPIRATION RATE: 18 BRPM | DIASTOLIC BLOOD PRESSURE: 60 MMHG

## 2019-01-10 DIAGNOSIS — Z41.1 ENCOUNTER FOR COSMETIC SURGERY: Primary | ICD-10-CM

## 2019-01-10 PROCEDURE — 15832 EXC EXCESSIVE SKIN THIGH: CPT | Performed by: SURGERY

## 2019-01-10 RX ORDER — EPHEDRINE SULFATE 50 MG/ML
INJECTION, SOLUTION INTRAVENOUS AS NEEDED
Status: DISCONTINUED | OUTPATIENT
Start: 2019-01-10 | End: 2019-01-10 | Stop reason: SURG

## 2019-01-10 RX ORDER — OXYCODONE HYDROCHLORIDE AND ACETAMINOPHEN 5; 325 MG/1; MG/1
1 TABLET ORAL EVERY 4 HOURS PRN
Status: DISCONTINUED | OUTPATIENT
Start: 2019-01-10 | End: 2019-01-10 | Stop reason: HOSPADM

## 2019-01-10 RX ORDER — PROPOFOL 10 MG/ML
INJECTION, EMULSION INTRAVENOUS AS NEEDED
Status: DISCONTINUED | OUTPATIENT
Start: 2019-01-10 | End: 2019-01-10 | Stop reason: SURG

## 2019-01-10 RX ORDER — OXYCODONE HYDROCHLORIDE AND ACETAMINOPHEN 5; 325 MG/1; MG/1
1 TABLET ORAL EVERY 4 HOURS PRN
Qty: 18 TABLET | Refills: 0 | Status: SHIPPED | OUTPATIENT
Start: 2019-01-10 | End: 2019-03-06 | Stop reason: ALTCHOICE

## 2019-01-10 RX ORDER — LIDOCAINE HYDROCHLORIDE 10 MG/ML
INJECTION, SOLUTION INFILTRATION; PERINEURAL AS NEEDED
Status: DISCONTINUED | OUTPATIENT
Start: 2019-01-10 | End: 2019-01-10 | Stop reason: SURG

## 2019-01-10 RX ORDER — CEFAZOLIN SODIUM 1 G/50ML
1000 SOLUTION INTRAVENOUS ONCE
Status: COMPLETED | OUTPATIENT
Start: 2019-01-10 | End: 2019-01-10

## 2019-01-10 RX ORDER — FENTANYL CITRATE/PF 50 MCG/ML
25 SYRINGE (ML) INJECTION
Status: DISCONTINUED | OUTPATIENT
Start: 2019-01-10 | End: 2019-01-10 | Stop reason: HOSPADM

## 2019-01-10 RX ORDER — MAGNESIUM HYDROXIDE 1200 MG/15ML
LIQUID ORAL AS NEEDED
Status: DISCONTINUED | OUTPATIENT
Start: 2019-01-10 | End: 2019-01-10 | Stop reason: HOSPADM

## 2019-01-10 RX ORDER — HYDROMORPHONE HCL/PF 1 MG/ML
0.5 SYRINGE (ML) INJECTION
Status: DISCONTINUED | OUTPATIENT
Start: 2019-01-10 | End: 2019-01-10 | Stop reason: HOSPADM

## 2019-01-10 RX ORDER — ROCURONIUM BROMIDE 10 MG/ML
INJECTION, SOLUTION INTRAVENOUS AS NEEDED
Status: DISCONTINUED | OUTPATIENT
Start: 2019-01-10 | End: 2019-01-10 | Stop reason: SURG

## 2019-01-10 RX ORDER — MIDAZOLAM HYDROCHLORIDE 1 MG/ML
INJECTION INTRAMUSCULAR; INTRAVENOUS AS NEEDED
Status: DISCONTINUED | OUTPATIENT
Start: 2019-01-10 | End: 2019-01-10 | Stop reason: SURG

## 2019-01-10 RX ORDER — SODIUM CHLORIDE, SODIUM LACTATE, POTASSIUM CHLORIDE, CALCIUM CHLORIDE 600; 310; 30; 20 MG/100ML; MG/100ML; MG/100ML; MG/100ML
125 INJECTION, SOLUTION INTRAVENOUS CONTINUOUS
Status: DISCONTINUED | OUTPATIENT
Start: 2019-01-10 | End: 2019-01-10 | Stop reason: HOSPADM

## 2019-01-10 RX ORDER — ONDANSETRON 2 MG/ML
INJECTION INTRAMUSCULAR; INTRAVENOUS AS NEEDED
Status: DISCONTINUED | OUTPATIENT
Start: 2019-01-10 | End: 2019-01-10 | Stop reason: SURG

## 2019-01-10 RX ORDER — FENTANYL CITRATE 50 UG/ML
INJECTION, SOLUTION INTRAMUSCULAR; INTRAVENOUS AS NEEDED
Status: DISCONTINUED | OUTPATIENT
Start: 2019-01-10 | End: 2019-01-10 | Stop reason: SURG

## 2019-01-10 RX ADMIN — FENTANYL CITRATE 25 MCG: 50 INJECTION, SOLUTION INTRAMUSCULAR; INTRAVENOUS at 12:16

## 2019-01-10 RX ADMIN — EPHEDRINE SULFATE 10 MG: 50 INJECTION, SOLUTION INTRAMUSCULAR; INTRAVENOUS; SUBCUTANEOUS at 08:51

## 2019-01-10 RX ADMIN — ONDANSETRON 4 MG: 2 INJECTION, SOLUTION INTRAMUSCULAR; INTRAVENOUS at 11:40

## 2019-01-10 RX ADMIN — ROCURONIUM BROMIDE 10 MG: 10 INJECTION, SOLUTION INTRAVENOUS at 09:18

## 2019-01-10 RX ADMIN — MIDAZOLAM HYDROCHLORIDE 2 MG: 1 INJECTION, SOLUTION INTRAMUSCULAR; INTRAVENOUS at 07:35

## 2019-01-10 RX ADMIN — FENTANYL CITRATE 25 MCG: 50 INJECTION, SOLUTION INTRAMUSCULAR; INTRAVENOUS at 11:06

## 2019-01-10 RX ADMIN — FENTANYL CITRATE 25 MCG: 50 INJECTION, SOLUTION INTRAMUSCULAR; INTRAVENOUS at 09:42

## 2019-01-10 RX ADMIN — HYDROMORPHONE HYDROCHLORIDE 0.5 MG: 1 INJECTION, SOLUTION INTRAMUSCULAR; INTRAVENOUS; SUBCUTANEOUS at 12:31

## 2019-01-10 RX ADMIN — CEFAZOLIN SODIUM 1000 MG: 1 SOLUTION INTRAVENOUS at 07:58

## 2019-01-10 RX ADMIN — ROCURONIUM BROMIDE 50 MG: 10 INJECTION, SOLUTION INTRAVENOUS at 07:45

## 2019-01-10 RX ADMIN — PROPOFOL 150 MG: 10 INJECTION, EMULSION INTRAVENOUS at 07:44

## 2019-01-10 RX ADMIN — LIDOCAINE HYDROCHLORIDE 20 MG: 10 INJECTION, SOLUTION INFILTRATION; PERINEURAL at 07:44

## 2019-01-10 RX ADMIN — FENTANYL CITRATE 25 MCG: 50 INJECTION, SOLUTION INTRAMUSCULAR; INTRAVENOUS at 08:00

## 2019-01-10 RX ADMIN — SODIUM CHLORIDE, SODIUM LACTATE, POTASSIUM CHLORIDE, AND CALCIUM CHLORIDE 125 ML/HR: .6; .31; .03; .02 INJECTION, SOLUTION INTRAVENOUS at 06:27

## 2019-01-10 RX ADMIN — OXYCODONE HYDROCHLORIDE AND ACETAMINOPHEN 1 TABLET: 5; 325 TABLET ORAL at 13:49

## 2019-01-10 RX ADMIN — FENTANYL CITRATE 50 MCG: 50 INJECTION, SOLUTION INTRAMUSCULAR; INTRAVENOUS at 12:05

## 2019-01-10 RX ADMIN — FENTANYL CITRATE 50 MCG: 50 INJECTION, SOLUTION INTRAMUSCULAR; INTRAVENOUS at 11:55

## 2019-01-10 RX ADMIN — SODIUM CHLORIDE, SODIUM LACTATE, POTASSIUM CHLORIDE, AND CALCIUM CHLORIDE: .6; .31; .03; .02 INJECTION, SOLUTION INTRAVENOUS at 10:30

## 2019-01-10 RX ADMIN — EPHEDRINE SULFATE 5 MG: 50 INJECTION, SOLUTION INTRAMUSCULAR; INTRAVENOUS; SUBCUTANEOUS at 09:51

## 2019-01-10 RX ADMIN — FENTANYL CITRATE 25 MCG: 50 INJECTION, SOLUTION INTRAMUSCULAR; INTRAVENOUS at 12:44

## 2019-01-10 RX ADMIN — FENTANYL CITRATE 50 MCG: 50 INJECTION, SOLUTION INTRAMUSCULAR; INTRAVENOUS at 07:38

## 2019-01-10 RX ADMIN — FENTANYL CITRATE 50 MCG: 50 INJECTION, SOLUTION INTRAMUSCULAR; INTRAVENOUS at 09:19

## 2019-01-10 RX ADMIN — FENTANYL CITRATE 25 MCG: 50 INJECTION, SOLUTION INTRAMUSCULAR; INTRAVENOUS at 10:11

## 2019-01-10 RX ADMIN — PROPOFOL 20 MG: 10 INJECTION, EMULSION INTRAVENOUS at 07:46

## 2019-01-10 RX ADMIN — FENTANYL CITRATE 25 MCG: 50 INJECTION, SOLUTION INTRAMUSCULAR; INTRAVENOUS at 12:24

## 2019-01-10 RX ADMIN — EPHEDRINE SULFATE 5 MG: 50 INJECTION, SOLUTION INTRAMUSCULAR; INTRAVENOUS; SUBCUTANEOUS at 10:30

## 2019-01-10 NOTE — INTERIM OP NOTE
THIGH LIFT  Postoperative Note  PATIENT NAME: Sonny Harkins  : 1955  MRN: [id number]  [facility] OR ROOM 02    Surgery Date: 1/10/2019    Preop Diagnosis:  Encounter for cosmetic surgery [Z41 1]    Post-Op Diagnosis Codes:     * Encounter for cosmetic surgery [Z41 1]    Procedure(s) (LRB):  THIGH LIFT (Bilateral)    Surgeon(s) and Role:     * Kait Browne MD - Primary     * Rosana Peguero PA-C - Assisting    Specimens:  * No specimens in log *    Estimated Blood Loss:   Minimal    Anesthesia Type:   General     Findings:    None  Complications:   None    SIGNATURE: Rosana Peguero PA-C   DATE: January 10, 2019   TIME: 11:05 AM

## 2019-01-10 NOTE — DISCHARGE INSTRUCTIONS
[facility]  Dr Ben Rowland   [address] [facility], [address]  Phone: [phone]     Postoperative Instructions for Outpatient Surgery     These instructions are being provided by your doctor to give you basic guidelines during your post-op recovery  Please let our office know if your contact information has changed       Please call the office today for an appointment on Friday for postoperative care      Dressings: Keep clean and dry until seen tomorrow       Activity Restrictions: Nothing strenuous       Bathing:  No bathing       Medications:    Resume pre-op medications  You may take tylenol, aleve, or ibuprofen for pain control             Percocet as needed for pain  Other: Ice area for 15 minute intervals while awake for the first 48 hours  Monitor and track drain output daily

## 2019-01-10 NOTE — OP NOTE
OPERATIVE REPORT  PATIENT NAME: Marcel Styles    :  1955  MRN: [id number]  Pt Location: [facility] OR ROOM 02    SURGERY DATE: 1/10/2019    Surgeon(s) and Role:     * Leonora Brady MD - Primary     * Na Aviles PA-C - Assisting    Preop Diagnosis:  Encounter for cosmetic surgery [Z41 1]    Post-Op Diagnosis Codes:     * Encounter for cosmetic surgery [Z41 1]    Procedure(s) (LRB):  THIGH LIFT (Bilateral)    Specimen(s):  * No specimens in log *    Estimated Blood Loss:   Minimal    Drains:  Closed/Suction Drain Right; Anterior Hip Bulb 15 Fr  (Active)   Number of days: 196       Closed/Suction Drain Left;Posterior Hip Bulb 15 Fr  (Active)   Number of days: 196       Closed/Suction Drain Right Thigh Bulb 15 Fr  (Active)   Site Description Unable to view 1/10/2019 12:32 PM   Dressing Status Clean;Dry; Intact 1/10/2019 12:32 PM   Status To bulb suction 1/10/2019 12:32 PM   Number of days: 0       Closed/Suction Drain Left Thigh 15 Fr  (Active)   Site Description Unable to view 1/10/2019 12:32 PM   Dressing Status Clean;Dry; Intact 1/10/2019 12:32 PM   Drainage Appearance Bloody 1/10/2019 12:32 PM   Status To bulb suction 1/10/2019 12:32 PM   Number of days: 0       [REMOVED] Urethral Catheter 16 Fr  (Removed)   Output (mL) 800 mL 1/10/2019 11:58 AM   Number of days: 0       Anesthesia Type:   General    Operative Indications:  Encounter for cosmetic surgery [Z41 1]  Excess skin medial thighs      Operative Findings:  As above    Complications:   None    Procedure and Technique:  * Misbah Mckinney was seen in the office the day prior to the procedure and she was marked in usual fashion for medial thigh lift  The morning of surgery, those marks were reinforced, and we again reviewed the procedure as well as potential risks, complications limitations  She was taken to the operating room and underwent induction of general anesthesia by the anesthesia personnel    The operative field was prepped and draped in sterile fashion and a proper time-out was performed  The right side was addressed initially, the planned incisions were then infiltrated with xylocaine with epinephrine  A 15 blade used to create the anterior most incision, this was taken down through the dermis and the Bovie cautery was used to dissect through the subcutaneous tissue down to the level of Win's fascia  Hemostasis was obtained throughout utilizing the Bovie cautery, the flap was then elevated from anterior to posterior just deep to Win's fascia utilizing the Bovie cautery  The flap was advanced from posterior to anterior in the amount to be resected was carefully marked utilizing standard flap marking techniques  The resection was then completed with a 15 blade and Bovie cautery  Hemostasis was again assured the Bovie and the wound edges were temporarily closed with skin staples  It was apparent that additional excision/resection could be performed, the areas to be excised were remarked with a sterile surgical marking pen and the redundant areas incised with a 15 blade resected with the Bovie cautery  Again, hemostasis was assured the Bovie  The wound was again temporarily closed with skin staples, was given ice pleasing appearance to the right medial thigh  An identical procedure was then performed on the opposite, left side  After making adequate adjustments and re-excision as described previously, the left side was temporally closed with skin staples  This gave a quite pleasing appearance, hash marks were then placed utilizing sterile surgical marking pen and the staples were removed bilaterally  The wounds were thoroughly irrigated hemostasis assured prior to closure  Closure was then undertaken with 2-0 Vicryl and 2-0 PDS sutures placed in inverted, buried fashion at the level of Win's fascia, deep dermal closure was accomplished with interrupted buried 3-0 Vicryl sutures    Prior to closing the deep dermis 15 Bengali drains were placed brought out through separate stab wounds the medial knee  They were secured with 3-0 nylon  After completing the deep dermal closure the subcuticular closure was performed wound utilizing running strata fix  histoacryl was applied followed by dry sterile surgical dressing and a compression garment  The patient was then transferred to the recovery room stable condition    I was present for the entire procedure    Patient Disposition:  PACU     SIGNATURE: Ramon Ballesteros MD  DATE: January 10, 2019  TIME: 12:49 PM

## 2019-01-10 NOTE — ANESTHESIA PREPROCEDURE EVALUATION
Review of Systems/Medical History  Patient summary reviewed  Chart reviewed      Cardiovascular  EKG reviewed, Exercise tolerance (METS): >4,  Hyperlipidemia,    Pulmonary  Negative pulmonary ROS        GI/Hepatic    Bariatric surgery,        Negative  ROS        Endo/Other  History of thyroid disease , hypothyroidism,      GYN  Negative gynecology ROS          Hematology  Negative hematology ROS      Musculoskeletal  Osteoarthritis,   Arthritis     Neurology    TIA, Headaches,    Psychology   Anxiety, Depression ,              Physical Exam    Airway    Mallampati score: I  TM Distance: >3 FB  Neck ROM: full     Dental   No notable dental hx     Cardiovascular  Rhythm: regular, Rate: normal, Cardiovascular exam normal    Pulmonary  Pulmonary exam normal Breath sounds clear to auscultation,     Other Findings        Anesthesia Plan  ASA Score- 3     Anesthesia Type- general with ASA Monitors  Additional Monitors:   Airway Plan: ETT  Plan Factors-    Induction- intravenous  Postoperative Plan- Plan for postoperative opioid use  Informed Consent- Anesthetic plan and risks discussed with patient

## 2019-01-11 ENCOUNTER — OFFICE VISIT (OUTPATIENT)
Dept: PLASTIC SURGERY | Facility: CLINIC | Age: 64
End: 2019-01-11

## 2019-01-11 DIAGNOSIS — L98.7 EXCESS SKIN OF THIGH: Primary | ICD-10-CM

## 2019-01-11 PROCEDURE — 99024 POSTOP FOLLOW-UP VISIT: CPT | Performed by: PHYSICIAN ASSISTANT

## 2019-01-11 NOTE — PROGRESS NOTES
Assessment/Plan:   Campbell Lee is a pleasant 29-year-old female who is postoperative day #1 status post bilateral thigh lift  Please see HPI  Her dressings were changed in the office today  She can remove the dressings tomorrow and shower  She is to continue wearing the garment for compression  We will see her next week for drain evaluation  Diagnoses and all orders for this visit:    Excess skin of thigh          Subjective:     Patient ID: David Thomson is a 61 y o  female  HPI   Campbell Lee is a pleasant 29-year-old female who is postoperative day #1 status post bilateral thigh lift  She complains only of the garment and difficulty going to the bathroom  Otherwise she feels well  Review of Systems   Skin:        As per HPI  Objective:      Physical Exam   Skin:   Incisions are all clean, dry and intact  Drains are serosanguineous  Dressing was changed

## 2019-01-16 ENCOUNTER — OFFICE VISIT (OUTPATIENT)
Dept: PLASTIC SURGERY | Facility: HOSPITAL | Age: 64
End: 2019-01-16

## 2019-01-16 DIAGNOSIS — Z48.89 POSTOPERATIVE VISIT: Primary | ICD-10-CM

## 2019-01-16 PROCEDURE — 99024 POSTOP FOLLOW-UP VISIT: CPT | Performed by: SURGERY

## 2019-01-16 NOTE — PROGRESS NOTES
Juvenal López presents today in follow-up, 6 days status post bilateral medial thigh lift  Overall, she is doing well, although she is finding it difficult to wear the surgical garment  At today's visit both removed the surgical sites are healing well, there is the anticipated irritation of the upper medial incisions, but no sign of jacque infection  All wounds are intact  The dressings were changed, and 6 inch Ace wraps were applied  She will be seen again next week for suture removal  She is to call if she has any problems whatsoever

## 2019-01-25 ENCOUNTER — OFFICE VISIT (OUTPATIENT)
Dept: PLASTIC SURGERY | Facility: CLINIC | Age: 64
End: 2019-01-25

## 2019-01-25 VITALS — WEIGHT: 167 LBS | HEIGHT: 67 IN | BODY MASS INDEX: 26.21 KG/M2

## 2019-01-25 DIAGNOSIS — Z98.890 STATUS POST COSMETIC PLASTIC SURGERY: Primary | ICD-10-CM

## 2019-01-25 PROCEDURE — RECHECK: Performed by: PHYSICIAN ASSISTANT

## 2019-01-28 NOTE — PROGRESS NOTES
Assessment/Plan:   Nara Vogel is a pleasant 28-year-old female who is 2 weeks status post bilateral thigh lift  Please see HPI  Sutures were removed today  Continue compression  Follow up in 4-6 weeks  May begin scar patches in 2-3 weeks  Follow up sooner with any concerns  Diagnoses and all orders for this visit:    Status post cosmetic plastic surgery          Subjective:     Patient ID: Nancy Obrien is a 61 y o  female  HPI   Nara Vogel is a pleasant 28-year-old female who is 2 weeks status post bilateral thigh lift  She reports a bump on the left inner lower aspect  Review of Systems   Skin:        As per HPI  Objective:     Physical Exam   Skin:   Incisions are all C, D and I  Sutures were removed  No evidence for purulence noted  Left lower aspect of scar with possible small hematoma vs seroma noted

## 2019-02-08 ENCOUNTER — OFFICE VISIT (OUTPATIENT)
Dept: PLASTIC SURGERY | Facility: CLINIC | Age: 64
End: 2019-02-08

## 2019-02-08 DIAGNOSIS — Z98.890 STATUS POST COSMETIC PLASTIC SURGERY: Primary | ICD-10-CM

## 2019-02-08 PROCEDURE — RECHECK: Performed by: PHYSICIAN ASSISTANT

## 2019-02-08 RX ORDER — CEPHALEXIN 500 MG/1
500 CAPSULE ORAL EVERY 6 HOURS SCHEDULED
Qty: 28 CAPSULE | Refills: 0 | Status: SHIPPED | OUTPATIENT
Start: 2019-02-08 | End: 2019-02-15

## 2019-02-08 NOTE — PROGRESS NOTES
Assessment/Plan:   Sanam Alcantar is a pleasant 12-year-old female who is 4 weeks status post bilateral thigh lift   Please see HPI  In the office today, I drained 2 seromas on her bilateral legs  There is no evidence of purulent drainage  I will prescribe her a course of Keflex given that she has been having fevers  We will see her back in 1-2 weeks  She will remove the packing in 1-2 days and continue with compression  She is encouraged to follow up sooner with any concerns  Diagnoses and all orders for this visit:    Status post cosmetic plastic surgery          Subjective:     Patient ID: Dakota Davis is a 61 y o  female  HPI   Sanam Alcantar is a pleasant 12-year-old female who is 4 weeks status post bilateral thigh lift    She reports that she continues to have a lump on the left leg and she does have a small 1 on the right leg as well  These are located by the incision  She denies any pain  She does report some intermittent fevers  Review of Systems   Skin:        As per HPI  Objective:     Physical Exam   Skin:   Scars are healing well  There is 1 small open area located in the left lower incision  There is also some induration noted bilaterally, more so on the left  In the office today I did I and D the 2 areas  Clear serous fluid was noted  No evidence of purulent drainage  Both areas were packed with quarter-inch packing

## 2019-02-20 ENCOUNTER — OFFICE VISIT (OUTPATIENT)
Dept: PLASTIC SURGERY | Facility: HOSPITAL | Age: 64
End: 2019-02-20

## 2019-02-20 DIAGNOSIS — Z98.890 STATUS POST COSMETIC PLASTIC SURGERY: Primary | ICD-10-CM

## 2019-02-20 PROCEDURE — RECHECK: Performed by: PHYSICIAN ASSISTANT

## 2019-02-20 NOTE — PROGRESS NOTES
Assessment/Plan:   Anastasiya Marie is a pleasant 77-year-old female who is 5 weeks status post bilateral thigh lift   Please see HPI  Recurrent seroma left thigh  I drained approximately 50cc of clear serous fluid today  Continue to wrap and follow up in 1-2 weeks  Diagnoses and all orders for this visit:    Status post cosmetic plastic surgery          Subjective:     Patient ID: Josefina Mai is a 61 y o  female  HPI   Anastasiya Marie is a pleasant 77-year-old female who is 5 weeks status post bilateral thigh lift  She reports that the left thigh swelled up again  The right side is better  She denies fevers  Review of Systems   Skin:        As per HPI  Objective:     Physical Exam   Skin:   Left thigh recurrent seroma  No evidence for purulence  Drained 50cc under sterile technique

## 2019-02-25 ENCOUNTER — OFFICE VISIT (OUTPATIENT)
Dept: PLASTIC SURGERY | Facility: CLINIC | Age: 64
End: 2019-02-25

## 2019-02-25 DIAGNOSIS — Z98.890 STATUS POST COSMETIC PLASTIC SURGERY: Primary | ICD-10-CM

## 2019-02-25 PROCEDURE — 99024 POSTOP FOLLOW-UP VISIT: CPT | Performed by: PHYSICIAN ASSISTANT

## 2019-02-25 NOTE — PROGRESS NOTES
Assessment/Plan:   Campbell Lee is a pleasant 19-year-old female who is 6 weeks status post bilateral thigh lift   Please see HPI   Recurrent seroma left thigh  I drained approximately 50cc of clear serous fluid today  No drainage from the right side  Follow up in 1 week  Continue taking antibiotics and wrap both thighs with ACE  Follow up sooner with any problems  Diagnoses and all orders for this visit:    Status post cosmetic plastic surgery          Subjective:     Patient ID: David Thomson is a 61 y o  female  HPI   Campbell Lee is a pleasant 19-year-old female who is 6 weeks status post bilateral thigh lift  She reports a fever over the weekend along with clear drainage from the right thigh and recurrent swelling of the left  Review of Systems   Skin:        As per HPI  Objective:     Physical Exam   Skin:   Right thigh with erythema and swelling noted  No fluid return with aspiration  Left thigh 60cc removed with aspiration

## 2019-03-06 ENCOUNTER — OFFICE VISIT (OUTPATIENT)
Dept: FAMILY MEDICINE CLINIC | Facility: CLINIC | Age: 64
End: 2019-03-06
Payer: COMMERCIAL

## 2019-03-06 ENCOUNTER — OFFICE VISIT (OUTPATIENT)
Dept: PLASTIC SURGERY | Facility: HOSPITAL | Age: 64
End: 2019-03-06

## 2019-03-06 VITALS
HEIGHT: 67 IN | DIASTOLIC BLOOD PRESSURE: 82 MMHG | HEART RATE: 71 BPM | RESPIRATION RATE: 16 BRPM | SYSTOLIC BLOOD PRESSURE: 120 MMHG | TEMPERATURE: 98.9 F | BODY MASS INDEX: 27.25 KG/M2 | OXYGEN SATURATION: 98 % | WEIGHT: 173.6 LBS

## 2019-03-06 VITALS — WEIGHT: 173.9 LBS | BODY MASS INDEX: 27.29 KG/M2 | HEIGHT: 67 IN

## 2019-03-06 DIAGNOSIS — E03.9 HYPOTHYROIDISM, UNSPECIFIED TYPE: ICD-10-CM

## 2019-03-06 DIAGNOSIS — G43.909 MIGRAINE WITHOUT STATUS MIGRAINOSUS, NOT INTRACTABLE, UNSPECIFIED MIGRAINE TYPE: ICD-10-CM

## 2019-03-06 DIAGNOSIS — E78.5 HYPERLIPIDEMIA, UNSPECIFIED HYPERLIPIDEMIA TYPE: ICD-10-CM

## 2019-03-06 DIAGNOSIS — E78.2 MIXED HYPERLIPIDEMIA: Primary | ICD-10-CM

## 2019-03-06 DIAGNOSIS — Z98.890 STATUS POST COSMETIC PLASTIC SURGERY: Primary | ICD-10-CM

## 2019-03-06 DIAGNOSIS — E03.9 ACQUIRED HYPOTHYROIDISM: ICD-10-CM

## 2019-03-06 DIAGNOSIS — Z00.00 MEDICARE ANNUAL WELLNESS VISIT, SUBSEQUENT: ICD-10-CM

## 2019-03-06 DIAGNOSIS — G47.00 INSOMNIA, UNSPECIFIED TYPE: ICD-10-CM

## 2019-03-06 PROCEDURE — G0439 PPPS, SUBSEQ VISIT: HCPCS | Performed by: FAMILY MEDICINE

## 2019-03-06 PROCEDURE — 99214 OFFICE O/P EST MOD 30 MIN: CPT | Performed by: FAMILY MEDICINE

## 2019-03-06 PROCEDURE — 3725F SCREEN DEPRESSION PERFORMED: CPT | Performed by: FAMILY MEDICINE

## 2019-03-06 PROCEDURE — RECHECK: Performed by: PHYSICIAN ASSISTANT

## 2019-03-06 RX ORDER — LEVOTHYROXINE SODIUM 0.12 MG/1
125 TABLET ORAL DAILY
Qty: 90 TABLET | Refills: 3 | Status: SHIPPED | OUTPATIENT
Start: 2019-03-06 | End: 2019-03-06 | Stop reason: SDUPTHER

## 2019-03-06 RX ORDER — SIMVASTATIN 40 MG
40 TABLET ORAL EVERY EVENING
Qty: 90 TABLET | Refills: 3 | OUTPATIENT
Start: 2019-03-06 | End: 2019-03-06 | Stop reason: SDUPTHER

## 2019-03-06 RX ORDER — LEVOTHYROXINE SODIUM 0.12 MG/1
125 TABLET ORAL DAILY
Qty: 90 TABLET | Refills: 3 | Status: SHIPPED | OUTPATIENT
Start: 2019-03-06 | End: 2020-02-03

## 2019-03-06 RX ORDER — SIMVASTATIN 40 MG
40 TABLET ORAL EVERY EVENING
Qty: 90 TABLET | Refills: 3 | Status: SHIPPED | OUTPATIENT
Start: 2019-03-06 | End: 2020-03-05

## 2019-03-06 NOTE — PROGRESS NOTES
Assessment/Plan:    Problem List Items Addressed This Visit        Endocrine    Hypothyroidism     Check levels  Stable on levothyroxine         Relevant Orders    TSH, 3rd generation with Free T4 reflex       Cardiovascular and Mediastinum    Migraine headache     Stable right now  maxalt as needed            Other    Hyperlipidemia - Primary     Stable on current zocor  Check lipids         Relevant Orders    Comprehensive metabolic panel    Lipid Panel with Direct LDL reflex    Insomnia     lunesta not working as well  Rule out thyroid first  Possible change to trazadone         Medicare annual wellness visit, subsequent          BMI Counseling: Body mass index is 27 09 kg/m²  Discussed the patient's BMI with her  The BMI is above average  BMI counseling and education was provided to the patient  Nutrition recommendations include reducing portion sizes, 3-5 servings of fruits/vegetables daily and moderation in carbohydrate intake  Exercise recommendations include exercising 3-5 times per week  Patient Instructions       Obesity   AMBULATORY CARE:   Obesity  is when your body mass index (BMI) is greater than 30  Your healthcare provider will use your height and weight to measure your BMI  The risks of obesity include  many health problems, such as injuries or physical disability  You may need tests to check for the following:  · Diabetes     · High blood pressure or high cholesterol     · Heart disease     · Gallbladder or liver disease     · Cancer of the colon, breast, prostate, liver, or kidney     · Sleep apnea     · Arthritis or gout  Seek care immediately if:   · You have a severe headache, confusion, or difficulty speaking  · You have weakness on one side of your body  · You have chest pain, sweating, or shortness of breath  Contact your healthcare provider if:   · You have symptoms of gallbladder or liver disease, such as pain in your upper abdomen      · You have knee or hip pain and discomfort while walking  · You have symptoms of diabetes, such as intense hunger and thirst, and frequent urination  · You have symptoms of sleep apnea, such as snoring or daytime sleepiness  · You have questions or concerns about your condition or care  Treatment for obesity  focuses on helping you lose weight to improve your health  Even a small decrease in BMI can reduce the risk for many health problems  Your healthcare provider will help you set a weight-loss goal   · Lifestyle changes  are the first step in treating obesity  These include making healthy food choices and getting regular physical activity  Your healthcare provider may suggest a weight-loss program that involves coaching, education, and therapy  · Medicine  may help you lose weight when it is used with a healthy diet and physical activity  · Surgery  can help you lose weight if you are very obese and have other health problems  There are several types of weight-loss surgery  Ask your healthcare provider for more information  Be successful losing weight:   · Set small, realistic goals  An example of a small goal is to walk for 20 minutes 5 days a week  Anther goal is to lose 5% of your body weight  · Tell friends, family members, and coworkers about your goals  and ask for their support  Ask a friend to lose weight with you, or join a weight-loss support group  · Identify foods or triggers that may cause you to overeat , and find ways to avoid them  Remove tempting high-calorie foods from your home and workplace  Place a bowl of fresh fruit on your kitchen counter  If stress causes you to eat, then find other ways to cope with stress  · Keep a diary to track what you eat and drink  Also write down how many minutes of physical activity you do each day  Weigh yourself once a week and record it in your diary  Eating changes:   You will need to eat 500 to 1,000 fewer calories each day than you currently eat to lose 1 to 2 pounds a week  The following changes will help you cut calories:  · Eat smaller portions  Use small plates, no larger than 9 inches in diameter  Fill your plate half full of fruits and vegetables  Measure your food using measuring cups until you know what a serving size looks like  · Eat 3 meals and 1 or 2 snacks each day  Plan your meals in advance  Daisy Escobedo and eat at home most of the time  Eat slowly  · Eat fruits and vegetables at every meal   They are low in calories and high in fiber, which makes you feel full  Do not add butter, margarine, or cream sauce to vegetables  Use herbs to season steamed vegetables  · Eat less fat and fewer fried foods  Eat more baked or grilled chicken and fish  These protein sources are lower in calories and fat than red meat  Limit fast food  Dress your salads with olive oil and vinegar instead of bottled dressing  · Limit the amount of sugar you eat  Do not drink sugary beverages  Limit alcohol  Activity changes:  Physical activity is good for your body in many ways  It helps you burn calories and build strong muscles  It decreases stress and depression, and improves your mood  It can also help you sleep better  Talk to your healthcare provider before you begin an exercise program   · Exercise for at least 30 minutes 5 days a week  Start slowly  Set aside time each day for physical activity that you enjoy and that is convenient for you  It is best to do both weight training and an activity that increases your heart rate, such as walking, bicycling, or swimming  · Find ways to be more active  Do yard work and housecleaning  Walk up the stairs instead of using elevators  Spend your leisure time going to events that require walking, such as outdoor festivals or fairs  This extra physical activity can help you lose weight and keep it off  Follow up with your healthcare provider as directed: You may need to meet with a dietitian   Write down your questions so you remember to ask them during your visits  © 2017 [address] Information is for End User's use only and may not be sold, redistributed or otherwise used for commercial purposes  All illustrations and images included in CareNotes® are the copyrighted property of A D A M , Inc  or Colin Doty  The above information is an  only  It is not intended as medical advice for individual conditions or treatments  Talk to your doctor, nurse or pharmacist before following any medical regimen to see if it is safe and effective for you  Urinary Incontinence   WHAT YOU NEED TO KNOW:   What is urinary incontinence? Urinary incontinence (UI) is when you lose control of your bladder  What causes UI? UI occurs because your bladder cannot store or empty urine properly  The following are the most common types of UI:  · Stress incontinence  is when you leak urine due to increased bladder pressure  This may happen when you cough, sneeze, or exercise  · Urge incontinence  is when you feel the need to urinate right away and leak urine accidentally  · Mixed incontinence  is when you have both stress and urge UI  What are the signs and symptoms of UI?   · You feel like your bladder does not empty completely when you urinate  · You urinate often and need to urinate immediately  · You leak urine when you sleep, or you wake up with the urge to urinate  · You leak urine when you cough, sneeze, exercise, or laugh  How is UI diagnosed? Your healthcare provider will ask how often you leak urine and whether you have stress or urge symptoms  Tell him which medicines you take, how often you urinate, and how much liquid you drink each day  You may need any of the following tests:  · Urine tests  may show infection or kidney function  · A pelvic exam  may be done to check for blockages   A pelvic exam will also show if your bladder, uterus, or other organs have moved out of place      · An x-ray, ultrasound, or CT  may show problems with parts of your urinary system  You may be given contrast liquid to help your organs show up better in the pictures  Tell the healthcare provider if you have ever had an allergic reaction to contrast liquid  Do not enter the MRI room with anything metal  Metal can cause serious injury  Tell the healthcare provider if you have any metal in or on your body  · A bladder scan  will show how much urine is left in your bladder after you urinate  You will be asked to urinate and then healthcare providers will use a small ultrasound machine to check the urine left in your bladder  · Cystometry  is used to check the function of your urinary system  Your healthcare provider checks the pressure in your bladder while filling it with fluid  Your bladder pressure may also be tested when your bladder is full and while you urinate  How is UI treated? · Medicines  can help strengthen your bladder control  · Electrical stimulation  is used to send a small amount of electrical energy to your pelvic floor muscles  This helps control your bladder function  Electrodes may be placed outside your body or in your rectum  For women, the electrodes may be placed in the vagina  · A bulking agent  may be injected into the wall of your urethra to make it thicker  This helps keep your urethra closed and decreases urine leakage  · Devices  such as a clamp, pessary, or tampon may help stop urine leaks  Ask your healthcare provider for more information about these and other devices  · Surgery  may be needed if other treatments do not work  Several types of surgery can help improve your bladder control  Ask your healthcare provider for more information about the surgery you may need  How can I manage my symptoms? · Do pelvic muscle exercises often  Your pelvic muscles help you stop urinating  Squeeze these muscles tight for 5 seconds, then relax for 5 seconds  Gradually work up to squeezing for 10 seconds  Do 3 sets of 15 repetitions a day, or as directed  This will help strengthen your pelvic muscles and improve bladder control  · A catheter  may be used to help empty your bladder  A catheter is a tiny, plastic tube that is put into your bladder to drain your urine  Your healthcare provider may tell you to use a catheter to prevent your bladder from getting too full and leaking urine  · Keep a UI record  Write down how often you leak urine and how much you leak  Make a note of what you were doing when you leaked urine  · Train your bladder  Go to the bathroom at set times, such as every 2 hours, even if you do not feel the urge to go  You can also try to hold your urine when you feel the urge to go  For example, hold your urine for 5 minutes when you feel the urge to go  As that becomes easier, hold your urine for 10 minutes  · Drink liquids as directed  Ask your healthcare provider how much liquid to drink each day and which liquids are best for you  You may need to limit the amount of liquid you drink to help control your urine leakage  Limit or do not have drinks that contain caffeine or alcohol  Do not drink any liquid right before you go to bed  · Prevent constipation  Eat a variety of high-fiber foods  Good examples are high-fiber cereals, beans, vegetables, and whole-grain breads  Prune juice may help make your bowel movement softer  Walking is the best way to trigger your intestines to have a bowel movement  · Exercise regularly and maintain a healthy weight  Ask your healthcare provider how much you should weigh and about the best exercise plan for you  Weight loss and exercise will decrease pressure on your bladder and help you control your leakage  Ask him to help you create a weight loss plan if you are overweight  When should I seek immediate care? · You have severe pain  · You are confused or cannot think clearly    When should I contact my healthcare provider? · You have a fever  · You see blood in your urine  · You have pain when you urinate  · You have new or worse pain, even after treatment  · Your mouth feels dry or you have vision changes  · Your urine is cloudy or smells bad  · You have questions or concerns about your condition or care  CARE AGREEMENT:   You have the right to help plan your care  Learn about your health condition and how it may be treated  Discuss treatment options with your caregivers to decide what care you want to receive  You always have the right to refuse treatment  The above information is an  only  It is not intended as medical advice for individual conditions or treatments  Talk to your doctor, nurse or pharmacist before following any medical regimen to see if it is safe and effective for you  © 2017 [address] Information is for End User's use only and may not be sold, redistributed or otherwise used for commercial purposes  All illustrations and images included in CareNotes® are the copyrighted property of A D A M , Inc  or Colin Lalitha  Cigarette Smoking and Your Health   AMBULATORY CARE:   Risks to your health if you smoke:  Nicotine and other chemicals found in tobacco damage every cell in your body  Even if you are a light smoker, you have an increased risk for cancer, heart disease, and lung disease  If you are pregnant or have diabetes, smoking increases your risk for complications  Benefits to your health if you stop smoking:   · You decrease respiratory symptoms such as coughing, wheezing, and shortness of breath  · You reduce your risk for cancers of the lung, mouth, throat, kidney, bladder, pancreas, stomach, and cervix  If you already have cancer, you increase the benefits of chemotherapy  You also reduce your risk for cancer returning or a second cancer from developing       · You reduce your risk for heart disease, blood clots, heart attack, and stroke  · You reduce your risk for lung infections, and diseases such as pneumonia, asthma, chronic bronchitis, and emphysema  · Your circulation improves  More oxygen can be delivered to your body  If you have diabetes, you lower your risk for complications, such as kidney, artery, and eye diseases  You also lower your risk for nerve damage  Nerve damage can lead to amputations, poor vision, and blindness  · You improve your body's ability to heal and to fight infections  Benefits to the health of others if you stop smoking:  Tobacco is harmful to nonsmokers who breathe in your secondhand smoke  The following are ways the health of others around you may improve when you stop smoking:  · You lower the risks for lung cancer and heart disease in nonsmoking adults  · If you are pregnant, you lower the risk for miscarriage, early delivery, low birth weight, and stillbirth  You also lower your baby's risk for SIDS, obesity, developmental delay, and neurobehavioral problems, such as ADHD  · If you have children, you lower their risk for ear infections, colds, pneumonia, bronchitis, and asthma  For more information and support to stop smoking:   · [employer]  Phone: 1- 269 - 640-1013  Web Address: www Chance (app)  Follow up with your healthcare provider as directed:  Write down your questions so you remember to ask them during your visits  © 2017 [address] Information is for End User's use only and may not be sold, redistributed or otherwise used for commercial purposes  All illustrations and images included in CareNotes® are the copyrighted property of [employer] M [employer]  or Colin Doty  The above information is an  only  It is not intended as medical advice for individual conditions or treatments  Talk to your doctor, nurse or pharmacist before following any medical regimen to see if it is safe and effective for you    Fall Prevention   AMBULATORY CARE:   Fall prevention  includes ways to make your home and other areas safer  It also includes ways you can move more carefully to prevent a fall  Health conditions that cause changes in your blood pressure, vision, or muscle strength and coordination may increase your risk for falls  Medicines may also increase your risk for falls if they make you dizzy, weak, or sleepy  Call 911 or have someone else call if:   · You have fallen and are unconscious  · You have fallen and cannot move part of your body  Contact your healthcare provider if:   · You have fallen and have pain or a headache  · You have questions or concerns about your condition or care  Fall prevention tips:   · Stand or sit up slowly  This may help you keep your balance and prevent falls  · Use assistive devices as directed  Your healthcare provider may suggest that you use a cane or walker to help you keep your balance  You may need to have grab bars put in your bathroom near the toilet or in the shower  · Wear shoes that fit well and have soles that   Wear shoes both inside and outside  Use slippers with good   Do not wear shoes with high heels  · Wear a personal alarm  This is a device that allows you to call 911 if you fall and need help  Ask your healthcare provider for more information  · Stay active  Exercise can help strengthen your muscles and improve your balance  Your healthcare provider may recommend water aerobics or walking  He or she may also recommend physical therapy to improve your coordination  Never start an exercise program without talking to your healthcare provider first      · Manage your medical conditions  Keep all appointments with your healthcare providers  Visit your eye doctor as directed  Home safety tips:   · Add items to prevent falls in the bathroom  Put nonslip strips on your bath or shower floor to prevent you from slipping   Use a bath mat if you do not have carpet in the bathroom  This will prevent you from falling when you step out of the bath or shower  Use a shower seat so you do not need to stand while you shower  Sit on the toilet or a chair in your bathroom to dry yourself and put on clothing  This will prevent you from losing your balance from drying or dressing yourself while you are standing  · Keep paths clear  Remove books, shoes, and other objects from walkways and stairs  Place cords for telephones and lamps out of the way so that you do not need to walk over them  Tape them down if you cannot move them  Remove small rugs  If you cannot remove a rug, secure it with double-sided tape  This will prevent you from tripping  · Install bright lights in your home  Use night lights to help light paths to the bathroom or kitchen  Always turn on the light before you start walking  · Keep items you use often on shelves within reach  Do not use a step stool to help you reach an item  · Paint or place reflective tape on the edges of your stairs  This will help you see the stairs better  Follow up with your healthcare provider as directed:  Write down your questions so you remember to ask them during your visits  © 2017 [address] Information is for End User's use only and may not be sold, redistributed or otherwise used for commercial purposes  All illustrations and images included in CareNotes® are the copyrighted property of [employer] M , [employer]  or Colin Doty  The above information is an  only  It is not intended as medical advice for individual conditions or treatments  Talk to your doctor, nurse or pharmacist before following any medical regimen to see if it is safe and effective for you  Advance Directives   WHAT YOU NEED TO KNOW:   What are advance directives? Advance directives are legal documents that state your wishes and plans for medical care   These plans are made ahead of time in case you lose your ability to make decisions for yourself  Advance directives can apply to any medical decision, such as the treatments you want, and if you want to donate organs  What are the types of advance directives? There are many types of advance directives, and each state has rules about how to use them  You may choose a combination of any of the following:  · Living will: This is a written record of the treatment you want  You can also choose which treatments you do not want, which to limit, and which to stop at a certain time  This includes surgery, medicine, IV fluid, and tube feedings  · Durable power of  for healthcare [facility]): This is a written record that states who you want to make healthcare choices for you when you are unable to make them for yourself  This person, called a proxy, is usually a family member or a friend  You may choose more than 1 proxy  · Do not resuscitate (DNR) order:  A DNR order is used in case your heart stops beating or you stop breathing  It is a request not to have certain forms of treatment, such as CPR  A DNR order may be included in other types of advance directives  · Medical directive: This covers the care that you want if you are in a coma, near death, or unable to make decisions for yourself  You can list the treatments you want for each condition  Treatment may include pain medicine, surgery, blood transfusions, dialysis, IV or tube feedings, and a ventilator (breathing machine)  · Values history: This document has questions about your views, beliefs, and how you feel and think about life  This information can help others choose the care that you would choose  Why are advance directives important? An advance directive helps you control your care  Although spoken wishes may be used, it is better to have your wishes written down  Spoken wishes can be misunderstood, or not followed  Treatments may be given even if you do not want them   An advance directive may make it easier for your family to make difficult choices about your care  How do I decide what to put in my advance directives? · Make informed decisions:  Make sure you fully understand treatments or care you may receive  Think about the benefits and problems your decisions could cause for you or your family  Talk to healthcare providers if you have concerns or questions before you write down your wishes  You may also want to talk with your Quaker or , or a   Check your state laws to make sure that what you put in your advance directive is legal      · Sign all forms:  Sign and date your advance directive when you have finished  You may also need 2 witnesses to sign the forms  Witnesses cannot be your doctor or his staff, your spouse, heirs or beneficiaries, people you owe money to, or your chosen proxy  Talk to your family, proxy, and healthcare providers about your advance directive  Give each person a copy, and keep one for yourself in a place you can get to easily  Do not keep it hidden or locked away  · Review and revise your plans: You can revise your advance directive at any time, as long as you are able to make decisions  Review your plan every year, and when there are changes in your life, or your health  When you make changes, let your family, proxy, and healthcare providers know  Give each a new copy  Where can I find more information? · [employer]  [address] [facility] [address]  Phone: 7- [phone] - [phone]  Phone: 5- 750 - [phone]  Web Address: http://www  aafp org  · [address] [facility])  [address] [facility], [address]  Phone: 7- [phone] - [phone]  Phone: [phone]  Web Address: Lionel sykes  CARE AGREEMENT:   You have the right to help plan your care   To help with this plan, you must learn about your health condition and treatment options  You must also learn about advance directives and how they are used  Work with your healthcare providers to decide what care will be used to treat you  You always have the right to refuse treatment  The above information is an  only  It is not intended as medical advice for individual conditions or treatments  Talk to your doctor, nurse or pharmacist before following any medical regimen to see if it is safe and effective for you  © 2017 [address]  Information is for End User's use only and may not be sold, redistributed or otherwise used for commercial purposes  All illustrations and images included in CareNotes® are the copyrighted property of A D A M , Inc  or [employer]  Return in 1 year (on 3/6/2020)  Subjective:      Patient ID: Raghu Frost is a 61 y o  female  Chief Complaint   Patient presents with   [facility] Visit     Patient here for Medicare wellness exam        Here for awv and follow up  Recent surgery  Not walking as much  Getting back to gym    Hyperlipidemia   This is a chronic problem  The current episode started more than 1 year ago  The problem is controlled  Recent lipid tests were reviewed and are normal  There are no known factors aggravating her hyperlipidemia  Current antihyperlipidemic treatment includes statins  The current treatment provides significant improvement of lipids  There are no compliance problems  Thyroid Problem   Presents for follow-up visit  The symptoms have been stable  Her past medical history is significant for hyperlipidemia  The following portions of the patient's history were reviewed and updated as appropriate: allergies, current medications, past family history, past medical history, past social history, past surgical history and problem list     Review of Systems   Constitutional: Negative  HENT: Negative  Eyes: Negative  Respiratory: Negative  Cardiovascular: Negative  Gastrointestinal: Negative  Endocrine: Negative  Genitourinary: Negative  Musculoskeletal: Negative  Skin: Negative  Allergic/Immunologic: Negative  Hematological: Negative  Psychiatric/Behavioral: Negative  Current Outpatient Medications   Medication Sig Dispense Refill    calcium citrate-vitamin D (CITRACAL+D) 315-200 MG-UNIT per tablet Take by mouth daily        clobetasol (TEMOVATE) 0 05 % cream Apply topically      eszopiclone (LUNESTA) 3 MG tablet TAKE 1 TABLET (3 MG TOTAL) BY MOUTH DAILY AT BEDTIME FOR 90 DAYS TAKE IMMEDIATELY BEFORE BEDTIME 90 tablet 1    Multiple Vitamins-Minerals (BARIATRIC FUSION) CHEW Chew 2 tablets daily        rizatriptan (MAXALT) 10 MG tablet Take by mouth daily as needed        simvastatin (ZOCOR) 40 mg tablet Take 1 tablet (40 mg total) by mouth every evening for 360 days 90 tablet 3    aspirin 81 MG tablet Take 1 tablet by mouth daily for 30 days 30 tablet 0    levothyroxine 125 mcg tablet Take 1 tablet (125 mcg total) by mouth daily for 90 days 90 tablet 3    PARoxetine (PAXIL) 10 mg tablet Take 1 tablet (10 mg total) by mouth daily for 90 days 90 tablet 3     No current facility-administered medications for this visit  Objective:    /82   Pulse 71   Temp 98 9 °F (37 2 °C)   Resp 16   Ht 5' 7 13" (1 705 m)   Wt 78 7 kg (173 lb 9 6 oz)   SpO2 98%   BMI 27 09 kg/m²        Physical Exam   Constitutional: She appears well-developed and well-nourished  HENT:   Head: Normocephalic and atraumatic  Eyes: Pupils are equal, round, and reactive to light  EOM are normal    Neck: Normal range of motion  Neck supple  Cardiovascular: Normal rate, regular rhythm, normal heart sounds and intact distal pulses  Pulmonary/Chest: Effort normal and breath sounds normal    Abdominal: Soft  Bowel sounds are normal    Musculoskeletal: Normal range of motion  Neurological: She is alert  Skin: Skin is warm  Capillary refill takes less than 2 seconds  Psychiatric: She has a normal mood and affect  Her behavior is normal  Judgment and thought content normal    Nursing note and vitals reviewed               Chinmay Suazo DO

## 2019-03-06 NOTE — TELEPHONE ENCOUNTER
So this was sen to me with 2 differenct pharmacies- one [facility] and one for local  If this is not correct, please correctly send and cancel the incorrect one

## 2019-03-06 NOTE — PATIENT INSTRUCTIONS
Obesity   AMBULATORY CARE:   Obesity  is when your body mass index (BMI) is greater than 30  Your healthcare provider will use your height and weight to measure your BMI  The risks of obesity include  many health problems, such as injuries or physical disability  You may need tests to check for the following:  · Diabetes     · High blood pressure or high cholesterol     · Heart disease     · Gallbladder or liver disease     · Cancer of the colon, breast, prostate, liver, or kidney     · Sleep apnea     · Arthritis or gout  Seek care immediately if:   · You have a severe headache, confusion, or difficulty speaking  · You have weakness on one side of your body  · You have chest pain, sweating, or shortness of breath  Contact your healthcare provider if:   · You have symptoms of gallbladder or liver disease, such as pain in your upper abdomen  · You have knee or hip pain and discomfort while walking  · You have symptoms of diabetes, such as intense hunger and thirst, and frequent urination  · You have symptoms of sleep apnea, such as snoring or daytime sleepiness  · You have questions or concerns about your condition or care  Treatment for obesity  focuses on helping you lose weight to improve your health  Even a small decrease in BMI can reduce the risk for many health problems  Your healthcare provider will help you set a weight-loss goal   · Lifestyle changes  are the first step in treating obesity  These include making healthy food choices and getting regular physical activity  Your healthcare provider may suggest a weight-loss program that involves coaching, education, and therapy  · Medicine  may help you lose weight when it is used with a healthy diet and physical activity  · Surgery  can help you lose weight if you are very obese and have other health problems  There are several types of weight-loss surgery  Ask your healthcare provider for more information    Be successful losing weight:   · Set small, realistic goals  An example of a small goal is to walk for 20 minutes 5 days a week  Anther goal is to lose 5% of your body weight  · Tell friends, family members, and coworkers about your goals  and ask for their support  Ask a friend to lose weight with you, or join a weight-loss support group  · Identify foods or triggers that may cause you to overeat , and find ways to avoid them  Remove tempting high-calorie foods from your home and workplace  Place a bowl of fresh fruit on your kitchen counter  If stress causes you to eat, then find other ways to cope with stress  · Keep a diary to track what you eat and drink  Also write down how many minutes of physical activity you do each day  Weigh yourself once a week and record it in your diary  Eating changes: You will need to eat 500 to 1,000 fewer calories each day than you currently eat to lose 1 to 2 pounds a week  The following changes will help you cut calories:  · Eat smaller portions  Use small plates, no larger than 9 inches in diameter  Fill your plate half full of fruits and vegetables  Measure your food using measuring cups until you know what a serving size looks like  · Eat 3 meals and 1 or 2 snacks each day  Plan your meals in advance  Yuli Thomas and eat at home most of the time  Eat slowly  · Eat fruits and vegetables at every meal   They are low in calories and high in fiber, which makes you feel full  Do not add butter, margarine, or cream sauce to vegetables  Use herbs to season steamed vegetables  · Eat less fat and fewer fried foods  Eat more baked or grilled chicken and fish  These protein sources are lower in calories and fat than red meat  Limit fast food  Dress your salads with olive oil and vinegar instead of bottled dressing  · Limit the amount of sugar you eat  Do not drink sugary beverages  Limit alcohol  Activity changes:  Physical activity is good for your body in many ways   It helps you burn calories and build strong muscles  It decreases stress and depression, and improves your mood  It can also help you sleep better  Talk to your healthcare provider before you begin an exercise program   · Exercise for at least 30 minutes 5 days a week  Start slowly  Set aside time each day for physical activity that you enjoy and that is convenient for you  It is best to do both weight training and an activity that increases your heart rate, such as walking, bicycling, or swimming  · Find ways to be more active  Do yard work and housecleaning  Walk up the stairs instead of using elevators  Spend your leisure time going to events that require walking, such as outdoor festivals or fairs  This extra physical activity can help you lose weight and keep it off  Follow up with your healthcare provider as directed: You may need to meet with a dietitian  Write down your questions so you remember to ask them during your visits  © 2017 [employer] Information is for End User's use only and may not be sold, redistributed or otherwise used for commercial purposes  All illustrations and images included in CareNotes® are the copyrighted property of [employer]  or Colin Doty  The above information is an  only  It is not intended as medical advice for individual conditions or treatments  Talk to your doctor, nurse or pharmacist before following any medical regimen to see if it is safe and effective for you  Urinary Incontinence   WHAT YOU NEED TO KNOW:   What is urinary incontinence? Urinary incontinence (UI) is when you lose control of your bladder  What causes UI? UI occurs because your bladder cannot store or empty urine properly  The following are the most common types of UI:  · Stress incontinence  is when you leak urine due to increased bladder pressure  This may happen when you cough, sneeze, or exercise       · Urge incontinence  is when you feel the need to urinate right away and leak urine accidentally  · Mixed incontinence  is when you have both stress and urge UI  What are the signs and symptoms of UI?   · You feel like your bladder does not empty completely when you urinate  · You urinate often and need to urinate immediately  · You leak urine when you sleep, or you wake up with the urge to urinate  · You leak urine when you cough, sneeze, exercise, or laugh  How is UI diagnosed? Your healthcare provider will ask how often you leak urine and whether you have stress or urge symptoms  Tell him which medicines you take, how often you urinate, and how much liquid you drink each day  You may need any of the following tests:  · Urine tests  may show infection or kidney function  · A pelvic exam  may be done to check for blockages  A pelvic exam will also show if your bladder, uterus, or other organs have moved out of place  · An x-ray, ultrasound, or CT  may show problems with parts of your urinary system  You may be given contrast liquid to help your organs show up better in the pictures  Tell the healthcare provider if you have ever had an allergic reaction to contrast liquid  Do not enter the MRI room with anything metal  Metal can cause serious injury  Tell the healthcare provider if you have any metal in or on your body  · A bladder scan  will show how much urine is left in your bladder after you urinate  You will be asked to urinate and then healthcare providers will use a small ultrasound machine to check the urine left in your bladder  · Cystometry  is used to check the function of your urinary system  Your healthcare provider checks the pressure in your bladder while filling it with fluid  Your bladder pressure may also be tested when your bladder is full and while you urinate  How is UI treated? · Medicines  can help strengthen your bladder control      · Electrical stimulation  is used to send a small amount of electrical energy to your pelvic floor muscles  This helps control your bladder function  Electrodes may be placed outside your body or in your rectum  For women, the electrodes may be placed in the vagina  · A bulking agent  may be injected into the wall of your urethra to make it thicker  This helps keep your urethra closed and decreases urine leakage  · Devices  such as a clamp, pessary, or tampon may help stop urine leaks  Ask your healthcare provider for more information about these and other devices  · Surgery  may be needed if other treatments do not work  Several types of surgery can help improve your bladder control  Ask your healthcare provider for more information about the surgery you may need  How can I manage my symptoms? · Do pelvic muscle exercises often  Your pelvic muscles help you stop urinating  Squeeze these muscles tight for 5 seconds, then relax for 5 seconds  Gradually work up to squeezing for 10 seconds  Do 3 sets of 15 repetitions a day, or as directed  This will help strengthen your pelvic muscles and improve bladder control  · A catheter  may be used to help empty your bladder  A catheter is a tiny, plastic tube that is put into your bladder to drain your urine  Your healthcare provider may tell you to use a catheter to prevent your bladder from getting too full and leaking urine  · Keep a UI record  Write down how often you leak urine and how much you leak  Make a note of what you were doing when you leaked urine  · Train your bladder  Go to the bathroom at set times, such as every 2 hours, even if you do not feel the urge to go  You can also try to hold your urine when you feel the urge to go  For example, hold your urine for 5 minutes when you feel the urge to go  As that becomes easier, hold your urine for 10 minutes  · Drink liquids as directed  Ask your healthcare provider how much liquid to drink each day and which liquids are best for you   You may need to limit the amount of liquid you drink to help control your urine leakage  Limit or do not have drinks that contain caffeine or alcohol  Do not drink any liquid right before you go to bed  · Prevent constipation  Eat a variety of high-fiber foods  Good examples are high-fiber cereals, beans, vegetables, and whole-grain breads  Prune juice may help make your bowel movement softer  Walking is the best way to trigger your intestines to have a bowel movement  · Exercise regularly and maintain a healthy weight  Ask your healthcare provider how much you should weigh and about the best exercise plan for you  Weight loss and exercise will decrease pressure on your bladder and help you control your leakage  Ask him to help you create a weight loss plan if you are overweight  When should I seek immediate care? · You have severe pain  · You are confused or cannot think clearly  When should I contact my healthcare provider? · You have a fever  · You see blood in your urine  · You have pain when you urinate  · You have new or worse pain, even after treatment  · Your mouth feels dry or you have vision changes  · Your urine is cloudy or smells bad  · You have questions or concerns about your condition or care  CARE AGREEMENT:   You have the right to help plan your care  Learn about your health condition and how it may be treated  Discuss treatment options with your caregivers to decide what care you want to receive  You always have the right to refuse treatment  The above information is an  only  It is not intended as medical advice for individual conditions or treatments  Talk to your doctor, nurse or pharmacist before following any medical regimen to see if it is safe and effective for you  © 2017 [employer] Information is for End User's use only and may not be sold, redistributed or otherwise used for commercial purposes   All illustrations and images included in CareNotes® are the copyrighted property of [employer]  or Colin Doty  Cigarette Smoking and Your Health   AMBULATORY CARE:   Risks to your health if you smoke:  Nicotine and other chemicals found in tobacco damage every cell in your body  Even if you are a light smoker, you have an increased risk for cancer, heart disease, and lung disease  If you are pregnant or have diabetes, smoking increases your risk for complications  Benefits to your health if you stop smoking:   · You decrease respiratory symptoms such as coughing, wheezing, and shortness of breath  · You reduce your risk for cancers of the lung, mouth, throat, kidney, bladder, pancreas, stomach, and cervix  If you already have cancer, you increase the benefits of chemotherapy  You also reduce your risk for cancer returning or a second cancer from developing  · You reduce your risk for heart disease, blood clots, heart attack, and stroke  · You reduce your risk for lung infections, and diseases such as pneumonia, asthma, chronic bronchitis, and emphysema  · Your circulation improves  More oxygen can be delivered to your body  If you have diabetes, you lower your risk for complications, such as kidney, artery, and eye diseases  You also lower your risk for nerve damage  Nerve damage can lead to amputations, poor vision, and blindness  · You improve your body's ability to heal and to fight infections  Benefits to the health of others if you stop smoking:  Tobacco is harmful to nonsmokers who breathe in your secondhand smoke  The following are ways the health of others around you may improve when you stop smoking:  · You lower the risks for lung cancer and heart disease in nonsmoking adults  · If you are pregnant, you lower the risk for miscarriage, early delivery, low birth weight, and stillbirth  You also lower your baby's risk for SIDS, obesity, developmental delay, and neurobehavioral problems, such as ADHD  · If you have children, you lower their risk for ear infections, colds, pneumonia, bronchitis, and asthma  For more information and support to stop smoking:   · Smokefree  gov  Phone: 0- 656 - 959-2997  Web Address: www smokefree  gov  Follow up with your healthcare provider as directed:  Write down your questions so you remember to ask them during your visits  © 2017 [facility] Information is for End User's use only and may not be sold, redistributed or otherwise used for commercial purposes  All illustrations and images included in CareNotes® are the copyrighted property of [employer]  or Colin Doty  The above information is an  only  It is not intended as medical advice for individual conditions or treatments  Talk to your doctor, nurse or pharmacist before following any medical regimen to see if it is safe and effective for you  Fall Prevention   AMBULATORY CARE:   Fall prevention  includes ways to make your home and other areas safer  It also includes ways you can move more carefully to prevent a fall  Health conditions that cause changes in your blood pressure, vision, or muscle strength and coordination may increase your risk for falls  Medicines may also increase your risk for falls if they make you dizzy, weak, or sleepy  Call 911 or have someone else call if:   · You have fallen and are unconscious  · You have fallen and cannot move part of your body  Contact your healthcare provider if:   · You have fallen and have pain or a headache  · You have questions or concerns about your condition or care  Fall prevention tips:   · Stand or sit up slowly  This may help you keep your balance and prevent falls  · Use assistive devices as directed  Your healthcare provider may suggest that you use a cane or walker to help you keep your balance  You may need to have grab bars put in your bathroom near the toilet or in the shower      · Wear shoes that fit well and have soles that   Wear shoes both inside and outside  Use slippers with good   Do not wear shoes with high heels  · Wear a personal alarm  This is a device that allows you to call 911 if you fall and need help  Ask your healthcare provider for more information  · Stay active  Exercise can help strengthen your muscles and improve your balance  Your healthcare provider may recommend water aerobics or walking  He or she may also recommend physical therapy to improve your coordination  Never start an exercise program without talking to your healthcare provider first      · Manage your medical conditions  Keep all appointments with your healthcare providers  Visit your eye doctor as directed  Home safety tips:   · Add items to prevent falls in the bathroom  Put nonslip strips on your bath or shower floor to prevent you from slipping  Use a bath mat if you do not have carpet in the bathroom  This will prevent you from falling when you step out of the bath or shower  Use a shower seat so you do not need to stand while you shower  Sit on the toilet or a chair in your bathroom to dry yourself and put on clothing  This will prevent you from losing your balance from drying or dressing yourself while you are standing  · Keep paths clear  Remove books, shoes, and other objects from walkways and stairs  Place cords for telephones and lamps out of the way so that you do not need to walk over them  Tape them down if you cannot move them  Remove small rugs  If you cannot remove a rug, secure it with double-sided tape  This will prevent you from tripping  · Install bright lights in your home  Use night lights to help light paths to the bathroom or kitchen  Always turn on the light before you start walking  · Keep items you use often on shelves within reach  Do not use a step stool to help you reach an item  · Paint or place reflective tape on the edges of your stairs    This will help you see the stairs better  Follow up with your healthcare provider as directed:  Write down your questions so you remember to ask them during your visits  © 2017 [employer] Information is for End User's use only and may not be sold, redistributed or otherwise used for commercial purposes  All illustrations and images included in CareNotes® are the copyrighted property of [employer]  or Colin Doty  The above information is an  only  It is not intended as medical advice for individual conditions or treatments  Talk to your doctor, nurse or pharmacist before following any medical regimen to see if it is safe and effective for you  Advance Directives   WHAT YOU NEED TO KNOW:   What are advance directives? Advance directives are legal documents that state your wishes and plans for medical care  These plans are made ahead of time in case you lose your ability to make decisions for yourself  Advance directives can apply to any medical decision, such as the treatments you want, and if you want to donate organs  What are the types of advance directives? There are many types of advance directives, and each state has rules about how to use them  You may choose a combination of any of the following:  · Living will: This is a written record of the treatment you want  You can also choose which treatments you do not want, which to limit, and which to stop at a certain time  This includes surgery, medicine, IV fluid, and tube feedings  · Durable power of  for healthcare [facility]): This is a written record that states who you want to make healthcare choices for you when you are unable to make them for yourself  This person, called a proxy, is usually a family member or a friend  You may choose more than 1 proxy  · Do not resuscitate (DNR) order:  A DNR order is used in case your heart stops beating or you stop breathing   It is a request not to have certain forms of treatment, such as CPR  A DNR order may be included in other types of advance directives  · Medical directive: This covers the care that you want if you are in a coma, near death, or unable to make decisions for yourself  You can list the treatments you want for each condition  Treatment may include pain medicine, surgery, blood transfusions, dialysis, IV or tube feedings, and a ventilator (breathing machine)  · Values history: This document has questions about your views, beliefs, and how you feel and think about life  This information can help others choose the care that you would choose  Why are advance directives important? An advance directive helps you control your care  Although spoken wishes may be used, it is better to have your wishes written down  Spoken wishes can be misunderstood, or not followed  Treatments may be given even if you do not want them  An advance directive may make it easier for your family to make difficult choices about your care  How do I decide what to put in my advance directives? · Make informed decisions:  Make sure you fully understand treatments or care you may receive  Think about the benefits and problems your decisions could cause for you or your family  Talk to healthcare providers if you have concerns or questions before you write down your wishes  You may also want to talk with your [employer] or , or a   Check your state laws to make sure that what you put in your advance directive is legal      · Sign all forms:  Sign and date your advance directive when you have finished  You may also need 2 witnesses to sign the forms  Witnesses cannot be your doctor or his staff, your spouse, heirs or beneficiaries, people you owe money to, or your chosen proxy  Talk to your family, proxy, and healthcare providers about your advance directive  Give each person a copy, and keep one for yourself in a place you can get to easily   Do not keep it hidden or locked away  · Review and revise your plans: You can revise your advance directive at any time, as long as you are able to make decisions  Review your plan every year, and when there are changes in your life, or your health  When you make changes, let your family, proxy, and healthcare providers know  Give each a new copy  Where can I find more information? · [employer]  [address] , [address]  Phone: 1- [phone] - [phone]  Phone: 5- [phone] - [phone]  Web Address: http://www  aafp org  · [address] [facility])  [address] [address] [facility], [address] [facility] , [address] [place] [address]  Phone: [phone]  Phone: [phone]  Web Address: Lionel sykes  CARE AGREEMENT:   You have the right to help plan your care  To help with this plan, you must learn about your health condition and treatment options  You must also learn about advance directives and how they are used  Work with your healthcare providers to decide what care will be used to treat you  You always have the right to refuse treatment  The above information is an  only  It is not intended as medical advice for individual conditions or treatments  Talk to your doctor, nurse or pharmacist before following any medical regimen to see if it is safe and effective for you  © 2017 [employer] Information is for End User's use only and may not be sold, redistributed or otherwise used for commercial purposes  All illustrations and images included in CareNotes® are the copyrighted property of [employer]  or Colin Doty

## 2019-03-06 NOTE — TELEPHONE ENCOUNTER
Refill  On,     levothyroxine 125 mcg tablet Sig: Take 1 tablet (125 mcg total) by mouth daily for 90 days        simvastatin (ZOCOR) 40 mg tablet Sig: Take 1 tablet (40 mg total) by mouth every evening for 360 days      Pharmacy is [facility]

## 2019-03-06 NOTE — PROGRESS NOTES
Assessment and Plan:    Problem List Items Addressed This Visit     None      Visit Diagnoses     Medicare annual wellness visit, subsequent    -  Primary        Health Maintenance Due   Topic Date Due    BMI: Followup Plan  03/13/1973    Pneumococcal PPSV23 Highest Risk Adult (1 of 3 - PCV13) 03/13/1974    Depression Screening PHQ  02/27/2019    Medicare Annual Wellness Visit (AWV)  02/27/2019         HPI:  Thuy Wilkinson is a 61 y o  female here for her Subsequent Wellness Visit  Patient Active Problem List   Diagnosis    Hyperlipidemia    Hypothyroidism    Blind right eye    Arthritis    Anxiety    Insomnia    Migraine headache    Postgastrectomy malabsorption    Transient ischemic attack    Urge incontinence of urine    Vulvar atrophy    Bariatric surgery status    Low vitamin B12 level    Encounter for cosmetic surgery     Past Medical History:   Diagnosis Date    Allergic dermatitis     resolved 01/26/2016    Anxiety     Arthritis     Blind right eye     Depression     Hyperlipidemia     Hypothyroidism (acquired)     hypothyroid    Incontinence of urine in female     Insomnia     Migraine     Morbid obesity ([address] )     TIA (transient ischemic attack)     questionable     Wears glasses      Past Surgical History:   Procedure Laterality Date    BODY LIFT LOWER N/A 6/28/2018    Procedure: LOWER BODY LIFT;  Surgeon: Yaritza Snyder MD;  Location:  MAIN OR;  Service: Plastics    CATARACT EXTRACTION Left     CHOLECYSTECTOMY      COLONOSCOPY      ESOPHAGOGASTRODUODENOSCOPY N/A 3/27/2017    Procedure: ESOPHAGOGASTRODUODENOSCOPY (EGD); Surgeon: Erum Piedra MD;  [facility];  Service:     EYE SURGERY      HEMORROIDECTOMY      JOINT REPLACEMENT      oswald TKR    OR EGD TRANSORAL BIOPSY SINGLE/MULTIPLE N/A 3/8/2017    Procedure: ESOPHAGOGASTRODUODENOSCOPY (EGD); Surgeon: Erum Piedra MD;  [facility];   Service: Bariatrics    OR EXCISE EXCESS SKIN [address] Bilateral 1/10/2019    Procedure: THIGH LIFT;  Surgeon: Ramon Ballesteros MD;  [facility];  Service: Plastics    AK LAP, NADYA RESTRICT PROC, LONGITUDINAL GASTRECTOMY N/A 3/27/2017    Procedure: GASTRECTOMY SLEEVE LAPAROSCOPIC;  Surgeon: Hannah Lindsey MD;  [facility];  Service: Bariatrics    ROTATOR CUFF REPAIR Right     TOTAL BODY LIFT       Family History   Problem Relation Age of Onset    Diabetes Mother     Skin cancer Mother     Alzheimer's disease Father     Heart failure Father     Diabetes Father     Breast cancer Maternal Grandmother         adenocarcinoma in situ in villous adenoma of the breast     Social History     Tobacco Use   Smoking Status Never Smoker   Smokeless Tobacco Never Used     Social History     Substance and Sexual Activity   Alcohol Use No      Social History     Substance and Sexual Activity   Drug Use No       Current Outpatient Medications   Medication Sig Dispense Refill    calcium citrate-vitamin D (CITRACAL+D) 315-200 MG-UNIT per tablet Take by mouth daily        clobetasol (TEMOVATE) 0 05 % cream Apply topically      eszopiclone (LUNESTA) 3 MG tablet TAKE 1 TABLET (3 MG TOTAL) BY MOUTH DAILY AT BEDTIME FOR 90 DAYS TAKE IMMEDIATELY BEFORE BEDTIME 90 tablet 1    Multiple Vitamins-Minerals (BARIATRIC FUSION) CHEW Chew 2 tablets daily        rizatriptan (MAXALT) 10 MG tablet Take by mouth daily as needed        simvastatin (ZOCOR) 40 mg tablet Take 1 tablet (40 mg total) by mouth every evening for 360 days 90 tablet 3    aspirin 81 MG tablet Take 1 tablet by mouth daily for 30 days 30 tablet 0    levothyroxine 125 mcg tablet Take 1 tablet (125 mcg total) by mouth daily for 90 days 90 tablet 3    PARoxetine (PAXIL) 10 mg tablet Take 1 tablet (10 mg total) by mouth daily for 90 days 90 tablet 3     No current facility-administered medications for this visit        No Known Allergies  Immunization History   Administered Date(s) Administered     Influenza (IM) Preservative Free 09/05/2018    H1N1, All Formulations 01/06/2010    INFLUENZA 09/28/2013, 09/08/2014, 10/08/2015, 10/15/2016, 09/21/2017, 09/05/2018    Influenza Quadrivalent Preservative Free 3 years and older IM 10/08/2015    Influenza TIV (IM) 09/20/2011, 09/08/2014, 10/15/2016    Influenza, injectable, quadrivalent, preservative free 0 5 mL 09/05/2018    Tdap 03/04/2014    Zoster Vaccine Recombinant 09/22/2018       Patient Care Team:  Halle Lutz DO as PCP - MD Chandler Moreau MD Carmella Lorenzo, DO Hennie Bunde, MD Margette Rick, MD    Medicare Screening Tests and Risk Assessments:  Graham Cochran is here for her Subsequent Wellness visit  Health Risk Assessment:  Patient rates overall health as very good  Patient feels that their physical health rating is Same  Eyesight was rated as Same  Hearing was rated as Same  Patient feels that their emotional and mental health rating is Same  Pain experienced by patient in the last 7 days has been None  Patient states that she has experienced no weight loss or gain in last 6 months  Emotional/Mental Health:  Patient has been feeling nervous/anxious  PHQ-9 Depression Screening:    Frequency of the following problems over the past two weeks:      1  Little interest or pleasure in doing things: 0 - not at all      2  Feeling down, depressed, or hopeless: 0 - not at all  PHQ-2 Score: 0          Broken Bones/Falls: Fall Risk Assessment:    In the past year, patient has experienced: No history of falling in past year          Bladder/Bowel:  Patient has not leaked urine accidently in the last six months  Patient reports no loss of bowel control  Immunizations:  Patient has had a flu vaccination within the last year  Patient has received a pneumonia shot  Patient has received a shingles shot  Patient has received tetanus/diphtheria shot       Home Safety:  Patient does not have trouble with stairs inside or outside of their home  Patient currently reports that there are no safety hazards present in home, working smoke alarms, working carbon monoxide detectors  Preventative Screenings:   Breast cancer screening performed, colon cancer screen completed, cholesterol screen completed, glaucoma eye exam completed,     Nutrition:  Current diet: Regular with servings of the following:    Medications:  Patient is currently taking over-the-counter supplements  Patient is able to manage medications  Lifestyle Choices:  Patient reports no tobacco use  Patient has not smoked or used tobacco in the past   Patient reports no alcohol use  Patient drives a vehicle  Patient wears seat belt  Activities of Daily Living:  Can get out of bed by his or her self, able to dress self, able to make own meals, able to do own shopping, able to bathe self, can do own laundry/housekeeping, can manage own money, pay bills and track expenses    Previous Hospitalizations:  Hospitalization or ED visit in past 12 months  Number of hospitalizations within the last year: 1-2        Advanced Directives:  Patient has decided on a power of   Patient has spoken to designated power of   Patient has completed advanced directive          Preventative Screening/Counseling:      Cardiovascular:      General: Screening Current          Diabetes:      General: Screening Current          Colorectal Cancer:      General: Screening Current          Breast Cancer:      General: Screening Current          Cervical Cancer:      General: Screening Current          Osteoporosis:      General: Screening Not Indicated          AAA:      General: Screening Not Indicated          Glaucoma:      General: Screening Current          HIV:      General: Screening Not Indicated          Hepatitis C:      General: Screening Not Indicated        Advanced Directives:   Patient has living will for healthcare, has durable POA for healthcare, patient has an advanced directive  Immunizations:  Patient reviewed and up to date      Other Preventative Counseling (Non-Medicare):   Increase physical activity

## 2019-03-11 ENCOUNTER — OFFICE VISIT (OUTPATIENT)
Dept: PLASTIC SURGERY | Facility: CLINIC | Age: 64
End: 2019-03-11

## 2019-03-11 VITALS — BODY MASS INDEX: 27.15 KG/M2 | WEIGHT: 173 LBS | HEIGHT: 67 IN

## 2019-03-11 DIAGNOSIS — Z48.89 POSTOPERATIVE VISIT: Primary | ICD-10-CM

## 2019-03-11 PROCEDURE — 99024 POSTOP FOLLOW-UP VISIT: CPT | Performed by: SURGERY

## 2019-03-15 ENCOUNTER — OFFICE VISIT (OUTPATIENT)
Dept: PLASTIC SURGERY | Facility: CLINIC | Age: 64
End: 2019-03-15

## 2019-03-15 DIAGNOSIS — Z98.890 STATUS POST COSMETIC PLASTIC SURGERY: Primary | ICD-10-CM

## 2019-03-15 PROCEDURE — RECHECK: Performed by: PHYSICIAN ASSISTANT

## 2019-03-15 NOTE — PROGRESS NOTES
Assessment/Plan:   Nara Vogel is a pleasant 28-year-old female who is 2 months status post bilateral thigh lift   Please see HPI   Recurrent seroma left thigh  I drained approximately 60 cc of fluid from the left by today  We will continue to Ace wrap the thigh  We did discuss with her possibility of a drain placement and sclerosing agent  We will hold off at this time  She will follow up next week for possible drainage  Diagnoses and all orders for this visit:    Status post cosmetic plastic surgery          Subjective:     Patient ID: Nancy Obrien is a 59 y o  female  HPI   She states that the left thigh filled back up with fluid within 48 hours  She denies any complaints regarding the right thigh  Review of Systems   Skin:        As per HPI  Objective:     Physical Exam   Skin:   Right thigh previous seroma area appears to be improving  No evidence of recurrent seroma  Left thigh with recurrent seroma noted  This was drained in the office

## 2019-03-18 ENCOUNTER — OFFICE VISIT (OUTPATIENT)
Dept: PLASTIC SURGERY | Facility: CLINIC | Age: 64
End: 2019-03-18

## 2019-03-18 DIAGNOSIS — Z98.890 STATUS POST COSMETIC PLASTIC SURGERY: Primary | ICD-10-CM

## 2019-03-18 PROCEDURE — RECHECK: Performed by: PHYSICIAN ASSISTANT

## 2019-03-18 NOTE — PROGRESS NOTES
Assessment/Plan:   Luisito Angelo is a pleasant 31-year-old female who is 2 months status post bilateral thigh lift   Please see HPI   Recurrent seroma left thigh  I drained approximately 60 cc of fluid from the left thigh today  We will continue to Ace wrap the thigh  Recommend IR drain placement  Will make arrangements  Diagnoses and all orders for this visit:    Status post cosmetic plastic surgery          Subjective:     Patient ID: Olive Singh is a 59 y o  female  HPI   She states seroma has recurred as it always does, within 48 hours  She is requesting a drain be placed  Review of Systems   HENT: Negative for hearing loss  Eyes: Negative for visual disturbance  Respiratory: Negative for shortness of breath  Cardiovascular: Negative for chest pain  Gastrointestinal: Negative for abdominal pain, blood in stool, constipation, diarrhea, nausea and vomiting  Genitourinary: Negative for hematuria  Musculoskeletal: Negative for gait problem  Skin:        As per HPI  Neurological: Negative for seizures and headaches  Hematological: Does not bruise/bleed easily  Psychiatric/Behavioral: The patient is not nervous/anxious  Objective:     Physical Exam   Constitutional: She is oriented to person, place, and time  She appears well-developed and well-nourished  No distress  HENT:   Head: Normocephalic and atraumatic  Eyes: Pupils are equal, round, and reactive to light  EOM are normal  No scleral icterus  Neck: Neck supple  No tracheal deviation present  No thyromegaly present  Cardiovascular: Normal rate and regular rhythm  Exam reveals no gallop and no friction rub  No murmur heard  Pulmonary/Chest: Effort normal and breath sounds normal  She has no wheezes  She has no rales  Abdominal: Soft  Bowel sounds are normal  She exhibits no distension  There is no tenderness  There is no rebound and no guarding  Musculoskeletal: Normal range of motion     Lymphadenopathy: She has no cervical adenopathy  Neurological: She is alert and oriented to person, place, and time  No cranial nerve deficit  Skin:   Left lower inner thigh seroma  Same size as previously noted  Drained 60-70cc today in the office  Psychiatric: She has a normal mood and affect

## 2019-03-20 ENCOUNTER — TELEPHONE (OUTPATIENT)
Dept: RADIOLOGY | Facility: HOSPITAL | Age: 64
End: 2019-03-20

## 2019-03-20 ENCOUNTER — OFFICE VISIT (OUTPATIENT)
Dept: PLASTIC SURGERY | Facility: HOSPITAL | Age: 64
End: 2019-03-20

## 2019-03-20 DIAGNOSIS — L76.34 POSTOPERATIVE SEROMA OF SUBCUTANEOUS TISSUE AFTER NON-DERMATOLOGIC PROCEDURE: ICD-10-CM

## 2019-03-20 DIAGNOSIS — Z98.890 STATUS POST COSMETIC PLASTIC SURGERY: Primary | ICD-10-CM

## 2019-03-20 PROCEDURE — RECHECK: Performed by: PHYSICIAN ASSISTANT

## 2019-03-20 RX ORDER — SODIUM CHLORIDE 9 MG/ML
75 INJECTION, SOLUTION INTRAVENOUS CONTINUOUS
Status: CANCELLED | OUTPATIENT
Start: 2019-03-26

## 2019-03-20 NOTE — PROGRESS NOTES
Assessment/Plan:   Herson Torres is a pleasant 60-year-old female who is 2 months status post bilateral thigh lift   Please see HPI   Recurrent seroma left thigh   I drained approximately 50 cc of fluid from the left thigh today   We will continue to Ace wrap the thigh  Awaiting drain placement by IR  Drained the seroma again today  Follow up after IR places drain  I asked her to avoid wrapping her leg so tightly as I believe it is contributing to her foot swelling  Diagnoses and all orders for this visit:    Status post cosmetic plastic surgery    Postoperative seroma of subcutaneous tissue after non-dermatologic procedure          Subjective:     Patient ID: Lucy Ryan is a 59 y o  female  HPI   Her left thigh continues to swell up with fluid  She reports now that she has swelling at in the left foot as well  She denies any calf pain or leg pain at all  She states that she still has not received appointment for drain placement with Interventional Radiology  Review of Systems   Skin:        As per HPI  Objective:     Physical Exam   Skin:   Left inner thigh seroma has recurred  I drained the area with an 18 gauge needle and removed approximately 50 cc of clear fluid  The left foot is edematous  It is nontender and her calf is nontender  This is likely secondary to Ace wrap to the thigh

## 2019-03-26 ENCOUNTER — APPOINTMENT (OUTPATIENT)
Dept: LAB | Facility: CLINIC | Age: 64
End: 2019-03-26
Payer: COMMERCIAL

## 2019-03-26 ENCOUNTER — TELEPHONE (OUTPATIENT)
Dept: UROLOGY | Facility: AMBULATORY SURGERY CENTER | Age: 64
End: 2019-03-26

## 2019-03-26 ENCOUNTER — HOSPITAL ENCOUNTER (OUTPATIENT)
Dept: RADIOLOGY | Facility: HOSPITAL | Age: 64
Discharge: HOME | End: 2019-03-26
Admitting: RADIOLOGY
Payer: COMMERCIAL

## 2019-03-26 VITALS
SYSTOLIC BLOOD PRESSURE: 136 MMHG | RESPIRATION RATE: 16 BRPM | OXYGEN SATURATION: 99 % | HEIGHT: 68 IN | BODY MASS INDEX: 25.01 KG/M2 | WEIGHT: 165 LBS | DIASTOLIC BLOOD PRESSURE: 64 MMHG | HEART RATE: 62 BPM

## 2019-03-26 DIAGNOSIS — E78.2 MIXED HYPERLIPIDEMIA: ICD-10-CM

## 2019-03-26 DIAGNOSIS — E03.9 ACQUIRED HYPOTHYROIDISM: ICD-10-CM

## 2019-03-26 DIAGNOSIS — Z98.890 STATUS POST COSMETIC PLASTIC SURGERY: ICD-10-CM

## 2019-03-26 LAB
ALBUMIN SERPL BCP-MCNC: 3.7 G/DL (ref 3.5–5)
ALP SERPL-CCNC: 96 U/L (ref 46–116)
ALT SERPL W P-5'-P-CCNC: 26 U/L (ref 12–78)
ANION GAP SERPL CALCULATED.3IONS-SCNC: 2 MMOL/L (ref 4–13)
AST SERPL W P-5'-P-CCNC: 22 U/L (ref 5–45)
BILIRUB SERPL-MCNC: 0.67 MG/DL (ref 0.2–1)
BUN SERPL-MCNC: 22 MG/DL (ref 5–25)
CALCIUM SERPL-MCNC: 9.4 MG/DL (ref 8.3–10.1)
CHLORIDE SERPL-SCNC: 109 MMOL/L (ref 100–108)
CHOLEST SERPL-MCNC: 142 MG/DL (ref 50–200)
CO2 SERPL-SCNC: 31 MMOL/L (ref 21–32)
CREAT SERPL-MCNC: 0.62 MG/DL (ref 0.6–1.3)
GFR SERPL CREATININE-BSD FRML MDRD: 96 ML/MIN/1.73SQ M
GLUCOSE P FAST SERPL-MCNC: 86 MG/DL (ref 65–99)
HDLC SERPL-MCNC: 61 MG/DL (ref 40–60)
LDLC SERPL CALC-MCNC: 64 MG/DL (ref 0–100)
POTASSIUM SERPL-SCNC: 4.3 MMOL/L (ref 3.5–5.3)
PROT SERPL-MCNC: 7.2 G/DL (ref 6.4–8.2)
SODIUM SERPL-SCNC: 142 MMOL/L (ref 136–145)
T4 FREE SERPL-MCNC: 1.09 NG/DL (ref 0.76–1.46)
TRIGL SERPL-MCNC: 83 MG/DL
TSH SERPL DL<=0.05 MIU/L-ACNC: 0.3 UIU/ML (ref 0.36–3.74)

## 2019-03-26 PROCEDURE — 10030 IMG GID FLU COLL DRG SFT TIS: CPT | Performed by: RADIOLOGY

## 2019-03-26 PROCEDURE — C1769 GUIDE WIRE: HCPCS

## 2019-03-26 PROCEDURE — 10030 IMG GID FLU COLL DRG SFT TIS: CPT

## 2019-03-26 PROCEDURE — 84443 ASSAY THYROID STIM HORMONE: CPT

## 2019-03-26 PROCEDURE — 84439 ASSAY OF FREE THYROXINE: CPT

## 2019-03-26 PROCEDURE — 80061 LIPID PANEL: CPT

## 2019-03-26 PROCEDURE — C1729 CATH, DRAINAGE: HCPCS

## 2019-03-26 PROCEDURE — 80053 COMPREHEN METABOLIC PANEL: CPT

## 2019-03-26 PROCEDURE — 36415 COLL VENOUS BLD VENIPUNCTURE: CPT

## 2019-03-26 RX ORDER — 0.9 % SODIUM CHLORIDE 0.9 %
10 VIAL (ML) INJECTION DAILY
Qty: 300 ML | Refills: 1 | OUTPATIENT
Start: 2019-03-26

## 2019-03-26 RX ORDER — SODIUM CHLORIDE 9 MG/ML
75 INJECTION, SOLUTION INTRAVENOUS CONTINUOUS
Status: DISCONTINUED | OUTPATIENT
Start: 2019-03-26 | End: 2019-03-27 | Stop reason: HOSPADM

## 2019-03-26 RX ORDER — HEPARIN SODIUM,PORCINE 10 UNIT/ML
VIAL (ML) INTRAVENOUS ONCE
Status: CANCELLED | OUTPATIENT
Start: 2019-03-26 | End: 2019-03-26

## 2019-03-26 NOTE — H&P
IR H&P    HPI:  59year old female s/p bilateral thigh lift was found to have recurrent seroma in the left distal thigh  Patient is referred for a drain placement      PMH:  HLD  Hypothyroidism  Migraine  TIA    PSH:  Lower body lift  Cholecystectomy  Bilateral TKA  Sleeve gastrectomy  Right rotator cuff repair    Physical exam:  Gen: NAD  Left medial thigh: Seroma present    Coags ok    A/P:  59year old female s/p bilateral thigh lift was found to have recurrent seroma in the left distal thigh      - Left medial thigh seroma drain placement

## 2019-03-26 NOTE — DISCHARGE INSTRUCTIONS
TUBE CARE INSTRUCTIONS    Care after your procedure:    Resume your normal diet  Small sips of flat soda will help with nausea  1  The properly functioning catheter should be forward flushed once (1x) daily with 10ml of normal saline using clean technique  You will be given a prescription for flushes  To flush the tube, clean both connections with alcohol swab  Twist off the drainage bag/ bulb  tubing and twist the saline syringe into the drainage tube and flush  Remove the syringe and twist the drainage bag / bulb tubing tubing back on     2  The drainage bag/bulb may be emptied as necessary  Keep a record of the amount of fluid you drain from your tube  This should be done with clean technique as well  3  A fresh dressing should be applied daily over the tube insertion site  4  As the tube is secured to the skin with only a suture,try not to pull on your tube  Tub baths are not permitted  Showers are permitted if the patient's skin entry site is prevented from getting wet  Similarly, washcloth "baths" are acceptable  Contact Interventional Radiology at [phone] [place] PATIENTS: Contact Interventional Radiology at [phone]) Orie Dancer PATIENTS: Contact Interventional Radiology at [phone]) if:    1  Leakage or large amounts of liquid around the catheter  2  Fever of 101 degrees lasting several hours without other obvious cause (such as sore throat, flu, etc)  3  Persistent nausea or vomiting  4  Diminished drainage, which may be associated with pressure or pain  Or when the     drainage from your tube is less than 10mls for 48 hours  5  Catheter pulled back or falls out  The following pharmacies carry the flush syringes         [facility]                     [facility]  [address]                         [address] [facility]  Phone [phone]            Phone [phone]   [place] [address]                                [phone]  [address] [facility]  Phone [phone]            Phone [phone]                      [place]                                                                                                          [phone]  [facility]  [facility] [address]  [facility]  Phone [phone]                                                                          Drainage Tube Removal    Your drainage tube was removed today  What you need know at home:   Keep a clean dry dressing at the tube site until the small opening closes  It will take twenty four to forty eight hours  Keep the site dry until it heals  A small amount of drainage on your dressing is normal  Resume your normal diet  Small sips of flat soda will help with any nausea  Contact Interventional Radiology for any of the following: You have pain, fever greater than 101, shaking chills  If you have increased redness or swelling at the site  I the drainage from your site does not stop  If the site drains pus or has a bad odor       Contact Interventional Radiology at [phone]   [place] PATIENTS: Contact Interventional Radiology at [phone]) [place] Reyes PATIENTS: Contact Interventional Radiology at [phone]) if:

## 2019-03-26 NOTE — BRIEF OP NOTE (RAD/CATH)
IR IMAGE GUIDED ASPIRATION / DRAINAGE W TUBE  Procedure Note    PATIENT NAME: Nusrat Duggan  : 1955  MRN: [id number]     Pre-op Diagnosis:   1  Status post cosmetic plastic surgery      Post-op Diagnosis:   1  Status post cosmetic plastic surgery        Surgeon:   Brii Tejeda MD    Estimated Blood Loss: None    Findings:   1  Successful left medial thigh seroma drain placement using 8 5 Fr catheter  2  60 mL serous fluid aspirated      Specimens: None    Complications:  None    Anesthesia: Local    Brii Tejeda MD     Date: 3/26/2019  Time: 10:48 AM

## 2019-03-26 NOTE — SEDATION DOCUMENTATION
Drainage tube placed by Dr Cassius Tate in patient's left inner thigh  60ml of clear cruz fluid drained  Patient's VSS and not complaining of any pain

## 2019-03-26 NOTE — TELEPHONE ENCOUNTER
I called and s/w patient to see if she would like to r/s her appt that was with Dr Garret Wilkinson in Feb  Patient said that she just got a tube placed and has a lot of upcoming appts and wishes to be called in May to r/s her f/u with Dr Garret Wilkinson

## 2019-04-09 ENCOUNTER — HOSPITAL ENCOUNTER (OUTPATIENT)
Dept: RADIOLOGY | Facility: HOSPITAL | Age: 64
Discharge: HOME | End: 2019-04-09
Admitting: RADIOLOGY
Payer: COMMERCIAL

## 2019-04-09 ENCOUNTER — HOSPITAL ENCOUNTER (OUTPATIENT)
Dept: RADIOLOGY | Facility: HOSPITAL | Age: 64
Discharge: HOME | End: 2019-04-09
Payer: COMMERCIAL

## 2019-04-09 DIAGNOSIS — L76.33 POSTOPERATIVE SEROMA OF SUBCUTANEOUS TISSUE AFTER DERMATOLOGIC PROCEDURE: ICD-10-CM

## 2019-04-09 PROCEDURE — 49185 SCLEROTX FLUID COLLECTION: CPT | Performed by: RADIOLOGY

## 2019-04-09 PROCEDURE — 49423 EXCHANGE DRAINAGE CATHETER: CPT | Performed by: RADIOLOGY

## 2019-04-09 PROCEDURE — 75984 XRAY CONTROL CATHETER CHANGE: CPT | Performed by: RADIOLOGY

## 2019-04-09 PROCEDURE — C1729 CATH, DRAINAGE: HCPCS

## 2019-04-09 PROCEDURE — C1769 GUIDE WIRE: HCPCS

## 2019-04-09 PROCEDURE — 49185 SCLEROTX FLUID COLLECTION: CPT

## 2019-04-09 PROCEDURE — 75984 XRAY CONTROL CATHETER CHANGE: CPT

## 2019-04-09 RX ORDER — ALCOHOL 0.98 ML/ML
INJECTION INTRASPINAL CODE/TRAUMA/SEDATION MEDICATION
Status: COMPLETED | OUTPATIENT
Start: 2019-04-09 | End: 2019-04-09

## 2019-04-09 RX ADMIN — ALCOHOL 4 ML: 0.98 INJECTION INTRASPINAL at 11:00

## 2019-04-18 ENCOUNTER — HOSPITAL ENCOUNTER (OUTPATIENT)
Dept: RADIOLOGY | Facility: HOSPITAL | Age: 64
Discharge: HOME | End: 2019-04-18
Payer: COMMERCIAL

## 2019-04-18 DIAGNOSIS — L02.91 ABSCESS: Primary | ICD-10-CM

## 2019-04-18 PROCEDURE — 76080 X-RAY EXAM OF FISTULA: CPT | Performed by: RADIOLOGY

## 2019-04-18 PROCEDURE — 76080 X-RAY EXAM OF FISTULA: CPT

## 2019-04-18 PROCEDURE — 49424 ASSESS CYST CONTRAST INJECT: CPT | Performed by: RADIOLOGY

## 2019-04-18 PROCEDURE — 49424 ASSESS CYST CONTRAST INJECT: CPT

## 2019-04-18 RX ORDER — CEPHALEXIN 500 MG/1
500 CAPSULE ORAL EVERY 6 HOURS SCHEDULED
Qty: 28 CAPSULE | Refills: 0 | Status: SHIPPED | OUTPATIENT
Start: 2019-04-18 | End: 2019-04-25

## 2019-04-18 RX ADMIN — IOHEXOL 2 ML: 300 INJECTION, SOLUTION INTRAVENOUS at 09:12

## 2019-04-19 NOTE — INTERVAL H&P NOTE
H&P reviewed  After examining the patient I find no changes in the patients condition since the H&P had been written  Patient/Caregiver provided printed discharge information.

## 2019-04-30 ENCOUNTER — HOSPITAL ENCOUNTER (OUTPATIENT)
Dept: RADIOLOGY | Facility: HOSPITAL | Age: 64
Discharge: HOME | End: 2019-04-30
Admitting: RADIOLOGY
Payer: COMMERCIAL

## 2019-04-30 DIAGNOSIS — L02.91 ABSCESS: ICD-10-CM

## 2019-04-30 PROCEDURE — 76882 US LMTD JT/FCL EVL NVASC XTR: CPT | Performed by: RADIOLOGY

## 2019-04-30 PROCEDURE — 49424 ASSESS CYST CONTRAST INJECT: CPT

## 2019-04-30 PROCEDURE — 76080 X-RAY EXAM OF FISTULA: CPT

## 2019-05-09 ENCOUNTER — TELEPHONE (OUTPATIENT)
Dept: UROLOGY | Facility: CLINIC | Age: 64
End: 2019-05-09

## 2019-06-13 DIAGNOSIS — R30.0 DYSURIA: Primary | ICD-10-CM

## 2019-06-14 ENCOUNTER — APPOINTMENT (OUTPATIENT)
Dept: LAB | Facility: CLINIC | Age: 64
End: 2019-06-14
Payer: COMMERCIAL

## 2019-06-14 DIAGNOSIS — R30.0 DYSURIA: ICD-10-CM

## 2019-06-14 LAB
BACTERIA UR QL AUTO: ABNORMAL /HPF
BILIRUB UR QL STRIP: NEGATIVE
CLARITY UR: ABNORMAL
COLOR UR: YELLOW
GLUCOSE UR STRIP-MCNC: NEGATIVE MG/DL
HGB UR QL STRIP.AUTO: ABNORMAL
HYALINE CASTS #/AREA URNS LPF: ABNORMAL /LPF
KETONES UR STRIP-MCNC: NEGATIVE MG/DL
LEUKOCYTE ESTERASE UR QL STRIP: ABNORMAL
NITRITE UR QL STRIP: POSITIVE
NON-SQ EPI CELLS URNS QL MICRO: ABNORMAL /HPF
PH UR STRIP.AUTO: 6.5 [PH]
PROT UR STRIP-MCNC: NEGATIVE MG/DL
RBC #/AREA URNS AUTO: ABNORMAL /HPF
SP GR UR STRIP.AUTO: 1.02 (ref 1–1.03)
UROBILINOGEN UR QL STRIP.AUTO: 0.2 E.U./DL
WBC #/AREA URNS AUTO: ABNORMAL /HPF

## 2019-06-14 PROCEDURE — 87077 CULTURE AEROBIC IDENTIFY: CPT

## 2019-06-14 PROCEDURE — 87086 URINE CULTURE/COLONY COUNT: CPT

## 2019-06-14 PROCEDURE — 87186 SC STD MICRODIL/AGAR DIL: CPT

## 2019-06-14 PROCEDURE — 81001 URINALYSIS AUTO W/SCOPE: CPT

## 2019-06-16 DIAGNOSIS — N39.0 E-COLI UTI: Primary | ICD-10-CM

## 2019-06-16 DIAGNOSIS — B96.20 E-COLI UTI: Primary | ICD-10-CM

## 2019-06-16 LAB — BACTERIA UR CULT: ABNORMAL

## 2019-06-16 RX ORDER — CIPROFLOXACIN 500 MG/1
500 TABLET, FILM COATED ORAL EVERY 12 HOURS SCHEDULED
Qty: 14 TABLET | Refills: 0 | Status: SHIPPED | OUTPATIENT
Start: 2019-06-16 | End: 2019-06-23

## 2019-07-03 DIAGNOSIS — G47.00 INSOMNIA, UNSPECIFIED TYPE: ICD-10-CM

## 2019-07-03 RX ORDER — ESZOPICLONE 3 MG/1
TABLET, FILM COATED ORAL
Qty: 90 TABLET | Refills: 1 | Status: SHIPPED | OUTPATIENT
Start: 2019-07-03 | End: 2019-12-31

## 2019-07-05 ENCOUNTER — TELEPHONE (OUTPATIENT)
Dept: UROLOGY | Facility: CLINIC | Age: 64
End: 2019-07-05

## 2019-07-05 NOTE — TELEPHONE ENCOUNTER
Patient left message in our message box to set up her Botox Injection with Dr Humberto Hughes  I spoke to patient who informed me, specifically that Cj Blevins is the one who schedules her for this procedure  Please contact patient  Thank you

## 2019-07-10 NOTE — TELEPHONE ENCOUNTER
I returned pt 's call and informed her that she needs a follow before being able to schedule her for a Botox procedure since her appt on 2/17/19 was canceled  I also let her know that Dr Enma Sanford is not able to do the Botox procedures in the office anymore and it would be best if I could get her scheduled with one of the other providers in our group who do the Botox procedure  Pt agreed and is scheduled with Dr Karon Kong at the [facility] on 8/22/19  Per pt 's request I am mailing her a appt card with the information

## 2019-08-01 ENCOUNTER — TELEPHONE (OUTPATIENT)
Dept: BARIATRICS | Facility: CLINIC | Age: 64
End: 2019-08-01

## 2019-08-01 DIAGNOSIS — Z12.31 ENCOUNTER FOR SCREENING MAMMOGRAM FOR MALIGNANT NEOPLASM OF BREAST: ICD-10-CM

## 2019-08-01 NOTE — TELEPHONE ENCOUNTER
lm for pt to schedule overdue f/u apt         ----- Message from Vini Segovia RN sent at 2019 10:08 AM EDT -----  Regardin year follow up appointment  Please call patient to reschedule her 2 year f/u appointment that she cancelled on 3/8/2019  Thank you

## 2019-08-20 NOTE — PROGRESS NOTES
8/22/2019    Donnia Epley  1955  [id number]    Discussion and Plan      Risks and benefits of cystoscopy and Botox injection therapy reviewed including bleeding, infection, persistence of urinary incontinence, urinary retention, and need for secondary procedures  Patient understands that treatment effect is a transient in nature  All questions answered at this time  Assessment      Patient Active Problem List   Diagnosis    Hyperlipidemia    Hypothyroidism    Blind right eye    Arthritis    Anxiety    Insomnia    Migraine headache    Postgastrectomy malabsorption    Transient ischemic attack    Urge incontinence of urine    Vulvar atrophy    Bariatric surgery status    Low vitamin B12 level    Encounter for cosmetic surgery    Medicare annual wellness visit, subsequent       History of Present Illness    Staci Donato is a 59 y o  female seen today in regards to a history of   Urge urinary incontinence  Previously treated with Botox 200 units injections and states that she had excellent clinical response typically on the order of at least 3-5 months  No interval hematuria or urinary tract infection  She has not been treated in some time and wishes to have repeat injection performed      Urinary Symptom Assessment        Past Medical History  Past Medical History:   Diagnosis Date    Allergic dermatitis     resolved 01/26/2016    Anxiety     Arthritis     Blind right eye     Depression     Hyperlipidemia     Hypothyroidism (acquired)     hypothyroid    Incontinence of urine in female     Insomnia     Migraine     Morbid obesity ([address] )     TIA (transient ischemic attack)     questionable     Wears glasses        Past Social History  Past Surgical History:   Procedure Laterality Date    BODY LIFT LOWER N/A 6/28/2018    Procedure: LOWER BODY LIFT;  Surgeon: Nel Mckinley MD;  Location:  MAIN OR;  Service: Plastics    CATARACT EXTRACTION Left     CHOLECYSTECTOMY      COLONOSCOPY  ESOPHAGOGASTRODUODENOSCOPY N/A 3/27/2017    Procedure: ESOPHAGOGASTRODUODENOSCOPY (EGD); Surgeon: Bertha Painting MD;  [facility];  Service:     EYE SURGERY      HEMORROIDECTOMY      IR IMAGE GUIDED ASPIRATION / DRAINAGE W TUBE  3/26/2019    JOINT REPLACEMENT      oswald TKR    FL EGD TRANSORAL BIOPSY SINGLE/MULTIPLE N/A 3/8/2017    Procedure: ESOPHAGOGASTRODUODENOSCOPY (EGD); Surgeon: Bertha Painting MD;  [facility];   Service: Bariatrics    FL EXCISE EXCESS SKIN [address] Bilateral 1/10/2019    Procedure: THIGH LIFT;  Surgeon: Dawson Zamarripa MD;  [facility];  Service: Plastics    FL LAP, NADYA RESTRICT PROC, LONGITUDINAL GASTRECTOMY N/A 3/27/2017    Procedure: GASTRECTOMY SLEEVE LAPAROSCOPIC;  Surgeon: Bertha Painting MD;  [facility];  Service: Bariatrics    ROTATOR CUFF REPAIR Right     TOTAL BODY LIFT         Past Family History  Family History   Problem Relation Age of Onset    Diabetes Mother     Skin cancer Mother     Alzheimer's disease Father     Heart failure Father     Diabetes Father     Breast cancer Maternal Grandmother         adenocarcinoma in situ in villous adenoma of the breast       Past Social history  Social History     Socioeconomic History    Marital status: /Civil Union     Spouse name: Not on file    Number of children: Not on file    Years of education: Not on file    Highest education level: Not on file   Occupational History    Not on file   Social Needs    Financial resource strain: Not on file    Food insecurity:     Worry: Not on file     Inability: Not on file    Transportation needs:     Medical: Not on file     Non-medical: Not on file   Tobacco Use    Smoking status: Never Smoker    Smokeless tobacco: Never Used   Substance and Sexual Activity    Alcohol use: No    Drug use: No    Sexual activity: Not Currently   Lifestyle    Physical activity:     Days per week: Not on file     Minutes per session: Not on file    Stress: Not on file   Relationships    Social connections:     Talks on phone: Not on file     Gets together: Not on file     Attends Latter-day service: Not on file     Active member of club or organization: Not on file     Attends meetings of clubs or organizations: Not on file     Relationship status: Not on file    Intimate partner violence:     Fear of current or ex partner: Not on file     Emotionally abused: Not on file     Physically abused: Not on file     Forced sexual activity: Not on file   Other Topics Concern    Not on file   Social History Narrative    Daily coffee consumption    Exercise frequency    Two children           Current Medications  Current Outpatient Medications   Medication Sig Dispense Refill    clobetasol (TEMOVATE) 0 05 % cream Apply topically      eszopiclone (LUNESTA) 3 MG tablet TAKE ONE TABLET BY MOUTH AT BEDTIME 90 tablet 1    Multiple Vitamins-Minerals (BARIATRIC FUSION) CHEW Chew 2 tablets daily        rizatriptan (MAXALT) 10 MG tablet Take by mouth daily as needed        simvastatin (ZOCOR) 40 mg tablet Take 1 tablet (40 mg total) by mouth every evening for 360 days 90 tablet 3    aspirin 81 MG tablet Take 1 tablet by mouth daily for 30 days 30 tablet 0    calcium citrate-vitamin D (CITRACAL+D) 315-200 MG-UNIT per tablet Take by mouth daily        levothyroxine 125 mcg tablet Take 1 tablet (125 mcg total) by mouth daily for 90 days 90 tablet 3    LORazepam (ATIVAN) 2 mg tablet Take 1 tablet (2 mg total) by mouth once for 1 dose Take 1 hour prior to procedure  1 tablet 0    PARoxetine (PAXIL) 10 mg tablet Take 1 tablet (10 mg total) by mouth daily for 90 days 90 tablet 3     No current facility-administered medications for this visit  Allergies  No Known Allergies    Past Medical History, Social History, Family History, medications and allergies were reviewed  Review of Systems  Review of Systems   Constitutional: Negative  HENT: Negative  Eyes: Negative  Respiratory: Negative  Cardiovascular: Negative  Gastrointestinal: Negative  Endocrine: Negative  Genitourinary: Positive for frequency and urgency  Negative for difficulty urinating and hematuria  Musculoskeletal: Negative  Skin: Negative  Allergic/Immunologic: Negative  Neurological: Negative  Hematological: Negative  Psychiatric/Behavioral: Negative  Vitals  Vitals:    08/22/19 0949   BP: 114/80   Pulse: 90   Weight: 86 8 kg (191 lb 6 4 oz)   Height: 5' 7" (1 702 m)         Physical Exam    Physical Exam   Constitutional: She is oriented to person, place, and time  She appears well-developed and well-nourished  HENT:   Head: Normocephalic and atraumatic  Eyes: Pupils are equal, round, and reactive to light  Neck: Normal range of motion  Cardiovascular: Normal rate, regular rhythm and normal heart sounds  Pulmonary/Chest: Effort normal and breath sounds normal    Abdominal: Soft  Bowel sounds are normal  She exhibits no distension  There is no tenderness  There is no rebound and no guarding  Musculoskeletal: Normal range of motion  Neurological: She is alert and oriented to person, place, and time  Skin: Skin is warm, dry and intact  Psychiatric: She has a normal mood and affect  Nursing note and vitals reviewed        Results    Below listed labs, pathology results, and radiology images were personally reviewed:    No results found for: PSA  Lab Results   Component Value Date    GLUCOSE 113 01/06/2016    CALCIUM 9 4 03/26/2019     01/06/2016    K 4 3 03/26/2019    CO2 31 03/26/2019     (H) 03/26/2019    BUN 22 03/26/2019    CREATININE 0 62 03/26/2019     Lab Results   Component Value Date    WBC 4 64 01/03/2019    HGB 12 2 01/03/2019    HCT 38 1 01/03/2019    MCV 92 01/03/2019     01/03/2019       No results found for this or any previous visit (from the past 1 hour(s)) ]    200u

## 2019-08-22 ENCOUNTER — OFFICE VISIT (OUTPATIENT)
Dept: UROLOGY | Facility: CLINIC | Age: 64
End: 2019-08-22
Payer: COMMERCIAL

## 2019-08-22 VITALS
SYSTOLIC BLOOD PRESSURE: 114 MMHG | HEIGHT: 67 IN | DIASTOLIC BLOOD PRESSURE: 80 MMHG | WEIGHT: 191.4 LBS | BODY MASS INDEX: 30.04 KG/M2 | HEART RATE: 90 BPM

## 2019-08-22 DIAGNOSIS — N39.41 URGE INCONTINENCE OF URINE: Primary | ICD-10-CM

## 2019-08-22 PROCEDURE — 99214 OFFICE O/P EST MOD 30 MIN: CPT | Performed by: UROLOGY

## 2019-08-22 RX ORDER — LORAZEPAM 2 MG/1
2 TABLET ORAL ONCE
Qty: 1 TABLET | Refills: 0 | Status: SHIPPED | OUTPATIENT
Start: 2019-08-22 | End: 2020-09-15

## 2019-08-23 ENCOUNTER — ANNUAL EXAM (OUTPATIENT)
Dept: OBGYN CLINIC | Facility: CLINIC | Age: 64
End: 2019-08-23
Payer: COMMERCIAL

## 2019-08-23 VITALS
SYSTOLIC BLOOD PRESSURE: 126 MMHG | HEIGHT: 67 IN | DIASTOLIC BLOOD PRESSURE: 70 MMHG | BODY MASS INDEX: 30.29 KG/M2 | WEIGHT: 193 LBS

## 2019-08-23 DIAGNOSIS — Z01.419 ENCOUNTER FOR GYNECOLOGICAL EXAMINATION (GENERAL) (ROUTINE) WITHOUT ABNORMAL FINDINGS: ICD-10-CM

## 2019-08-23 DIAGNOSIS — Z12.31 ENCOUNTER FOR SCREENING MAMMOGRAM FOR MALIGNANT NEOPLASM OF BREAST: Primary | ICD-10-CM

## 2019-08-23 PROCEDURE — S0612 ANNUAL GYNECOLOGICAL EXAMINA: HCPCS | Performed by: OBSTETRICS & GYNECOLOGY

## 2019-08-23 NOTE — PROGRESS NOTES
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

## 2019-08-23 NOTE — PATIENT INSTRUCTIONS
Patient was told that the vulvar area with leukoplakia exists is stable  If she sees any burning or bleeding from and the vulvar area over the next year called immediately  If she is stable I will recheck in 1 year

## 2019-09-27 NOTE — PROGRESS NOTES
Cystoscopy  Date/Time: 9/27/2019 7:38 AM  Performed by: Rafat Urbina MD  Authorized by: Rafat Urbina MD     Procedure details: cystoscopy, injection for chemodenervation    Patient tolerance: Patient tolerated the procedure well with no immediate complications        Rashaun Cain is a 59 y o  female seen today in regards to a history of   Urge urinary incontinence  Previously treated with Botox 200 units injections and states that she had excellent clinical response typically on the order of at least 3-5 months  No interval hematuria or urinary tract infection  She has not been treated in some time and wishes to have repeat injection performed  PROCEDURE:  CYSTOSCOPY    With patient properly identified and informed consent obtained she was placed supine in the procedure suite  She was sterilely prepped and draped in the usual fashion  10 cc viscous lidocaine was administered per urethra  Flexible cystourethroscopy was done with the 16 [address] scope  Inspection of the bladder revealed no significant findings  Ureteral orifices were normal in caliber and location  No evidence of bladder tumor  Needle was introduced via the flexible cystoscope and a total of 200 units of Botox in was injected in 0 5 mL aliquots diffusely throughout the bladder taking care to avoid the trigone  Patient tolerated the procedure well  PLAN      Patient had successful cystoscopy and Botox injection  She will return in the next 1-2 months to reassess urinary pattern  All questions answered at this time

## 2019-09-30 ENCOUNTER — PROCEDURE VISIT (OUTPATIENT)
Dept: UROLOGY | Facility: AMBULATORY SURGERY CENTER | Age: 64
End: 2019-09-30
Payer: COMMERCIAL

## 2019-09-30 VITALS
HEIGHT: 67 IN | WEIGHT: 193 LBS | BODY MASS INDEX: 30.29 KG/M2 | DIASTOLIC BLOOD PRESSURE: 70 MMHG | SYSTOLIC BLOOD PRESSURE: 132 MMHG | HEART RATE: 72 BPM

## 2019-09-30 DIAGNOSIS — N39.41 URGE INCONTINENCE OF URINE: Primary | ICD-10-CM

## 2019-09-30 LAB
SL AMB  POCT GLUCOSE, UA: NORMAL
SL AMB LEUKOCYTE ESTERASE,UA: NORMAL
SL AMB POCT BILIRUBIN,UA: NORMAL
SL AMB POCT BLOOD,UA: NORMAL
SL AMB POCT CLARITY,UA: CLEAR
SL AMB POCT COLOR,UA: YELLOW
SL AMB POCT KETONES,UA: NORMAL
SL AMB POCT NITRITE,UA: NORMAL
SL AMB POCT PH,UA: 5
SL AMB POCT SPECIFIC GRAVITY,UA: 1.03
SL AMB POCT URINE PROTEIN: NORMAL
SL AMB POCT UROBILINOGEN: 0.2

## 2019-09-30 PROCEDURE — 52287 CYSTOSCOPY CHEMODENERVATION: CPT | Performed by: UROLOGY

## 2019-09-30 PROCEDURE — 81002 URINALYSIS NONAUTO W/O SCOPE: CPT | Performed by: UROLOGY

## 2019-11-11 ENCOUNTER — OFFICE VISIT (OUTPATIENT)
Dept: UROLOGY | Facility: CLINIC | Age: 64
End: 2019-11-11
Payer: COMMERCIAL

## 2019-11-11 VITALS
HEART RATE: 80 BPM | WEIGHT: 207 LBS | DIASTOLIC BLOOD PRESSURE: 80 MMHG | BODY MASS INDEX: 32.49 KG/M2 | SYSTOLIC BLOOD PRESSURE: 142 MMHG | HEIGHT: 67 IN

## 2019-11-11 DIAGNOSIS — N39.41 URGE INCONTINENCE OF URINE: Primary | ICD-10-CM

## 2019-11-11 LAB — POST-VOID RESIDUAL VOLUME, ML POC: 48 ML

## 2019-11-11 PROCEDURE — 51798 US URINE CAPACITY MEASURE: CPT | Performed by: PHYSICIAN ASSISTANT

## 2019-11-11 PROCEDURE — 99213 OFFICE O/P EST LOW 20 MIN: CPT | Performed by: PHYSICIAN ASSISTANT

## 2019-11-11 RX ORDER — PAROXETINE 10 MG/1
10 TABLET, FILM COATED ORAL DAILY
COMMUNITY
End: 2020-03-10 | Stop reason: SDUPTHER

## 2019-11-11 RX ORDER — LEVOTHYROXINE SODIUM 137 MCG
137 TABLET ORAL DAILY
COMMUNITY
End: 2020-03-10 | Stop reason: DRUGHIGH

## 2019-11-11 NOTE — PROGRESS NOTES
11/11/2019    Kyle Fischer  1955  [id number]    Discussion and Plan    1  Urge urinary incontinence status post Botox 200 units (09/30/2019) - managed by Dr Casandra Garcia  - patient has responded well to bladder Botox  Demonstrating adequate emptying the office today  Did review that typically wears off after 3-6 months  She will follow up in 5 months for symptom reassessment and coordinate her next round  Patient verbalized understanding  All questions answered  Patient Active Problem List   Diagnosis    Hyperlipidemia    Hypothyroidism    Blind right eye    Arthritis    Anxiety    Insomnia    Migraine headache    Postgastrectomy malabsorption    Transient ischemic attack    Urge incontinence of urine    Vulvar atrophy    Bariatric surgery status    Low vitamin B12 level    Encounter for cosmetic surgery    Medicare annual wellness visit, subsequent       History of Present Illness    Roshni Rangel is a 59 y o  female patient of Dr Casandra Garcia with a history of urge urinary incontinence status post Botox 200 units (09/30/2019) presenting for follow-up  Patient has a history of urge urinary incontinence  Previously had been managed on Botox and had good clinical response  Underwent Botox injection in the office 17/07/1098 without complication  Patient states that she has had excellent response to the Botox  She has noticed decreased urinary urgency, frequency, and incontinence  Feels as if she is still voiding well and emptying her bladder  Denies any dysuria, gross hematuria, or urinary infections  Postvoid residual of 48mL             Past Medical History  Past Medical History:   Diagnosis Date    Allergic dermatitis     resolved 01/26/2016    Anxiety     Arthritis     Blind right eye     Depression     Hyperlipidemia     Hypothyroidism (acquired)     hypothyroid    Incontinence of urine in female     Insomnia     Migraine     Morbid obesity ([address] )     TIA (transient ischemic attack)     questionable     Wears glasses        Past Social History  Past Surgical History:   Procedure Laterality Date    BODY LIFT LOWER N/A 6/28/2018    Procedure: LOWER BODY LIFT;  Surgeon: Irish Hernandez MD;  [facility];  Service: Plastics    CATARACT EXTRACTION Left     CHOLECYSTECTOMY      COLONOSCOPY      ESOPHAGOGASTRODUODENOSCOPY N/A 3/27/2017    Procedure: ESOPHAGOGASTRODUODENOSCOPY (EGD); Surgeon: Teofilo Celaya MD;  [facility];  Service:     EYE SURGERY      HEMORROIDECTOMY      IR IMAGE GUIDED ASPIRATION / DRAINAGE W TUBE  3/26/2019    JOINT REPLACEMENT      oswald TKR    SD EGD TRANSORAL BIOPSY SINGLE/MULTIPLE N/A 3/8/2017    Procedure: ESOPHAGOGASTRODUODENOSCOPY (EGD); Surgeon: Teofilo Celaya MD;  [facility];   Service: Bariatrics    SD EXCISE EXCESS SKIN [address] Bilateral 1/10/2019    Procedure: THIGH LIFT;  Surgeon: Irish Hernandze MD;  Location:  MAIN OR;  Service: Plastics    SD LAP, NADYA RESTRICT PROC, LONGITUDINAL GASTRECTOMY N/A 3/27/2017    Procedure: GASTRECTOMY SLEEVE LAPAROSCOPIC;  Surgeon: Teofilo Celaya MD;  [facility];  Service: [address] [place] [address] Right     TOTAL BODY LIFT         Past Family History  Family History   Problem Relation Age of Onset    Diabetes Mother     Skin cancer Mother     Alzheimer's disease Father     Heart failure Father     Diabetes Father     Breast cancer Maternal Grandmother         adenocarcinoma in situ in villous adenoma of the breast       Past Social history  Social History     Socioeconomic History    Marital status: /Civil Union     Spouse name: Not on file    Number of children: Not on file    Years of education: Not on file    Highest education level: Not on file   Occupational History    Not on file   Social Needs    Financial resource strain: Not on file    Food insecurity:     Worry: Not on file     Inability: Not on file   [employer] needs:      Medical: Not on file     Non-medical: Not on file   Tobacco Use    Smoking status: Never Smoker    Smokeless tobacco: Never Used   Substance and Sexual Activity    Alcohol use: No    Drug use: No    Sexual activity: Not Currently   Lifestyle    Physical activity:     Days per week: Not on file     Minutes per session: Not on file    Stress: Not on file   Relationships    Social connections:     Talks on phone: Not on file     Gets together: Not on file     Attends Hindu service: Not on file     Active member of club or organization: Not on file     Attends meetings of clubs or organizations: Not on file     Relationship status: Not on file    Intimate partner violence:     Fear of current or ex partner: Not on file     Emotionally abused: Not on file     Physically abused: Not on file     Forced sexual activity: Not on file   Other Topics Concern    Not on file   Social History Narrative    Daily coffee consumption    Exercise frequency    Two children           Current Medications  Current Outpatient Medications   Medication Sig Dispense Refill    calcium citrate-vitamin D (CITRACAL+D) 315-200 MG-UNIT per tablet Take by mouth daily        clobetasol (TEMOVATE) 0 05 % cream Apply topically      eszopiclone (LUNESTA) 3 MG tablet TAKE ONE TABLET BY MOUTH AT BEDTIME 90 tablet 1    levothyroxine (SYNTHROID) 137 mcg tablet Take 137 mcg by mouth daily      Multiple Vitamins-Minerals (BARIATRIC FUSION) CHEW Chew 2 tablets daily        PARoxetine (PAXIL) 10 mg tablet Take 10 mg by mouth daily      rizatriptan (MAXALT) 10 MG tablet Take by mouth daily as needed        simvastatin (ZOCOR) 40 mg tablet Take 1 tablet (40 mg total) by mouth every evening for 360 days 90 tablet 3    aspirin 81 MG tablet Take 1 tablet by mouth daily for 30 days 30 tablet 0    levothyroxine 125 mcg tablet Take 1 tablet (125 mcg total) by mouth daily for 90 days 90 tablet 3    LORazepam (ATIVAN) 2 mg tablet Take 1 tablet (2 mg total) by mouth once for 1 dose Take 1 hour prior to procedure  1 tablet 0    PARoxetine (PAXIL) 10 mg tablet Take 1 tablet (10 mg total) by mouth daily for 90 days 90 tablet 3     No current facility-administered medications for this visit  Allergies  No Known Allergies    Past Medical History, Social History, Family History, medications and allergies were reviewed  Review of Systems  Review of Systems   Constitutional: Negative  HENT: Negative  Eyes: Negative  Respiratory: Negative  Cardiovascular: Negative  Gastrointestinal: Negative  Endocrine: Negative  Genitourinary: Positive for frequency and urgency  Negative for difficulty urinating and hematuria  Musculoskeletal: Negative  Skin: Negative  Allergic/Immunologic: Negative  Neurological: Negative  Hematological: Negative  Psychiatric/Behavioral: Negative  Vitals  Vitals:    11/11/19 0728   BP: 142/80   BP Location: Left arm   Patient Position: Sitting   Cuff Size: Adult   Pulse: 80   Weight: 93 9 kg (207 lb)   Height: 5' 7" (1 702 m)         Physical Exam    Physical Exam   Constitutional: She is oriented to person, place, and time  She appears well-developed and well-nourished  HENT:   Head: Normocephalic and atraumatic  Eyes: Pupils are equal, round, and reactive to light  Neck: Normal range of motion  Cardiovascular: Normal rate, regular rhythm and normal heart sounds  Pulmonary/Chest: Effort normal and breath sounds normal    Abdominal: Soft  Bowel sounds are normal  She exhibits no distension  There is no tenderness  There is no rebound and no guarding  Musculoskeletal: Normal range of motion  Neurological: She is alert and oriented to person, place, and time  Skin: Skin is warm, dry and intact  Psychiatric: She has a normal mood and affect  Nursing note and vitals reviewed        Results    Below listed labs, pathology results, and radiology images were personally reviewed:    No results found for: PSA  Lab Results   Component Value Date    GLUCOSE 113 01/06/2016    CALCIUM 9 4 03/26/2019     01/06/2016    K 4 3 03/26/2019    CO2 31 03/26/2019     (H) 03/26/2019    BUN 22 03/26/2019    CREATININE 0 62 03/26/2019     Lab Results   Component Value Date    WBC 4 64 01/03/2019    HGB 12 2 01/03/2019    HCT 38 1 01/03/2019    MCV 92 01/03/2019     01/03/2019       Recent Results (from the past 1 hour(s))   POCT Measure PVR    Collection Time: 11/11/19  7:33 AM   Result Value Ref Range    POST-VOID RESIDUAL VOLUME, ML POC 48 mL   ]    200u

## 2019-11-28 DIAGNOSIS — F41.1 GENERALIZED ANXIETY DISORDER: ICD-10-CM

## 2019-11-29 RX ORDER — PAROXETINE 10 MG/1
TABLET, FILM COATED ORAL
Qty: 90 TABLET | Refills: 4 | Status: SHIPPED | OUTPATIENT
Start: 2019-11-29 | End: 2021-03-11 | Stop reason: SDUPTHER

## 2019-12-19 DIAGNOSIS — G43.909 MIGRAINE WITHOUT STATUS MIGRAINOSUS, NOT INTRACTABLE, UNSPECIFIED MIGRAINE TYPE: Primary | ICD-10-CM

## 2019-12-19 RX ORDER — RIZATRIPTAN BENZOATE 10 MG/1
10 TABLET ORAL DAILY PRN
Qty: 18 TABLET | Refills: 3 | Status: SHIPPED | OUTPATIENT
Start: 2019-12-19 | End: 2020-05-26 | Stop reason: SDUPTHER

## 2019-12-30 DIAGNOSIS — G47.00 INSOMNIA, UNSPECIFIED TYPE: ICD-10-CM

## 2019-12-31 RX ORDER — ESZOPICLONE 3 MG/1
TABLET, FILM COATED ORAL
Qty: 90 TABLET | Refills: 0 | Status: SHIPPED | OUTPATIENT
Start: 2019-12-31 | End: 2020-01-03

## 2020-01-02 DIAGNOSIS — G47.00 INSOMNIA, UNSPECIFIED TYPE: ICD-10-CM

## 2020-01-03 RX ORDER — ESZOPICLONE 3 MG/1
TABLET, FILM COATED ORAL
Qty: 90 TABLET | Refills: 1 | Status: SHIPPED | OUTPATIENT
Start: 2020-01-03 | End: 2020-03-23

## 2020-02-03 DIAGNOSIS — E03.9 HYPOTHYROIDISM, UNSPECIFIED TYPE: ICD-10-CM

## 2020-02-03 RX ORDER — LEVOTHYROXINE SODIUM 0.12 MG/1
TABLET ORAL
Qty: 90 TABLET | Refills: 3 | Status: SHIPPED | OUTPATIENT
Start: 2020-02-03 | End: 2020-12-28

## 2020-02-24 DIAGNOSIS — R30.0 DYSURIA: Primary | ICD-10-CM

## 2020-02-26 ENCOUNTER — APPOINTMENT (OUTPATIENT)
Dept: LAB | Facility: CLINIC | Age: 65
End: 2020-02-26
Payer: COMMERCIAL

## 2020-02-26 DIAGNOSIS — R30.0 DYSURIA: ICD-10-CM

## 2020-02-26 PROCEDURE — 81001 URINALYSIS AUTO W/SCOPE: CPT | Performed by: FAMILY MEDICINE

## 2020-02-26 PROCEDURE — 87086 URINE CULTURE/COLONY COUNT: CPT

## 2020-02-26 PROCEDURE — 87077 CULTURE AEROBIC IDENTIFY: CPT

## 2020-02-26 PROCEDURE — 87186 SC STD MICRODIL/AGAR DIL: CPT

## 2020-02-27 ENCOUNTER — TRANSCRIBE ORDERS (OUTPATIENT)
Dept: LAB | Facility: HOSPITAL | Age: 65
End: 2020-02-27

## 2020-02-27 DIAGNOSIS — R30.0 DYSURIA: Primary | ICD-10-CM

## 2020-02-28 DIAGNOSIS — N39.0 E-COLI UTI: Primary | ICD-10-CM

## 2020-02-28 DIAGNOSIS — B96.20 E-COLI UTI: Primary | ICD-10-CM

## 2020-02-28 LAB — BACTERIA UR CULT: ABNORMAL

## 2020-02-28 RX ORDER — CIPROFLOXACIN 500 MG/1
500 TABLET, FILM COATED ORAL EVERY 12 HOURS SCHEDULED
Qty: 14 TABLET | Refills: 0 | Status: SHIPPED | OUTPATIENT
Start: 2020-02-28 | End: 2020-03-06

## 2020-03-04 LAB
BACTERIA UR QL AUTO: ABNORMAL /HPF
BILIRUB UR QL STRIP: NEGATIVE
CAOX CRY URNS QL MICRO: ABNORMAL /HPF
CLARITY UR: ABNORMAL
COLOR UR: YELLOW
GLUCOSE UR STRIP-MCNC: NEGATIVE MG/DL
HGB UR QL STRIP.AUTO: ABNORMAL
KETONES UR STRIP-MCNC: NEGATIVE MG/DL
LEUKOCYTE ESTERASE UR QL STRIP: ABNORMAL
NITRITE UR QL STRIP: NEGATIVE
NON-SQ EPI CELLS URNS QL MICRO: ABNORMAL /HPF
PH UR STRIP.AUTO: 5.5 [PH]
PROT UR STRIP-MCNC: NEGATIVE MG/DL
RBC #/AREA URNS AUTO: ABNORMAL /HPF
SP GR UR STRIP.AUTO: 1.02 (ref 1–1.03)
UROBILINOGEN UR QL STRIP.AUTO: 0.2 E.U./DL
WBC #/AREA URNS AUTO: ABNORMAL /HPF

## 2020-03-05 DIAGNOSIS — E78.5 HYPERLIPIDEMIA, UNSPECIFIED HYPERLIPIDEMIA TYPE: ICD-10-CM

## 2020-03-05 RX ORDER — SIMVASTATIN 40 MG
TABLET ORAL
Qty: 90 TABLET | Refills: 3 | Status: SHIPPED | OUTPATIENT
Start: 2020-03-05 | End: 2021-01-27

## 2020-03-10 ENCOUNTER — OFFICE VISIT (OUTPATIENT)
Dept: FAMILY MEDICINE CLINIC | Facility: CLINIC | Age: 65
End: 2020-03-10
Payer: COMMERCIAL

## 2020-03-10 ENCOUNTER — APPOINTMENT (OUTPATIENT)
Dept: LAB | Facility: CLINIC | Age: 65
End: 2020-03-10
Payer: COMMERCIAL

## 2020-03-10 VITALS
DIASTOLIC BLOOD PRESSURE: 84 MMHG | WEIGHT: 204 LBS | BODY MASS INDEX: 32.02 KG/M2 | OXYGEN SATURATION: 97 % | TEMPERATURE: 98.8 F | HEART RATE: 83 BPM | RESPIRATION RATE: 16 BRPM | HEIGHT: 67 IN | SYSTOLIC BLOOD PRESSURE: 120 MMHG

## 2020-03-10 DIAGNOSIS — E78.2 MIXED HYPERLIPIDEMIA: ICD-10-CM

## 2020-03-10 DIAGNOSIS — E03.9 ACQUIRED HYPOTHYROIDISM: ICD-10-CM

## 2020-03-10 DIAGNOSIS — Z00.00 MEDICARE ANNUAL WELLNESS VISIT, SUBSEQUENT: Primary | ICD-10-CM

## 2020-03-10 DIAGNOSIS — E53.8 LOW VITAMIN B12 LEVEL: ICD-10-CM

## 2020-03-10 DIAGNOSIS — K91.2 POSTGASTRECTOMY MALABSORPTION: ICD-10-CM

## 2020-03-10 DIAGNOSIS — Z90.3 POSTGASTRECTOMY MALABSORPTION: ICD-10-CM

## 2020-03-10 DIAGNOSIS — Z23 NEED FOR VACCINATION: ICD-10-CM

## 2020-03-10 PROBLEM — Z41.1 ENCOUNTER FOR COSMETIC SURGERY: Status: RESOLVED | Noted: 2018-04-30 | Resolved: 2020-03-10

## 2020-03-10 LAB
25(OH)D3 SERPL-MCNC: 41.6 NG/ML (ref 30–100)
ALBUMIN SERPL BCP-MCNC: 3.5 G/DL (ref 3.5–5)
ALP SERPL-CCNC: 100 U/L (ref 46–116)
ALT SERPL W P-5'-P-CCNC: 17 U/L (ref 12–78)
ANION GAP SERPL CALCULATED.3IONS-SCNC: 4 MMOL/L (ref 4–13)
AST SERPL W P-5'-P-CCNC: 15 U/L (ref 5–45)
BILIRUB SERPL-MCNC: 0.69 MG/DL (ref 0.2–1)
BUN SERPL-MCNC: 22 MG/DL (ref 5–25)
CALCIUM SERPL-MCNC: 9.2 MG/DL (ref 8.3–10.1)
CHLORIDE SERPL-SCNC: 112 MMOL/L (ref 100–108)
CHOLEST SERPL-MCNC: 174 MG/DL (ref 50–200)
CO2 SERPL-SCNC: 30 MMOL/L (ref 21–32)
CREAT SERPL-MCNC: 0.74 MG/DL (ref 0.6–1.3)
ERYTHROCYTE [DISTWIDTH] IN BLOOD BY AUTOMATED COUNT: 13 % (ref 11.6–15.1)
GFR SERPL CREATININE-BSD FRML MDRD: 86 ML/MIN/1.73SQ M
GLUCOSE P FAST SERPL-MCNC: 85 MG/DL (ref 65–99)
HCT VFR BLD AUTO: 41.9 % (ref 34.8–46.1)
HDLC SERPL-MCNC: 67 MG/DL
HGB BLD-MCNC: 12.9 G/DL (ref 11.5–15.4)
LDLC SERPL CALC-MCNC: 87 MG/DL (ref 0–100)
MCH RBC QN AUTO: 28.9 PG (ref 26.8–34.3)
MCHC RBC AUTO-ENTMCNC: 30.8 G/DL (ref 31.4–37.4)
MCV RBC AUTO: 94 FL (ref 82–98)
PLATELET # BLD AUTO: 283 THOUSANDS/UL (ref 149–390)
PMV BLD AUTO: 10.2 FL (ref 8.9–12.7)
POTASSIUM SERPL-SCNC: 4.4 MMOL/L (ref 3.5–5.3)
PROT SERPL-MCNC: 7.1 G/DL (ref 6.4–8.2)
RBC # BLD AUTO: 4.47 MILLION/UL (ref 3.81–5.12)
SODIUM SERPL-SCNC: 146 MMOL/L (ref 136–145)
TRIGL SERPL-MCNC: 99 MG/DL
TSH SERPL DL<=0.05 MIU/L-ACNC: 0.87 UIU/ML (ref 0.36–3.74)
VIT B12 SERPL-MCNC: 483 PG/ML (ref 100–900)
WBC # BLD AUTO: 5.91 THOUSAND/UL (ref 4.31–10.16)

## 2020-03-10 PROCEDURE — G0009 ADMIN PNEUMOCOCCAL VACCINE: HCPCS | Performed by: FAMILY MEDICINE

## 2020-03-10 PROCEDURE — 80061 LIPID PANEL: CPT

## 2020-03-10 PROCEDURE — 90732 PPSV23 VACC 2 YRS+ SUBQ/IM: CPT | Performed by: FAMILY MEDICINE

## 2020-03-10 PROCEDURE — 82306 VITAMIN D 25 HYDROXY: CPT

## 2020-03-10 PROCEDURE — 4040F PNEUMOC VAC/ADMIN/RCVD: CPT | Performed by: FAMILY MEDICINE

## 2020-03-10 PROCEDURE — 82607 VITAMIN B-12: CPT

## 2020-03-10 PROCEDURE — 3288F FALL RISK ASSESSMENT DOCD: CPT | Performed by: FAMILY MEDICINE

## 2020-03-10 PROCEDURE — G0439 PPPS, SUBSEQ VISIT: HCPCS | Performed by: FAMILY MEDICINE

## 2020-03-10 PROCEDURE — 80053 COMPREHEN METABOLIC PANEL: CPT

## 2020-03-10 PROCEDURE — 1101F PT FALLS ASSESS-DOCD LE1/YR: CPT | Performed by: FAMILY MEDICINE

## 2020-03-10 PROCEDURE — 85027 COMPLETE CBC AUTOMATED: CPT

## 2020-03-10 PROCEDURE — 84443 ASSAY THYROID STIM HORMONE: CPT

## 2020-03-10 PROCEDURE — 3008F BODY MASS INDEX DOCD: CPT | Performed by: FAMILY MEDICINE

## 2020-03-10 PROCEDURE — 1036F TOBACCO NON-USER: CPT | Performed by: FAMILY MEDICINE

## 2020-03-10 PROCEDURE — 36415 COLL VENOUS BLD VENIPUNCTURE: CPT

## 2020-03-10 NOTE — PATIENT INSTRUCTIONS
Medicare Preventive Visit Patient Instructions  Thank you for completing your Welcome to Medicare Visit or Medicare Annual Wellness Visit today  Your next wellness visit will be due in one year (3/10/2021)  The screening/preventive services that you may require over the next 5-10 years are detailed below  Some tests may not apply to you based off risk factors and/or age  Screening tests ordered at today's visit but not completed yet may show as past due  Also, please note that scanned in results may not display below  Preventive Screenings:  Service Recommendations Previous Testing/Comments   Colorectal Cancer Screening  * Colonoscopy    * Fecal Occult Blood Test (FOBT)/Fecal Immunochemical Test (FIT)  * Fecal DNA/Cologuard Test  * Flexible Sigmoidoscopy Age: 54-65 years old   Colonoscopy: every 10 years (may be performed more frequently if at higher risk)  OR  FOBT/FIT: every 1 year  OR  Cologuard: every 3 years  OR  Sigmoidoscopy: every 5 years  Screening may be recommended earlier than age 48 if at higher risk for colorectal cancer  Also, an individualized decision between you and your healthcare provider will decide whether screening between the ages of 74-80 would be appropriate  Colonoscopy: 01/30/2017  FOBT/FIT: Not on file  Cologuard: Not on file  Sigmoidoscopy: Not on file         Breast Cancer Screening Age: 36 years old  Frequency: every 1-2 years  Not required if history of left and right mastectomy Mammogram: 07/31/2019       Cervical Cancer Screening Between the ages of 21-29, pap smear recommended once every 3 years  Between the ages of 33-67, can perform pap smear with HPV co-testing every 5 years     Recommendations may differ for women with a history of total hysterectomy, cervical cancer, or abnormal pap smears in past  Pap Smear: 08/17/2018       Hepatitis C Screening Once for adults born between 1945 and 1965  More frequently in patients at high risk for Hepatitis C Hep C Antibody: 03/27/2018       Diabetes Screening 1-2 times per year if you're at risk for diabetes or have pre-diabetes Fasting glucose: 86 mg/dL   A1C: 5 2 %       Cholesterol Screening Once every 5 years if you don't have a lipid disorder  May order more often based on risk factors  Lipid panel: 03/26/2019         Other Preventive Screenings Covered by Medicare:  1  Abdominal Aortic Aneurysm (AAA) Screening: covered once if your at risk  You're considered to be at risk if you have a family history of AAA  2  Lung Cancer Screening: covers low dose CT scan once per year if you meet all of the following conditions: (1) Age 50-69; (2) No signs or symptoms of lung cancer; (3) Current smoker or have quit smoking within the last 15 years; (4) You have a tobacco smoking history of at least 30 pack years (packs per day multiplied by number of years you smoked); (5) You get a written order from a healthcare provider  3  Glaucoma Screening: covered annually if you're considered high risk: (1) You have diabetes OR (2) Family history of glaucoma OR (3)  aged 48 and older OR (3)  American aged 72 and older  3  Osteoporosis Screening: covered every 2 years if you meet one of the following conditions: (1) You're estrogen deficient and at risk for osteoporosis based off medical history and other findings; (2) Have a vertebral abnormality; (3) On glucocorticoid therapy for more than 3 months; (4) Have primary hyperparathyroidism; (5) On osteoporosis medications and need to assess response to drug therapy  · Last bone density test (DXA Scan): Not on file  5  HIV Screening: covered annually if you're between the age of 12-76  Also covered annually if you are younger than 13 and older than 72 with risk factors for HIV infection  For pregnant patients, it is covered up to 3 times per pregnancy      Immunizations:  Immunization Recommendations   Influenza Vaccine Annual influenza vaccination during flu season is recommended for all persons aged >= 6 months who do not have contraindications   Pneumococcal Vaccine (Prevnar and Pneumovax)  * Prevnar = PCV13  * Pneumovax = PPSV23   Adults 25-60 years old: 1-3 doses may be recommended based on certain risk factors  Adults 72 years old: Prevnar (PCV13) vaccine recommended followed by Pneumovax (PPSV23) vaccine  If already received PPSV23 since turning 65, then PCV13 recommended at least one year after PPSV23 dose  Hepatitis B Vaccine 3 dose series if at intermediate or high risk (ex: diabetes, end stage renal disease, liver disease)   Tetanus (Td) Vaccine - COST NOT COVERED BY MEDICARE PART B Following completion of primary series, a booster dose should be given every 10 years to maintain immunity against tetanus  Td may also be given as tetanus wound prophylaxis  Tdap Vaccine - COST NOT COVERED BY MEDICARE PART B Recommended at least once for all adults  For pregnant patients, recommended with each pregnancy  Shingles Vaccine (Shingrix) - COST NOT COVERED BY MEDICARE PART B  2 shot series recommended in those aged 48 and above     Health Maintenance Due:      Topic Date Due    Cervical Cancer Screening  08/17/2019    CRC Screening: Colonoscopy  06/25/2020    MAMMOGRAM  07/31/2020    Hepatitis C Screening  Completed     Immunizations Due:  There are no preventive care reminders to display for this patient  Advance Directives   What are advance directives? Advance directives are legal documents that state your wishes and plans for medical care  These plans are made ahead of time in case you lose your ability to make decisions for yourself  Advance directives can apply to any medical decision, such as the treatments you want, and if you want to donate organs  What are the types of advance directives? There are many types of advance directives, and each state has rules about how to use them  You may choose a combination of any of the following:  · Living will:   This is a written record of the treatment you want  You can also choose which treatments you do not want, which to limit, and which to stop at a certain time  This includes surgery, medicine, IV fluid, and tube feedings  · Durable power of  for healthcare [facility]): This is a written record that states who you want to make healthcare choices for you when you are unable to make them for yourself  This person, called a proxy, is usually a family member or a friend  You may choose more than 1 proxy  · Do not resuscitate (DNR) order:  A DNR order is used in case your heart stops beating or you stop breathing  It is a request not to have certain forms of treatment, such as CPR  A DNR order may be included in other types of advance directives  · Medical directive: This covers the care that you want if you are in a coma, near death, or unable to make decisions for yourself  You can list the treatments you want for each condition  Treatment may include pain medicine, surgery, blood transfusions, dialysis, IV or tube feedings, and a ventilator (breathing machine)  · Values history: This document has questions about your views, beliefs, and how you feel and think about life  This information can help others choose the care that you would choose  Why are advance directives important? An advance directive helps you control your care  Although spoken wishes may be used, it is better to have your wishes written down  Spoken wishes can be misunderstood, or not followed  Treatments may be given even if you do not want them  An advance directive may make it easier for your family to make difficult choices about your care  Weight Management   Why it is important to manage your weight:  Being overweight increases your risk of health conditions such as heart disease, high blood pressure, type 2 diabetes, and certain types of cancer   It can also increase your risk for osteoarthritis, sleep apnea, and other respiratory problems  Aim for a slow, steady weight loss  Even a small amount of weight loss can lower your risk of health problems  How to lose weight safely:  A safe and healthy way to lose weight is to eat fewer calories and get regular exercise  You can lose up about 1 pound a week by decreasing the number of calories you eat by 500 calories each day  Healthy meal plan for weight management:  A healthy meal plan includes a variety of foods, contains fewer calories, and helps you stay healthy  A healthy meal plan includes the following:  · Eat whole-grain foods more often  A healthy meal plan should contain fiber  Fiber is the part of grains, fruits, and vegetables that is not broken down by your body  Whole-grain foods are healthy and provide extra fiber in your diet  Some examples of whole-grain foods are whole-wheat breads and pastas, oatmeal, brown rice, and bulgur  · Eat a variety of vegetables every day  Include dark, leafy greens such as spinach, kale, deuce greens, and mustard greens  Eat yellow and orange vegetables such as carrots, sweet potatoes, and winter squash  · Eat a variety of fruits every day  Choose fresh or canned fruit (canned in its own juice or light syrup) instead of juice  Fruit juice has very little or no fiber  · Eat low-fat dairy foods  Drink fat-free (skim) milk or 1% milk  Eat fat-free yogurt and low-fat cottage cheese  Try low-fat cheeses such as mozzarella and other reduced-fat cheeses  · Choose meat and other protein foods that are low in fat  Choose beans or other legumes such as split peas or lentils  Choose fish, skinless poultry (chicken or turkey), or lean cuts of red meat (beef or pork)  Before you cook meat or poultry, cut off any visible fat  · Use less fat and oil  Try baking foods instead of frying them  Add less fat, such as margarine, sour cream, regular salad dressing and mayonnaise to foods  Eat fewer high-fat foods   Some examples of high-fat foods include french fries, doughnuts, ice cream, and cakes  · Eat fewer sweets  Limit foods and drinks that are high in sugar  This includes candy, cookies, regular soda, and sweetened drinks  Exercise:  Exercise at least 30 minutes per day on most days of the week  Some examples of exercise include walking, biking, dancing, and swimming  You can also fit in more physical activity by taking the stairs instead of the elevator or parking farther away from stores  Ask your healthcare provider about the best exercise plan for you  © Copyright [employer] 2018 Information is for End User's use only and may not be sold, redistributed or otherwise used for commercial purposes   All illustrations and images included in CareNotes® are the copyrighted property of A D A M , Inc  or [address] [place] [address]

## 2020-03-10 NOTE — PROGRESS NOTES
Assessment and Plan:     Problem List Items Addressed This Visit        Digestive    Postgastrectomy malabsorption    Relevant Orders    Vitamin D 25 hydroxy       Endocrine    Hypothyroidism    Relevant Orders    TSH, 3rd generation with Free T4 reflex       Other    Hyperlipidemia    Relevant Orders    CBC    Comprehensive metabolic panel    Lipid Panel with Direct LDL reflex    Low vitamin B12 level    Relevant Orders    Vitamin B12    Medicare annual wellness visit, subsequent - Primary      Other Visit Diagnoses     Need for vaccination        Relevant Orders    PNEUMOCOCCAL POLYSACCHARIDE VACCINE 23-VALENT =>3YO SQ IM        BMI Counseling: Body mass index is 31 65 kg/m²  The BMI is above normal  Nutrition recommendations include encouraging healthy choices of fruits and vegetables  Exercise recommendations include exercising 3-5 times per week  Preventive health issues were discussed with patient, and age appropriate screening tests were ordered as noted in patient's After Visit Summary  Personalized health advice and appropriate referrals for health education or preventive services given if needed, as noted in patient's After Visit Summary       History of Present Illness:     Patient presents for Medicare Annual Wellness visit    Patient Care Team:  Nidhi Sommer DO as PCP - Sherryle Point, MD Keller Mallick, MD Theresia Dalton, DO Floy Bi, MD Peter Lucky, MD     Problem List:     Patient Active Problem List   Diagnosis    Hyperlipidemia    Hypothyroidism    Blind right eye    Arthritis    Anxiety    Insomnia    Migraine headache    Postgastrectomy malabsorption    Transient ischemic attack    Urge incontinence of urine    Vulvar atrophy    Bariatric surgery status    Low vitamin B12 level    Medicare annual wellness visit, subsequent      Past Medical and Surgical History:     Past Medical History:   Diagnosis Date    Allergic dermatitis     resolved 01/26/2016    Anxiety     Arthritis     Blind right eye     Depression     Encounter for cosmetic surgery 4/30/2018    Added automatically from request for surgery [id number]    Hyperlipidemia     Hypothyroidism (acquired)     hypothyroid    Incontinence of urine in female     Insomnia     Migraine     Morbid obesity ([address] )     TIA (transient ischemic attack)     questionable     Wears glasses      Past Surgical History:   Procedure Laterality Date    BODY LIFT LOWER N/A 6/28/2018    Procedure: LOWER BODY LIFT;  Surgeon: Gautam Cordero MD;  [facility];  Service: Plastics    CATARACT EXTRACTION Left     CHOLECYSTECTOMY      COLONOSCOPY      ESOPHAGOGASTRODUODENOSCOPY N/A 3/27/2017    Procedure: ESOPHAGOGASTRODUODENOSCOPY (EGD); Surgeon: Rosa Venegas MD;  [facility];  Service:     EYE SURGERY      HEMORROIDECTOMY      IR IMAGE GUIDED ASPIRATION / DRAINAGE W TUBE  3/26/2019    JOINT REPLACEMENT      [facility] TKR    AK EGD TRANSORAL BIOPSY SINGLE/MULTIPLE N/A 3/8/2017    Procedure: ESOPHAGOGASTRODUODENOSCOPY (EGD); Surgeon: Rosa Venegas MD;  [facility];   Service: Bariatrics    AK EXCISE EXCESS SKIN [address] Bilateral 1/10/2019    Procedure: THIGH LIFT;  Surgeon: Gautam Cordero MD;  [facility];  Service: Plastics    AK LAP, NADYA RESTRICT PROC, LONGITUDINAL GASTRECTOMY N/A 3/27/2017    Procedure: Aramis Renner;  Surgeon: Rosa Venegas MD;  [facility];  Service: Bariatrics    ROTATOR CUFF REPAIR Right     TOTAL BODY LIFT        Family History:     Family History   Problem Relation Age of Onset    Diabetes Mother     Skin cancer Mother     Alzheimer's disease Father     Heart failure Father     Diabetes Father     Breast cancer Maternal Grandmother         adenocarcinoma in situ in villous adenoma of the breast      Social History:        Social History     Socioeconomic History    Marital status: /Civil [employer]     Spouse name: Not on file    Number of children: Not on file    Years of education: Not on file    Highest education level: Not on file   Occupational History    Not on file   Social Needs    Financial resource strain: Not on file    Food insecurity:     Worry: Not on file     Inability: Not on file    Transportation needs:     Medical: Not on file     Non-medical: Not on file   Tobacco Use    Smoking status: Never Smoker    Smokeless tobacco: Never Used   Substance and Sexual Activity    Alcohol use: No    Drug use: No    Sexual activity: Not Currently   Lifestyle    Physical activity:     Days per week: Not on file     Minutes per session: Not on file    Stress: Not on file   Relationships    Social connections:     Talks on phone: Not on file     Gets together: Not on file     Attends Bahai service: Not on file     Active member of club or organization: Not on file     Attends meetings of clubs or organizations: Not on file     Relationship status: Not on file    Intimate partner violence:     Fear of current or ex partner: Not on file     Emotionally abused: Not on file     Physically abused: Not on file     Forced sexual activity: Not on file   Other Topics Concern    Not on file   Social History Narrative    Daily coffee consumption    Exercise frequency    Two children          Medications and Allergies:     Current Outpatient Medications   Medication Sig Dispense Refill    aspirin 81 MG tablet Take 1 tablet by mouth daily for 30 days 30 tablet 0    calcium citrate-vitamin D (CITRACAL+D) 315-200 MG-UNIT per tablet Take by mouth daily        clobetasol (TEMOVATE) 0 05 % cream Apply topically as needed       eszopiclone (LUNESTA) 3 MG tablet TAKE ONE TABLET AT BEDTIME 90 tablet 1    levothyroxine 125 mcg tablet TAKE 1 TABLET DAILY 90 tablet 3    Multiple Vitamins-Minerals (BARIATRIC FUSION) CHEW Chew 2 tablets daily        Multiple Vitamins-Minerals (CENTRUM WOMEN) TABS Take by mouth Goes between bariatric multi and Centrum      PARoxetine (PAXIL) 10 mg tablet TAKE 1 TABLET DAILY 90 tablet 4    rizatriptan (MAXALT) 10 MG tablet Take 1 tablet (10 mg total) by mouth daily as needed for migraine 18 tablet 3    simvastatin (ZOCOR) 40 mg tablet TAKE 1 TABLET EVERY EVENING 90 tablet 3    LORazepam (ATIVAN) 2 mg tablet Take 1 tablet (2 mg total) by mouth once for 1 dose Take 1 hour prior to procedure  1 tablet 0     No current facility-administered medications for this visit  No Known Allergies   Immunizations:     Immunization History   Administered Date(s) Administered     Influenza (IM) Preservative Free 09/05/2018    H1N1, All Formulations 01/06/2010    INFLUENZA 09/28/2013, 09/08/2014, 10/08/2015, 10/15/2016, 09/21/2017, 09/05/2018, 09/18/2019    Influenza Injectable, MDCK, Preservative Free, Quadrivalent, 0 5 mL 09/18/2019    Influenza Quadrivalent Preservative Free 3 years and older IM 10/08/2015    Influenza TIV (IM) 09/20/2011, 09/08/2014, 10/15/2016    Influenza, injectable, quadrivalent, preservative free 0 5 mL 09/05/2018    Tdap 03/04/2014    Zoster Vaccine Recombinant 09/22/2018, 03/06/2019      Health Maintenance:         Topic Date Due    Cervical Cancer Screening  08/17/2019    CRC Screening: Colonoscopy  06/25/2020    MAMMOGRAM  07/31/2020    Hepatitis C Screening  Completed     There are no preventive care reminders to display for this patient  Medicare Health Risk Assessment:     /84   Pulse 83   Temp 98 8 °F (37 1 °C)   Resp 16   Ht 5' 7 32" (1 71 m)   Wt 92 5 kg (204 lb)   SpO2 97%   BMI 31 65 kg/m²      Juan A Coburn is here for her Subsequent Wellness visit  Health Risk Assessment:   Patient rates overall health as very good  Patient feels that their physical health rating is same  Eyesight was rated as same  Hearing was rated as same  Patient feels that their emotional and mental health rating is same   Pain experienced in the last 7 days has been none  Depression Screening:   PHQ-2 Score: 0      Fall Risk Screening: In the past year, patient has experienced: no history of falling in past year      Urinary Incontinence Screening:   Patient has not leaked urine accidently in the last six months  Home Safety:  Patient does not have trouble with stairs inside or outside of their home  Patient has working smoke alarms and has working carbon monoxide detector  Home safety hazards include: none  Nutrition:   Current diet is Regular  Medications:   Patient is not currently taking any over-the-counter supplements  Patient is not able to manage medications  Activities of Daily Living (ADLs)/Instrumental Activities of Daily Living (IADLs):   Walk and transfer into and out of bed and chair?: Yes  Dress and groom yourself?: Yes    Bathe or shower yourself?: Yes    Feed yourself? Yes  Do your laundry/housekeeping?: Yes  Manage your money, pay your bills and track your expenses?: Yes  Make your own meals?: Yes    Do your own shopping?: Yes    Previous Hospitalizations:   Any hospitalizations or ED visits within the last 12 months?: No      Advance Care Planning:   Living will: Yes    Durable POA for healthcare:  Yes    Advanced directive: Yes      Cognitive Screening:   Provider or family/friend/caregiver concerned regarding cognition?: No    PREVENTIVE SCREENINGS      Cardiovascular Screening:    General: Screening Not Indicated and History Lipid Disorder      Diabetes Screening:     General: Screening Current      Colorectal Cancer Screening:     General: Screening Current      Breast Cancer Screening:     General: Screening Current      Cervical Cancer Screening:    General: Screening Current      Osteoporosis Screening:    General: Screening Not Indicated      Abdominal Aortic Aneurysm (AAA) Screening:        General: Screening Not Indicated      Lung Cancer Screening:     General: Screening Not Indicated      Hepatitis C Screening:    General: Screening Current    Other Counseling Topics:   Regular weightbearing exercise         Cierra Miller DO

## 2020-03-11 DIAGNOSIS — Z13.820 SCREENING FOR OSTEOPOROSIS: ICD-10-CM

## 2020-03-11 DIAGNOSIS — Z78.0 POSTMENOPAUSE: Primary | ICD-10-CM

## 2020-03-11 DIAGNOSIS — Z90.3 POSTGASTRECTOMY MALABSORPTION: ICD-10-CM

## 2020-03-11 DIAGNOSIS — K91.2 POSTGASTRECTOMY MALABSORPTION: ICD-10-CM

## 2020-03-11 DIAGNOSIS — E03.9 ACQUIRED HYPOTHYROIDISM: ICD-10-CM

## 2020-03-21 DIAGNOSIS — G47.00 INSOMNIA, UNSPECIFIED TYPE: ICD-10-CM

## 2020-03-23 RX ORDER — ESZOPICLONE 3 MG/1
TABLET, FILM COATED ORAL
Qty: 90 TABLET | Refills: 3 | Status: SHIPPED | OUTPATIENT
Start: 2020-03-23 | End: 2020-09-29

## 2020-04-08 ENCOUNTER — TELEPHONE (OUTPATIENT)
Dept: UROLOGY | Facility: CLINIC | Age: 65
End: 2020-04-08

## 2020-05-26 DIAGNOSIS — G43.909 MIGRAINE WITHOUT STATUS MIGRAINOSUS, NOT INTRACTABLE, UNSPECIFIED MIGRAINE TYPE: ICD-10-CM

## 2020-05-26 RX ORDER — RIZATRIPTAN BENZOATE 10 MG/1
10 TABLET ORAL DAILY PRN
Qty: 18 TABLET | Refills: 3 | Status: SHIPPED | OUTPATIENT
Start: 2020-05-26 | End: 2020-12-28

## 2020-08-03 ENCOUNTER — TELEPHONE (OUTPATIENT)
Dept: BARIATRICS | Facility: CLINIC | Age: 65
End: 2020-08-03

## 2020-08-03 NOTE — TELEPHONE ENCOUNTER
---Lm to call office and schedule a 3 year f/u       ---- Message from Kris Couch RN sent at 7/31/2020  6:15 AM EDT -----  Regarding: 3 year f/u appointment  Please contact patient to schedule a 3 year follow up appointment  Thank You

## 2020-08-14 ENCOUNTER — TELEPHONE (OUTPATIENT)
Dept: UROLOGY | Facility: MEDICAL CENTER | Age: 65
End: 2020-08-14

## 2020-08-14 NOTE — TELEPHONE ENCOUNTER
Yes, patient should have an office visit for PVR and to we discussed and coordinate Botox since it is almost many years and she was last seen  Thank you

## 2020-08-14 NOTE — TELEPHONE ENCOUNTER
Please assist in scheduling office visit with AP  She needs to have this first before we can schedule next botox procedure

## 2020-08-14 NOTE — TELEPHONE ENCOUNTER
Patient called back  Patient scheduled with Grisel Ascencio on 10/15/2020 at [facility]    Patient can be reached at [phone]

## 2020-08-14 NOTE — TELEPHONE ENCOUNTER
Patient previously of Dr Vera Lovett in the [facility] office, known to the practice for urge urinary incontinence treated with bladder botox in the office, 200u  Last treatment was 9/30/2019  Patient was to have follow up around 4/2020 but she cancelled as she was "feeling better "    Please advise if patient needs OV prior to scheduling botox or if we can just schedule next available bladder botox with Dr Jamshid Sandoval

## 2020-09-03 DIAGNOSIS — Z12.31 ENCOUNTER FOR SCREENING MAMMOGRAM FOR MALIGNANT NEOPLASM OF BREAST: ICD-10-CM

## 2020-09-04 ENCOUNTER — ANNUAL EXAM (OUTPATIENT)
Dept: OBGYN CLINIC | Facility: CLINIC | Age: 65
End: 2020-09-04
Payer: COMMERCIAL

## 2020-09-04 VITALS
HEIGHT: 65 IN | DIASTOLIC BLOOD PRESSURE: 62 MMHG | TEMPERATURE: 97.9 F | BODY MASS INDEX: 35.56 KG/M2 | SYSTOLIC BLOOD PRESSURE: 112 MMHG | WEIGHT: 213.4 LBS

## 2020-09-04 DIAGNOSIS — N90.5 VULVAR ATROPHY: ICD-10-CM

## 2020-09-04 DIAGNOSIS — Z01.419 ENCOUNTER FOR GYNECOLOGICAL EXAMINATION (GENERAL) (ROUTINE) WITHOUT ABNORMAL FINDINGS: Primary | ICD-10-CM

## 2020-09-04 DIAGNOSIS — Z12.31 ENCOUNTER FOR SCREENING MAMMOGRAM FOR MALIGNANT NEOPLASM OF BREAST: ICD-10-CM

## 2020-09-04 DIAGNOSIS — Z11.51 SCREENING FOR HUMAN PAPILLOMAVIRUS (HPV): ICD-10-CM

## 2020-09-04 PROCEDURE — G0145 SCR C/V CYTO,THINLAYER,RESCR: HCPCS | Performed by: OBSTETRICS & GYNECOLOGY

## 2020-09-04 PROCEDURE — S0612 ANNUAL GYNECOLOGICAL EXAMINA: HCPCS | Performed by: OBSTETRICS & GYNECOLOGY

## 2020-09-04 PROCEDURE — 87624 HPV HI-RISK TYP POOLED RSLT: CPT | Performed by: OBSTETRICS & GYNECOLOGY

## 2020-09-04 RX ORDER — CLOBETASOL PROPIONATE 0.5 MG/G
CREAM TOPICAL AS NEEDED
Qty: 30 G | Refills: 1 | Status: SHIPPED | OUTPATIENT
Start: 2020-09-04 | End: 2020-09-15

## 2020-09-04 NOTE — PROGRESS NOTES
Assessment/Plan     Diagnoses and all orders for this visit:    Encounter for gynecological examination (general) (routine) without abnormal findings  -     Liquid-based pap, screening    Encounter for screening mammogram for malignant neoplasm of breast  -     Mammo screening bilateral w 3d & cad; Future    Screening for human papillomavirus (HPV)  -     Liquid-based pap, screening    Vulvar atrophy  -     clobetasol (TEMOVATE) 0 05 % cream; Apply topically as needed (itching)             Rosario Frost is a 72 y o  female who presents for annual exam  The patient has no complaints today  The patient is not currently sexually active  GYN screening history: last pap: was normal and last mammogram: was normal  The patient is not taking hormone replacement therapy  Patient denies post-menopausal vaginal bleeding    The patient participates in regular exercise: no  The patient denies any significant change in urinary leakage  Menstrual History:  OB History        2    Para        Term                AB        Living   2       SAB        TAB        Ectopic        Multiple        Live Births                      No LMP recorded  Patient is postmenopausal        The following portions of the patient's history were reviewed and updated as appropriate: allergies, current medications, past family history, past medical history, past social history, past surgical history and problem list     Review of Systems  Pertinent items are noted in HPI  Objective      /62 (BP Location: Left arm, Patient Position: Sitting, Cuff Size: Large)   Temp 97 9 °F (36 6 °C) (Tympanic)   Ht 5' 5" (1 651 m)   Wt 96 8 kg (213 lb 6 4 oz)   BMI 35 51 kg/m²     General:   alert and oriented, in no acute distress   Heart:    Breasts: regular rate and rhythm, S1, S2 normal, no murmur, click, rub or gallop    appear normal, no suspicious masses, no skin or nipple changes or axillary nodes     Lungs: clear to auscultation bilaterally   Abdomen: soft, non-tender, without masses or organomegaly   Vulva: Normal; diffuse atrophy; no lichenification, no lesions   Vagina: normal mucosa   Cervix: multiparous appearance and no lesions   Uterus: normal size, mobile, non-tender   Adnexa: normal adnexa and no mass, fullness, tenderness

## 2020-09-08 LAB
HPV HR 12 DNA CVX QL NAA+PROBE: NEGATIVE
HPV16 DNA CVX QL NAA+PROBE: NEGATIVE
HPV18 DNA CVX QL NAA+PROBE: NEGATIVE

## 2020-09-11 ENCOUNTER — TELEPHONE (OUTPATIENT)
Dept: OBGYN CLINIC | Facility: CLINIC | Age: 65
End: 2020-09-11

## 2020-09-12 LAB
LAB AP GYN PRIMARY INTERPRETATION: NORMAL
Lab: NORMAL

## 2020-09-14 DIAGNOSIS — N90.5 VULVAR ATROPHY: Primary | ICD-10-CM

## 2020-09-14 NOTE — TELEPHONE ENCOUNTER
That is fine but pleas notrify patient she is changing med due to insurance  She has been on temovate for some time now

## 2020-09-15 ENCOUNTER — OFFICE VISIT (OUTPATIENT)
Dept: FAMILY MEDICINE CLINIC | Facility: CLINIC | Age: 65
End: 2020-09-15
Payer: COMMERCIAL

## 2020-09-15 VITALS
WEIGHT: 216.2 LBS | OXYGEN SATURATION: 97 % | SYSTOLIC BLOOD PRESSURE: 132 MMHG | HEIGHT: 65 IN | HEART RATE: 77 BPM | TEMPERATURE: 99.2 F | DIASTOLIC BLOOD PRESSURE: 92 MMHG | BODY MASS INDEX: 36.02 KG/M2 | RESPIRATION RATE: 16 BRPM

## 2020-09-15 DIAGNOSIS — E03.9 ACQUIRED HYPOTHYROIDISM: Primary | ICD-10-CM

## 2020-09-15 DIAGNOSIS — E78.2 MIXED HYPERLIPIDEMIA: ICD-10-CM

## 2020-09-15 DIAGNOSIS — Z90.3 POSTGASTRECTOMY MALABSORPTION: ICD-10-CM

## 2020-09-15 DIAGNOSIS — G47.00 INSOMNIA, UNSPECIFIED TYPE: ICD-10-CM

## 2020-09-15 DIAGNOSIS — E53.8 LOW VITAMIN B12 LEVEL: ICD-10-CM

## 2020-09-15 DIAGNOSIS — Z23 NEED FOR VACCINATION: ICD-10-CM

## 2020-09-15 DIAGNOSIS — K91.2 POSTGASTRECTOMY MALABSORPTION: ICD-10-CM

## 2020-09-15 PROCEDURE — 90662 IIV NO PRSV INCREASED AG IM: CPT

## 2020-09-15 PROCEDURE — 3725F SCREEN DEPRESSION PERFORMED: CPT | Performed by: FAMILY MEDICINE

## 2020-09-15 PROCEDURE — 99214 OFFICE O/P EST MOD 30 MIN: CPT | Performed by: FAMILY MEDICINE

## 2020-09-15 PROCEDURE — G0008 ADMIN INFLUENZA VIRUS VAC: HCPCS

## 2020-09-15 NOTE — PROGRESS NOTES
Assessment/Plan:    1  Acquired hypothyroidism  Assessment & Plan:  Stable on levo    Orders:  -     TSH, 3rd generation with Free T4 reflex; Future; Expected date: 03/01/2021    2  Mixed hyperlipidemia  Assessment & Plan:  Stable on zocor    Orders:  -     CBC; Future; Expected date: 03/01/2021  -     Comprehensive metabolic panel; Future; Expected date: 03/01/2021  -     Lipid Panel with Direct LDL reflex; Future; Expected date: 03/01/2021    3  Postgastrectomy malabsorption  Assessment & Plan:  Taking vitamins    Orders:  -     Vitamin D 25 hydroxy; Future; Expected date: 03/01/2021    4  Insomnia, unspecified type  Assessment & Plan:  Stable on lunesta      5  Need for vaccination  -     influenza vaccine, high-dose, PF 0 7 mL (FLUZONE HIGH-DOSE)    6  Low vitamin B12 level  -     Vitamin B12; Future; Expected date: 03/01/2021          There are no Patient Instructions on file for this visit  No follow-ups on file  Subjective:      Patient ID: Josefina Mai is a 72 y o  female  Chief Complaint   Patient presents with    Follow-up       Here for follow up  Watching diet  Not exercising as much  Eating a little more      Hyperlipidemia   This is a chronic problem  The current episode started more than 1 year ago  The problem is controlled  Recent lipid tests were reviewed and are normal  There are no known factors aggravating her hyperlipidemia  Current antihyperlipidemic treatment includes statins  The current treatment provides significant improvement of lipids  There are no compliance problems  Risk factors for coronary artery disease include dyslipidemia         The following portions of the patient's history were reviewed and updated as appropriate: allergies, current medications, past family history, past medical history, past social history, past surgical history and problem list     Review of Systems      Current Outpatient Medications   Medication Sig Dispense Refill    betamethasone valerate (VALISONE) 0 1 % cream Apply topically 2 (two) times a day 30 g 0    calcium citrate-vitamin D (CITRACAL+D) 315-200 MG-UNIT per tablet Take by mouth daily        eszopiclone (LUNESTA) 3 MG tablet TAKE ONE TABLET BY MOUTH AT BEDTIME 90 tablet 3    levothyroxine 125 mcg tablet TAKE 1 TABLET DAILY 90 tablet 3    Multiple Vitamins-Minerals (BARIATRIC FUSION) CHEW Chew 2 tablets daily        Multiple Vitamins-Minerals (CENTRUM WOMEN) TABS Take by mouth Goes between bariatric multi and Centrum      PARoxetine (PAXIL) 10 mg tablet TAKE 1 TABLET DAILY 90 tablet 4    rizatriptan (MAXALT) 10 MG tablet Take 1 tablet (10 mg total) by mouth daily as needed for migraine 18 tablet 3    simvastatin (ZOCOR) 40 mg tablet TAKE 1 TABLET EVERY EVENING 90 tablet 3    aspirin 81 MG tablet Take 1 tablet by mouth daily for 30 days 30 tablet 0     No current facility-administered medications for this visit  Objective:    /92 (BP Location: Left arm, Patient Position: Sitting, Cuff Size: Standard)   Pulse 77   Temp 99 2 °F (37 3 °C) (Tympanic)   Resp 16   Ht 5' 5" (1 651 m)   Wt 98 1 kg (216 lb 3 2 oz)   SpO2 97%   BMI 35 98 kg/m²        Physical Exam  Vitals signs and nursing note reviewed  Constitutional:       Appearance: Normal appearance  HENT:      Head: Normocephalic and atraumatic  Eyes:      Extraocular Movements: Extraocular movements intact  Pupils: Pupils are equal, round, and reactive to light  Neck:      Musculoskeletal: Normal range of motion and neck supple  Cardiovascular:      Rate and Rhythm: Normal rate and regular rhythm  Pulses: Normal pulses  Heart sounds: Normal heart sounds  Pulmonary:      Effort: Pulmonary effort is normal       Breath sounds: Normal breath sounds  Abdominal:      General: Abdomen is flat  Palpations: Abdomen is soft  Musculoskeletal: Normal range of motion  Skin:     General: Skin is warm and dry     Neurological:      General: No focal deficit present  Mental Status: She is alert and oriented to person, place, and time  Psychiatric:         Mood and Affect: Mood normal          Behavior: Behavior normal          Thought Content:  Thought content normal          Judgment: Judgment normal                 Katerina Hein DO

## 2020-09-25 DIAGNOSIS — G47.00 INSOMNIA, UNSPECIFIED TYPE: ICD-10-CM

## 2020-09-26 DIAGNOSIS — G47.00 INSOMNIA, UNSPECIFIED TYPE: ICD-10-CM

## 2020-09-29 RX ORDER — ESZOPICLONE 3 MG/1
TABLET, FILM COATED ORAL
Qty: 90 TABLET | Refills: 0 | OUTPATIENT
Start: 2020-09-29

## 2020-09-29 RX ORDER — ESZOPICLONE 3 MG/1
TABLET, FILM COATED ORAL
Qty: 90 TABLET | Refills: 3 | Status: SHIPPED | OUTPATIENT
Start: 2020-09-29 | End: 2021-06-17

## 2020-10-14 DIAGNOSIS — N90.5 VULVAR ATROPHY: ICD-10-CM

## 2020-10-15 ENCOUNTER — TELEPHONE (OUTPATIENT)
Dept: UROLOGY | Facility: CLINIC | Age: 65
End: 2020-10-15

## 2020-10-15 ENCOUNTER — OFFICE VISIT (OUTPATIENT)
Dept: UROLOGY | Facility: CLINIC | Age: 65
End: 2020-10-15
Payer: COMMERCIAL

## 2020-10-15 VITALS
SYSTOLIC BLOOD PRESSURE: 140 MMHG | HEIGHT: 67 IN | HEART RATE: 78 BPM | DIASTOLIC BLOOD PRESSURE: 86 MMHG | TEMPERATURE: 97.9 F | WEIGHT: 216 LBS | BODY MASS INDEX: 33.9 KG/M2

## 2020-10-15 DIAGNOSIS — N39.41 URGE INCONTINENCE OF URINE: Primary | ICD-10-CM

## 2020-10-15 LAB — POST-VOID RESIDUAL VOLUME, ML POC: 20 ML

## 2020-10-15 PROCEDURE — 51798 US URINE CAPACITY MEASURE: CPT | Performed by: PHYSICIAN ASSISTANT

## 2020-10-15 PROCEDURE — 99213 OFFICE O/P EST LOW 20 MIN: CPT | Performed by: PHYSICIAN ASSISTANT

## 2020-10-15 PROCEDURE — 1036F TOBACCO NON-USER: CPT | Performed by: PHYSICIAN ASSISTANT

## 2020-12-09 ENCOUNTER — OFFICE VISIT (OUTPATIENT)
Dept: FAMILY MEDICINE CLINIC | Facility: CLINIC | Age: 65
End: 2020-12-09
Payer: COMMERCIAL

## 2020-12-09 VITALS
OXYGEN SATURATION: 98 % | DIASTOLIC BLOOD PRESSURE: 70 MMHG | SYSTOLIC BLOOD PRESSURE: 128 MMHG | WEIGHT: 217.6 LBS | BODY MASS INDEX: 34.15 KG/M2 | RESPIRATION RATE: 14 BRPM | HEART RATE: 81 BPM | TEMPERATURE: 97.8 F | HEIGHT: 67 IN

## 2020-12-09 DIAGNOSIS — R30.0 DYSURIA: Primary | ICD-10-CM

## 2020-12-09 LAB
SL AMB  POCT GLUCOSE, UA: NEGATIVE
SL AMB LEUKOCYTE ESTERASE,UA: ABNORMAL
SL AMB POCT BILIRUBIN,UA: NEGATIVE
SL AMB POCT BLOOD,UA: ABNORMAL
SL AMB POCT CLARITY,UA: ABNORMAL
SL AMB POCT COLOR,UA: ABNORMAL
SL AMB POCT KETONES,UA: NEGATIVE
SL AMB POCT NITRITE,UA: NEGATIVE
SL AMB POCT PH,UA: 5.5
SL AMB POCT SPECIFIC GRAVITY,UA: 10.3
SL AMB POCT URINE PROTEIN: ABNORMAL
SL AMB POCT UROBILINOGEN: NEGATIVE

## 2020-12-09 PROCEDURE — 87086 URINE CULTURE/COLONY COUNT: CPT | Performed by: FAMILY MEDICINE

## 2020-12-09 PROCEDURE — 87147 CULTURE TYPE IMMUNOLOGIC: CPT | Performed by: FAMILY MEDICINE

## 2020-12-09 PROCEDURE — 87186 SC STD MICRODIL/AGAR DIL: CPT | Performed by: FAMILY MEDICINE

## 2020-12-09 PROCEDURE — 87077 CULTURE AEROBIC IDENTIFY: CPT | Performed by: FAMILY MEDICINE

## 2020-12-09 PROCEDURE — 99213 OFFICE O/P EST LOW 20 MIN: CPT | Performed by: FAMILY MEDICINE

## 2020-12-09 PROCEDURE — 1036F TOBACCO NON-USER: CPT | Performed by: FAMILY MEDICINE

## 2020-12-09 PROCEDURE — 3008F BODY MASS INDEX DOCD: CPT | Performed by: FAMILY MEDICINE

## 2020-12-09 PROCEDURE — 81002 URINALYSIS NONAUTO W/O SCOPE: CPT | Performed by: FAMILY MEDICINE

## 2020-12-09 RX ORDER — AMOXICILLIN 500 MG/1
CAPSULE ORAL
COMMUNITY
Start: 2020-10-13 | End: 2021-11-16 | Stop reason: HOSPADM

## 2020-12-11 LAB
BACTERIA UR CULT: ABNORMAL
BACTERIA UR CULT: ABNORMAL

## 2020-12-13 DIAGNOSIS — N39.0 URINARY TRACT INFECTION WITHOUT HEMATURIA, SITE UNSPECIFIED: Primary | ICD-10-CM

## 2020-12-13 RX ORDER — CIPROFLOXACIN 500 MG/1
500 TABLET, FILM COATED ORAL EVERY 12 HOURS SCHEDULED
Qty: 10 TABLET | Refills: 0 | Status: SHIPPED | OUTPATIENT
Start: 2020-12-13 | End: 2020-12-18

## 2020-12-21 ENCOUNTER — TELEPHONE (OUTPATIENT)
Dept: UROLOGY | Facility: CLINIC | Age: 65
End: 2020-12-21

## 2020-12-26 DIAGNOSIS — G43.909 MIGRAINE WITHOUT STATUS MIGRAINOSUS, NOT INTRACTABLE, UNSPECIFIED MIGRAINE TYPE: ICD-10-CM

## 2020-12-26 DIAGNOSIS — E03.9 HYPOTHYROIDISM, UNSPECIFIED TYPE: ICD-10-CM

## 2020-12-28 RX ORDER — LEVOTHYROXINE SODIUM 0.12 MG/1
TABLET ORAL
Qty: 90 TABLET | Refills: 3 | Status: SHIPPED | OUTPATIENT
Start: 2020-12-28 | End: 2021-11-30

## 2020-12-28 RX ORDER — RIZATRIPTAN BENZOATE 10 MG/1
TABLET ORAL
Qty: 18 TABLET | Refills: 5 | Status: SHIPPED | OUTPATIENT
Start: 2020-12-28 | End: 2022-03-26 | Stop reason: SDUPTHER

## 2021-01-12 ENCOUNTER — TELEPHONE (OUTPATIENT)
Dept: UROLOGY | Facility: MEDICAL CENTER | Age: 66
End: 2021-01-12

## 2021-01-12 NOTE — TELEPHONE ENCOUNTER
This is a patient of Dr Jean Hinds and seen by Kalvin Habermann in [facility]  Patient called and is concerned her botox appointment was originally supposed to be in September and it is pushed off until March  She said when she stands up she has leakage and she also said she could go to any office to have this done if there is a way to get it done sooner  Please call her back at [phone]

## 2021-01-12 NOTE — TELEPHONE ENCOUNTER
Called and spoke with patient at this time  I apologized for the appt changes  Reviewed Dr Kelly Nunez is only provider performing this in the office  It appears her appts were bumped due to surgical changes  Unfortunately this is unavoidable  Reviewed I have placed her on the wait list for both [facility]  The office will call if there are any cancellations or sooner availability  She verbalized understanding

## 2021-01-27 DIAGNOSIS — E78.5 HYPERLIPIDEMIA, UNSPECIFIED HYPERLIPIDEMIA TYPE: ICD-10-CM

## 2021-01-27 RX ORDER — SIMVASTATIN 40 MG
TABLET ORAL
Qty: 90 TABLET | Refills: 3 | Status: SHIPPED | OUTPATIENT
Start: 2021-01-27 | End: 2022-01-10

## 2021-02-13 DIAGNOSIS — Z23 ENCOUNTER FOR IMMUNIZATION: ICD-10-CM

## 2021-02-26 ENCOUNTER — TELEPHONE (OUTPATIENT)
Dept: UROLOGY | Facility: AMBULATORY SURGERY CENTER | Age: 66
End: 2021-02-26

## 2021-03-03 DIAGNOSIS — R30.0 DYSURIA: Primary | ICD-10-CM

## 2021-03-04 ENCOUNTER — RA CDI HCC (OUTPATIENT)
Dept: OTHER | Facility: HOSPITAL | Age: 66
End: 2021-03-04

## 2021-03-04 ENCOUNTER — LAB (OUTPATIENT)
Dept: LAB | Facility: AMBULARY SURGERY CENTER | Age: 66
End: 2021-03-04
Payer: COMMERCIAL

## 2021-03-04 ENCOUNTER — TELEPHONE (OUTPATIENT)
Dept: UROLOGY | Facility: CLINIC | Age: 66
End: 2021-03-04

## 2021-03-04 DIAGNOSIS — Z90.3 POSTGASTRECTOMY MALABSORPTION: ICD-10-CM

## 2021-03-04 DIAGNOSIS — R30.0 DYSURIA: ICD-10-CM

## 2021-03-04 DIAGNOSIS — E53.8 LOW VITAMIN B12 LEVEL: ICD-10-CM

## 2021-03-04 DIAGNOSIS — E78.2 MIXED HYPERLIPIDEMIA: ICD-10-CM

## 2021-03-04 DIAGNOSIS — E03.9 ACQUIRED HYPOTHYROIDISM: ICD-10-CM

## 2021-03-04 DIAGNOSIS — K91.2 POSTGASTRECTOMY MALABSORPTION: ICD-10-CM

## 2021-03-04 LAB
25(OH)D3 SERPL-MCNC: 32.3 NG/ML (ref 30–100)
ALBUMIN SERPL BCP-MCNC: 3.5 G/DL (ref 3.5–5)
ALP SERPL-CCNC: 113 U/L (ref 46–116)
ALT SERPL W P-5'-P-CCNC: 20 U/L (ref 12–78)
ANION GAP SERPL CALCULATED.3IONS-SCNC: 4 MMOL/L (ref 4–13)
AST SERPL W P-5'-P-CCNC: 18 U/L (ref 5–45)
BILIRUB SERPL-MCNC: 1.03 MG/DL (ref 0.2–1)
BUN SERPL-MCNC: 15 MG/DL (ref 5–25)
CALCIUM SERPL-MCNC: 9.4 MG/DL (ref 8.3–10.1)
CHLORIDE SERPL-SCNC: 109 MMOL/L (ref 100–108)
CHOLEST SERPL-MCNC: 184 MG/DL (ref 50–200)
CO2 SERPL-SCNC: 29 MMOL/L (ref 21–32)
CREAT SERPL-MCNC: 0.78 MG/DL (ref 0.6–1.3)
ERYTHROCYTE [DISTWIDTH] IN BLOOD BY AUTOMATED COUNT: 13.1 % (ref 11.6–15.1)
GFR SERPL CREATININE-BSD FRML MDRD: 80 ML/MIN/1.73SQ M
GLUCOSE P FAST SERPL-MCNC: 96 MG/DL (ref 65–99)
HCT VFR BLD AUTO: 43.3 % (ref 34.8–46.1)
HDLC SERPL-MCNC: 64 MG/DL
HGB BLD-MCNC: 13.5 G/DL (ref 11.5–15.4)
LDLC SERPL CALC-MCNC: 95 MG/DL (ref 0–100)
MCH RBC QN AUTO: 28.8 PG (ref 26.8–34.3)
MCHC RBC AUTO-ENTMCNC: 31.2 G/DL (ref 31.4–37.4)
MCV RBC AUTO: 93 FL (ref 82–98)
PLATELET # BLD AUTO: 347 THOUSANDS/UL (ref 149–390)
PMV BLD AUTO: 10.1 FL (ref 8.9–12.7)
POTASSIUM SERPL-SCNC: 4 MMOL/L (ref 3.5–5.3)
PROT SERPL-MCNC: 7.8 G/DL (ref 6.4–8.2)
RBC # BLD AUTO: 4.68 MILLION/UL (ref 3.81–5.12)
SODIUM SERPL-SCNC: 142 MMOL/L (ref 136–145)
TRIGL SERPL-MCNC: 126 MG/DL
TSH SERPL DL<=0.05 MIU/L-ACNC: 0.54 UIU/ML (ref 0.36–3.74)
VIT B12 SERPL-MCNC: 653 PG/ML (ref 100–900)
WBC # BLD AUTO: 6.83 THOUSAND/UL (ref 4.31–10.16)

## 2021-03-04 PROCEDURE — 80061 LIPID PANEL: CPT

## 2021-03-04 PROCEDURE — 80053 COMPREHEN METABOLIC PANEL: CPT

## 2021-03-04 PROCEDURE — 87186 SC STD MICRODIL/AGAR DIL: CPT

## 2021-03-04 PROCEDURE — 84443 ASSAY THYROID STIM HORMONE: CPT

## 2021-03-04 PROCEDURE — 87086 URINE CULTURE/COLONY COUNT: CPT

## 2021-03-04 PROCEDURE — 85027 COMPLETE CBC AUTOMATED: CPT

## 2021-03-04 PROCEDURE — 82607 VITAMIN B-12: CPT

## 2021-03-04 PROCEDURE — 87077 CULTURE AEROBIC IDENTIFY: CPT

## 2021-03-04 PROCEDURE — 36415 COLL VENOUS BLD VENIPUNCTURE: CPT

## 2021-03-04 PROCEDURE — 82306 VITAMIN D 25 HYDROXY: CPT

## 2021-03-04 NOTE — TELEPHONE ENCOUNTER
Patient scheduled for bladder botox on 3/17/21  She has previously had bladder botox injections with Dr Elaine Cadet and Dr Fernanda Walls  Her last injection was 9/30/2019 and she received 200u

## 2021-03-06 LAB
BACTERIA UR CULT: ABNORMAL
BACTERIA UR CULT: ABNORMAL

## 2021-03-07 DIAGNOSIS — N30.00 ACUTE CYSTITIS WITHOUT HEMATURIA: Primary | ICD-10-CM

## 2021-03-07 RX ORDER — CIPROFLOXACIN 500 MG/1
500 TABLET, FILM COATED ORAL EVERY 12 HOURS SCHEDULED
Qty: 14 TABLET | Refills: 0 | Status: SHIPPED | OUTPATIENT
Start: 2021-03-07 | End: 2021-03-14

## 2021-03-11 ENCOUNTER — OFFICE VISIT (OUTPATIENT)
Dept: FAMILY MEDICINE CLINIC | Facility: CLINIC | Age: 66
End: 2021-03-11
Payer: COMMERCIAL

## 2021-03-11 VITALS
RESPIRATION RATE: 16 BRPM | OXYGEN SATURATION: 96 % | BODY MASS INDEX: 34.75 KG/M2 | TEMPERATURE: 99 F | WEIGHT: 221.4 LBS | HEART RATE: 84 BPM | HEIGHT: 67 IN | DIASTOLIC BLOOD PRESSURE: 90 MMHG | SYSTOLIC BLOOD PRESSURE: 140 MMHG

## 2021-03-11 DIAGNOSIS — Z00.00 MEDICARE ANNUAL WELLNESS VISIT, SUBSEQUENT: Primary | ICD-10-CM

## 2021-03-11 DIAGNOSIS — Z12.11 SCREENING FOR COLON CANCER: ICD-10-CM

## 2021-03-11 DIAGNOSIS — F41.1 GENERALIZED ANXIETY DISORDER: ICD-10-CM

## 2021-03-11 PROBLEM — R30.0 DYSURIA: Status: RESOLVED | Noted: 2020-12-09 | Resolved: 2021-03-11

## 2021-03-11 PROCEDURE — G0439 PPPS, SUBSEQ VISIT: HCPCS | Performed by: FAMILY MEDICINE

## 2021-03-11 PROCEDURE — 1160F RVW MEDS BY RX/DR IN RCRD: CPT | Performed by: FAMILY MEDICINE

## 2021-03-11 PROCEDURE — 3288F FALL RISK ASSESSMENT DOCD: CPT | Performed by: FAMILY MEDICINE

## 2021-03-11 PROCEDURE — 1101F PT FALLS ASSESS-DOCD LE1/YR: CPT | Performed by: FAMILY MEDICINE

## 2021-03-11 PROCEDURE — 1170F FXNL STATUS ASSESSED: CPT | Performed by: FAMILY MEDICINE

## 2021-03-11 PROCEDURE — 1125F AMNT PAIN NOTED PAIN PRSNT: CPT | Performed by: FAMILY MEDICINE

## 2021-03-11 PROCEDURE — 3725F SCREEN DEPRESSION PERFORMED: CPT | Performed by: FAMILY MEDICINE

## 2021-03-11 PROCEDURE — 1036F TOBACCO NON-USER: CPT | Performed by: FAMILY MEDICINE

## 2021-03-11 RX ORDER — ALPRAZOLAM 0.25 MG/1
0.25 TABLET ORAL DAILY PRN
Qty: 10 TABLET | Refills: 0 | Status: SHIPPED | OUTPATIENT
Start: 2021-03-11 | End: 2021-11-16 | Stop reason: HOSPADM

## 2021-03-11 RX ORDER — PAROXETINE HYDROCHLORIDE 20 MG/1
20 TABLET, FILM COATED ORAL DAILY
Qty: 90 TABLET | Refills: 3 | Status: SHIPPED | OUTPATIENT
Start: 2021-03-11 | End: 2022-02-11

## 2021-03-11 NOTE — PATIENT INSTRUCTIONS
Medicare Preventive Visit Patient Instructions  Thank you for completing your Welcome to Medicare Visit or Medicare Annual Wellness Visit today  Your next wellness visit will be due in one year (3/12/2022)  The screening/preventive services that you may require over the next 5-10 years are detailed below  Some tests may not apply to you based off risk factors and/or age  Screening tests ordered at today's visit but not completed yet may show as past due  Also, please note that scanned in results may not display below  Preventive Screenings:  Service Recommendations Previous Testing/Comments   Colorectal Cancer Screening  * Colonoscopy    * Fecal Occult Blood Test (FOBT)/Fecal Immunochemical Test (FIT)  * Fecal DNA/Cologuard Test  * Flexible Sigmoidoscopy Age: 54-65 years old   Colonoscopy: every 10 years (may be performed more frequently if at higher risk)  OR  FOBT/FIT: every 1 year  OR  Cologuard: every 3 years  OR  Sigmoidoscopy: every 5 years  Screening may be recommended earlier than age 48 if at higher risk for colorectal cancer  Also, an individualized decision between you and your healthcare provider will decide whether screening between the ages of 74-80 would be appropriate  Colonoscopy: 01/30/2017  FOBT/FIT: Not on file  Cologuard: Not on file  Sigmoidoscopy: Not on file    Screening Current     Breast Cancer Screening Age: 36 years old  Frequency: every 1-2 years  Not required if history of left and right mastectomy Mammogram: 08/26/2020    Screening Current   Cervical Cancer Screening Between the ages of 21-29, pap smear recommended once every 3 years  Between the ages of 33-67, can perform pap smear with HPV co-testing every 5 years     Recommendations may differ for women with a history of total hysterectomy, cervical cancer, or abnormal pap smears in past  Pap Smear: 09/04/2020    Screening Not Indicated   Hepatitis C Screening Once for adults born between 1945 and 1965  More frequently in patients at high risk for Hepatitis C Hep C Antibody: 03/27/2018    Screening Current   Diabetes Screening 1-2 times per year if you're at risk for diabetes or have pre-diabetes Fasting glucose: 96 mg/dL   A1C: 5 2 %    Screening Current   Cholesterol Screening Once every 5 years if you don't have a lipid disorder  May order more often based on risk factors  Lipid panel: 03/04/2021    Screening Not Indicated  History Lipid Disorder     Other Preventive Screenings Covered by Medicare:  1  Abdominal Aortic Aneurysm (AAA) Screening: covered once if your at risk  You're considered to be at risk if you have a family history of AAA  2  Lung Cancer Screening: covers low dose CT scan once per year if you meet all of the following conditions: (1) Age 50-69; (2) No signs or symptoms of lung cancer; (3) Current smoker or have quit smoking within the last 15 years; (4) You have a tobacco smoking history of at least 30 pack years (packs per day multiplied by number of years you smoked); (5) You get a written order from a healthcare provider  3  Glaucoma Screening: covered annually if you're considered high risk: (1) You have diabetes OR (2) Family history of glaucoma OR (3)  aged 48 and older OR (3)  American aged 72 and older  3  Osteoporosis Screening: covered every 2 years if you meet one of the following conditions: (1) You're estrogen deficient and at risk for osteoporosis based off medical history and other findings; (2) Have a vertebral abnormality; (3) On glucocorticoid therapy for more than 3 months; (4) Have primary hyperparathyroidism; (5) On osteoporosis medications and need to assess response to drug therapy  · Last bone density test (DXA Scan): Not on file  5  HIV Screening: covered annually if you're between the age of 12-76  Also covered annually if you are younger than 13 and older than 72 with risk factors for HIV infection   For pregnant patients, it is covered up to 3 times per pregnancy  Immunizations:  Immunization Recommendations   Influenza Vaccine Annual influenza vaccination during flu season is recommended for all persons aged >= 6 months who do not have contraindications   Pneumococcal Vaccine (Prevnar and Pneumovax)  * Prevnar = PCV13  * Pneumovax = PPSV23   Adults 25-60 years old: 1-3 doses may be recommended based on certain risk factors  Adults 72 years old: Prevnar (PCV13) vaccine recommended followed by Pneumovax (PPSV23) vaccine  If already received PPSV23 since turning 65, then PCV13 recommended at least one year after PPSV23 dose  Hepatitis B Vaccine 3 dose series if at intermediate or high risk (ex: diabetes, end stage renal disease, liver disease)   Tetanus (Td) Vaccine - COST NOT COVERED BY MEDICARE PART B Following completion of primary series, a booster dose should be given every 10 years to maintain immunity against tetanus  Td may also be given as tetanus wound prophylaxis  Tdap Vaccine - COST NOT COVERED BY MEDICARE PART B Recommended at least once for all adults  For pregnant patients, recommended with each pregnancy  Shingles Vaccine (Shingrix) - COST NOT COVERED BY MEDICARE PART B  2 shot series recommended in those aged 48 and above     Health Maintenance Due:      Topic Date Due    HIV Screening  03/13/1970    Colonoscopy Surveillance  06/25/2020    MAMMOGRAM  08/26/2021    Cervical Cancer Screening  09/04/2021    Colorectal Cancer Screening  06/25/2025    Hepatitis C Screening  Completed     Immunizations Due:      Topic Date Due    COVID-19 Vaccine (1 of 2) 03/13/1971     Advance Directives   What are advance directives? Advance directives are legal documents that state your wishes and plans for medical care  These plans are made ahead of time in case you lose your ability to make decisions for yourself  Advance directives can apply to any medical decision, such as the treatments you want, and if you want to donate organs     What are the types of advance directives? There are many types of advance directives, and each state has rules about how to use them  You may choose a combination of any of the following:  · Living will: This is a written record of the treatment you want  You can also choose which treatments you do not want, which to limit, and which to stop at a certain time  This includes surgery, medicine, IV fluid, and tube feedings  · Durable power of  for healthcare [facility]): This is a written record that states who you want to make healthcare choices for you when you are unable to make them for yourself  This person, called a proxy, is usually a family member or a friend  You may choose more than 1 proxy  · Do not resuscitate (DNR) order:  A DNR order is used in case your heart stops beating or you stop breathing  It is a request not to have certain forms of treatment, such as CPR  A DNR order may be included in other types of advance directives  · Medical directive: This covers the care that you want if you are in a coma, near death, or unable to make decisions for yourself  You can list the treatments you want for each condition  Treatment may include pain medicine, surgery, blood transfusions, dialysis, IV or tube feedings, and a ventilator (breathing machine)  · Values history: This document has questions about your views, beliefs, and how you feel and think about life  This information can help others choose the care that you would choose  Why are advance directives important? An advance directive helps you control your care  Although spoken wishes may be used, it is better to have your wishes written down  Spoken wishes can be misunderstood, or not followed  Treatments may be given even if you do not want them  An advance directive may make it easier for your family to make difficult choices about your care  Urinary Incontinence   Urinary incontinence (UI)  is when you lose control of your bladder   UI develops because your bladder cannot store or empty urine properly  The 3 most common types of UI are stress incontinence, urge incontinence, or both  Medicines:   · May be given to help strengthen your bladder control  Report any side effects of medication to your healthcare provider  Do pelvic muscle exercises often:  Your pelvic muscles help you stop urinating  Squeeze these muscles tight for 5 seconds, then relax for 5 seconds  Gradually work up to squeezing for 10 seconds  Do 3 sets of 15 repetitions a day, or as directed  This will help strengthen your pelvic muscles and improve bladder control  Train your bladder:  Go to the bathroom at set times, such as every 2 hours, even if you do not feel the urge to go  You can also try to hold your urine when you feel the urge to go  For example, hold your urine for 5 minutes when you feel the urge to go  As that becomes easier, hold your urine for 10 minutes  Self-care:   · Keep a UI record  Write down how often you leak urine and how much you leak  Make a note of what you were doing when you leaked urine  · Drink liquids as directed  You may need to limit the amount of liquid you drink to help control your urine leakage  Do not drink any liquid right before you go to bed  Limit or do not have drinks that contain caffeine or alcohol  · Prevent constipation  Eat a variety of high-fiber foods  Good examples are high-fiber cereals, beans, vegetables, and whole-grain breads  Walking is the best way to trigger your intestines to have a bowel movement  · Exercise regularly and maintain a healthy weight  Weight loss and exercise will decrease pressure on your bladder and help you control your leakage  · Use a catheter as directed  to help empty your bladder  A catheter is a tiny, plastic tube that is put into your bladder to drain your urine  · Go to behavior therapy as directed    Behavior therapy may be used to help you learn to control your urge to urinate  Weight Management   Why it is important to manage your weight:  Being overweight increases your risk of health conditions such as heart disease, high blood pressure, type 2 diabetes, and certain types of cancer  It can also increase your risk for osteoarthritis, sleep apnea, and other respiratory problems  Aim for a slow, steady weight loss  Even a small amount of weight loss can lower your risk of health problems  How to lose weight safely:  A safe and healthy way to lose weight is to eat fewer calories and get regular exercise  You can lose up about 1 pound a week by decreasing the number of calories you eat by 500 calories each day  Healthy meal plan for weight management:  A healthy meal plan includes a variety of foods, contains fewer calories, and helps you stay healthy  A healthy meal plan includes the following:  · Eat whole-grain foods more often  A healthy meal plan should contain fiber  Fiber is the part of grains, fruits, and vegetables that is not broken down by your body  Whole-grain foods are healthy and provide extra fiber in your diet  Some examples of whole-grain foods are whole-wheat breads and pastas, oatmeal, brown rice, and bulgur  · Eat a variety of vegetables every day  Include dark, leafy greens such as spinach, kale, deuce greens, and mustard greens  Eat yellow and orange vegetables such as carrots, sweet potatoes, and winter squash  · Eat a variety of fruits every day  Choose fresh or canned fruit (canned in its own juice or light syrup) instead of juice  Fruit juice has very little or no fiber  · Eat low-fat dairy foods  Drink fat-free (skim) milk or 1% milk  Eat fat-free yogurt and low-fat cottage cheese  Try low-fat cheeses such as mozzarella and other reduced-fat cheeses  · Choose meat and other protein foods that are low in fat  Choose beans or other legumes such as split peas or lentils   Choose fish, skinless poultry (chicken or turkey), or lean cuts of red meat (beef or pork)  Before you cook meat or poultry, cut off any visible fat  · Use less fat and oil  Try baking foods instead of frying them  Add less fat, such as margarine, sour cream, regular salad dressing and mayonnaise to foods  Eat fewer high-fat foods  Some examples of high-fat foods include french fries, doughnuts, ice cream, and cakes  · Eat fewer sweets  Limit foods and drinks that are high in sugar  This includes candy, cookies, regular soda, and sweetened drinks  Exercise:  Exercise at least 30 minutes per day on most days of the week  Some examples of exercise include walking, biking, dancing, and swimming  You can also fit in more physical activity by taking the stairs instead of the elevator or parking farther away from stores  Ask your healthcare provider about the best exercise plan for you  © Copyright [employer] 2018 Information is for End User's use only and may not be sold, redistributed or otherwise used for commercial purposes   All illustrations and images included in CareNotes® are the copyrighted property of [employer]  or [address] [place] [address]

## 2021-03-11 NOTE — PROGRESS NOTES
Assessment and Plan:     Problem List Items Addressed This Visit        Other    Medicare annual wellness visit, subsequent - Primary     Referral to colonoscopy         Generalized anxiety disorder    Relevant Medications    PARoxetine (PAXIL) 20 mg tablet    ALPRAZolam (XANAX) 0 25 mg tablet      Other Visit Diagnoses     Screening for colon cancer        Relevant Orders    Ambulatory referral to Colorectal Surgery        BMI Counseling: Body mass index is 34 46 kg/m²  The BMI is above normal  Nutrition recommendations include encouraging healthy choices of fruits and vegetables  Exercise recommendations include exercising 3-5 times per week  No pharmacotherapy was ordered  Preventive health issues were discussed with patient, and age appropriate screening tests were ordered as noted in patient's After Visit Summary  Personalized health advice and appropriate referrals for health education or preventive services given if needed, as noted in patient's After Visit Summary       History of Present Illness:     Patient presents for Medicare Annual Wellness visit    Patient Care Team:  Chandler Edgar DO as PCP - MD Josr Damian MD Kenyon Skinner, DO Geronimo Hermanns, MD Anibal Oyster, MD Chryl Poot, MD (Obstetrics and Gynecology)     Problem List:     Patient Active Problem List   Diagnosis    Hyperlipidemia    Hypothyroidism    Blind right eye    Arthritis    Anxiety    Insomnia    Migraine headache    Postgastrectomy malabsorption    Transient ischemic attack    Urge incontinence of urine    Vulvar atrophy    Bariatric surgery status    Low vitamin B12 level    Medicare annual wellness visit, subsequent    Generalized anxiety disorder      Past Medical and Surgical History:     Past Medical History:   Diagnosis Date    Allergic dermatitis     resolved 01/26/2016    Anxiety     Arthritis     Blind right eye     Depression     Encounter for cosmetic surgery 4/30/2018    Added automatically from request for surgery [id number]    Hyperlipidemia     Hypothyroidism (acquired)     hypothyroid    Incontinence of urine in female     Insomnia     Migraine     Morbid obesity ([facility] [address] )     TIA (transient ischemic attack)     questionable     Wears glasses      Past Surgical History:   Procedure Laterality Date    BODY LIFT LOWER N/A 6/28/2018    Procedure: LOWER BODY LIFT;  Surgeon: Elma Michaels MD;  [facility];  Service: Plastics    CATARACT EXTRACTION Left     CHOLECYSTECTOMY      COLONOSCOPY      ESOPHAGOGASTRODUODENOSCOPY N/A 3/27/2017    Procedure: ESOPHAGOGASTRODUODENOSCOPY (EGD); Surgeon: Siva Gifford MD;  [facility];  Service:     EYE SURGERY      HEMORROIDECTOMY      IR IMAGE GUIDED ASPIRATION / DRAINAGE W TUBE  3/26/2019    JOINT REPLACEMENT      oswald TKR    AR EGD TRANSORAL BIOPSY SINGLE/MULTIPLE N/A 3/8/2017    Procedure: ESOPHAGOGASTRODUODENOSCOPY (EGD); Surgeon: Siva Gifford MD;  [facility];   Service: [place] [employer] AR EXCISE EXCESS SKIN [address] Bilateral 1/10/2019    Procedure: THIGH LIFT;  Surgeon: Elma Michaels MD;  [facility];  Service: Plastics    AR LAP, NADYA RESTRICT PROC, LONGITUDINAL GASTRECTOMY N/A 3/27/2017    Procedure: Leslie Rutledgew;  Surgeon: Siva Gifford MD;  [facility];  Service: Bariatrics    ROTATOR CUFF REPAIR Right     TOTAL BODY LIFT        Family History:     Family History   Problem Relation Age of Onset    Diabetes Mother     Skin cancer Mother     Alzheimer's disease Father     Heart failure Father     Diabetes Father     Breast cancer Maternal Grandmother         adenocarcinoma in situ in villous adenoma of the breast      Social History:     E-Cigarette/Vaping    E-Cigarette Use Never User      E-Cigarette/Vaping Substances    Nicotine No     THC No     CBD No     Flavoring No     Other No     Unknown No Social History     Socioeconomic History    Marital status: /Civil Union     Spouse name: None    Number of children: None    Years of education: None    Highest education level: None   Occupational History    None   Social Needs    Financial resource strain: None    Food insecurity     Worry: None     Inability: None    Transportation needs     Medical: None     Non-medical: None   Tobacco Use    Smoking status: Never Smoker    Smokeless tobacco: Never Used   Substance and Sexual Activity    Alcohol use: No    Drug use: No    Sexual activity: Not Currently   Lifestyle    Physical activity     Days per week: None     Minutes per session: None    Stress: None   Relationships    Social connections     Talks on phone: None     Gets together: None     Attends Sikhism service: None     Active member of club or organization: None     Attends meetings of clubs or organizations: None     Relationship status: None    Intimate partner violence     Fear of current or ex partner: None     Emotionally abused: None     Physically abused: None     Forced sexual activity: None   Other Topics Concern    None   Social History Narrative    Daily coffee consumption    Exercise frequency    Two children          Medications and Allergies:     Current Outpatient Medications   Medication Sig Dispense Refill    amoxicillin (AMOXIL) 500 mg capsule TAKE 4 CAPSULES 1 HOUR PRIOR TO APPOINTMENT      aspirin 81 MG tablet Take 1 tablet by mouth daily for 30 days 30 tablet 0    betamethasone valerate (VALISONE) 0 1 % cream Apply topically 2 (two) times a day 30 g 0    ciprofloxacin (CIPRO) 500 mg tablet Take 1 tablet (500 mg total) by mouth every 12 (twelve) hours for 7 days 14 tablet 0    eszopiclone (LUNESTA) 3 MG tablet TAKE 1 TABLET BY MOUTH DAILY AT BEDTIME 90 tablet 3    levothyroxine 125 mcg tablet TAKE 1 TABLET DAILY 90 tablet 3    Multiple Vitamins-Minerals (CENTRUM WOMEN) TABS Take by mouth Goes between bariatric multi and Centrum      PARoxetine (PAXIL) 20 mg tablet Take 1 tablet (20 mg total) by mouth daily 90 tablet 3    rizatriptan (MAXALT) 10 MG tablet TAKE 1 TABLET DAILY AS NEEDED FOR MIGRAINE 18 tablet 5    simvastatin (ZOCOR) 40 mg tablet TAKE 1 TABLET EVERY EVENING 90 tablet 3    ALPRAZolam (XANAX) 0 25 mg tablet Take 1 tablet (0 25 mg total) by mouth daily as needed for anxiety 10 tablet 0    calcium citrate-vitamin D (CITRACAL+D) 315-200 MG-UNIT per tablet Take by mouth daily        Multiple Vitamins-Minerals (BARIATRIC FUSION) CHEW Chew 2 tablets daily         No current facility-administered medications for this visit        No Known Allergies   Immunizations:     Immunization History   Administered Date(s) Administered    H1N1, All Formulations 01/06/2010    INFLUENZA 09/28/2013, 09/08/2014, 10/08/2015, 10/15/2016, 09/21/2017, 09/05/2018, 09/18/2019    Influenza Injectable, MDCK, Preservative Free, Quadrivalent, 0 5 mL 09/18/2019    Influenza Quadrivalent Preservative Free 3 years and older IM 10/08/2015    Influenza, high dose seasonal 0 7 mL 09/15/2020    Influenza, injectable, quadrivalent, preservative free 0 5 mL 09/05/2018    Influenza, seasonal, injectable 09/20/2011, 09/08/2014, 10/15/2016    Influenza, seasonal, injectable, preservative free 09/05/2018    Pneumococcal Polysaccharide PPV23 03/10/2020    Tdap 03/04/2014    Zoster Vaccine Recombinant 09/22/2018, 03/06/2019      Health Maintenance:         Topic Date Due    HIV Screening  03/13/1970    Colonoscopy Surveillance  06/25/2020    MAMMOGRAM  08/26/2021    Cervical Cancer Screening  09/04/2021    Colorectal Cancer Screening  06/25/2025    Hepatitis C Screening  Completed         Topic Date Due    COVID-19 Vaccine (1 of 2) [birth year]13/1971      Medicare Health Risk Assessment:     /90   Pulse 84   Temp 99 °F (37 2 °C)   Resp 16   Ht 5' 7 21" (1 707 m)   Wt 100 kg (221 lb 6 4 oz)   SpO2 96%   BMI 34 46 kg/m²      Thomas Brady is here for her Subsequent Wellness visit  Last Medicare Wellness visit information reviewed, patient interviewed and updates made to the record today  Health Risk Assessment:   Patient rates overall health as very good  Patient feels that their physical health rating is same  Patient is very satisfied with their life  Eyesight was rated as same  Hearing was rated as same  Patient feels that their emotional and mental health rating is same  Patients states they are sometimes angry  Patient states they are never, rarely unusually tired/fatigued  Pain experienced in the last 7 days has been some  Patient's pain rating has been 5/10  Patient states that she has experienced weight loss or gain in last 6 months  Depression Screening:   PHQ-2 Score: 0      Fall Risk Screening: In the past year, patient has experienced: no history of falling in past year      Urinary Incontinence Screening:   Patient has not leaked urine accidently in the last six months  Home Safety:  Patient has trouble with stairs inside or outside of their home  Patient has working smoke alarms and has working carbon monoxide detector  Home safety hazards include: none  Nutrition:   Current diet is Regular  Medications:   Patient is currently taking over-the-counter supplements  OTC medications include: see medication list  Patient is able to manage medications  Activities of Daily Living (ADLs)/Instrumental Activities of Daily Living (IADLs):   Walk and transfer into and out of bed and chair?: Yes  Dress and groom yourself?: Yes    Bathe or shower yourself?: Yes    Feed yourself?  Yes  Do your laundry/housekeeping?: Yes  Manage your money, pay your bills and track your expenses?: Yes  Make your own meals?: Yes    Do your own shopping?: Yes    Previous Hospitalizations:   Any hospitalizations or ED visits within the last 12 months?: No      Advance Care Planning:   Living will: Yes    Durable POA for healthcare:  Yes    Advanced directive: Yes      PREVENTIVE SCREENINGS      Cardiovascular Screening:    General: Screening Not Indicated and History Lipid Disorder      Diabetes Screening:     General: Screening Current      Colorectal Cancer Screening:     General: Screening Current      Breast Cancer Screening:     General: Screening Current      Cervical Cancer Screening:    General: Screening Not Indicated      Lung Cancer Screening:     General: Screening Not Indicated      Hepatitis C Screening:    General: Screening Current    Screening, Brief Intervention, and Referral to Treatment (SBIRT)    Screening      Single Item Drug Screening:  How often have you used an illegal drug (including marijuana) or a prescription medication for non-medical reasons in the past year? never    Single Item Drug Screen Score: 0  Interpretation: Negative screen for possible drug use disorder      Vin Neville, DO

## 2021-03-17 ENCOUNTER — TELEPHONE (OUTPATIENT)
Dept: UROLOGY | Facility: CLINIC | Age: 66
End: 2021-03-17

## 2021-03-17 ENCOUNTER — PROCEDURE VISIT (OUTPATIENT)
Dept: UROLOGY | Facility: CLINIC | Age: 66
End: 2021-03-17
Payer: COMMERCIAL

## 2021-03-17 VITALS
DIASTOLIC BLOOD PRESSURE: 90 MMHG | WEIGHT: 221 LBS | SYSTOLIC BLOOD PRESSURE: 142 MMHG | HEART RATE: 98 BPM | HEIGHT: 67 IN | BODY MASS INDEX: 34.69 KG/M2

## 2021-03-17 DIAGNOSIS — N39.41 URGE INCONTINENCE OF URINE: Primary | ICD-10-CM

## 2021-03-17 PROCEDURE — 3008F BODY MASS INDEX DOCD: CPT | Performed by: FAMILY MEDICINE

## 2021-03-17 PROCEDURE — 52287 CYSTOSCOPY CHEMODENERVATION: CPT | Performed by: UROLOGY

## 2021-03-17 NOTE — TELEPHONE ENCOUNTER
PT IS SCHEDULED FOR CYSTO AND BOTOX NEXT MARCH AND NOT SURE IF YOU NEEDED TO KNOW ABOUT THE BOTOX FOR Cassandria Cleaves

## 2021-03-17 NOTE — PROGRESS NOTES
Office Cystoscopy Procedure Note    Indication:     medically refractory overactive bladder    Informed consent   The risks, benefits, complications, treatment options, and expected outcomes were discussed with the patient  The patient concurred with the proposed plan and provided informed consent  Anesthesia  Lidocaine jelly 2%    Antibiotic prophylaxis   None    Procedure  In the presence of a female nurse, the patient was placed in the supine frog-legged position, was prepped and draped in the usual manner using sterile technique, and 2% lidocaine jelly instilled into the urethra  A 20 Slov[place] Thomas catheter was placed under sterile conditions  The bladder was drained and through this 20 mL of 2% plain lidocaine was instilled and allowed to dwell for 10 minutes to provide analgesic      A 17 F flexible cystoscope was then inserted into the urethra and the urethra and bladder carefully examined  The following findings were noted:    Findings:  Urethra:  Normal  Bladder:  Normal  Ureteral orifices:  Normal  Other findings:  None        200 units of Botox were diluted into injectable saline  These were injected using 0 5 mL boluses in multiple locations in a super trigonal pattern  Care was taken to avoid the trigone  The bladder was then observed with no active installation hemostasis was noted to be excellent  Specimens: None                 Complications:    None; patient tolerated the procedure well           Disposition: To home after 30 minute observation  Condition: Stable    Plan:      patient has previously enjoyed a excellent response to Botox for duration of approximately 1 year  Of note she is on no oral pharmacotherapy  We will plan her next installation of 200 units bladder Botox in 1 year's time    Shee was asked to call if the effectiveness wears off sooner and she can be scheduled for sooner appointment or her next injection can be pushed back further if  She continues to do well

## 2021-05-06 ENCOUNTER — OFFICE VISIT (OUTPATIENT)
Dept: FAMILY MEDICINE CLINIC | Facility: CLINIC | Age: 66
End: 2021-05-06
Payer: COMMERCIAL

## 2021-05-06 VITALS
HEART RATE: 93 BPM | TEMPERATURE: 98.3 F | BODY MASS INDEX: 34.09 KG/M2 | OXYGEN SATURATION: 95 % | RESPIRATION RATE: 18 BRPM | DIASTOLIC BLOOD PRESSURE: 88 MMHG | SYSTOLIC BLOOD PRESSURE: 150 MMHG | WEIGHT: 219 LBS

## 2021-05-06 DIAGNOSIS — E55.9 VITAMIN D DEFICIENCY: ICD-10-CM

## 2021-05-06 DIAGNOSIS — E53.8 LOW VITAMIN B12 LEVEL: ICD-10-CM

## 2021-05-06 DIAGNOSIS — F41.1 GENERALIZED ANXIETY DISORDER: ICD-10-CM

## 2021-05-06 DIAGNOSIS — E78.2 MIXED HYPERLIPIDEMIA: ICD-10-CM

## 2021-05-06 DIAGNOSIS — R03.0 ELEVATED BLOOD PRESSURE READING WITHOUT DIAGNOSIS OF HYPERTENSION: ICD-10-CM

## 2021-05-06 DIAGNOSIS — E03.9 ACQUIRED HYPOTHYROIDISM: Primary | ICD-10-CM

## 2021-05-06 PROCEDURE — 99214 OFFICE O/P EST MOD 30 MIN: CPT | Performed by: FAMILY MEDICINE

## 2021-05-06 NOTE — PROGRESS NOTES
Assessment/Plan:    1  Acquired hypothyroidism  Assessment & Plan:  Stable on current dose      2  Low vitamin B12 level  Assessment & Plan:  stable      3  Mixed hyperlipidemia  Assessment & Plan:  Stable on current dose      4  Generalized anxiety disorder  Assessment & Plan:  Stable on paxil      5  Vitamin D deficiency  Assessment & Plan: On the low side of normal  Will double dose      6  Elevated blood pressure reading without diagnosis of hypertension  Assessment & Plan: Will get bp cuff and and monitor  bp's have been up last several visit            There are no Patient Instructions on file for this visit  No follow-ups on file  Subjective:      Patient ID: Liliana Carrier is a 77 y o  female  Chief Complaint   Patient presents with    Follow-up     Patient is here for her Hypothyroidism       Here for follow up  Overall feeling better  Hasn't used xanax        The following portions of the patient's history were reviewed and updated as appropriate: allergies, current medications, past family history, past medical history, past social history, past surgical history and problem list     Review of Systems   Constitutional: Negative  HENT: Negative  Eyes: Negative  Respiratory: Negative  Cardiovascular: Negative  Gastrointestinal: Negative  Endocrine: Negative  Genitourinary: Negative  Musculoskeletal: Negative  Skin: Negative  Allergic/Immunologic: Negative  Neurological: Negative  Hematological: Negative  Psychiatric/Behavioral: Negative            Current Outpatient Medications   Medication Sig Dispense Refill    ALPRAZolam (XANAX) 0 25 mg tablet Take 1 tablet (0 25 mg total) by mouth daily as needed for anxiety 10 tablet 0    amoxicillin (AMOXIL) 500 mg capsule TAKE 4 CAPSULES 1 HOUR PRIOR TO APPOINTMENT      betamethasone valerate (VALISONE) 0 1 % cream Apply topically 2 (two) times a day 30 g 0    calcium citrate-vitamin D (CITRACAL+D) 315-200 MG-UNIT per tablet Take by mouth daily        eszopiclone (LUNESTA) 3 MG tablet TAKE 1 TABLET BY MOUTH DAILY AT BEDTIME 90 tablet 3    levothyroxine 125 mcg tablet TAKE 1 TABLET DAILY 90 tablet 3    Multiple Vitamins-Minerals (BARIATRIC FUSION) CHEW Chew 2 tablets daily        Multiple Vitamins-Minerals (CENTRUM WOMEN) TABS Take by mouth Goes between bariatric multi and Centrum      PARoxetine (PAXIL) 20 mg tablet Take 1 tablet (20 mg total) by mouth daily 90 tablet 3    rizatriptan (MAXALT) 10 MG tablet TAKE 1 TABLET DAILY AS NEEDED FOR MIGRAINE 18 tablet 5    simvastatin (ZOCOR) 40 mg tablet TAKE 1 TABLET EVERY EVENING 90 tablet 3    aspirin 81 MG tablet Take 1 tablet by mouth daily for 30 days 30 tablet 0     No current facility-administered medications for this visit  Objective:    /88 (BP Location: Left arm)   Pulse 93   Temp 98 3 °F (36 8 °C)   Resp 18   Wt 99 3 kg (219 lb)   SpO2 95%   BMI 34 09 kg/m²        Physical Exam  Vitals signs and nursing note reviewed  Constitutional:       Appearance: Normal appearance  HENT:      Head: Normocephalic and atraumatic  Eyes:      Extraocular Movements: Extraocular movements intact  Pupils: Pupils are equal, round, and reactive to light  Neck:      Musculoskeletal: Normal range of motion and neck supple  Cardiovascular:      Rate and Rhythm: Normal rate and regular rhythm  Pulses: Normal pulses  Heart sounds: Normal heart sounds  Pulmonary:      Effort: Pulmonary effort is normal       Breath sounds: Normal breath sounds  Abdominal:      General: Abdomen is flat  Palpations: Abdomen is soft  Musculoskeletal: Normal range of motion  Skin:     General: Skin is warm  Capillary Refill: Capillary refill takes less than 2 seconds  Neurological:      General: No focal deficit present  Mental Status: She is alert and oriented to person, place, and time     Psychiatric:         Mood and Affect: Mood normal  Behavior: Behavior normal          Thought Content:  Thought content normal          Judgment: Judgment normal                 Rogelio Capone, DO

## 2021-05-10 PROCEDURE — 88305 TISSUE EXAM BY PATHOLOGIST: CPT | Performed by: PATHOLOGY

## 2021-05-11 ENCOUNTER — LAB REQUISITION (OUTPATIENT)
Dept: LAB | Facility: HOSPITAL | Age: 66
End: 2021-05-11
Payer: COMMERCIAL

## 2021-05-11 DIAGNOSIS — Z12.11 ENCOUNTER FOR SCREENING FOR MALIGNANT NEOPLASM OF COLON: ICD-10-CM

## 2021-05-11 DIAGNOSIS — K63.5 POLYP OF COLON: ICD-10-CM

## 2021-05-12 DIAGNOSIS — I10 ESSENTIAL HYPERTENSION: Primary | ICD-10-CM

## 2021-05-12 RX ORDER — LISINOPRIL 10 MG/1
10 TABLET ORAL DAILY
Qty: 90 TABLET | Refills: 3 | Status: SHIPPED | OUTPATIENT
Start: 2021-05-12 | End: 2021-08-20 | Stop reason: SDUPTHER

## 2021-05-24 ENCOUNTER — RA CDI HCC (OUTPATIENT)
Dept: OTHER | Facility: HOSPITAL | Age: 66
End: 2021-05-24

## 2021-05-24 NOTE — PROGRESS NOTES
[facility]  coding opportunities          Chart reviewed, no opportunity found: CHART REVIEWED, NO OPPORTUNITY FOUND              Patients insurance company:  [facility] (Medicare Advantage and Commercial)

## 2021-06-01 ENCOUNTER — OFFICE VISIT (OUTPATIENT)
Dept: FAMILY MEDICINE CLINIC | Facility: CLINIC | Age: 66
End: 2021-06-01
Payer: COMMERCIAL

## 2021-06-01 VITALS
SYSTOLIC BLOOD PRESSURE: 104 MMHG | HEIGHT: 67 IN | HEART RATE: 80 BPM | OXYGEN SATURATION: 96 % | WEIGHT: 215 LBS | RESPIRATION RATE: 16 BRPM | TEMPERATURE: 99.1 F | DIASTOLIC BLOOD PRESSURE: 80 MMHG | BODY MASS INDEX: 33.74 KG/M2

## 2021-06-01 DIAGNOSIS — I10 ESSENTIAL HYPERTENSION: Primary | ICD-10-CM

## 2021-06-01 PROCEDURE — 99213 OFFICE O/P EST LOW 20 MIN: CPT | Performed by: FAMILY MEDICINE

## 2021-06-01 NOTE — PROGRESS NOTES
Assessment/Plan:    1  Essential hypertension  Assessment & Plan:  Improved on lisinopril            There are no Patient Instructions on file for this visit  No follow-ups on file  Subjective:      Patient ID: Ya Humphrey is a 77 y o  female  Chief Complaint   Patient presents with    Follow-up     2 week f/u       Here for follow up  bp much better  No side effects on lisinopril  Had colonoscopy  5 polyps- on 1 year recall    Hypertension  This is a new problem  The current episode started more than 1 month ago  The problem has been gradually improving since onset  The problem is controlled  There are no associated agents to hypertension  Past treatments include ACE inhibitors  The current treatment provides significant improvement  There are no compliance problems  The following portions of the patient's history were reviewed and updated as appropriate: allergies, current medications, past family history, past medical history, past social history, past surgical history and problem list     Review of Systems   Constitutional: Negative  HENT: Negative  Eyes: Negative  Respiratory: Negative  Cardiovascular: Negative  Gastrointestinal: Negative  Endocrine: Negative  Genitourinary: Negative  Musculoskeletal: Negative  Skin: Negative  Allergic/Immunologic: Negative  Neurological: Negative  Hematological: Negative  Psychiatric/Behavioral: Negative            Current Outpatient Medications   Medication Sig Dispense Refill    ALPRAZolam (XANAX) 0 25 mg tablet Take 1 tablet (0 25 mg total) by mouth daily as needed for anxiety 10 tablet 0    amoxicillin (AMOXIL) 500 mg capsule TAKE 4 CAPSULES 1 HOUR PRIOR TO APPOINTMENT      aspirin 81 MG tablet Take 1 tablet by mouth daily for 30 days 30 tablet 0    betamethasone valerate (VALISONE) 0 1 % cream Apply topically 2 (two) times a day 30 g 0    calcium citrate-vitamin D (CITRACAL+D) 315-200 MG-UNIT per tablet Take by mouth daily        eszopiclone (LUNESTA) 3 MG tablet TAKE 1 TABLET BY MOUTH DAILY AT BEDTIME 90 tablet 3    levothyroxine 125 mcg tablet TAKE 1 TABLET DAILY 90 tablet 3    lisinopril (ZESTRIL) 10 mg tablet Take 1 tablet (10 mg total) by mouth daily 90 tablet 3    Multiple Vitamins-Minerals (BARIATRIC FUSION) CHEW Chew 2 tablets daily        Multiple Vitamins-Minerals (CENTRUM WOMEN) TABS Take by mouth Goes between bariatric multi and Centrum      PARoxetine (PAXIL) 20 mg tablet Take 1 tablet (20 mg total) by mouth daily 90 tablet 3    rizatriptan (MAXALT) 10 MG tablet TAKE 1 TABLET DAILY AS NEEDED FOR MIGRAINE 18 tablet 5    simvastatin (ZOCOR) 40 mg tablet TAKE 1 TABLET EVERY EVENING 90 tablet 3     No current facility-administered medications for this visit  Objective:    /80   Pulse 80   Temp 99 1 °F (37 3 °C) (Tympanic)   Resp 16   Ht 5' 7 21" (1 707 m)   Wt 97 5 kg (215 lb)   SpO2 96%   BMI 33 46 kg/m²        Physical Exam  Vitals signs and nursing note reviewed  Constitutional:       Appearance: Normal appearance  HENT:      Head: Normocephalic and atraumatic  Eyes:      Extraocular Movements: Extraocular movements intact  Pupils: Pupils are equal, round, and reactive to light  Neck:      Musculoskeletal: Normal range of motion and neck supple  Cardiovascular:      Rate and Rhythm: Normal rate and regular rhythm  Pulses: Normal pulses  Heart sounds: Normal heart sounds  Pulmonary:      Effort: Pulmonary effort is normal       Breath sounds: Normal breath sounds  Abdominal:      General: Abdomen is flat  Palpations: Abdomen is soft  Musculoskeletal: Normal range of motion  Skin:     Capillary Refill: Capillary refill takes less than 2 seconds  Comments: Multiple nevi   Neurological:      General: No focal deficit present  Mental Status: She is alert and oriented to person, place, and time     Psychiatric:         Mood and Affect: Mood normal          Behavior: Behavior normal          Thought Content:  Thought content normal          Judgment: Judgment normal                 Cheikh Rios, DO

## 2021-06-09 ENCOUNTER — OFFICE VISIT (OUTPATIENT)
Dept: BARIATRICS | Facility: CLINIC | Age: 66
End: 2021-06-09
Payer: COMMERCIAL

## 2021-06-09 VITALS
DIASTOLIC BLOOD PRESSURE: 70 MMHG | BODY MASS INDEX: 33.59 KG/M2 | WEIGHT: 214 LBS | HEART RATE: 90 BPM | SYSTOLIC BLOOD PRESSURE: 122 MMHG | HEIGHT: 67 IN | TEMPERATURE: 99.3 F

## 2021-06-09 DIAGNOSIS — Z98.84 BARIATRIC SURGERY STATUS: Primary | ICD-10-CM

## 2021-06-09 PROCEDURE — 1036F TOBACCO NON-USER: CPT | Performed by: SURGERY

## 2021-06-09 PROCEDURE — 3008F BODY MASS INDEX DOCD: CPT | Performed by: SURGERY

## 2021-06-09 PROCEDURE — 99213 OFFICE O/P EST LOW 20 MIN: CPT | Performed by: SURGERY

## 2021-06-09 PROCEDURE — 1160F RVW MEDS BY RX/DR IN RCRD: CPT | Performed by: SURGERY

## 2021-06-09 NOTE — PROGRESS NOTES
BARIATRIC HISTORY AND PHYSICAL - BARIATRIC SURGERY  Melany Bustos 77 y o  female MRN: [id number]  Unit/Bed#:  Encounter: [id number]      HPI:  Melany Bustos is a 77 y o  female who presents for an evaluation of jamila recidivism  She is status post  sleeve gastrectomy on 3/27/2017  Review of Systems   Constitutional: Negative for chills and fatigue  Weight regain   HENT: Negative for ear pain  Eyes: Negative for pain  Respiratory: Negative for cough, shortness of breath and wheezing  Cardiovascular: Negative for chest pain  Gastrointestinal: Negative for abdominal distention and abdominal pain  Endocrine: Negative for polydipsia  Genitourinary: Negative for flank pain  Musculoskeletal: Negative for arthralgias and back pain  Skin: Negative for pallor  Allergic/Immunologic: Negative for food allergies  Neurological: Negative for weakness and headaches  Hematological: Does not bruise/bleed easily  Psychiatric/Behavioral: Negative for confusion  Historical Information   Past Medical History:   Diagnosis Date    Allergic dermatitis     resolved 01/26/2016    Anxiety     Arthritis     Blind right eye     Depression     Encounter for cosmetic surgery 4/30/2018    Added automatically from request for surgery [id number]    Hyperlipidemia     Hypothyroidism (acquired)     hypothyroid    Incontinence of urine in female     Insomnia     Migraine     Morbid obesity ([facility] [address] )     TIA (transient ischemic attack)     questionable     Wears glasses      Past Surgical History:   Procedure Laterality Date    BODY LIFT LOWER N/A 6/28/2018    Procedure: LOWER BODY LIFT;  Surgeon: Richard Cochran MD;  Location:  MAIN OR;  Service: Plastics    CATARACT EXTRACTION Left     CHOLECYSTECTOMY      COLONOSCOPY      ESOPHAGOGASTRODUODENOSCOPY N/A 3/27/2017    Procedure: ESOPHAGOGASTRODUODENOSCOPY (EGD);   Surgeon: Ashleigh Lucero MD;  [facility];  Service:    [address] [place] [address]  HEMORROIDECTOMY      IR IMAGE GUIDED ASPIRATION / DRAINAGE W TUBE  3/26/2019    JOINT REPLACEMENT      oswald TKR    CO EGD TRANSORAL BIOPSY SINGLE/MULTIPLE N/A 3/8/2017    Procedure: ESOPHAGOGASTRODUODENOSCOPY (EGD); Surgeon: Popeye Gee MD;  [facility]; Service: Bariatrics    CO EXCISE EXCESS SKIN 1 [facility] [address] Bilateral 1/10/2019    Procedure: THIGH LIFT;  Surgeon: Jannie Ayon MD;  Location:  MAIN OR;  Service: Plastics    CO LAP, NADYA RESTRICT PROC, LONGITUDINAL GASTRECTOMY N/A 3/27/2017    Procedure: GASTRECTOMY SLEEVE LAPAROSCOPIC;  Surgeon: Popeye Gee MD;  [facility];  Service: [address] [place] [address] Street Right     TOTAL BODY LIFT       Social History   Social History     Substance and Sexual Activity   Alcohol Use No     Social History     Substance and Sexual Activity   Drug Use No     Social History     Tobacco Use   Smoking Status Never Smoker   Smokeless Tobacco Never Used     Family History: non-contributory    Meds/Allergies   all medications and allergies reviewed  No Known Allergies    Objective     Current Vitals:   Blood Pressure: 122/70 (06/09/21 1019)  Pulse: 90 (06/09/21 1019)  Temperature: 99 3 °F (37 4 °C) (06/09/21 1019)  Temp Source: Tympanic (06/09/21 1019)  Height: 5' 7" (170 2 cm) (06/09/21 1019)  Weight - Scale: 97 1 kg (214 lb) (06/09/21 1019)  bowel movement  [unfilled]    Invasive Devices     Peripheral Intravenous Line            Peripheral IV 03/26/19 Right Antecubital 806 days          Drain            Closed/Suction Drain Left;Posterior Hip Bulb 15 Fr  1077 days    Closed/Suction Drain Right; Anterior Hip Bulb 15 Fr  1077 days    Closed/Suction Drain Left Thigh 15 Fr  880 days    Closed/Suction Drain Right Thigh Bulb 15 Fr  880 days                Physical Exam  Constitutional:       Appearance: Normal appearance  HENT:      Head: Normocephalic        Mouth/Throat:      Mouth: Mucous membranes are moist    Eyes: Conjunctiva/sclera: Conjunctivae normal       Pupils: Pupils are equal, round, and reactive to light  Neck:      Musculoskeletal: Normal range of motion  Cardiovascular:      Rate and Rhythm: Normal rate and regular rhythm  Pulmonary:      Effort: Pulmonary effort is normal    Abdominal:      General: Abdomen is flat  There is no distension  Tenderness: There is no abdominal tenderness  Comments: Incision healed  has tummy tuck scar   Musculoskeletal: Normal range of motion  Comments: Bilateral knee scar   Skin:     General: Skin is warm  Capillary Refill: Capillary refill takes less than 2 seconds  Neurological:      General: No focal deficit present  Mental Status: She is alert  Psychiatric:         Mood and Affect: Mood normal          Lab Results: I have personally reviewed pertinent lab results  Imaging: I have personally reviewed pertinent reports  EKG, Pathology, and Other Studies: I have personally reviewed pertinent reports  Code Status: [facility]  Advance Directive and Living Will: Yes    Power of :    POLST:      Assessment/Plan:     Olive Singh is a 77 y o  female who presents for an evaluation of jamila recidivism  She is status post  sleeve gastrectomy on 3/27/2017  pPtient denies pain, nausea, vomiting, heart pain or difficulty of swallowing  Her initial weight was 227 with a BMI of 35  Yordan 158 a year after surgery  Current 214 lb with a BMI of 33 52  She is not exercising as she used to be after surgery, note watching her diet, report greasing and eating snacks between meals  she is  Back on anti hypertensive medication  plan: Will start with upper GI study and upper GI endoscopy to evaluate her sleeve anatomy and to rule out hiatal hernia  follow-up afterward          Garlan Pallas, MD  Bariatric Surgery Fellow  6/9/2021  10:53 AM

## 2021-06-09 NOTE — H&P (VIEW-ONLY)
BARIATRIC HISTORY AND PHYSICAL - BARIATRIC SURGERY  Dory Hair 77 y o  female MRN: [id number]  Unit/Bed#:  Encounter: [id number]      HPI:  Dory Hair is a 77 y o  female who presents for an evaluation of jamila recidivism  She is status post  sleeve gastrectomy on 3/27/2017  Review of Systems   Constitutional: Negative for chills and fatigue  Weight regain   HENT: Negative for ear pain  Eyes: Negative for pain  Respiratory: Negative for cough, shortness of breath and wheezing  Cardiovascular: Negative for chest pain  Gastrointestinal: Negative for abdominal distention and abdominal pain  Endocrine: Negative for polydipsia  Genitourinary: Negative for flank pain  Musculoskeletal: Negative for arthralgias and back pain  Skin: Negative for pallor  Allergic/Immunologic: Negative for food allergies  Neurological: Negative for weakness and headaches  Hematological: Does not bruise/bleed easily  Psychiatric/Behavioral: Negative for confusion  Historical Information   Past Medical History:   Diagnosis Date    Allergic dermatitis     resolved 01/26/2016    Anxiety     Arthritis     Blind right eye     Depression     Encounter for cosmetic surgery 4/30/2018    Added automatically from request for surgery [id number]    Hyperlipidemia     Hypothyroidism (acquired)     hypothyroid    Incontinence of urine in female     Insomnia     Migraine     Morbid obesity ([facility] [address] )     TIA (transient ischemic attack)     questionable     Wears glasses      Past Surgical History:   Procedure Laterality Date    BODY LIFT LOWER N/A 6/28/2018    Procedure: LOWER BODY LIFT;  Surgeon: Diaz Baca MD;  [facility]: [facility];  Service: Plastics    CATARACT EXTRACTION Left     CHOLECYSTECTOMY      COLONOSCOPY      ESOPHAGOGASTRODUODENOSCOPY N/A 3/27/2017    Procedure: ESOPHAGOGASTRODUODENOSCOPY (EGD);   Surgeon: Kayla Calzada MD;  [facility];  Service:    [address] [place] [address]  HEMORROIDECTOMY      IR IMAGE GUIDED ASPIRATION / DRAINAGE W TUBE  3/26/2019    JOINT REPLACEMENT      oswald TKR    OK EGD TRANSORAL BIOPSY SINGLE/MULTIPLE N/A 3/8/2017    Procedure: ESOPHAGOGASTRODUODENOSCOPY (EGD); Surgeon: Duke Fong MD;  [facility]; Service: Bariatrics    OK EXCISE EXCESS SKIN [address] [facility] [address] Bilateral 1/10/2019    Procedure: THIGH LIFT;  Surgeon: Marsha Rockwell MD;  Location:  MAIN OR;  Service: Plastics    OK LAP, NADYA RESTRICT PROC, LONGITUDINAL GASTRECTOMY N/A 3/27/2017    Procedure: GASTRECTOMY SLEEVE LAPAROSCOPIC;  Surgeon: Duke Fong MD;  [facility];  Service: [address] [place] [address] Street Right     TOTAL BODY LIFT       Social History   Social History     Substance and Sexual Activity   Alcohol Use No     Social History     Substance and Sexual Activity   Drug Use No     Social History     Tobacco Use   Smoking Status Never Smoker   Smokeless Tobacco Never Used     Family History: non-contributory    Meds/Allergies   all medications and allergies reviewed  No Known Allergies    Objective     Current Vitals:   Blood Pressure: 122/70 (06/09/21 1019)  Pulse: 90 (06/09/21 1019)  Temperature: 99 3 °F (37 4 °C) (06/09/21 1019)  Temp Source: Tympanic (06/09/21 1019)  Height: 5' 7" (170 2 cm) (06/09/21 1019)  Weight - Scale: 97 1 kg (214 lb) (06/09/21 1019)  bowel movement  [unfilled]    Invasive Devices     Peripheral Intravenous Line            Peripheral IV 03/26/19 Right Antecubital 806 days          Drain            Closed/Suction Drain Left;Posterior Hip Bulb 15 Fr  1077 days    Closed/Suction Drain Right; Anterior Hip Bulb 15 Fr  1077 days    Closed/Suction Drain Left Thigh 15 Fr  880 days    Closed/Suction Drain Right Thigh Bulb 15 Fr  880 days                Physical Exam  Constitutional:       Appearance: Normal appearance  HENT:      Head: Normocephalic        Mouth/Throat:      Mouth: Mucous membranes are moist    Eyes: Conjunctiva/sclera: Conjunctivae normal       Pupils: Pupils are equal, round, and reactive to light  Neck:      Musculoskeletal: Normal range of motion  Cardiovascular:      Rate and Rhythm: Normal rate and regular rhythm  Pulmonary:      Effort: Pulmonary effort is normal    Abdominal:      General: Abdomen is flat  There is no distension  Tenderness: There is no abdominal tenderness  Comments: Incision healed  has tummy tuck scar   Musculoskeletal: Normal range of motion  Comments: Bilateral knee scar   Skin:     General: Skin is warm  Capillary Refill: Capillary refill takes less than 2 seconds  Neurological:      General: No focal deficit present  Mental Status: She is alert  Psychiatric:         Mood and Affect: Mood normal          Lab Results: I have personally reviewed pertinent lab results  Imaging: I have personally reviewed pertinent reports  EKG, Pathology, and Other Studies: I have personally reviewed pertinent reports  Code Status: [unfilled]  Advance Directive and Living Will: Yes    Power of :    POLST:      Assessment/Plan:     Ashlee Flores is a 77 y o  female who presents for an evaluation of jamila recidivism  She is status post  sleeve gastrectomy on 3/27/2017  pPtient denies pain, nausea, vomiting, heart pain or difficulty of swallowing  Her initial weight was 227 with a BMI of 35  Yordan 158 a year after surgery  Current 214 lb with a BMI of 33 52  She is not exercising as she used to be after surgery, note watching her diet, report greasing and eating snacks between meals  she is  Back on anti hypertensive medication  plan: Will start with upper GI study and upper GI endoscopy to evaluate her sleeve anatomy and to rule out hiatal hernia  follow-up afterward          Rosie Aguiar MD  Bariatric Surgery Fellow  6/9/2021  10:53 AM

## 2021-06-17 DIAGNOSIS — G47.00 INSOMNIA, UNSPECIFIED TYPE: ICD-10-CM

## 2021-06-17 RX ORDER — ESZOPICLONE 3 MG/1
TABLET, FILM COATED ORAL
Qty: 90 TABLET | Refills: 3 | Status: SHIPPED | OUTPATIENT
Start: 2021-06-17 | End: 2022-03-07

## 2021-06-17 NOTE — TELEPHONE ENCOUNTER
Medication:  PDMP   03/22/2021  1  09/29/2020  ESZOPICLONE 3 MG TABLET  90 0  90  TI SHE     Active agreement on file -No

## 2021-06-24 ENCOUNTER — HOSPITAL ENCOUNTER (OUTPATIENT)
Dept: RADIOLOGY | Facility: HOSPITAL | Age: 66
Discharge: HOME | End: 2021-06-24
Payer: COMMERCIAL

## 2021-06-24 DIAGNOSIS — Z98.84 BARIATRIC SURGERY STATUS: ICD-10-CM

## 2021-06-24 PROCEDURE — 74240 X-RAY XM UPR GI TRC 1CNTRST: CPT

## 2021-06-29 ENCOUNTER — TELEPHONE (OUTPATIENT)
Dept: GASTROENTEROLOGY | Facility: HOSPITAL | Age: 66
End: 2021-06-29

## 2021-06-30 ENCOUNTER — ANESTHESIA (OUTPATIENT)
Dept: GASTROENTEROLOGY | Facility: HOSPITAL | Age: 66
End: 2021-06-30

## 2021-06-30 ENCOUNTER — ANESTHESIA EVENT (OUTPATIENT)
Dept: GASTROENTEROLOGY | Facility: HOSPITAL | Age: 66
End: 2021-06-30

## 2021-06-30 ENCOUNTER — HOSPITAL ENCOUNTER (OUTPATIENT)
Dept: GASTROENTEROLOGY | Facility: HOSPITAL | Age: 66
Setting detail: OUTPATIENT SURGERY
Discharge: HOME | End: 2021-06-30
Attending: SURGERY | Admitting: SURGERY
Payer: COMMERCIAL

## 2021-06-30 VITALS
OXYGEN SATURATION: 97 % | SYSTOLIC BLOOD PRESSURE: 96 MMHG | TEMPERATURE: 97.3 F | DIASTOLIC BLOOD PRESSURE: 52 MMHG | RESPIRATION RATE: 16 BRPM | HEART RATE: 66 BPM | HEIGHT: 67 IN | BODY MASS INDEX: 33.59 KG/M2 | WEIGHT: 214 LBS

## 2021-06-30 DIAGNOSIS — Z98.84 BARIATRIC SURGERY STATUS: ICD-10-CM

## 2021-06-30 PROCEDURE — 43239 EGD BIOPSY SINGLE/MULTIPLE: CPT | Performed by: SURGERY

## 2021-06-30 PROCEDURE — 88305 TISSUE EXAM BY PATHOLOGIST: CPT | Performed by: PATHOLOGY

## 2021-06-30 RX ORDER — PROPOFOL 10 MG/ML
INJECTION, EMULSION INTRAVENOUS AS NEEDED
Status: DISCONTINUED | OUTPATIENT
Start: 2021-06-30 | End: 2021-06-30

## 2021-06-30 RX ORDER — SODIUM CHLORIDE 9 MG/ML
125 INJECTION, SOLUTION INTRAVENOUS CONTINUOUS
Status: DISCONTINUED | OUTPATIENT
Start: 2021-06-30 | End: 2021-07-04 | Stop reason: HOSPADM

## 2021-06-30 RX ADMIN — PROPOFOL 150 MG: 10 INJECTION, EMULSION INTRAVENOUS at 13:17

## 2021-06-30 RX ADMIN — SODIUM CHLORIDE 125 ML/HR: 0.9 INJECTION, SOLUTION INTRAVENOUS at 12:54

## 2021-06-30 RX ADMIN — PROPOFOL 50 MG: 10 INJECTION, EMULSION INTRAVENOUS at 13:20

## 2021-06-30 NOTE — INTERVAL H&P NOTE
H&P reviewed  After examining the patient I find no changes in the patients condition since the H&P had been written      Vitals:    06/30/21 1240   BP: 105/64   Pulse: 69   Resp: 16   Temp: (!) 97 3 °F (36 3 °C)   SpO2: 94%

## 2021-06-30 NOTE — ANESTHESIA POSTPROCEDURE EVALUATION
Post-Op Assessment Note    CV Status:  Stable  Pain Score: 0    Pain management: adequate     Mental Status:  Alert and awake   Hydration Status:  Euvolemic and stable   PONV Controlled:  Controlled   Airway Patency:  Patent      Post Op Vitals Reviewed: Yes      Staff: Anesthesiologist         No complications documented      BP      Temp     Pulse     Resp      SpO2

## 2021-06-30 NOTE — ANESTHESIA PREPROCEDURE EVALUATION
Procedure:  EGD    Relevant Problems   CARDIO   (+) Essential hypertension   (+) Hyperlipidemia      ENDO   (+) Hypothyroidism      GI/HEPATIC   (+) Bariatric surgery status      MUSCULOSKELETAL   (+) Arthritis      NEURO/PSYCH   (+) Blind right eye   (+) Generalized anxiety disorder   (+) Transient ischemic attack        Physical Exam    Airway    Mallampati score: II  TM Distance: >3 FB  Neck ROM: full     Dental   No notable dental hx     Cardiovascular  Rhythm: regular, Rate: normal, Cardiovascular exam normal    Pulmonary  Pulmonary exam normal Breath sounds clear to auscultation,     Other Findings        Anesthesia Plan  ASA Score- 2     Anesthesia Type- general and IV sedation with anesthesia with ASA Monitors  Additional Monitors:   Airway Plan:           Plan Factors-    Chart reviewed  Patient summary reviewed  Patient is not a current smoker  Patient instructed to abstain from smoking on day of procedure  Patient did not smoke on day of surgery  Induction- intravenous  Postoperative Plan-     Informed Consent- Anesthetic plan and risks discussed with patient

## 2021-07-08 ENCOUNTER — OFFICE VISIT (OUTPATIENT)
Dept: BARIATRICS | Facility: CLINIC | Age: 66
End: 2021-07-08
Payer: COMMERCIAL

## 2021-07-08 VITALS
WEIGHT: 207.5 LBS | RESPIRATION RATE: 17 BRPM | HEIGHT: 68 IN | TEMPERATURE: 98.7 F | BODY MASS INDEX: 31.45 KG/M2 | SYSTOLIC BLOOD PRESSURE: 112 MMHG | DIASTOLIC BLOOD PRESSURE: 64 MMHG | HEART RATE: 84 BPM

## 2021-07-08 DIAGNOSIS — Z98.84 BARIATRIC SURGERY STATUS: Primary | ICD-10-CM

## 2021-07-08 PROCEDURE — 1160F RVW MEDS BY RX/DR IN RCRD: CPT | Performed by: SURGERY

## 2021-07-08 PROCEDURE — 3008F BODY MASS INDEX DOCD: CPT | Performed by: SURGERY

## 2021-07-08 PROCEDURE — 99212 OFFICE O/P EST SF 10 MIN: CPT | Performed by: SURGERY

## 2021-07-08 PROCEDURE — 1036F TOBACCO NON-USER: CPT | Performed by: SURGERY

## 2021-07-08 NOTE — PROGRESS NOTES
BARIATRIC HISTORY AND PHYSICAL - BARIATRIC SURGERY  Layo Meyer 77 y o  female MRN: [id number]  Unit/Bed#:  Encounter: [id number]      HPI:  Layo Meyer is a 77 y o  female who presents for an evaluation of jamila recidivism  She is status post  sleeve gastrectomy on 3/27/2017  She is here to review   GI endoscopy  Results  Review of Systems   Constitutional: Negative for chills and fatigue  Weight regain   HENT: Negative for ear pain  Eyes: Negative for pain  Respiratory: Negative for cough, shortness of breath and wheezing  Cardiovascular: Negative for chest pain  Gastrointestinal: Negative for abdominal distention and abdominal pain  Endocrine: Negative for polydipsia  Genitourinary: Negative for flank pain  Musculoskeletal: Negative for arthralgias and back pain  Skin: Negative for pallor  Allergic/Immunologic: Negative for food allergies  Neurological: Negative for weakness and headaches  Hematological: Does not bruise/bleed easily  Psychiatric/Behavioral: Negative for confusion         Historical Information   Past Medical History:   Diagnosis Date    Allergic dermatitis     resolved 01/26/2016    Anxiety     Arthritis     Blind right eye     Depression     Disease of thyroid gland     Encounter for cosmetic surgery 4/30/2018    Added automatically from request for surgery [id number]    Hyperlipidemia     Hypothyroidism (acquired)     hypothyroid    Incontinence of urine in female     Insomnia     Migraine     Morbid obesity ([address] )     TIA (transient ischemic attack)     questionable     Wears glasses      Past Surgical History:   Procedure Laterality Date    BODY LIFT LOWER N/A 6/28/2018    Procedure: LOWER BODY LIFT;  Surgeon: Jie García MD;  Location:  MAIN OR;  Service: Plastics    CATARACT EXTRACTION Left     CHOLECYSTECTOMY      COLONOSCOPY      ESOPHAGOGASTRODUODENOSCOPY N/A 3/27/2017    Procedure: ESOPHAGOGASTRODUODENOSCOPY (EGD); Surgeon: Dago Marrero MD;  [facility];  Service:     EYE SURGERY      HEMORROIDECTOMY      IR IMAGE GUIDED ASPIRATION / DRAINAGE W TUBE  3/26/2019    JOINT REPLACEMENT      oswald TKR    NC EGD TRANSORAL BIOPSY SINGLE/MULTIPLE N/A 3/8/2017    Procedure: ESOPHAGOGASTRODUODENOSCOPY (EGD); Surgeon: Dago Marrero MD;  [facility]; Service: Bariatrics    NC EXCISE EXCESS SKIN [address] [facility] [address] Bilateral 1/10/2019    Procedure: THIGH LIFT;  Surgeon: Jie García MD;  Location:  MAIN OR;  Service: Plastics    NC LAP, NADYA RESTRICT PROC, LONGITUDINAL GASTRECTOMY N/A 3/27/2017    Procedure: GASTRECTOMY SLEEVE LAPAROSCOPIC;  Surgeon: Dago Marrero MD;  [facility];  Service: [address] [place] [address] Right     SLEEVE GASTROPLASTY  3/27/2017    TOTAL BODY LIFT       Social History   Social History     Substance and Sexual Activity   Alcohol Use No     Social History     Substance and Sexual Activity   Drug Use No     Social History     Tobacco Use   Smoking Status Never Smoker   Smokeless Tobacco Never Used     Family History: non-contributory    Meds/Allergies   all medications and allergies reviewed  No Known Allergies    Objective     Current Vitals:   Blood Pressure: 112/64 (07/08/21 0854)  Pulse: 84 (07/08/21 0854)  Temperature: 98 7 °F (37 1 °C) (07/08/21 0854)  Temp Source: Tympanic (07/08/21 0854)  Respirations: 17 (07/08/21 0854)  Height: 5' 7 5" (171 5 cm) (07/08/21 0854)  Weight - Scale: 94 1 kg (207 lb 8 oz) (07/08/21 0854)  bowel movement  [unfilled]    Invasive Devices     Peripheral Intravenous Line            Peripheral IV 03/26/19 Right Antecubital 835 days          Drain            Closed/Suction Drain Left;Posterior Hip Bulb 15 Fr  1106 days    Closed/Suction Drain Right; Anterior Hip Bulb 15 Fr  1106 days    Closed/Suction Drain Left Thigh 15 Fr  909 days    Closed/Suction Drain Right Thigh Bulb 15 Fr   909 days                Physical Exam  Constitutional:       Appearance: Normal appearance  HENT:      Head: Normocephalic  Mouth/Throat:      Mouth: Mucous membranes are moist    Eyes:      Conjunctiva/sclera: Conjunctivae normal       Pupils: Pupils are equal, round, and reactive to light  Cardiovascular:      Rate and Rhythm: Normal rate and regular rhythm  Pulmonary:      Effort: Pulmonary effort is normal    Abdominal:      General: Abdomen is flat  There is no distension  Tenderness: There is no abdominal tenderness  Musculoskeletal:         General: Normal range of motion  Cervical back: Normal range of motion  Skin:     General: Skin is warm  Capillary Refill: Capillary refill takes less than 2 seconds  Neurological:      General: No focal deficit present  Mental Status: She is alert  Psychiatric:         Mood and Affect: Mood normal          Lab Results: I have personally reviewed pertinent lab results  Imaging: I have personally reviewed pertinent reports  EKG, Pathology, and Other Studies: I have personally reviewed pertinent reports  Code Status: [unfilled]  Advance Directive and Living Will: Yes    Power of :    POLST:    UGI: 6/24/21  IMPRESSION:     Status post gastric sleeve surgery without evidence of ulceration or mucosal abnormality  No reflux was seen  No definite hiatal hernia  Duodenal bulb appears unremarkable    -------------------------------  EGD 6/30/21  FINDINGS:  · Mild, generalized erythematous mucosa in the stomach; performed 6 cold forceps biopsies to rule out H  pylori  · Small sliding hiatal hernia (type I hiatal hernia) without Pamila Rosholt lesions present        SPECIMENS:  ID Type Source Tests Collected by Time Destination   1 : Gastric pouch biopsy r/o h pylori Tissue Stomach TISSUE EXAM Nirmala Ivey MD 6/30/2021 1318              IMPRESSION:     Gastritis  Small hiatal hernia most likely sliding type     normal sleeve gastrectomy anatomy    --------------------------------------------  A  Stomach, Gastric pouch biopsy:  - Mild chronic inactive antral and oxyntic gastritis  - Negative for intestinal metaplasia or dysplasia  - No Helicobacter pylori is identified on H&E stained slides  Assessment/Plan:   Karen Becerril is a 77 y o  female who presents for an evaluation of jamila recidivism  She is status post  sleeve gastrectomy on 3/27/2017  she denies heartburn, nausea or vomiting  Her initial weight was 227 with a BMI of 35  Yordan 158 a year after surgery  Current 207 lb with a BMI of 32      She is not exercising as she used to be after surgery, not  watching her diet, report greasing and eating snacks between meals  she is  Back on anti hypertensive medication  she had upper GI study that was normal   Her recent endoscopy did show gastritis small hiatal hernia otherwise normal sleeve  Biopsy did show no infection, dysplasia or cancer  patient to follow-up with  Medical weight management     follow-up once yearly surgical side        Jamin Moreira MD  Bariatric Surgery Fellow  7/8/2021  9:26 AM

## 2021-08-13 ENCOUNTER — OFFICE VISIT (OUTPATIENT)
Dept: BARIATRICS | Facility: CLINIC | Age: 66
End: 2021-08-13

## 2021-08-13 VITALS — WEIGHT: 207.5 LBS | BODY MASS INDEX: 32.02 KG/M2

## 2021-08-13 DIAGNOSIS — Z90.3 POSTGASTRECTOMY MALABSORPTION: Primary | ICD-10-CM

## 2021-08-13 DIAGNOSIS — K91.2 POSTGASTRECTOMY MALABSORPTION: Primary | ICD-10-CM

## 2021-08-13 DIAGNOSIS — E66.9 OBESITY, CLASS I, BMI 30-34.9: ICD-10-CM

## 2021-08-13 DIAGNOSIS — Z98.84 BARIATRIC SURGERY STATUS: ICD-10-CM

## 2021-08-13 PROCEDURE — RECHECK: Performed by: DIETITIAN, REGISTERED

## 2021-08-13 NOTE — PROGRESS NOTES
Bariatric Nutrition Assessment Note    Type of surgery    Vertical sleeve gastrectomy  Surgery Date: 3/27/2017  4 5 years  post-op  Surgeon: Dr Ramila Pagan  77 y o   female     Wt with BMI of 25: 161 2lbs  Pre-Op Excess Wt: 46 3lbs from current weight  Wt 94 1 kg (207 lb 8 oz)   BMI 32 02 kg/m²     [place]- [place] Equation:     Weight maintenance= 1817 kcal/day  Estimated calories for weight loss 817-1317 kcal/day ( 1-2# per wk wt loss - sedentary )  Estimated protein needs 73 3-88 g/day (1 0-1 2 gms/kg IBW )   Estimated fluid needs 3803-4521 ml/day(30-35 ml/kg IBW )      Weight History   Wt Loss Attempts: Exercise  Nutrition Counseling with RD  Self Created Diets (Portion Control, Healthy Food Choices, etc )  Patient has tried the above for 6 months or more with insufficient weight loss or weight regain, which is why patient has requested to be evaluated for weight loss surgery today  Maximum Wt Lost: pt lost 55lbs prior to surgery, lost down to 158lbs after surgery inapprox 6 months  Maintained for about a year and a half, had body lift and both thighs done  Some foods such as breads and pasta added back into diet, as well as snacks  Pt has regained 49 5lbs from post-op saranya  Weight stable at this time per pt report      Review of History and Medications   Past Medical History:   Diagnosis Date    Allergic dermatitis     resolved 01/26/2016    Anxiety     Arthritis     Blind right eye     Depression     Disease of thyroid gland     Encounter for cosmetic surgery 4/30/2018    Added automatically from request for surgery [id number]    Hyperlipidemia     Hypothyroidism (acquired)     hypothyroid    Incontinence of urine in female     Insomnia     Migraine     Morbid obesity ([address] )     TIA (transient ischemic attack)     questionable     Wears glasses      Past Surgical History:   Procedure Laterality Date    BODY LIFT LOWER N/A 6/28/2018    Procedure: LOWER BODY LIFT;  Surgeon: Lorena Moses MD;  Location:  MAIN OR;  Service: Plastics    CATARACT EXTRACTION Left     CHOLECYSTECTOMY      COLONOSCOPY      ESOPHAGOGASTRODUODENOSCOPY N/A 3/27/2017    Procedure: ESOPHAGOGASTRODUODENOSCOPY (EGD); Surgeon: Sukumar Ochoa MD;  [facility];  Service:     EYE SURGERY      HEMORROIDECTOMY      IR IMAGE GUIDED ASPIRATION / DRAINAGE W TUBE  3/26/2019    JOINT REPLACEMENT      oswald TKR    OK EGD TRANSORAL BIOPSY SINGLE/MULTIPLE N/A 3/8/2017    Procedure: ESOPHAGOGASTRODUODENOSCOPY (EGD); Surgeon: Sukumar Ochoa MD;  [facility];   Service: Tilmon Faster OK EXCISE EXCESS SKIN [address] [facility] [address] Bilateral 1/10/2019    Procedure: THIGH LIFT;  Surgeon: Lorena Moses MD;  [facility]: [facility];  Service: Plastics    OK LAP, NADYA RESTRICT PROC, LONGITUDINAL GASTRECTOMY N/A 3/27/2017    Procedure: GASTRECTOMY SLEEVE LAPAROSCOPIC;  Surgeon: Sukumar Ochoa MD;  [facility];  Service: [address] [place] [address] Right     SLEEVE GASTROPLASTY  3/27/2017    TOTAL BODY LIFT       Social History     Socioeconomic History    Marital status: /Civil Union     Spouse name: Not on file    Number of children: Not on file    Years of education: Not on file    Highest education level: Not on file   Occupational History    Not on file   Tobacco Use    Smoking status: Never Smoker    Smokeless tobacco: Never Used   Vaping Use    Vaping Use: Never used   Substance and Sexual Activity    Alcohol use: No    Drug use: No    Sexual activity: Not Currently   Other Topics Concern    Not on file   Social History Narrative    Daily coffee consumption    Exercise frequency    Two children         Social Determinants of Health     Financial Resource Strain:     Difficulty of Paying Living Expenses:    Food Insecurity:     Worried About Running Out of Food in the Last Year:     Harjeet of Food in the Last Year:    Transportation Needs:     Lack of Transportation (Medical):      Lack of Transportation (Non-Medical):    Physical Activity:     Days of Exercise per Week:     Minutes of Exercise per Session:    Stress:     Feeling of Stress :    Social Connections:     Frequency of Communication with Friends and Family:     Frequency of Social Gatherings with Friends and Family:     Attends [employer] Services:     Active Member of Clubs or Organizations:     Attends Club or Organization Meetings:     Marital Status:    Intimate Partner Violence:     Fear of Current or Ex-Partner:     Emotionally Abused:     Physically Abused:     Sexually Abused:        Current Outpatient Medications:     ALPRAZolam (XANAX) 0 25 mg tablet, Take 1 tablet (0 25 mg total) by mouth daily as needed for anxiety (Patient not taking: Reported on 7/8/2021), Disp: 10 tablet, Rfl: 0    amoxicillin (AMOXIL) 500 mg capsule, TAKE 4 CAPSULES 1 HOUR PRIOR TO APPOINTMENT (Patient not taking: Reported on 7/8/2021), Disp: , Rfl:     aspirin 81 MG tablet, Take 1 tablet by mouth daily for 30 days, Disp: 30 tablet, Rfl: 0    betamethasone valerate (VALISONE) 0 1 % cream, Apply topically 2 (two) times a day, Disp: 30 g, Rfl: 0    calcium citrate-vitamin D (CITRACAL+D) 315-200 MG-UNIT per tablet, Take by mouth daily   (Patient not taking: Reported on 7/8/2021), Disp: , Rfl:     eszopiclone (LUNESTA) 3 MG tablet, TAKE 1 TABLET BY MOUTH EVERY DAY AT BEDTIME, Disp: 90 tablet, Rfl: 3    levothyroxine 125 mcg tablet, TAKE 1 TABLET DAILY, Disp: 90 tablet, Rfl: 3    lisinopril (ZESTRIL) 10 mg tablet, Take 1 tablet (10 mg total) by mouth daily, Disp: 90 tablet, Rfl: 3    Multiple Vitamins-Minerals (BARIATRIC FUSION) CHEW, Chew 2 tablets daily   (Patient not taking: Reported on 7/8/2021), Disp: , Rfl:     Multiple Vitamins-Minerals (CENTRUM WOMEN) TABS, Take by mouth Goes between bariatric multi and Centrum, Disp: , Rfl:     PARoxetine (PAXIL) 20 mg tablet, Take 1 tablet (20 mg total) by mouth daily, Disp: 90 tablet, Rfl: 3    rizatriptan (MAXALT) 10 MG tablet, TAKE 1 TABLET DAILY AS NEEDED FOR MIGRAINE, Disp: 18 tablet, Rfl: 5    simvastatin (ZOCOR) 40 mg tablet, TAKE 1 TABLET EVERY EVENING, Disp: 90 tablet, Rfl: 3     Food Intake and Lifestyle Assessment   Food Intake Assessment completed via usual diet recall  Breakfast: coffee, cottage cheese or oatmeal or yogurt  Snack: none   Lunch: noon: salad: occasionally chicken in her salad, low fat dressing; or cup of soup: Tim wedding or clam chowder or broccoli cheddar  Snack: none  Dinner: protein and veggies  Snack: chips and salsa or smart cheddar popcorn or fruit/melon, mini cone every other day  Beverage intake: coffee, propel katz diet iced tea, occasional 5 calorie cranberry juice  Does not drink alcohol  Protein supplement: none  Estimated protein intake per day: 60-90g  Estimated fluid intake per day: 32 oz water, 8 oz soup, 8 oz coffee  Meals eaten away from home: 1-2 time sper week before covid pandemic  Not since covid  Typical meal pattern: 3 meals per day and 1 snacks per day  Eating Behaviors: Frequent snacking/ grazing  Food allergies or intolerances: No Known Allergies  Cultural or Voodoo considerations: does not eat red meat  Does not eat potatoes or bread  Physical Assessment  Physical Activity  Types of exercise: used to walk for an hour 4-5 days per week  Stopped 3 years ago  Mostly sedentary now  Uncomfortable exercising in publicdue to covid  Current physical limitations: previous B/L TKR, still gets some knee pain    Psychosocial Assessment   Support systems: spouse  Socioeconomic factors: retired  Lives with       Nutrition Diagnosis  Diagnosis: Overweight / Obesity (NC-3 3), Undesirable food choices (NB-1 7) and Altered GI function (NC-1 4)  Related to: Physical inactivity and Excessive energy intake  As Evidenced by: BMI >25, Unintentional weight gain and reports of snacking at night and sedentary lifestyle     Nutrition Prescription: Recommend the following diet  Regular    Interventions and Teaching   Discussed pre-op and post-op nutrition guidelines  Patient educated and handouts provided  Capacity of post-surgery stomach  Adequate hydration  Weight loss plateaus/ possibility of weight regain  Exercise  Nutrition considerations after surgery  Meal planning and preparation  Appropriate carbohydrate, protein, and fat intake, and food/fluid choices to maximize safe weight loss, nutrient intake, and tolerance   Dietary and lifestyle changes  Possible problems with poor eating habits  Techniques for self monitoring and keeping daily food journal  Potential for food intolerance after surgery, and ways to deal with them including: lactose intolerance, nausea, reflux, vomiting, diarrhea, food intolerance, appetite changes, gas  Vitamin / Mineral supplementation of:  Pt is currently taking:  Two Centrum MVI  Vitamin D BID  Cranberry pill BID  No calcium- used to take [place]  Pt had CBC, CMP, Lipids, TSH, Vit D, Vit B12 checked on March  Ordered all other annual vitamin/mineral levels today  Education provided to: patient    Barriers to learning: No barriers identified    Readiness to change: action and relapsing    Prior research on procedure: discussed with provider and previous wt  loss surgery    Comprehension: verbalizes understanding     Expected Compliance: fair  Recommendations  Pt is an appropriate candidate for surgery  Not applicable  Pt is referred to medical weight management provider  Pt expressed interest in appetite suppressants      Evaluation / Monitoring  Dietitian to Monitor: Eating pattern as discussed Tolerance of nutrition prescription Body weight Lab values Physical activity    Goals  Food journal, Exercise 30 minutes 5 times per week, Eat 3 meals per day and Eliminate mindless snacking    Time Spent:   1 Hour

## 2021-08-16 NOTE — PROGRESS NOTES
Bariatric Behavioral Health Evaluation    Presenting Problem patient here to improve health, increase activity, prevent family disease and referral to [facility] for weight loss  Is the patient seeking Bariatric Surgery Eval? No If yes how long have you researched this surgery option  Pre-morbid level of function and history of present illness: patient has medical issues including weight gain  Psychiatric/Psychological Treatment Diagnosis: patient reports Mental Health diagnosis of Anxiety and Depression  Patient is prescribed medication by her PCP  Outpatient Counselor No     Psychiatrist No     Have you had Inpatient Treatment? No    Family Constellation (include relationship with each and Psych/Med HX)    Additional comments/stressors related to family/relationships/peer support patient reports good support from   Have stayed close to home since COVID only out for grocery shopping and time with family  Physical/Psychological Assessment:     Appearance: appropriate  Sociability: friendly  Affect: appropriate  Mood: calm  Thought Process: coherent  Speech: normal  Content: no impairment  Orientation: person  Yes , place  Yes , time  Yes , normal attention span  Yes , normal memory  Yes   and normal judgement  Yes   Insight: emotional  fair    Risk Assessment:     none    Recommendations: patient is appropriate for MWM program      Risk of Harm to Self or Others:     Observation:     Interviews this interview only  Based on the previous information, the client presents the following risk of harm to self or others: low     Note Patient reports Mental Health diagnosis of Anxiety and Depression  Patient is prescribed medication by her PCP  Patient reports no tobacco and no alcohol  Reviewed recommendations of no tobacco and alcohol and reasons for it   Patient is appropriate for MWM program

## 2021-08-20 DIAGNOSIS — I10 ESSENTIAL HYPERTENSION: ICD-10-CM

## 2021-08-20 RX ORDER — LISINOPRIL 10 MG/1
10 TABLET ORAL DAILY
Qty: 90 TABLET | Refills: 3 | Status: SHIPPED | OUTPATIENT
Start: 2021-08-20 | End: 2021-11-09 | Stop reason: SDUPTHER

## 2021-08-26 ENCOUNTER — APPOINTMENT (OUTPATIENT)
Dept: LAB | Facility: CLINIC | Age: 66
End: 2021-08-26
Payer: COMMERCIAL

## 2021-08-26 DIAGNOSIS — Z90.3 POSTGASTRECTOMY MALABSORPTION: ICD-10-CM

## 2021-08-26 DIAGNOSIS — K91.2 POSTGASTRECTOMY MALABSORPTION: ICD-10-CM

## 2021-08-26 DIAGNOSIS — Z98.84 BARIATRIC SURGERY STATUS: ICD-10-CM

## 2021-08-26 DIAGNOSIS — E66.9 OBESITY, CLASS I, BMI 30-34.9: ICD-10-CM

## 2021-08-26 LAB
FERRITIN SERPL-MCNC: 68 NG/ML (ref 8–388)
FOLATE SERPL-MCNC: >20 NG/ML (ref 3.1–17.5)
IRON SATN MFR SERPL: 24 %
IRON SERPL-MCNC: 70 UG/DL (ref 50–170)
PTH-INTACT SERPL-MCNC: 73.8 PG/ML (ref 18.4–80.1)
TIBC SERPL-MCNC: 293 UG/DL (ref 250–450)

## 2021-08-26 PROCEDURE — 83550 IRON BINDING TEST: CPT

## 2021-08-26 PROCEDURE — 84630 ASSAY OF ZINC: CPT

## 2021-08-26 PROCEDURE — 83540 ASSAY OF IRON: CPT

## 2021-08-26 PROCEDURE — 84590 ASSAY OF VITAMIN A: CPT

## 2021-08-26 PROCEDURE — 84425 ASSAY OF VITAMIN B-1: CPT

## 2021-08-26 PROCEDURE — 82746 ASSAY OF FOLIC ACID SERUM: CPT

## 2021-08-26 PROCEDURE — 36415 COLL VENOUS BLD VENIPUNCTURE: CPT

## 2021-08-26 PROCEDURE — 82728 ASSAY OF FERRITIN: CPT

## 2021-08-26 PROCEDURE — 83970 ASSAY OF PARATHORMONE: CPT

## 2021-08-27 NOTE — SOCIAL WORK
August 27, 2021        Letty Stringer  [address] [place] [id number]        Jong Saenz,    I am checking in to see how you are doing since we last spoke on 8/12/2021.    As your Care Management Nurse, I work with your doctors to offer support to improve your health. I’m here to help you reach your personal goals for healthy living.    I have been unable to reach you by phone. Please call me as soon as possible to let me know how you are doing. You can reach me at    [phone]  8:00 AM to 4:00 PM CST (Monday-Friday)     If I am not available, you can leave a message on my confidential voicemail.     We want to help you live well and look forward to hearing from you!    Sincerely,    Mari Mcleod, RN  [facility]   Met with patient  Explained role of care management  Patient lives with spouse in a bilevel home with 5 MEKHI  She is independent adl's and ambulation, drives  DME - denies  Past services - denies  [facility]  She has 2 GRISELDA drains in place  She plans on returning home at discharge and does not feel that she will need visiting nurses  Will follow

## 2021-08-30 LAB
VIT A SERPL-MCNC: 41.4 UG/DL (ref 22–69.5)
VIT B1 BLD-SCNC: 190.8 NMOL/L (ref 66.5–200)

## 2021-09-01 LAB — ZINC SERPL-MCNC: 94 UG/DL (ref 44–115)

## 2021-09-10 ENCOUNTER — ANNUAL EXAM (OUTPATIENT)
Dept: OBGYN CLINIC | Facility: CLINIC | Age: 66
End: 2021-09-10
Payer: COMMERCIAL

## 2021-09-10 VITALS
HEIGHT: 67 IN | DIASTOLIC BLOOD PRESSURE: 78 MMHG | BODY MASS INDEX: 32.46 KG/M2 | SYSTOLIC BLOOD PRESSURE: 120 MMHG | WEIGHT: 206.8 LBS

## 2021-09-10 DIAGNOSIS — Z12.31 ENCOUNTER FOR SCREENING MAMMOGRAM FOR MALIGNANT NEOPLASM OF BREAST: ICD-10-CM

## 2021-09-10 DIAGNOSIS — Z01.419 ENCOUNTER FOR ANNUAL ROUTINE GYNECOLOGICAL EXAMINATION: Primary | ICD-10-CM

## 2021-09-10 PROCEDURE — G0101 CA SCREEN;PELVIC/BREAST EXAM: HCPCS | Performed by: OBSTETRICS & GYNECOLOGY

## 2021-09-10 NOTE — PROGRESS NOTES
Assessment/Plan:     Diagnoses and all orders for this visit:    Encounter for annual routine gynecological examination    Encounter for screening mammogram for malignant neoplasm of breast  -     Mammo screening bilateral w 3d & cad; Future             Subjective      Karen Becerril is a 77 y o  female who presents for annual exam  The patient has no complaints today  The patient is not sexually active  The patient is not taking hormone replacement therapy  Patient denies post-menopausal vaginal bleeding  The patient still gets botox injections for OAB; symptoms well-controlled  Menstrual History:  OB History        2    Para        Term                AB        Living   2       SAB        TAB        Ectopic        Multiple        Live Births                    No LMP recorded   Patient is postmenopausal      Past Medical History:   Diagnosis Date    Allergic dermatitis     resolved 2016    Anxiety     Arthritis     Blind right eye     Depression     Disease of thyroid gland     Encounter for cosmetic surgery 2018    Added automatically from request for surgery [id number]    Hyperlipidemia     Hypothyroidism (acquired)     hypothyroid    Incontinence of urine in female     Insomnia     Migraine     Morbid obesity ([address] )     TIA (transient ischemic attack)     questionable     Wears glasses        Family History   Problem Relation Age of Onset    Diabetes Mother     Skin cancer Mother     Arthritis Mother     Cancer Mother     Alzheimer's disease Father     Heart failure Father    Mollie Sizer Diabetes Father     Dementia Father     Cancer Father     Stroke Father     Breast cancer Maternal Grandmother         adenocarcinoma in situ in villous adenoma of the breast       The following portions of the patient's history were reviewed and updated as appropriate: allergies, current medications, past family history, past medical history, past social history, past surgical history and problem list     Review of Systems  Pertinent items are noted in HPI  Objective      /78 (BP Location: Left arm, Patient Position: Sitting, Cuff Size: Large)   Ht 5' 7" (1 702 m)   Wt 93 8 kg (206 lb 12 8 oz)   BMI 32 39 kg/m²     General:   alert and oriented, in no acute distress   Heart:    Breasts: regular rate and rhythm, S1, S2 normal, no murmur, click, rub or gallop   appear normal, no suspicious masses, no skin or nipple changes or axillary nodes     Lungs: clear to auscultation bilaterally   Abdomen: soft, non-tender, without masses or organomegaly   Vulva: normal   Vagina: normal mucosa   Cervix: no lesions   Uterus: normal size, mobile, non-tender   Adnexa: normal adnexa and no mass, fullness, tenderness

## 2021-09-14 ENCOUNTER — OFFICE VISIT (OUTPATIENT)
Dept: BARIATRICS | Facility: CLINIC | Age: 66
End: 2021-09-14
Payer: COMMERCIAL

## 2021-09-14 VITALS
RESPIRATION RATE: 14 BRPM | SYSTOLIC BLOOD PRESSURE: 114 MMHG | DIASTOLIC BLOOD PRESSURE: 68 MMHG | TEMPERATURE: 99.3 F | WEIGHT: 205.5 LBS | HEART RATE: 78 BPM | HEIGHT: 68 IN | BODY MASS INDEX: 31.14 KG/M2

## 2021-09-14 DIAGNOSIS — F41.1 GENERALIZED ANXIETY DISORDER: ICD-10-CM

## 2021-09-14 DIAGNOSIS — E78.2 MIXED HYPERLIPIDEMIA: ICD-10-CM

## 2021-09-14 DIAGNOSIS — I10 ESSENTIAL HYPERTENSION: ICD-10-CM

## 2021-09-14 DIAGNOSIS — E03.9 ACQUIRED HYPOTHYROIDISM: ICD-10-CM

## 2021-09-14 DIAGNOSIS — E66.9 OBESITY, CLASS I, BMI 30-34.9: Primary | ICD-10-CM

## 2021-09-14 DIAGNOSIS — K91.2 POSTGASTRECTOMY MALABSORPTION: ICD-10-CM

## 2021-09-14 DIAGNOSIS — Z90.3 POSTGASTRECTOMY MALABSORPTION: ICD-10-CM

## 2021-09-14 DIAGNOSIS — G43.909 MIGRAINE WITHOUT STATUS MIGRAINOSUS, NOT INTRACTABLE, UNSPECIFIED MIGRAINE TYPE: ICD-10-CM

## 2021-09-14 PROCEDURE — 3074F SYST BP LT 130 MM HG: CPT | Performed by: FAMILY MEDICINE

## 2021-09-14 PROCEDURE — 3078F DIAST BP <80 MM HG: CPT | Performed by: FAMILY MEDICINE

## 2021-09-14 PROCEDURE — 99214 OFFICE O/P EST MOD 30 MIN: CPT | Performed by: FAMILY MEDICINE

## 2021-09-14 RX ORDER — TOPIRAMATE 50 MG/1
50 TABLET, FILM COATED ORAL DAILY
Qty: 30 TABLET | Refills: 1 | Status: SHIPPED | OUTPATIENT
Start: 2021-09-14 | End: 2021-10-06

## 2021-09-14 NOTE — PROGRESS NOTES
Assessment/Plan:    No problem-specific Assessment & Plan notes found for this encounter  Diagnoses and all orders for this visit:    Obesity, Class I, BMI 30-34 9  -     topiramate (TOPAMAX) 50 MG tablet; Take 1 tablet (50 mg total) by mouth daily    Essential hypertension    Migraine without status migrainosus, not intractable, unspecified migraine type  -     topiramate (TOPAMAX) 50 MG tablet; Take 1 tablet (50 mg total) by mouth daily    Acquired hypothyroidism    Postgastrectomy malabsorption    Mixed hyperlipidemia    Generalized anxiety disorder       Sleeve gastrectomy on 3/27/2017 with Dr Salinas Brar  -Patient is pursuing Conservative Program  -Initial weight loss goal of 5-10% weight loss for improved health  -Screening labs-8/26/21  - Discussed topamax as off label option she agrees  Discussed use and common side effects  Initial MWM: 205 5lbs  Current:   Change:   Goal: 170-180lbs    Goals:  Food log (ie ) www myfitnesspal com,sparkpeople  com,loseit com,calorieking  com,etc  baritastic  No sugary beverages  At least 64oz of water daily  Increase physical activity by 10 minutes daily  Gradually increase physical activity to a goal of 5 days per week for 30 minutes of MODERATE intensity PLUS 2 days per week of FULL BODY resistance training  5438-2225 calories per day    Follow up in approximately 6 weeks  with Non-Surgical Physician/Advanced Practitioner  Subjective:   Chief Complaint   Patient presents with    Post-op     post op weight gain consult    Consult       Patient ID: Alva Jordan  is a 77 y o  female with excess weight/obesity here to pursue weight management  Patient is pursuing weight regain  HPI   Sleeve gastrectomy on 3/27/2017 with Dr Salinas Brar    Highest wt: 286lbs  Yordan: 158lbs    Gained 50lb in the past 2-3 years     She feels like she needs "something for apettitie", she would like to get down to 165lbs which will put her in BMI 25, we discussed BMI goals for her age and BMI 25 would be too low  The following portions of the patient's history were reviewed and updated as appropriate: allergies, current medications, past family history, past medical history, past social history, past surgical history and problem list     Review of Systems   Constitutional: Negative for activity change and appetite change  Respiratory: Negative  Cardiovascular: Negative  Gastrointestinal: Negative  Genitourinary: Negative  Musculoskeletal: Negative for arthralgias  Skin: Negative for rash  Psychiatric/Behavioral: Negative  Objective:    /68 (BP Location: Left arm, Patient Position: Sitting, Cuff Size: Adult)   Pulse 78   Temp 99 3 °F (37 4 °C) (Tympanic)   Resp 14   Ht 5' 7 5" (1 715 m)   Wt 93 2 kg (205 lb 8 oz)   BMI 31 71 kg/m²      Physical Exam     Constitutional   General appearance: Abnormal   well developed and obese  Eyes No conjunctival pallor     Musculoskeletal   Gait and station: Normal     Psychiatric   Orientation to person, place and time: Normal     Affect: appropriate

## 2021-09-21 ENCOUNTER — OFFICE VISIT (OUTPATIENT)
Dept: FAMILY MEDICINE CLINIC | Facility: CLINIC | Age: 66
End: 2021-09-21
Payer: COMMERCIAL

## 2021-09-21 ENCOUNTER — TELEPHONE (OUTPATIENT)
Dept: ADMINISTRATIVE | Facility: OTHER | Age: 66
End: 2021-09-21

## 2021-09-21 VITALS
SYSTOLIC BLOOD PRESSURE: 110 MMHG | HEART RATE: 80 BPM | RESPIRATION RATE: 16 BRPM | OXYGEN SATURATION: 97 % | WEIGHT: 204.6 LBS | TEMPERATURE: 98.9 F | DIASTOLIC BLOOD PRESSURE: 70 MMHG | HEIGHT: 67 IN | BODY MASS INDEX: 32.11 KG/M2

## 2021-09-21 DIAGNOSIS — E03.9 ACQUIRED HYPOTHYROIDISM: Primary | ICD-10-CM

## 2021-09-21 DIAGNOSIS — E78.2 MIXED HYPERLIPIDEMIA: ICD-10-CM

## 2021-09-21 DIAGNOSIS — E55.9 VITAMIN D DEFICIENCY: ICD-10-CM

## 2021-09-21 DIAGNOSIS — I10 ESSENTIAL HYPERTENSION: ICD-10-CM

## 2021-09-21 DIAGNOSIS — Z23 NEED FOR VACCINATION: ICD-10-CM

## 2021-09-21 DIAGNOSIS — E53.8 LOW VITAMIN B12 LEVEL: ICD-10-CM

## 2021-09-21 PROCEDURE — G0008 ADMIN INFLUENZA VIRUS VAC: HCPCS

## 2021-09-21 PROCEDURE — 3288F FALL RISK ASSESSMENT DOCD: CPT | Performed by: FAMILY MEDICINE

## 2021-09-21 PROCEDURE — 3725F SCREEN DEPRESSION PERFORMED: CPT | Performed by: FAMILY MEDICINE

## 2021-09-21 PROCEDURE — 3008F BODY MASS INDEX DOCD: CPT | Performed by: FAMILY MEDICINE

## 2021-09-21 PROCEDURE — 90662 IIV NO PRSV INCREASED AG IM: CPT

## 2021-09-21 PROCEDURE — 99214 OFFICE O/P EST MOD 30 MIN: CPT | Performed by: FAMILY MEDICINE

## 2021-09-21 PROCEDURE — 1160F RVW MEDS BY RX/DR IN RCRD: CPT | Performed by: FAMILY MEDICINE

## 2021-09-21 PROCEDURE — 1101F PT FALLS ASSESS-DOCD LE1/YR: CPT | Performed by: FAMILY MEDICINE

## 2021-09-21 PROCEDURE — 1036F TOBACCO NON-USER: CPT | Performed by: FAMILY MEDICINE

## 2021-09-21 NOTE — TELEPHONE ENCOUNTER
----- Message from Dylan Le sent at 9/21/2021  8:14 AM EDT -----  Regarding: Mammogram  09/21/21 8:14 AM    Hello, our patient Lynn Cespedes has had Mammogram completed/performed  Please assist in updating the patient chart by Care Every Where under other Labs The date of service is 09/02/2021       Thank you,  Dylan Le  [facility] JOMAR GUAMAN

## 2021-09-21 NOTE — PROGRESS NOTES
Assessment/Plan:    1  Acquired hypothyroidism  Assessment & Plan:  Stable on levo  Check tsh in march    Orders:  -     TSH, 3rd generation with Free T4 reflex; Future; Expected date: 03/01/2022    2  Essential hypertension  Assessment & Plan:  Stable on current med      3  Mixed hyperlipidemia  Assessment & Plan:  Check lipids  Stable on zocor    Orders:  -     Comprehensive metabolic panel; Future; Expected date: 03/01/2022  -     Lipid Panel with Direct LDL reflex; Future; Expected date: 03/01/2022    4  Low vitamin B12 level  Assessment & Plan:  Check vitamin b12    Orders:  -     Vitamin B12; Future; Expected date: 03/01/2022    5  Vitamin D deficiency  Assessment & Plan:  Vitamin d stable  Check levels in march    Orders:  -     Vitamin D 25 hydroxy; Future; Expected date: 03/01/2022    6  Need for vaccination  -     influenza vaccine, high-dose, PF 0 7 mL (FLUZONE HIGH-DOSE)      Depression Screening and Follow-up Plan:   Patient was screened for depression during today's encounter  They screened negative with a PHQ-2 score of 0  There are no Patient Instructions on file for this visit  Return in about 6 months (around 3/21/2022)  Subjective:      Patient ID: Alva Jordan is a 77 y o  female  Chief Complaint   Patient presents with    Follow-up     Patient here for follow up on Hypertension        Here for follow up  Overall doing well  Labs reviewed  Will get flu shot today    Hypertension  This is a chronic problem  The current episode started more than 1 year ago  The problem is unchanged  The problem is controlled  There are no associated agents to hypertension  Risk factors for coronary artery disease include dyslipidemia and post-menopausal state  The current treatment provides significant improvement  There are no compliance problems  Hyperlipidemia  This is a chronic problem  The current episode started more than 1 year ago  The problem is controlled   Exacerbating diseases include hypothyroidism  There are no known factors aggravating her hyperlipidemia  Current antihyperlipidemic treatment includes statins  There are no compliance problems  The following portions of the patient's history were reviewed and updated as appropriate: allergies, current medications, past family history, past medical history, past social history, past surgical history and problem list     Review of Systems   Constitutional: Negative  HENT: Negative  Eyes: Negative  Respiratory: Negative  Cardiovascular: Negative  Gastrointestinal: Negative  Endocrine: Negative  Genitourinary: Negative  Musculoskeletal: Negative  Skin: Negative  Allergic/Immunologic: Negative  Neurological: Negative  Hematological: Negative  Psychiatric/Behavioral: Negative            Current Outpatient Medications   Medication Sig Dispense Refill    ALPRAZolam (XANAX) 0 25 mg tablet Take 1 tablet (0 25 mg total) by mouth daily as needed for anxiety 10 tablet 0    aspirin 81 MG tablet Take 1 tablet by mouth daily for 30 days 30 tablet 0    betamethasone valerate (VALISONE) 0 1 % cream Apply topically 2 (two) times a day 30 g 0    calcium citrate-vitamin D (CITRACAL+D) 315-200 MG-UNIT per tablet Take by mouth daily       eszopiclone (LUNESTA) 3 MG tablet TAKE 1 TABLET BY MOUTH EVERY DAY AT BEDTIME 90 tablet 3    levothyroxine 125 mcg tablet TAKE 1 TABLET DAILY 90 tablet 3    lisinopril (ZESTRIL) 10 mg tablet Take 1 tablet (10 mg total) by mouth daily 90 tablet 3    Multiple Vitamins-Minerals (CENTRUM WOMEN) TABS Take by mouth Goes between bariatric multi and Centrum      PARoxetine (PAXIL) 20 mg tablet Take 1 tablet (20 mg total) by mouth daily 90 tablet 3    rizatriptan (MAXALT) 10 MG tablet TAKE 1 TABLET DAILY AS NEEDED FOR MIGRAINE 18 tablet 5    simvastatin (ZOCOR) 40 mg tablet TAKE 1 TABLET EVERY EVENING 90 tablet 3    topiramate (TOPAMAX) 50 MG tablet Take 1 tablet (50 mg total) by mouth daily 30 tablet 1    amoxicillin (AMOXIL) 500 mg capsule TAKE 4 CAPSULES 1 HOUR PRIOR TO APPOINTMENT (Patient not taking: Reported on 7/8/2021)      Multiple Vitamins-Minerals (BARIATRIC FUSION) CHEW Chew 2 tablets daily   (Patient not taking: Reported on 9/14/2021)       No current facility-administered medications for this visit  Objective:    /70   Pulse 80   Temp 98 9 °F (37 2 °C)   Resp 16   Ht 5' 7" (1 702 m)   Wt 92 8 kg (204 lb 9 6 oz)   SpO2 97%   BMI 32 04 kg/m²        Physical Exam  Vitals and nursing note reviewed  Constitutional:       Appearance: Normal appearance  HENT:      Head: Normocephalic and atraumatic  Eyes:      Extraocular Movements: Extraocular movements intact  Conjunctiva/sclera: Conjunctivae normal       Pupils: Pupils are equal, round, and reactive to light  Cardiovascular:      Rate and Rhythm: Normal rate and regular rhythm  Pulses: Normal pulses  Heart sounds: Normal heart sounds  Pulmonary:      Effort: Pulmonary effort is normal       Breath sounds: Normal breath sounds  Abdominal:      General: Abdomen is flat  Palpations: Abdomen is soft  Musculoskeletal:         General: Normal range of motion  Cervical back: Normal range of motion and neck supple  Skin:     General: Skin is warm  Capillary Refill: Capillary refill takes less than 2 seconds  Neurological:      General: No focal deficit present  Mental Status: She is alert and oriented to person, place, and time  Psychiatric:         Mood and Affect: Mood normal          Behavior: Behavior normal          Thought Content:  Thought content normal          Judgment: Judgment normal                 Keturah Galeton, DO

## 2021-09-21 NOTE — TELEPHONE ENCOUNTER
Upon review of the In Basket request we were able to locate, review, and update the patient chart as requested for Mammogram     Any additional questions or concerns should be emailed to the Practice Liaisons via [email or web address] email, please do not reply via In Basket      Thank you  Julian Mendoza

## 2021-09-27 ENCOUNTER — OFFICE VISIT (OUTPATIENT)
Dept: OBGYN CLINIC | Facility: CLINIC | Age: 66
End: 2021-09-27
Payer: COMMERCIAL

## 2021-09-27 DIAGNOSIS — N93.9 ABNORMAL UTERINE BLEEDING (AUB): Primary | ICD-10-CM

## 2021-09-27 PROCEDURE — 58100 BIOPSY OF UTERUS LINING: CPT | Performed by: OBSTETRICS & GYNECOLOGY

## 2021-09-27 PROCEDURE — 88305 TISSUE EXAM BY PATHOLOGIST: CPT | Performed by: PATHOLOGY

## 2021-09-27 NOTE — PROGRESS NOTES
Assessment/Plan:    No problem-specific Assessment & Plan notes found for this encounter  Subjective:      Patient ID: Hero Hannah is a 77 y o  female  HPI        Review of Systems      Objective: There were no vitals taken for this visit           Physical Exam

## 2021-09-27 NOTE — PROGRESS NOTES
Assessment/Plan: this 66-year-old patient experience bleeding similar to a   Menstrual flow Over the last few days  She is seen today for evaluation of this bleeding  No problem-specific Assessment & Plan notes found for this encounter  Subjective:      Patient ID: Shannon Hampton is a 77 y o  female  This 66-year-old patient was seen for her annual gyn evaluation September 21, 2021 at which time she had a normal exam   Over the last few days she has experienced vaginal bleeding similar to a menstrual flow  She was evaluated in [facility] this past night and on ultrasound found to have a 2 cm thickened endometrium  Her uterine size was normal and her ovaries were normal   Presently the bleeding is minimal   An endometrial biopsy will be done today  Review of Systems   Constitutional: Negative  Genitourinary:        The patient does get Botox injections near her urethra for urine stress incontinence  This is given once year  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 and is treated for this with steroid creams topically  Musculoskeletal: Negative  Allergic/Immunologic: Negative  Neurological: Negative  Psychiatric/Behavioral: Negative  Objective: There were no vitals taken for this visit  Physical Exam  Genitourinary:     Comments: Her uterus is anterior mobile normal size  No adnexal masses are identified  Cervix is normal to appearance there is dark blood coming from the endocervical canal   The patient was placed in the dorsal lithotomy position the cervix was treated with Betadine solution and a tenaculum placed on the anterior  Lip of the cervix  The  cervical canal  Was dilated with the cervical dilator  The a Pipelle was inserted into the endometrial cavity which measured about 6 5 cm  Three passes were made and dark blood and perhaps some tissue was obtained  The patient tolerated the procedure well

## 2021-09-27 NOTE — PATIENT INSTRUCTIONS
An endometrial biopsy was performed to evaluate postmenopausal bleeding  The patient tolerated the procedure well I suggested that we may get  a report within a week and will call her with that information

## 2021-09-27 NOTE — PROGRESS NOTES
Assessment/Plan:    No problem-specific Assessment & Plan notes found for this encounter  Subjective:      Patient ID: Sarah Magallon is a 77 y o  female  HPI        Review of Systems      Objective: There were no vitals taken for this visit           Physical Exam

## 2021-09-29 ENCOUNTER — TELEPHONE (OUTPATIENT)
Dept: OBGYN CLINIC | Facility: CLINIC | Age: 66
End: 2021-09-29

## 2021-09-29 NOTE — TELEPHONE ENCOUNTER
----- Message from Yolanda Jaren sent at 9/29/2021 12:34 PM EDT -----  Regarding: Test Results Question  Contact: [phone]  Hi Dr Aileen Gee,  Though I dont have my biopsy results yet, I do have anxiety and questions  I had heavy bleeding over the weekend, concerning enough, that I went to the ER on Sunday  The results of my ultrasound: complex thickening of the endometrium suggestive of endometrial tumor, endometrial lesion and mass of uterus  The ER doctor thought perhaps because of my age my gynecologist would suggest a hysterectomy because eventually this thickening could become cancerous  Dr Marcela Guadalupe said perhaps only a D&C would be needed  But after discussions with my family, a D&C wont take care of the thickening of the endometrium and I could perhaps have to have another procedure later on  And if its a possibility I could get cancer later on, shouldnt I eliminate that chance now? And what if where he took the biopsy isnt actually where the tumor is? I have also read this could cause incontinence, which I have been dealing with for years  I know we have no medical training , but I have such anxiety about this and personally feel like a hysterectomy might be the way to go, as suggested by the ER doctor  My mother and 2 of my sisters have had hysterectomies though I dont know the medical reasons behind them  I know my mom had cervical cancer and radiation treatment afterwards  I will come in to talk to you at a moments notice, though I also understand I should wait for my results, its just hard  Thank you for listening      Ernie Gilbert

## 2021-10-01 ENCOUNTER — TELEPHONE (OUTPATIENT)
Dept: HEMATOLOGY ONCOLOGY | Facility: CLINIC | Age: 66
End: 2021-10-01

## 2021-10-01 ENCOUNTER — TELEPHONE (OUTPATIENT)
Dept: OBGYN CLINIC | Facility: CLINIC | Age: 66
End: 2021-10-01

## 2021-10-06 DIAGNOSIS — G43.909 MIGRAINE WITHOUT STATUS MIGRAINOSUS, NOT INTRACTABLE, UNSPECIFIED MIGRAINE TYPE: ICD-10-CM

## 2021-10-06 DIAGNOSIS — E66.9 OBESITY, CLASS I, BMI 30-34.9: ICD-10-CM

## 2021-10-06 RX ORDER — TOPIRAMATE 50 MG/1
TABLET, FILM COATED ORAL
Qty: 30 TABLET | Refills: 1 | Status: SHIPPED | OUTPATIENT
Start: 2021-10-06 | End: 2021-10-26 | Stop reason: SDUPTHER

## 2021-10-14 NOTE — PROGRESS NOTES
Patient : Josie Ballard Age: 70 year old Sex: female   MRN: [id number] Encounter Date: 10/14/2021      History     Chief Complaint:  Left wrist injury    Patient describes feeling dizzy and subsequently falling and injuring her left wrist.  Patient denies any loss of consciousness.  There was no head or neck injury.  She denies any chest pain or shortness of breath.  She mainly complains of pain to her left wrist and has obvious deformity on presentation.  She seems to be comfortable otherwise without any obvious distress.          MEDICATIONS:  Medications reviewed with patient    ALLERGIES:  Reviewed    PAST MEDICAL HISTORY:   Significant for fibromyalgia, hypertension, history of PUD/gastric antrum ulcer, irritable bowel syndrome, recurrent UTIs, history of GI bleeding, hyperlipidemia, depression, and scoliosis.     PAST MEDICAL HISTORY:  Significant for bilateral hip replacement.  Right femoral ORIF.     FAMILY AND SOCIAL HISTORY:  Negative for any significant family or social history      Review of Systems   Constitutional: Negative.    HENT: Negative.    Eyes: Negative.    Respiratory: Negative.  Negative for chest tightness and shortness of breath.    Cardiovascular: Negative.  Negative for chest pain and palpitations.   Gastrointestinal: Negative.  Negative for abdominal pain, blood in stool, constipation, diarrhea, nausea and vomiting.   Endocrine: Negative.    Genitourinary: Negative.  Negative for difficulty urinating, dysuria and flank pain.   Musculoskeletal: Negative.  Negative for back pain, neck pain and neck stiffness.        Wrist pain   Skin: Negative.    Allergic/Immunologic: Negative.    Neurological: Negative.  Negative for weakness and headaches.   Hematological: Negative.    Psychiatric/Behavioral: Negative.        Physical Exam     ED Triage Vitals [10/14/21 0913]   ED Triage Vitals Group      Temp 99.8 °F (37.7 °C)      Heart Rate 74      Resp (!) 22      BP (!) 149/121      SpO2 95 %      
Rob Pacheco presents today in follow-up, status post bilateral thigh lift, she has recurrent seroma of the left distal thigh rule the knee  At today's visit after prepping the area with ChloraPrep 30 cc of straw-colored fluid was aspirated, an additional approximately 30 cc sees was then expressed with digital pressure  Both thighs were rewrapped, she will be seen again toward the end of the week, and again in 1 week  She knows what to look for in regard to any signs of infection 
EtCO2 mmHg       Height       Weight 179 lb (81.2 kg)      Weight Scale Used ED Stated      BMI (Calculated)       IBW/kg (Calculated)      PULSE OXIMETRY: Oxygen saturation is within normal limits on room air    Physical Exam  Vitals reviewed.   HENT:      Head: Normocephalic and atraumatic.      Mouth/Throat:      Mouth: Mucous membranes are moist.   Eyes:      Extraocular Movements: Extraocular movements intact.      Pupils: Pupils are equal, round, and reactive to light.   Neck:      Comments: No midline tenderness or bony step-off  Cardiovascular:      Rate and Rhythm: Normal rate and regular rhythm.      Pulses: Normal pulses.      Heart sounds: Normal heart sounds.      Comments: No chest wall tenderness or flail segments  Pulmonary:      Effort: Pulmonary effort is normal.      Breath sounds: Normal breath sounds.   Abdominal:      General: Abdomen is flat. Bowel sounds are normal.      Palpations: Abdomen is soft.      Comments: No palpable abdominal tenderness, there is no rebound or rigidity and bowel sounds are intact   Musculoskeletal:         General: Normal range of motion.      Cervical back: Normal range of motion and neck supple. No rigidity. No muscular tenderness.      Comments: Patient with obvious swan-neck deformity to the left wrist, no lacerations or wounds to indicate open fracture, extremity is distally neurovascularly intact, all surrounding compartments are soft   Skin:     General: Skin is warm.   Neurological:      General: No focal deficit present.      Mental Status: She is alert and oriented to person, place, and time.      Comments: Patient unable to cooperate with full neurological exam and is able to move all extremities without obvious focal neurological deficit.   Psychiatric:         Mood and Affect: Mood normal.         ED Course     Procedures    Lab Results     Results for orders placed or performed during the hospital encounter of 10/14/21   Comprehensive Metabolic Panel 
  Result Value Ref Range    Fasting Status      Sodium 142 135 - 145 mmol/L    Potassium 3.7 3.4 - 5.1 mmol/L    Chloride 104 98 - 107 mmol/L    Carbon Dioxide 26 21 - 32 mmol/L    Anion Gap 16 10 - 20 mmol/L    Glucose 127 (H) 70 - 99 mg/dL    BUN 19 6 - 20 mg/dL    Creatinine 0.99 (H) 0.51 - 0.95 mg/dL    Glomerular Filtration Rate 58 (L) >=60    BUN/ Creatinine Ratio 19 7 - 25    Calcium 8.8 8.4 - 10.2 mg/dL    Bilirubin, Total 0.4 0.2 - 1.0 mg/dL    GOT/AST 25 <=37 Units/L    GPT/ALT 30 <64 Units/L    Alkaline Phosphatase 71 45 - 117 Units/L    Albumin 3.3 (L) 3.6 - 5.1 g/dL    Protein, Total 6.9 6.4 - 8.2 g/dL    Globulin 3.6 2.0 - 4.0 g/dL    A/G Ratio 0.9 (L) 1.0 - 2.4   Troponin I Ultra Sensitive   Result Value Ref Range    Troponin I, Ultra Sensitive <0.02 <=0.04 ng/mL   CBC with Automated Differential (performable only)   Result Value Ref Range    WBC 14.2 (H) 4.2 - 11.0 K/mcL    RBC 4.05 4.00 - 5.20 mil/mcL    HGB 13.3 12.0 - 15.5 g/dL    HCT 40.2 36.0 - 46.5 %    MCV 99.3 78.0 - 100.0 fl    MCH 32.8 26.0 - 34.0 pg    MCHC 33.1 32.0 - 36.5 g/dL    RDW-CV 12.7 11.0 - 15.0 %    RDW-SD 46.7 39.0 - 50.0 fL     140 - 450 K/mcL    NRBC 0 <=0 /100 WBC    Neutrophil, Percent 59 %    Lymphocytes, Percent 31 %    Mono, Percent 7 %    Eosinophils, Percent 2 %    Basophils, Percent 1 %    Immature Granulocytes 0 %    Absolute Neutrophils 8.5 (H) 1.8 - 7.7 K/mcL    Absolute Lymphocytes 4.3 (H) 1.0 - 4.0 K/mcL    Absolute Monocytes 0.9 0.3 - 0.9 K/mcL    Absolute Eosinophils  0.3 0.0 - 0.5 K/mcL    Absolute Basophils 0.1 0.0 - 0.3 K/mcL    Absolute Immmature Granulocytes 0.1 0.0 - 0.2 K/mcL       EKG Results     EKG #1: EKG reviewed, normal sinus rhythm, baseline artifact secondary to patient's inability to hold still, no acute ischemic changes appreciated      Cardiac monitor: Normal sinus rhythm, baseline artifact    Radiology Results     Imaging Results          XR WRIST MIN 3 VIEWS LEFT (Final result)  
Result time 10/14/21 11:05:00   Procedure changed from XR WRIST 2 VIEWS LEFT     Final result                 Impression:      Comparison is made to prereduction study from the same date. Cast obscures  some bony detail.     Intra-articular comminuted distal radial fracture shows improved alignment  and less displacement when compared to the prereduction study. There still  continues to be approximately one half shaft width  dorsal displacement of  the distal radial fracture fragment relative to the proximal. Moderately  displaced ulnar styloid fracture. Mildly comminuted the distal ulnar  fracture along the ulnar side. No dislocation.    Electronically Signed by: JEFF WILLAMS MD   Signed on: 10/14/2021 11:05 AM                Narrative:    XR WRIST MIN 3 VIEWS LEFT    TECHNIQUE: XR WRIST MIN 3 VIEWS LEFT.  Multiple digital radiographic  projections were obtained. Number of views:3    CLINICAL INDICATION:  post reduction . Pain.                               XR WRIST 2 VIEWS LEFT (Final result)  Result time 10/14/21 09:35:21   Procedure changed from XR WRIST MIN 3 VIEWS LEFT     Final result                 Impression:      Examination has the patient's due to difficulties in positioning the  patient. Only 2 portable projections are available. There is a severely  displaced angulated fracture involving the a distal radial metaphyseal  region with one full shaft width dorsal displacement of the distal fracture  fragment relative to the proximal. There is likely intra-articular  extraction of comminuted fracture lines. There may be an ulnar styloid  fracture. Proximal row carpal bones appear to be  aligned with the a  severely displaced distal radial articular surface. 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    Electronically Signed by: JEFF WILLAMS MD   Signed on: 10/14/2021 9:35 AM                Narrative:    XR WRIST 2 VIEWS LEFT    TECHNIQUE: XR WRIST 2 VIEWS LEFT.  Multiple digital radiographic  projections were 
obtained. Number of views:3    CLINICAL INDICATION:  deformity . Pain.                                ED Medication Orders (From admission, onward)    Ordered Start     Status Ordering Provider    10/14/21 0940 10/14/21 0940  LIDOCAINE HCL (PF) 1 % IJ SOLN Pyxis Override        Note to Pharmacy: Leonel Vides   : cabinet override    Ordered     10/14/21 0916 10/14/21 0930  morphine injection 2 mg  ONCE         Last MAR action: Given CONWAY, MIKAELA P    10/14/21 0916 10/14/21 0930  ondansetron (ZOFRAN) injection 4 mg  ONCE         Last MAR action: Given CONWAY, MIKAELA P    10/14/21 0909 10/14/21 0909  FENTANYL CITRATE 0.05 MG/ML IJ SOLN (WRAPPED) Pyxis Override        Note to Pharmacy: Lisa Young   : cabinet override    Ordered                MDM  Number of Diagnoses or Management Options  Diagnosis management comments: 9:45AM Patient has wrist fracture with deformity as described above.  Hematoma block was performed in typical fashion.  5 mL of 1% lidocaine was injected into the hematoma site and patient achieved adequate anesthesia of the area.  Deformity was manipulated and wrist was straightened and placed into a fingertrap for additional traction.  Better anatomical alignment was achieved and patient was placed in a short arm post mold splint.  Patient will be given orthopedic follow-up.  She was given extensive instructions and advised to follow-up in the next 1 to 2 days.  She was advised to return if symptoms change or worsen.  With regards to patient's initial fall.  She describes some mild dizziness and I suspect this is partly related to the fentanyl patches that she has on.  They were removed here in the emergency department.  She was advised to follow-up with her primary physician as well.  She was given extensive instructions and advised to return if symptoms change or worsen.    11:45AM Patient resting comfortably without any apparent distress.  Case was discussed with Dr. Jaimes the 
orthopedic physician on-call.  After reduction the x-ray is improved but there is continued malalignment.  Dr. Jaimes believes that patient will likely need surgical intervention but does not feel that any emergent intervention is necessary.  She recommends follow-up with their hand specialist Dr. Belle tomorrow.  Patient and  were advised of this and they are agreeable with plan.  They are given extensive instructions and advised to absolutely follow-up tomorrow.  They are advised to return if symptoms change or worsen.      Clinical Impression     ED Diagnosis   1. Closed fracture of left wrist, initial encounter         Disposition        Discharge 10/14/2021 11:51 AM  Josie Ballard discharge to home/self care.                         Renato Newberry,   10/15/21 1146    
No

## 2021-10-21 ENCOUNTER — LAB REQUISITION (OUTPATIENT)
Dept: LAB | Facility: HOSPITAL | Age: 66
End: 2021-10-21
Payer: COMMERCIAL

## 2021-10-21 ENCOUNTER — OFFICE VISIT (OUTPATIENT)
Dept: LAB | Facility: CLINIC | Age: 66
End: 2021-10-21
Payer: COMMERCIAL

## 2021-10-21 ENCOUNTER — CONSULT (OUTPATIENT)
Dept: GYNECOLOGIC ONCOLOGY | Facility: HOSPITAL | Age: 66
End: 2021-10-21
Payer: COMMERCIAL

## 2021-10-21 ENCOUNTER — HOSPITAL ENCOUNTER (OUTPATIENT)
Dept: RADIOLOGY | Facility: HOSPITAL | Age: 66
Discharge: HOME | End: 2021-10-21
Attending: OBSTETRICS & GYNECOLOGY
Payer: COMMERCIAL

## 2021-10-21 ENCOUNTER — APPOINTMENT (OUTPATIENT)
Dept: LAB | Facility: CLINIC | Age: 66
End: 2021-10-21
Payer: COMMERCIAL

## 2021-10-21 VITALS
WEIGHT: 191.8 LBS | TEMPERATURE: 100.4 F | HEIGHT: 67 IN | RESPIRATION RATE: 18 BRPM | SYSTOLIC BLOOD PRESSURE: 124 MMHG | DIASTOLIC BLOOD PRESSURE: 68 MMHG | HEART RATE: 72 BPM | OXYGEN SATURATION: 98 % | BODY MASS INDEX: 30.1 KG/M2

## 2021-10-21 DIAGNOSIS — N94.89 ENDOMETRIAL MASS: ICD-10-CM

## 2021-10-21 DIAGNOSIS — E03.9 HYPOTHYROIDISM (ACQUIRED): ICD-10-CM

## 2021-10-21 DIAGNOSIS — N94.89 ENDOMETRIAL MASS: Primary | ICD-10-CM

## 2021-10-21 DIAGNOSIS — N95.0 PMB (POSTMENOPAUSAL BLEEDING): ICD-10-CM

## 2021-10-21 DIAGNOSIS — N94.89 OTHER SPECIFIED CONDITIONS ASSOCIATED WITH FEMALE GENITAL ORGANS AND MENSTRUAL CYCLE: ICD-10-CM

## 2021-10-21 LAB
ABO GROUP BLD: NORMAL
BLD GP AB SCN SERPL QL: NEGATIVE
EST. AVERAGE GLUCOSE BLD GHB EST-MCNC: 103 MG/DL
HBA1C MFR BLD: 5.2 %
RH BLD: POSITIVE
SPECIMEN EXPIRATION DATE: NORMAL
TSH SERPL DL<=0.05 MIU/L-ACNC: 0.25 UIU/ML (ref 0.36–3.74)

## 2021-10-21 PROCEDURE — 86901 BLOOD TYPING SEROLOGIC RH(D): CPT | Performed by: OBSTETRICS & GYNECOLOGY

## 2021-10-21 PROCEDURE — 99205 OFFICE O/P NEW HI 60 MIN: CPT | Performed by: OBSTETRICS & GYNECOLOGY

## 2021-10-21 PROCEDURE — 86900 BLOOD TYPING SEROLOGIC ABO: CPT | Performed by: OBSTETRICS & GYNECOLOGY

## 2021-10-21 PROCEDURE — 84443 ASSAY THYROID STIM HORMONE: CPT

## 2021-10-21 PROCEDURE — 3074F SYST BP LT 130 MM HG: CPT | Performed by: OBSTETRICS & GYNECOLOGY

## 2021-10-21 PROCEDURE — 93005 ELECTROCARDIOGRAM TRACING: CPT

## 2021-10-21 PROCEDURE — 83036 HEMOGLOBIN GLYCOSYLATED A1C: CPT

## 2021-10-21 PROCEDURE — 3078F DIAST BP <80 MM HG: CPT | Performed by: OBSTETRICS & GYNECOLOGY

## 2021-10-21 PROCEDURE — 71046 X-RAY EXAM CHEST 2 VIEWS: CPT

## 2021-10-21 PROCEDURE — 86850 RBC ANTIBODY SCREEN: CPT | Performed by: OBSTETRICS & GYNECOLOGY

## 2021-10-21 PROCEDURE — 36415 COLL VENOUS BLD VENIPUNCTURE: CPT

## 2021-10-21 RX ORDER — HEPARIN SODIUM 5000 [USP'U]/ML
5000 INJECTION, SOLUTION INTRAVENOUS; SUBCUTANEOUS
Status: CANCELLED | OUTPATIENT
Start: 2021-10-21 | End: 2021-10-22

## 2021-10-21 RX ORDER — ACETAMINOPHEN 325 MG/1
975 TABLET ORAL ONCE
Status: CANCELLED | OUTPATIENT
Start: 2021-10-21 | End: 2021-10-21

## 2021-10-21 RX ORDER — SODIUM CHLORIDE, SODIUM LACTATE, POTASSIUM CHLORIDE, CALCIUM CHLORIDE 600; 310; 30; 20 MG/100ML; MG/100ML; MG/100ML; MG/100ML
125 INJECTION, SOLUTION INTRAVENOUS CONTINUOUS
Status: CANCELLED | OUTPATIENT
Start: 2021-10-21

## 2021-10-21 RX ORDER — GABAPENTIN 100 MG/1
100 CAPSULE ORAL ONCE
Status: CANCELLED | OUTPATIENT
Start: 2021-10-21 | End: 2021-10-21

## 2021-10-21 RX ORDER — CEFAZOLIN SODIUM 2 G/50ML
2000 SOLUTION INTRAVENOUS ONCE
Status: CANCELLED | OUTPATIENT
Start: 2021-10-21 | End: 2021-10-21

## 2021-10-22 LAB
ATRIAL RATE: 85 BPM
P AXIS: -1 DEGREES
PR INTERVAL: 126 MS
QRS AXIS: 34 DEGREES
QRSD INTERVAL: 94 MS
QT INTERVAL: 360 MS
QTC INTERVAL: 428 MS
T WAVE AXIS: 0 DEGREES
VENTRICULAR RATE: 85 BPM

## 2021-10-22 PROCEDURE — 93010 ELECTROCARDIOGRAM REPORT: CPT | Performed by: INTERNAL MEDICINE

## 2021-10-26 ENCOUNTER — OFFICE VISIT (OUTPATIENT)
Dept: BARIATRICS | Facility: CLINIC | Age: 66
End: 2021-10-26
Payer: COMMERCIAL

## 2021-10-26 VITALS
HEART RATE: 92 BPM | TEMPERATURE: 99.7 F | SYSTOLIC BLOOD PRESSURE: 118 MMHG | WEIGHT: 190.1 LBS | DIASTOLIC BLOOD PRESSURE: 70 MMHG | HEIGHT: 68 IN | BODY MASS INDEX: 28.81 KG/M2

## 2021-10-26 DIAGNOSIS — E66.9 OBESITY, CLASS I, BMI 30-34.9: ICD-10-CM

## 2021-10-26 DIAGNOSIS — E03.9 HYPOTHYROIDISM (ACQUIRED): ICD-10-CM

## 2021-10-26 DIAGNOSIS — Z90.3 POSTGASTRECTOMY MALABSORPTION: ICD-10-CM

## 2021-10-26 DIAGNOSIS — K91.2 POSTGASTRECTOMY MALABSORPTION: ICD-10-CM

## 2021-10-26 DIAGNOSIS — E78.2 MIXED HYPERLIPIDEMIA: ICD-10-CM

## 2021-10-26 DIAGNOSIS — G43.909 MIGRAINE WITHOUT STATUS MIGRAINOSUS, NOT INTRACTABLE, UNSPECIFIED MIGRAINE TYPE: ICD-10-CM

## 2021-10-26 DIAGNOSIS — I10 ESSENTIAL HYPERTENSION: Primary | ICD-10-CM

## 2021-10-26 PROCEDURE — 1036F TOBACCO NON-USER: CPT | Performed by: FAMILY MEDICINE

## 2021-10-26 PROCEDURE — 1160F RVW MEDS BY RX/DR IN RCRD: CPT | Performed by: FAMILY MEDICINE

## 2021-10-26 PROCEDURE — 3008F BODY MASS INDEX DOCD: CPT | Performed by: FAMILY MEDICINE

## 2021-10-26 PROCEDURE — 99214 OFFICE O/P EST MOD 30 MIN: CPT | Performed by: FAMILY MEDICINE

## 2021-10-26 RX ORDER — TOPIRAMATE 50 MG/1
50 TABLET, FILM COATED ORAL 2 TIMES DAILY
Qty: 180 TABLET | Refills: 1 | Status: SHIPPED | OUTPATIENT
Start: 2021-10-26 | End: 2021-11-01

## 2021-11-09 DIAGNOSIS — I10 ESSENTIAL HYPERTENSION: ICD-10-CM

## 2021-11-09 RX ORDER — LISINOPRIL 10 MG/1
10 TABLET ORAL DAILY
Qty: 90 TABLET | Refills: 3 | Status: SHIPPED | OUTPATIENT
Start: 2021-11-09

## 2021-11-11 ENCOUNTER — ANESTHESIA EVENT (OUTPATIENT)
Dept: PERIOP | Facility: HOSPITAL | Age: 66
End: 2021-11-11
Payer: COMMERCIAL

## 2021-11-12 ENCOUNTER — TELEPHONE (OUTPATIENT)
Dept: OTHER | Facility: OTHER | Age: 66
End: 2021-11-12

## 2021-11-16 ENCOUNTER — HOSPITAL ENCOUNTER (OUTPATIENT)
Facility: HOSPITAL | Age: 66
Setting detail: OUTPATIENT SURGERY
Discharge: HOME | End: 2021-11-16
Attending: OBSTETRICS & GYNECOLOGY | Admitting: OBSTETRICS & GYNECOLOGY
Payer: COMMERCIAL

## 2021-11-16 ENCOUNTER — ANESTHESIA (OUTPATIENT)
Dept: PERIOP | Facility: HOSPITAL | Age: 66
End: 2021-11-16
Payer: COMMERCIAL

## 2021-11-16 VITALS
SYSTOLIC BLOOD PRESSURE: 122 MMHG | HEIGHT: 67 IN | RESPIRATION RATE: 22 BRPM | WEIGHT: 190 LBS | TEMPERATURE: 97.1 F | OXYGEN SATURATION: 99 % | DIASTOLIC BLOOD PRESSURE: 60 MMHG | BODY MASS INDEX: 29.82 KG/M2 | HEART RATE: 69 BPM

## 2021-11-16 DIAGNOSIS — N95.0 PMB (POSTMENOPAUSAL BLEEDING): ICD-10-CM

## 2021-11-16 DIAGNOSIS — N94.89 ENDOMETRIAL MASS: ICD-10-CM

## 2021-11-16 DIAGNOSIS — Z90.710 S/P LAPAROSCOPIC HYSTERECTOMY: Primary | ICD-10-CM

## 2021-11-16 LAB — GLUCOSE SERPL-MCNC: 112 MG/DL (ref 65–140)

## 2021-11-16 PROCEDURE — 88305 TISSUE EXAM BY PATHOLOGIST: CPT | Performed by: SPECIALIST

## 2021-11-16 PROCEDURE — 88333 PATH CONSLTJ SURG CYTO XM 1: CPT | Performed by: SPECIALIST

## 2021-11-16 PROCEDURE — S2900 ROBOTIC SURGICAL SYSTEM: HCPCS | Performed by: OBSTETRICS & GYNECOLOGY

## 2021-11-16 PROCEDURE — 88307 TISSUE EXAM BY PATHOLOGIST: CPT | Performed by: SPECIALIST

## 2021-11-16 PROCEDURE — 88112 CYTOPATH CELL ENHANCE TECH: CPT | Performed by: SPECIALIST

## 2021-11-16 PROCEDURE — 88342 IMHCHEM/IMCYTCHM 1ST ANTB: CPT | Performed by: SPECIALIST

## 2021-11-16 PROCEDURE — NC001 PR NO CHARGE: Performed by: OBSTETRICS & GYNECOLOGY

## 2021-11-16 PROCEDURE — 88341 IMHCHEM/IMCYTCHM EA ADD ANTB: CPT | Performed by: SPECIALIST

## 2021-11-16 PROCEDURE — 88331 PATH CONSLTJ SURG 1 BLK 1SPC: CPT | Performed by: SPECIALIST

## 2021-11-16 PROCEDURE — 88309 TISSUE EXAM BY PATHOLOGIST: CPT | Performed by: SPECIALIST

## 2021-11-16 PROCEDURE — 38900 IO MAP OF SENT LYMPH NODE: CPT | Performed by: OBSTETRICS & GYNECOLOGY

## 2021-11-16 PROCEDURE — 38570 LAPAROSCOPY LYMPH NODE BIOP: CPT | Performed by: OBSTETRICS & GYNECOLOGY

## 2021-11-16 PROCEDURE — 82948 REAGENT STRIP/BLOOD GLUCOSE: CPT

## 2021-11-16 PROCEDURE — 58571 TLH W/T/O 250 G OR LESS: CPT | Performed by: OBSTETRICS & GYNECOLOGY

## 2021-11-16 PROCEDURE — 49321 LAPAROSCOPY BIOPSY: CPT | Performed by: OBSTETRICS & GYNECOLOGY

## 2021-11-16 RX ORDER — HYDRALAZINE HYDROCHLORIDE 20 MG/ML
5 INJECTION INTRAMUSCULAR; INTRAVENOUS
Status: DISCONTINUED | OUTPATIENT
Start: 2021-11-16 | End: 2021-11-16 | Stop reason: HOSPADM

## 2021-11-16 RX ORDER — SODIUM CHLORIDE, SODIUM LACTATE, POTASSIUM CHLORIDE, CALCIUM CHLORIDE 600; 310; 30; 20 MG/100ML; MG/100ML; MG/100ML; MG/100ML
INJECTION, SOLUTION INTRAVENOUS CONTINUOUS PRN
Status: DISCONTINUED | OUTPATIENT
Start: 2021-11-16 | End: 2021-11-16

## 2021-11-16 RX ORDER — ALBUTEROL SULFATE 2.5 MG/3ML
2.5 SOLUTION RESPIRATORY (INHALATION) ONCE AS NEEDED
Status: DISCONTINUED | OUTPATIENT
Start: 2021-11-16 | End: 2021-11-16 | Stop reason: HOSPADM

## 2021-11-16 RX ORDER — LIDOCAINE HYDROCHLORIDE 10 MG/ML
INJECTION, SOLUTION EPIDURAL; INFILTRATION; INTRACAUDAL; PERINEURAL AS NEEDED
Status: DISCONTINUED | OUTPATIENT
Start: 2021-11-16 | End: 2021-11-16

## 2021-11-16 RX ORDER — DEXAMETHASONE SODIUM PHOSPHATE 10 MG/ML
INJECTION, SOLUTION INTRAMUSCULAR; INTRAVENOUS AS NEEDED
Status: DISCONTINUED | OUTPATIENT
Start: 2021-11-16 | End: 2021-11-16

## 2021-11-16 RX ORDER — ONDANSETRON 2 MG/ML
4 INJECTION INTRAMUSCULAR; INTRAVENOUS ONCE AS NEEDED
Status: DISCONTINUED | OUTPATIENT
Start: 2021-11-16 | End: 2021-11-16 | Stop reason: HOSPADM

## 2021-11-16 RX ORDER — MAGNESIUM HYDROXIDE 1200 MG/15ML
LIQUID ORAL AS NEEDED
Status: DISCONTINUED | OUTPATIENT
Start: 2021-11-16 | End: 2021-11-16 | Stop reason: HOSPADM

## 2021-11-16 RX ORDER — PROMETHAZINE HYDROCHLORIDE 25 MG/ML
12.5 INJECTION, SOLUTION INTRAMUSCULAR; INTRAVENOUS ONCE AS NEEDED
Status: DISCONTINUED | OUTPATIENT
Start: 2021-11-16 | End: 2021-11-16 | Stop reason: HOSPADM

## 2021-11-16 RX ORDER — INDOCYANINE GREEN AND WATER 25 MG
KIT INJECTION AS NEEDED
Status: DISCONTINUED | OUTPATIENT
Start: 2021-11-16 | End: 2021-11-16 | Stop reason: HOSPADM

## 2021-11-16 RX ORDER — ROCURONIUM BROMIDE 10 MG/ML
INJECTION, SOLUTION INTRAVENOUS AS NEEDED
Status: DISCONTINUED | OUTPATIENT
Start: 2021-11-16 | End: 2021-11-16

## 2021-11-16 RX ORDER — GABAPENTIN 100 MG/1
100 CAPSULE ORAL ONCE
Status: COMPLETED | OUTPATIENT
Start: 2021-11-16 | End: 2021-11-16

## 2021-11-16 RX ORDER — CEFAZOLIN SODIUM 2 G/50ML
2000 SOLUTION INTRAVENOUS ONCE
Status: DISCONTINUED | OUTPATIENT
Start: 2021-11-16 | End: 2021-11-16 | Stop reason: HOSPADM

## 2021-11-16 RX ORDER — HYDROMORPHONE HCL/PF 1 MG/ML
0.5 SYRINGE (ML) INJECTION
Status: DISCONTINUED | OUTPATIENT
Start: 2021-11-16 | End: 2021-11-16 | Stop reason: HOSPADM

## 2021-11-16 RX ORDER — SODIUM CHLORIDE, SODIUM LACTATE, POTASSIUM CHLORIDE, CALCIUM CHLORIDE 600; 310; 30; 20 MG/100ML; MG/100ML; MG/100ML; MG/100ML
125 INJECTION, SOLUTION INTRAVENOUS CONTINUOUS
Status: DISCONTINUED | OUTPATIENT
Start: 2021-11-16 | End: 2021-11-16 | Stop reason: HOSPADM

## 2021-11-16 RX ORDER — KETAMINE HCL IN NACL, ISO-OSM 100MG/10ML
SYRINGE (ML) INJECTION AS NEEDED
Status: DISCONTINUED | OUTPATIENT
Start: 2021-11-16 | End: 2021-11-16

## 2021-11-16 RX ORDER — FENTANYL CITRATE/PF 50 MCG/ML
25 SYRINGE (ML) INJECTION
Status: DISCONTINUED | OUTPATIENT
Start: 2021-11-16 | End: 2021-11-16 | Stop reason: HOSPADM

## 2021-11-16 RX ORDER — ACETAMINOPHEN 325 MG/1
975 TABLET ORAL ONCE
Status: COMPLETED | OUTPATIENT
Start: 2021-11-16 | End: 2021-11-16

## 2021-11-16 RX ORDER — HEPARIN SODIUM 5000 [USP'U]/ML
5000 INJECTION, SOLUTION INTRAVENOUS; SUBCUTANEOUS
Status: COMPLETED | OUTPATIENT
Start: 2021-11-16 | End: 2021-11-16

## 2021-11-16 RX ORDER — OXYCODONE HYDROCHLORIDE 5 MG/1
5 TABLET ORAL EVERY 6 HOURS PRN
Qty: 10 TABLET | Refills: 0 | Status: SHIPPED | OUTPATIENT
Start: 2021-11-16 | End: 2021-11-26

## 2021-11-16 RX ORDER — ONDANSETRON 2 MG/ML
4 INJECTION INTRAMUSCULAR; INTRAVENOUS EVERY 4 HOURS PRN
Status: DISCONTINUED | OUTPATIENT
Start: 2021-11-16 | End: 2021-11-16 | Stop reason: HOSPADM

## 2021-11-16 RX ORDER — BUPIVACAINE HYDROCHLORIDE 5 MG/ML
INJECTION, SOLUTION EPIDURAL; INTRACAUDAL AS NEEDED
Status: DISCONTINUED | OUTPATIENT
Start: 2021-11-16 | End: 2021-11-16 | Stop reason: HOSPADM

## 2021-11-16 RX ORDER — METOCLOPRAMIDE HYDROCHLORIDE 5 MG/ML
10 INJECTION INTRAMUSCULAR; INTRAVENOUS ONCE AS NEEDED
Status: DISCONTINUED | OUTPATIENT
Start: 2021-11-16 | End: 2021-11-16 | Stop reason: HOSPADM

## 2021-11-16 RX ORDER — CEFAZOLIN SODIUM 1 G/3ML
INJECTION, POWDER, FOR SOLUTION INTRAMUSCULAR; INTRAVENOUS AS NEEDED
Status: DISCONTINUED | OUTPATIENT
Start: 2021-11-16 | End: 2021-11-16

## 2021-11-16 RX ORDER — ACETAMINOPHEN 325 MG/1
650 TABLET ORAL EVERY 4 HOURS PRN
Status: DISCONTINUED | OUTPATIENT
Start: 2021-11-16 | End: 2021-11-16 | Stop reason: HOSPADM

## 2021-11-16 RX ORDER — SODIUM CHLORIDE 9 MG/ML
INJECTION, SOLUTION INTRAVENOUS CONTINUOUS PRN
Status: DISCONTINUED | OUTPATIENT
Start: 2021-11-16 | End: 2021-11-16

## 2021-11-16 RX ORDER — MIDAZOLAM HYDROCHLORIDE 2 MG/2ML
INJECTION, SOLUTION INTRAMUSCULAR; INTRAVENOUS AS NEEDED
Status: DISCONTINUED | OUTPATIENT
Start: 2021-11-16 | End: 2021-11-16

## 2021-11-16 RX ORDER — OXYCODONE HYDROCHLORIDE 5 MG/1
10 TABLET ORAL EVERY 4 HOURS PRN
Status: DISCONTINUED | OUTPATIENT
Start: 2021-11-16 | End: 2021-11-16 | Stop reason: HOSPADM

## 2021-11-16 RX ORDER — OXYCODONE HYDROCHLORIDE 5 MG/1
5 TABLET ORAL EVERY 4 HOURS PRN
Status: DISCONTINUED | OUTPATIENT
Start: 2021-11-16 | End: 2021-11-16 | Stop reason: HOSPADM

## 2021-11-16 RX ORDER — HYDROMORPHONE HCL IN WATER/PF 6 MG/30 ML
0.2 PATIENT CONTROLLED ANALGESIA SYRINGE INTRAVENOUS
Status: DISCONTINUED | OUTPATIENT
Start: 2021-11-16 | End: 2021-11-16 | Stop reason: HOSPADM

## 2021-11-16 RX ORDER — LABETALOL 20 MG/4 ML (5 MG/ML) INTRAVENOUS SYRINGE
10
Status: DISCONTINUED | OUTPATIENT
Start: 2021-11-16 | End: 2021-11-16 | Stop reason: HOSPADM

## 2021-11-16 RX ORDER — ONDANSETRON 2 MG/ML
INJECTION INTRAMUSCULAR; INTRAVENOUS AS NEEDED
Status: DISCONTINUED | OUTPATIENT
Start: 2021-11-16 | End: 2021-11-16

## 2021-11-16 RX ORDER — LIDOCAINE HYDROCHLORIDE 10 MG/ML
0.5 INJECTION, SOLUTION EPIDURAL; INFILTRATION; INTRACAUDAL; PERINEURAL ONCE AS NEEDED
Status: COMPLETED | OUTPATIENT
Start: 2021-11-16 | End: 2021-11-16

## 2021-11-16 RX ORDER — FENTANYL CITRATE 50 UG/ML
INJECTION, SOLUTION INTRAMUSCULAR; INTRAVENOUS AS NEEDED
Status: DISCONTINUED | OUTPATIENT
Start: 2021-11-16 | End: 2021-11-16

## 2021-11-16 RX ORDER — PROPOFOL 10 MG/ML
INJECTION, EMULSION INTRAVENOUS AS NEEDED
Status: DISCONTINUED | OUTPATIENT
Start: 2021-11-16 | End: 2021-11-16

## 2021-11-16 RX ADMIN — SODIUM CHLORIDE, SODIUM LACTATE, POTASSIUM CHLORIDE, AND CALCIUM CHLORIDE: .6; .31; .03; .02 INJECTION, SOLUTION INTRAVENOUS at 12:54

## 2021-11-16 RX ADMIN — ROCURONIUM BROMIDE 10 MG: 50 INJECTION, SOLUTION INTRAVENOUS at 14:35

## 2021-11-16 RX ADMIN — ROCURONIUM BROMIDE 80 MG: 50 INJECTION, SOLUTION INTRAVENOUS at 13:00

## 2021-11-16 RX ADMIN — SODIUM CHLORIDE, SODIUM LACTATE, POTASSIUM CHLORIDE, AND CALCIUM CHLORIDE 125 ML/HR: .6; .31; .03; .02 INJECTION, SOLUTION INTRAVENOUS at 09:40

## 2021-11-16 RX ADMIN — LIDOCAINE HYDROCHLORIDE 50 MG: 10 INJECTION, SOLUTION EPIDURAL; INFILTRATION; INTRACAUDAL; PERINEURAL at 13:00

## 2021-11-16 RX ADMIN — DEXAMETHASONE SODIUM PHOSPHATE 10 MG: 10 INJECTION, SOLUTION INTRAMUSCULAR; INTRAVENOUS at 13:20

## 2021-11-16 RX ADMIN — MIDAZOLAM 2 MG: 1 INJECTION INTRAMUSCULAR; INTRAVENOUS at 12:55

## 2021-11-16 RX ADMIN — PHENYLEPHRINE HYDROCHLORIDE 20 MCG/MIN: 10 INJECTION INTRAVENOUS at 13:25

## 2021-11-16 RX ADMIN — SODIUM CHLORIDE: 0.9 INJECTION, SOLUTION INTRAVENOUS at 13:10

## 2021-11-16 RX ADMIN — HEPARIN SODIUM 5000 UNITS: 5000 INJECTION INTRAVENOUS; SUBCUTANEOUS at 09:21

## 2021-11-16 RX ADMIN — SUGAMMADEX 200 MG: 100 INJECTION, SOLUTION INTRAVENOUS at 15:26

## 2021-11-16 RX ADMIN — ONDANSETRON 4 MG: 2 INJECTION INTRAMUSCULAR; INTRAVENOUS at 15:20

## 2021-11-16 RX ADMIN — FENTANYL CITRATE 50 MCG: 50 INJECTION INTRAMUSCULAR; INTRAVENOUS at 15:35

## 2021-11-16 RX ADMIN — Medication 50 MG: at 13:00

## 2021-11-16 RX ADMIN — FENTANYL CITRATE 100 MCG: 50 INJECTION INTRAMUSCULAR; INTRAVENOUS at 12:56

## 2021-11-16 RX ADMIN — GABAPENTIN 100 MG: 100 CAPSULE ORAL at 09:21

## 2021-11-16 RX ADMIN — ACETAMINOPHEN 975 MG: 325 TABLET, FILM COATED ORAL at 09:21

## 2021-11-16 RX ADMIN — LIDOCAINE HYDROCHLORIDE 0.5 ML: 10 INJECTION, SOLUTION EPIDURAL; INFILTRATION; INTRACAUDAL; PERINEURAL at 09:40

## 2021-11-16 RX ADMIN — FENTANYL CITRATE 50 MCG: 50 INJECTION INTRAMUSCULAR; INTRAVENOUS at 13:42

## 2021-11-16 RX ADMIN — CEFAZOLIN 2000 MG: 1 INJECTION, POWDER, FOR SOLUTION INTRAMUSCULAR; INTRAVENOUS at 13:15

## 2021-11-16 RX ADMIN — PROPOFOL 80 MG: 10 INJECTION, EMULSION INTRAVENOUS at 13:00

## 2021-11-17 ENCOUNTER — TELEPHONE (OUTPATIENT)
Dept: HEMATOLOGY ONCOLOGY | Facility: CLINIC | Age: 66
End: 2021-11-17

## 2021-11-29 ENCOUNTER — DOCUMENTATION (OUTPATIENT)
Dept: GYNECOLOGIC ONCOLOGY | Facility: CLINIC | Age: 66
End: 2021-11-29

## 2021-11-30 DIAGNOSIS — E03.9 HYPOTHYROIDISM, UNSPECIFIED TYPE: ICD-10-CM

## 2021-11-30 RX ORDER — LEVOTHYROXINE SODIUM 0.12 MG/1
TABLET ORAL
Qty: 90 TABLET | Refills: 3 | Status: SHIPPED | OUTPATIENT
Start: 2021-11-30

## 2021-12-01 ENCOUNTER — OFFICE VISIT (OUTPATIENT)
Dept: GYNECOLOGIC ONCOLOGY | Facility: HOSPITAL | Age: 66
End: 2021-12-01

## 2021-12-01 VITALS
BODY MASS INDEX: 29.51 KG/M2 | SYSTOLIC BLOOD PRESSURE: 118 MMHG | TEMPERATURE: 97 F | DIASTOLIC BLOOD PRESSURE: 70 MMHG | HEART RATE: 81 BPM | RESPIRATION RATE: 16 BRPM | HEIGHT: 67 IN | OXYGEN SATURATION: 98 % | WEIGHT: 188 LBS

## 2021-12-01 DIAGNOSIS — C54.1 ENDOMETRIAL CANCER (HCC): Primary | ICD-10-CM

## 2021-12-01 PROBLEM — N94.89 ENDOMETRIAL MASS: Status: RESOLVED | Noted: 2021-10-21 | Resolved: 2021-12-01

## 2021-12-01 PROCEDURE — 99024 POSTOP FOLLOW-UP VISIT: CPT | Performed by: OBSTETRICS & GYNECOLOGY

## 2021-12-30 ENCOUNTER — OFFICE VISIT (OUTPATIENT)
Dept: GYNECOLOGIC ONCOLOGY | Facility: CLINIC | Age: 66
End: 2021-12-30

## 2021-12-30 VITALS
HEIGHT: 67 IN | TEMPERATURE: 99.3 F | DIASTOLIC BLOOD PRESSURE: 92 MMHG | BODY MASS INDEX: 29.03 KG/M2 | WEIGHT: 185 LBS | RESPIRATION RATE: 18 BRPM | HEART RATE: 88 BPM | SYSTOLIC BLOOD PRESSURE: 122 MMHG

## 2021-12-30 DIAGNOSIS — K65.9 PERITONITIS (HCC): ICD-10-CM

## 2021-12-30 DIAGNOSIS — C54.1 ENDOMETRIAL CANCER (HCC): Primary | ICD-10-CM

## 2021-12-30 PROCEDURE — 99024 POSTOP FOLLOW-UP VISIT: CPT | Performed by: OBSTETRICS & GYNECOLOGY

## 2022-01-03 ENCOUNTER — TELEPHONE (OUTPATIENT)
Dept: GYNECOLOGIC ONCOLOGY | Facility: CLINIC | Age: 67
End: 2022-01-03

## 2022-01-03 DIAGNOSIS — C54.1 ENDOMETRIAL CANCER (HCC): Primary | ICD-10-CM

## 2022-01-03 NOTE — TELEPHONE ENCOUNTER
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

## 2022-01-04 ENCOUNTER — TELEPHONE (OUTPATIENT)
Dept: GENETICS | Facility: CLINIC | Age: 67
End: 2022-01-04

## 2022-01-04 NOTE — TELEPHONE ENCOUNTER
I called Luis Bui to schedule a new patient appointment with the [facility]       Outcome:   Ok'd by Nichol Thapa to schedule pt in Feb  Spoke with patient, genetics appt scheduled for 2/24 with Medhat Morgan

## 2022-01-04 NOTE — PROGRESS NOTES
Assessment and Plan:   ***    Plan:  Diagnoses and all orders for this visit:    Peritonitis [facility])        Follow-up plan: ***      HPI  Basilia Veliz is a 77 y o   female with hypothyroidism, hypertension, insomnia, anxiety, depression, migraines, hyperlipidemia, s/p sleeve gastrectomy, endometrial cancer s/p COURT/BSO, who presents for rheumatology consult by request of Dr Tegan Nguyen for peritonitis     Didi c34 ds sm rnp ssa ssb      Review of Systems  Review of Systems    Allergies  No Known Allergies    Home Medications    Current Outpatient Medications:     aspirin 81 MG tablet, Take 1 tablet by mouth daily for 30 days, Disp: 30 tablet, Rfl: 0    betamethasone valerate (VALISONE) 0 1 % cream, Apply topically 2 (two) times a day, Disp: 30 g, Rfl: 0    calcium citrate-vitamin D (CITRACAL+D) 315-200 MG-UNIT per tablet, Take by mouth daily , Disp: , Rfl:     eszopiclone (LUNESTA) 3 MG tablet, TAKE 1 TABLET BY MOUTH EVERY DAY AT BEDTIME, Disp: 90 tablet, Rfl: 3    levothyroxine 125 mcg tablet, TAKE 1 TABLET DAILY, Disp: 90 tablet, Rfl: 3    lisinopril (ZESTRIL) 10 mg tablet, Take 1 tablet (10 mg total) by mouth daily, Disp: 90 tablet, Rfl: 3    Multiple Vitamins-Minerals (CENTRUM WOMEN) TABS, Take by mouth Goes between bariatric multi and Centrum, Disp: , Rfl:     PARoxetine (PAXIL) 20 mg tablet, Take 1 tablet (20 mg total) by mouth daily, Disp: 90 tablet, Rfl: 3    rizatriptan (MAXALT) 10 MG tablet, TAKE 1 TABLET DAILY AS NEEDED FOR MIGRAINE, Disp: 18 tablet, Rfl: 5    simvastatin (ZOCOR) 40 mg tablet, TAKE 1 TABLET EVERY EVENING, Disp: 90 tablet, Rfl: 3    topiramate (TOPAMAX) 50 MG tablet, TAKE 1 TABLET BY MOUTH EVERY DAY, Disp: 30 tablet, Rfl: 4    Past Medical History  Past Medical History:   Diagnosis Date    Allergic dermatitis     resolved 01/26/2016    Anxiety     Arthritis     Blind right eye     Depression     Disease of thyroid gland     Encounter for cosmetic surgery 4/30/2018    Added automatically from request for surgery [id number]    Hyperlipidemia     Hypertension     Hypothyroidism (acquired)     hypothyroid    Incontinence of urine in female     Insomnia     Migraine     Morbid obesity ([facility] [address] )     TIA (transient ischemic attack)     questionable     Wears glasses        Past Surgical History   Past Surgical History:   Procedure Laterality Date    BODY LIFT LOWER N/A 6/28/2018    Procedure: LOWER BODY LIFT;  Surgeon: Kait Browne MD;  [facility];  Service: Plastics    CATARACT EXTRACTION Left     CHOLECYSTECTOMY      COLONOSCOPY      CYSTOSCOPY N/A 11/16/2021    Procedure: CYSTOSCOPY;  Surgeon: Brigette Marques MD;  [facility];  Service: Gynecology Oncology    ESOPHAGOGASTRODUODENOSCOPY N/A 3/27/2017    Procedure: ESOPHAGOGASTRODUODENOSCOPY (EGD); Surgeon: Karishma Flores MD;  [facility];  Service:     EYE SURGERY      HEMORROIDECTOMY      IR IMAGE GUIDED ASPIRATION / DRAINAGE W TUBE  3/26/2019    JOINT REPLACEMENT      oswald TKR    RI EGD TRANSORAL BIOPSY SINGLE/MULTIPLE N/A 3/8/2017    Procedure: ESOPHAGOGASTRODUODENOSCOPY (EGD); Surgeon: Karishma Flores MD;  [facility]; Service: Bariatrics    RI EXCISE EXCESS SKIN [address] Bilateral 1/10/2019    Procedure: THIGH LIFT;  Surgeon: Kait Browne MD;  [facility];  Service: Plastics    RI INJECTION FOR LYMPHATIC XRAY  11/16/2021    Procedure: LYMPHOGRAPHY WITH INDOCYANINE GREEN (ICG);   Surgeon: Brigette Marques MD;  [facility];  Service: Gynecology Oncology    RI LAP, NADYA RESTRICT PROC, LONGITUDINAL GASTRECTOMY N/A 3/27/2017    Procedure: GASTRECTOMY SLEEVE LAPAROSCOPIC;  Surgeon: Karishma Flores MD;  [facility];  Service: [address] <=250 GRAM  W TUBE/OVARY N/A 11/16/2021    Procedure: HYSTERECTOMY LAPAROSCOPIC TOTAL ([address]) W/ ROBOTICS, BILATERAL SALPINGO-OOPHORECTOMY, BL SENTINAL LYMPH NODE DISSECTION BIOPSY;  Surgeon: Lois Graham MD;  [facility];  Service: Gynecology Oncology    ROTATOR CUFF REPAIR Right     SLEEVE GASTROPLASTY  3/27/2017    TOTAL BODY LIFT         Family History  No known family history of autoimmune or inflammatory diseases  Family History   Problem Relation Age of Onset    Diabetes Mother     Skin cancer Mother     Arthritis Mother     Cancer Mother         Cervical    Alzheimer's disease Father     Heart failure Father     Diabetes Father     Dementia Father     Cancer Father         Skin    Stroke Father     Breast cancer Maternal Grandmother         adenocarcinoma in situ in villous adenoma of the breast       Social History  Occupation: ***  Social History     Substance and Sexual Activity   Alcohol Use No     Social History     Substance and Sexual Activity   Drug Use No     Social History     Tobacco Use   Smoking Status Never Smoker   Smokeless Tobacco Never Used       Objective: There were no vitals filed for this visit      Physical Exam    Labs:    Ref Range & Units 9/26/21 10:42 PM   Hemoglobin 11 5 - 14 5 g/dL 12 9    Hematocrit 35 0 - 43 0 % 38 0    WBC 4 0 - 10 0 thou/cmm 9 3    RBC 3 70 - 4 70 mill/cmm 4 29    Platelet Count 767 - 350 thou/cmm 297    MPV 7 5 - 11 3 fL 8 8    MCV 80 - 100 fL 89    MCH 26 0 - 34 0 pg 30 0    MCHC 32 0 - 37 0 g/dL 33 9    RDW 12 0 - 16 0 % 13 5    Differential Type  AUTO    Absolute Neutrophils 1 8 - 7 8 thou/cmm 5 6    Absolute Lymphocytes 1 0 - 3 0 thou/cmm 2 8    Absolute Monocytes 0 3 - 1 0 thou/cmm 0 7    Absolute Eosinophils 0 0 - 0 5 thou/cmm 0 2    Absolute Basophils 0 0 - 0 1 thou/cmm 0 1    Neutrophils % 59    Lymphocytes % 30    Monocytes % 8    Eosinophils % 2    Basophils % 1       Ref Range & Units 9/26/21 10:42 PM   Glucose 65 - 99 mg/dL 91    BUN 7 - 25 mg/dL 31 High     Creatinine 0 40 - 1 10 mg/dL 0 84    Sodium 135 - 145 mmol/L 138    Potassium 3 5 - 5 2 mmol/L 4 0    Chloride 100 - 109 mmol/L 110 High     Carbon Dioxide 23 - 31 mmol/L 19 Low     Calcium 8 5 - 10 1 mg/dL 9 4    Alkaline Phosphatase 35 - 120 U/L 85    Albumin 3 5 - 4 8 g/dL 3 9    Bilirubin, Total 0 2 - 1 0 mg/dL 0 7    Comment: Use of this assay is not recommended for patients undergoing treatment with eltrombopag due to the potential for falsely elevated results     Protein, Total 6 3 - 8 3 g/dL 8 0    AST <41 U/L 11    ALT <56 U/L 17    Anion Gap 3 - 11 9    eGFR, Non- >60 73    eGFR, African American >60 85 [employer] Intermediate Repair Preamble Text (Leave Blank If You Do Not Want): Undermining was performed with blunt dissection.

## 2022-01-07 ENCOUNTER — OFFICE VISIT (OUTPATIENT)
Dept: RHEUMATOLOGY | Facility: CLINIC | Age: 67
End: 2022-01-07
Payer: COMMERCIAL

## 2022-01-07 ENCOUNTER — TELEPHONE (OUTPATIENT)
Dept: OBGYN CLINIC | Facility: HOSPITAL | Age: 67
End: 2022-01-07

## 2022-01-07 ENCOUNTER — APPOINTMENT (OUTPATIENT)
Dept: LAB | Facility: CLINIC | Age: 67
End: 2022-01-07
Payer: COMMERCIAL

## 2022-01-07 DIAGNOSIS — K65.9 PERITONITIS (HCC): Primary | ICD-10-CM

## 2022-01-07 LAB
C3 SERPL-MCNC: 131 MG/DL (ref 90–180)
C4 SERPL-MCNC: 27 MG/DL (ref 10–40)
CRP SERPL QL: <3 MG/L
ERYTHROCYTE [SEDIMENTATION RATE] IN BLOOD: 25 MM/HOUR (ref 0–29)

## 2022-01-07 PROCEDURE — 36415 COLL VENOUS BLD VENIPUNCTURE: CPT | Performed by: INTERNAL MEDICINE

## 2022-01-07 PROCEDURE — 86235 NUCLEAR ANTIGEN ANTIBODY: CPT | Performed by: INTERNAL MEDICINE

## 2022-01-07 PROCEDURE — 86140 C-REACTIVE PROTEIN: CPT | Performed by: INTERNAL MEDICINE

## 2022-01-07 PROCEDURE — 85652 RBC SED RATE AUTOMATED: CPT | Performed by: INTERNAL MEDICINE

## 2022-01-07 PROCEDURE — 99204 OFFICE O/P NEW MOD 45 MIN: CPT | Performed by: INTERNAL MEDICINE

## 2022-01-07 PROCEDURE — 86038 ANTINUCLEAR ANTIBODIES: CPT | Performed by: INTERNAL MEDICINE

## 2022-01-07 PROCEDURE — 1160F RVW MEDS BY RX/DR IN RCRD: CPT | Performed by: INTERNAL MEDICINE

## 2022-01-07 PROCEDURE — 1036F TOBACCO NON-USER: CPT | Performed by: INTERNAL MEDICINE

## 2022-01-07 PROCEDURE — 86160 COMPLEMENT ANTIGEN: CPT | Performed by: INTERNAL MEDICINE

## 2022-01-07 PROCEDURE — 86225 DNA ANTIBODY NATIVE: CPT | Performed by: INTERNAL MEDICINE

## 2022-01-07 NOTE — TELEPHONE ENCOUNTER
Patient states that when she was in  the office today her temp was 100 5  When she went home it was 98  She just wanted to let you know

## 2022-01-07 NOTE — PROGRESS NOTES
Virtual Regular Visit    Verification of patient location:    Patient is located in the following state in which I hold an active license [place]      Assessment and Plan:   Patient is a 59-year-old female who presents for rheumatology consult regarding peritonitis  This was noted on the pathology of her total abdominal hysterectomy, which she had for endometrial cancer  We reviewed that in Rheumatology lupus would be the main disease that can be associated with peritonitis  We went through a review of systems geared toward looking for this disease or things on the lupus spectrum and review of systems was grossly negative for any concerns  I discussed with her that we will do screening blood work for typical lupus markers but suspicion is low that she has an underlying autoimmune disease contributing to the peritoneal inflammation noted on the specimen  We discussed possibly this could be related to the malignancy itself as she has no other systemic symptoms or manifestations  She will get the additional blood work done and if it is negative she will not need any additional rheumatology follow-up  She will also follow-up with her PCP regarding the low-grade fever        Problem List Items Addressed This Visit        Other    Peritonitis ([facility] [address] ) - Primary    Relevant Orders    DILIA Screen w/ Reflex to Titer/Pattern    C-reactive protein    Sedimentation rate, automated    Sjogren's Antibodies    Nuclear antigen antibody    Anti-DNA antibody, double-stranded    C4 complement    C3 complement            Follow-up plan: no rheumatology follow-up needed if work-up negative       Reason for visit is   Chief Complaint   Patient presents with    Virtual Regular Visit        Encounter provider Kira Louis DO    Provider located at [address] [place] [id number]      Recent Visits  No visits were found meeting these conditions  Showing recent visits within past 7 days and meeting all other requirements  Today's Visits  Date Type Provider Dept   01/07/22 Office Visit Bri Tamez DO [facility]   Showing today's visits and meeting all other requirements  Future Appointments  No visits were found meeting these conditions  Showing future appointments within next 150 days and meeting all other requirements     It was my intent to perform this visit via video technology but the patient was not able to do a video connection so the visit was completed via audio telephone only  The patient was identified by name and date of birth  Samara John was informed that this is a telemedicine visit and that the visit is being conducted through Telephone  My office door was closed  No one else was in the room  She acknowledged consent and understanding of privacy and security of the video platform  The patient has agreed to participate and understands they can discontinue the visit at any time  Patient is aware this is a billable service  Subjective  Samara John is a 77 y o  female with hypothyroidism, hypertension, insomnia, anxiety, depression, migraines, hyperlipidemia, s/p sleeve gastrectomy, endometrial cancer s/p COURT/BSO, who presents for rheumatology consult by request of Dr Gabe Quintero for peritonitis  Patient had a fever upon arrival to the office today so we had to convert to a telemedicine visit  Never saw Rheumatology in the past     Reports she had some inflammation on the peritoneum after her hysterectomy and she was referred for further evaluation of this  She currently denies any residual symptoms of abdominal pain or discomfort  She denies any joint pain stiffness or swelling on a regular basis  No photosensitivity, rashes, psoriasis, sicca symptoms, oral or nasal ulcers, alopecia, Raynaud's, h/o pericarditis or pleurisy, h/o blood clots     We discussed that in Rheumatology the main disease that can cause peritonitis would be systemic lupus which we will evaluate for but otherwise my suspicion would be low for a rheumatic cause  Social History:   No tobacco or alcohol use  Family History:  Sister with RA  Past Medical History:   Diagnosis Date    Allergic dermatitis     resolved 01/26/2016    Anxiety     Arthritis     Blind right eye     Depression     Disease of thyroid gland     Encounter for cosmetic surgery 4/30/2018    Added automatically from request for surgery [id number]    Hyperlipidemia     Hypertension     Hypothyroidism (acquired)     hypothyroid    Incontinence of urine in female     Insomnia     Migraine     Morbid obesity ([address] )     TIA (transient ischemic attack)     questionable     Wears glasses        Past Surgical History:   Procedure Laterality Date    BODY LIFT LOWER N/A 6/28/2018    Procedure: LOWER BODY LIFT;  Surgeon: Aurora Palomo MD;  [facility];  Service: Plastics    CATARACT EXTRACTION Left     CHOLECYSTECTOMY      COLONOSCOPY      CYSTOSCOPY N/A 11/16/2021    Procedure: CYSTOSCOPY;  Surgeon: Lary Rodrigez MD;  Location:  MAIN OR;  Service: Gynecology Oncology    ESOPHAGOGASTRODUODENOSCOPY N/A 3/27/2017    Procedure: ESOPHAGOGASTRODUODENOSCOPY (EGD); Surgeon: Romain Marie MD;  [facility];  Service:     EYE SURGERY      HEMORROIDECTOMY      IR IMAGE GUIDED ASPIRATION / DRAINAGE W TUBE  3/26/2019    JOINT REPLACEMENT      oswald TKR    WI EGD TRANSORAL BIOPSY SINGLE/MULTIPLE N/A 3/8/2017    Procedure: ESOPHAGOGASTRODUODENOSCOPY (EGD); Surgeon: Romain Marie MD;  [facility]; Service: Bariatrics    WI EXCISE EXCESS SKIN [address] [facility] [address] Bilateral 1/10/2019    Procedure: THIGH LIFT;  Surgeon: Aurora Palomo MD;  [facility];  Service: Plastics    WI INJECTION FOR LYMPHATIC XRAY  11/16/2021    Procedure: LYMPHOGRAPHY WITH INDOCYANINE GREEN (ICG);   Surgeon: Lary Rodrigez MD;  [facility];  Service: Gynecology Oncology    LA LAP, NADYA RESTRICT PROC, LONGITUDINAL GASTRECTOMY N/A 3/27/2017    Procedure: GASTRECTOMY SLEEVE LAPAROSCOPIC;  Surgeon: Patel Wolff MD;  [facility];  Service: Bariatrics    LA LAPAROSCOPY W TOT HYSTERECTUTERUS <=250 [address]  W TUBE/OVARY N/A 11/16/2021    Procedure: HYSTERECTOMY LAPAROSCOPIC TOTAL ([address]) W/ ROBOTICS, BILATERAL SALPINGO-OOPHORECTOMY, BL SENTINAL LYMPH NODE DISSECTION BIOPSY;  Surgeon: Arnav Gupta MD;  [facility];  Service: Gynecology Oncology    ROTATOR CUFF REPAIR Right     SLEEVE GASTROPLASTY  3/27/2017    TOTAL BODY LIFT         Current Outpatient Medications   Medication Sig Dispense Refill    aspirin 81 MG tablet Take 1 tablet by mouth daily for 30 days 30 tablet 0    betamethasone valerate (VALISONE) 0 1 % cream Apply topically 2 (two) times a day 30 g 0    calcium citrate-vitamin D (CITRACAL+D) 315-200 MG-UNIT per tablet Take by mouth daily       eszopiclone (LUNESTA) 3 MG tablet TAKE 1 TABLET BY MOUTH EVERY DAY AT BEDTIME 90 tablet 3    levothyroxine 125 mcg tablet TAKE 1 TABLET DAILY 90 tablet 3    lisinopril (ZESTRIL) 10 mg tablet Take 1 tablet (10 mg total) by mouth daily 90 tablet 3    Multiple Vitamins-Minerals (CENTRUM WOMEN) TABS Take by mouth Goes between bariatric multi and Centrum      PARoxetine (PAXIL) 20 mg tablet Take 1 tablet (20 mg total) by mouth daily 90 tablet 3    rizatriptan (MAXALT) 10 MG tablet TAKE 1 TABLET DAILY AS NEEDED FOR MIGRAINE 18 tablet 5    simvastatin (ZOCOR) 40 mg tablet TAKE 1 TABLET EVERY EVENING 90 tablet 3    topiramate (TOPAMAX) 50 MG tablet TAKE 1 TABLET BY MOUTH EVERY DAY 30 tablet 4     No current facility-administered medications for this visit  No Known Allergies    Review of Systems   Constitutional: Positive for fever  Negative for fatigue  HENT: Negative for mouth sores  Respiratory: Negative for shortness of breath      Cardiovascular: Negative for chest pain and leg swelling  Gastrointestinal: Negative for abdominal pain  Musculoskeletal: Negative for arthralgias and joint swelling  Skin: Negative for rash  Neurological: Negative for weakness  Hematological: Negative for adenopathy  Psychiatric/Behavioral: Negative for sleep disturbance  Labs:     Ref Range & Units 9/26/21 10:42 PM   Hemoglobin 11 5 - 14 5 g/dL 12 9    Hematocrit 35 0 - 43 0 % 38 0    WBC 4 0 - 10 0 thou/cmm 9 3    RBC 3 70 - 4 70 mill/cmm 4 29    Platelet Count 424 - 350 thou/cmm 297    MPV 7 5 - 11 3 fL 8 8    MCV 80 - 100 fL 89    MCH 26 0 - 34 0 pg 30 0    MCHC 32 0 - 37 0 g/dL 33 9    RDW 12 0 - 16 0 % 13 5    Differential Type   AUTO    Absolute Neutrophils 1 8 - 7 8 thou/cmm 5 6    Absolute Lymphocytes 1 0 - 3 0 thou/cmm 2 8    Absolute Monocytes 0 3 - 1 0 thou/cmm 0 7    Absolute Eosinophils 0 0 - 0 5 thou/cmm 0 2    Absolute Basophils 0 0 - 0 1 thou/cmm 0 1    Neutrophils % 59    Lymphocytes % 30    Monocytes % 8    Eosinophils % 2    Basophils % 1         Ref Range & Units 9/26/21 10:42 PM   Glucose 65 - 99 mg/dL 91    BUN 7 - 25 mg/dL 31 High     Creatinine 0 40 - 1 10 mg/dL 0 84    Sodium 135 - 145 mmol/L 138    Potassium 3 5 - 5 2 mmol/L 4 0    Chloride 100 - 109 mmol/L 110 High     Carbon Dioxide 23 - 31 mmol/L 19 Low     Calcium 8 5 - 10 1 mg/dL 9 4    Alkaline Phosphatase 35 - 120 U/L 85    Albumin 3 5 - 4 8 g/dL 3 9    Bilirubin, Total 0 2 - 1 0 mg/dL 0 7    Comment: Use of this assay is not recommended for patients undergoing treatment with eltrombopag due to the potential for falsely elevated results  Protein, Total 6 3 - 8 3 g/dL 8 0    AST <41 U/L 11    ALT <56 U/L 17    Anion Gap 3 - 11 9    eGFR, Non- >60 73    eGFR,  >60 85         I spent 15 minutes directly with the patient during this visit total time spent on patient encounter 25 minutes         VIRTUAL VISIT [facility] verbally agrees to participate in [employer]  Pt is aware that [employer] could be limited without vital signs or the ability to perform a full hands-on physical Pearl Mohan understands she or the provider may request at any time to terminate the video visit and request the patient to seek care or treatment in person

## 2022-01-08 LAB
DSDNA AB SER-ACNC: <1 IU/ML (ref 0–9)
ENA RNP AB SER-ACNC: 0.8 AI (ref 0–0.9)
ENA SM AB SER-ACNC: <0.2 AI (ref 0–0.9)
ENA SS-A AB SER-ACNC: <0.2 AI (ref 0–0.9)
ENA SS-B AB SER-ACNC: <0.2 AI (ref 0–0.9)

## 2022-01-10 ENCOUNTER — PATIENT MESSAGE (OUTPATIENT)
Dept: GYNECOLOGIC ONCOLOGY | Facility: CLINIC | Age: 67
End: 2022-01-10

## 2022-01-10 DIAGNOSIS — E78.5 HYPERLIPIDEMIA, UNSPECIFIED HYPERLIPIDEMIA TYPE: ICD-10-CM

## 2022-01-10 LAB — RYE IGE QN: NEGATIVE

## 2022-01-10 RX ORDER — SIMVASTATIN 40 MG
TABLET ORAL
Qty: 90 TABLET | Refills: 3 | Status: SHIPPED | OUTPATIENT
Start: 2022-01-10

## 2022-01-12 ENCOUNTER — TELEPHONE (OUTPATIENT)
Dept: GYNECOLOGIC ONCOLOGY | Facility: CLINIC | Age: 67
End: 2022-01-12

## 2022-01-12 NOTE — TELEPHONE ENCOUNTER
Return call placed to patient, LMOM  Stated there is no further work-up indicated  Inquired about pain  No antibiotics at this point  Keep f/u with Dr Glinda Oppenheim in June 2022, and call in the interim with new concerns  Regarding: Rheumatology Results  ----- Message from Savannah Whitehead sent at 1/11/2022  7:37 AM EST -----       ----- Message from Adrienne Daly MD sent at 1/10/2022  6:57 PM -----   Dr Glinda Oppenheim,       I had my rheumatology appointment with Dr Lei Yanez  on Friday 1/7/22  She did bloodwork and everything she tested for was negative, so I dont need to follow up with her  She thinks the abdomen irritation is from the cancer itself  Is there anything else I should do at this point, or do I need further testing or antibiotics?     Thank you for your time, Abdiel Luna

## 2022-02-11 DIAGNOSIS — F41.1 GENERALIZED ANXIETY DISORDER: ICD-10-CM

## 2022-02-11 RX ORDER — PAROXETINE HYDROCHLORIDE 20 MG/1
TABLET, FILM COATED ORAL
Qty: 90 TABLET | Refills: 3 | Status: SHIPPED | OUTPATIENT
Start: 2022-02-11

## 2022-02-15 ENCOUNTER — TELEPHONE (OUTPATIENT)
Dept: UROLOGY | Facility: CLINIC | Age: 67
End: 2022-02-15

## 2022-02-15 NOTE — TELEPHONE ENCOUNTER
Patient has botox approval good thru 3/16/22  She is scheduled that day for 200u bladder botox  Please order

## 2022-02-22 ENCOUNTER — TELEPHONE (OUTPATIENT)
Dept: SURGICAL ONCOLOGY | Facility: CLINIC | Age: 67
End: 2022-02-22

## 2022-02-24 ENCOUNTER — DOCUMENTATION (OUTPATIENT)
Dept: GENETICS | Facility: CLINIC | Age: 67
End: 2022-02-24

## 2022-02-24 ENCOUNTER — CLINICAL SUPPORT (OUTPATIENT)
Dept: GENETICS | Facility: CLINIC | Age: 67
End: 2022-02-24
Payer: COMMERCIAL

## 2022-02-24 DIAGNOSIS — Z80.0 FAMILY HISTORY OF PANCREATIC CANCER: ICD-10-CM

## 2022-02-24 DIAGNOSIS — C54.1 ENDOMETRIAL CANCER (HCC): Primary | ICD-10-CM

## 2022-02-24 DIAGNOSIS — Z80.42 FAMILY HISTORY OF PROSTATE CANCER: ICD-10-CM

## 2022-02-24 DIAGNOSIS — Z80.3 FAMILY HISTORY OF BREAST CANCER: ICD-10-CM

## 2022-02-24 PROCEDURE — NC001 PR NO CHARGE: Performed by: GENETIC COUNSELOR, MS

## 2022-02-24 PROCEDURE — 36415 COLL VENOUS BLD VENIPUNCTURE: CPT

## 2022-02-24 NOTE — PROGRESS NOTES
Pre-Test Genetic Counseling Consult Note    Patient Name: Yen Falcon   /Age: 1955/66 y o  Referring Provider: Maddie Waldron PA-C    Date of Service: 2022  Genetic Counselor: Sandra Portillo MS, [facility]  Interpretation Services: None  [facility]      Ihor Oppenheim was referred to the [address] [place] [address] and Genetic Assessment Program due to her family history of cancer and recent diagnosis of endometrial cancer  she presents today to discuss the possibility of a hereditary cancer syndrome, options for genetic testing, and implications for her and her family         Cancer History and Treatment:   Oncology History   Endometrial cancer ([facility] [address] )   10/21/2021 Initial Diagnosis     Endometrial cancer ([facility] [address] )      2021 -  Cancer Staged     Staging form: Corpus Uteri - Carcinoma, AJCC 8th Edition  - Clinical stage from 2021: FIGO Stage IA (cT1a, cN0(sn), cM0) - Signed by Katarzyna Knowles MD on 2021  Method of lymph node assessment: [place] lymph node biopsy  Histologic grade (G): G1  Histologic grading system: 3 grade system  Peritoneal cytology results: Unknown         2021 Surgery     Robotic hysterectomy, BSO, bilateral sentinel LN biopsies, peritoneal biopsies, cystoscopy  -Grade 1, 0 3/1 7cm depth of invasion, indeterminate LVSI (likely artifact), cytology atypical  -MLH1/PMS2 absent  methylation inconclusive (10%)         Screening Hx:   Breast:  Mammogram on 2021, normal  Breast biopsy: none    Colon:  Colonoscopy: 5/10/21  7 sessile polyps 1-2cm in size    Skin:  Skin cancer screening none    Other screening: none    Reproductive History  Age at menarche: 15  Parity:   Age at first live birth: 24  Menopause: post (unknown age)  Hormone replacement: none    Medical and Surgical History  Pertinent surgical history:   Past Surgical History:   Procedure Laterality Date    BODY LIFT LOWER N/A 2018    Procedure: LOWER BODY LIFT;  Surgeon: Steven Johnson MD;  [facility];  Service: Plastics    CATARACT EXTRACTION Left     CHOLECYSTECTOMY      COLONOSCOPY      CYSTOSCOPY N/A 11/16/2021    Procedure: Morales Demichael;  Surgeon: Oscar Boothe MD;  [facility];  Service: Gynecology Oncology    ESOPHAGOGASTRODUODENOSCOPY N/A 3/27/2017    Procedure: ESOPHAGOGASTRODUODENOSCOPY (EGD); Surgeon: Nimisha Morales MD;  [facility];  Service:     EYE SURGERY      HEMORROIDECTOMY      IR IMAGE GUIDED ASPIRATION / DRAINAGE W TUBE  3/26/2019    JOINT REPLACEMENT      oswald TKR    VA EGD TRANSORAL BIOPSY SINGLE/MULTIPLE N/A 3/8/2017    Procedure: ESOPHAGOGASTRODUODENOSCOPY (EGD); Surgeon: Nimisha Morales MD;  [facility]; Service: Bariatrics    VA EXCISE EXCESS SKIN [address] Bilateral 1/10/2019    Procedure: THIGH LIFT;  Surgeon: Steven Johnson MD;  [facility];  Service: Plastics    VA INJECTION FOR LYMPHATIC XRAY  11/16/2021    Procedure: LYMPHOGRAPHY WITH INDOCYANINE GREEN (ICG);   Surgeon: Oscar Boothe MD;  [facility];  Service: Gynecology Oncology    VA LAP, NADYA RESTRICT [address], LONGITUDINAL GASTRECTOMY N/A 3/27/2017    Procedure: GASTRECTOMY SLEEVE LAPAROSCOPIC;  Surgeon: Nimisha Morales MD;  [facility];  Service: [address] <=250 GRAM  W TUBE/OVARY N/A 11/16/2021    Procedure: HYSTERECTOMY LAPAROSCOPIC TOTAL ([address]) W/ ROBOTICS, BILATERAL SALPINGO-OOPHORECTOMY, BL SENTINAL LYMPH NODE DISSECTION BIOPSY;  Surgeon: Oscar Boothe MD;  [facility];  Service: Gynecology Oncology    ROTATOR CUFF REPAIR Right     SLEEVE GASTROPLASTY  3/27/2017    TOTAL BODY LIFT        Pertinent medical history:  Past Medical History:   Diagnosis Date    Allergic dermatitis     resolved 01/26/2016    Anxiety     Arthritis     Blind right eye     Depression     Disease of thyroid gland     Encounter for cosmetic surgery 4/30/2018    Added automatically from request for surgery [id number]    Hyperlipidemia     Hypertension     Hypothyroidism (acquired)     hypothyroid    Incontinence of urine in female     Insomnia     Migraine     Morbid obesity ([address] )     TIA (transient ischemic attack)     questionable     Wears glasses          Other History:  Height:   Ht Readings from Last 1 Encounters:   12/30/21 5' 7" (1 702 m)     Weight:   Wt Readings from Last 1 Encounters:   12/30/21 83 9 kg (185 lb)       Relevant Family History     Reported Ancestry: [place], [place]    Children: 2 daughters (39, 37)    Siblings: 3 sisters (61, 71, 67)    Maternal Family History: Mother (d 94) history of cervical cancer (dx 76) and a metastatic cancer of unknown primary shortly before she passed  Aunt (d 65) history of brain cancer (dx 72)   Son with history of lung cancer  Uncle (d 89) history of prostate cancer   Son (76) history of pancreatic cancer  Grandmother (d 65) history of breast cancer (dx 72)    Paternal Family History:   Father (d 80) history of skin cancer on face and head (dx 76)      Please refer to the scanned pedigree in the Media Tab for a complete family history     *All history is reported as provided by the patient; records are not available for review, except where indicated  Assessment:  We discussed sporadic, familial and hereditary cancer  We also discussed the many factors that influence our risk for cancer such as age, environmental exposures, lifestyle choices and family history  We reviewed the indications suggestive of a hereditary predisposition to cancer  Genetic testing is indicated for Senthil Lozano based on the following criteria: Meets NCCN V1 2021 Testing Criteria for the Evaluation of Mirza syndrome: perosnal history of endometrial cancer with abnormal IHC- given the inconclusive results of MLH1 promoter hypermethylation we cannot rule out the presence of a germline MLH1 mutation      The risks, benefits, and limitations of genetic testing were reviewed with the patient, as well as genetic discrimination laws, and possible test results (positive, negative, variants of uncertain significance) and their clinical implications  If positive for a mutation, options for managing cancer risk including increased surveillance, chemoprevention, and in some cases prophylactic surgery were discussed  Angie Parr was informed that if a hereditary cancer syndrome was identified in her, first degree relatives (parents, siblings, and children) have a chance of also inheriting the condition  Genetic testing would allow for predictive genetic testing in other relatives, who may also be at risk depending on their degree of relation  Plan: Patient decided to proceed with testing and provided consent  Summary:     Sample Collection:  The patient's blood sample was drawn in the office on 2/24 by medical assistant Jenny Brown    Genetic Testing Preformed: CancerNext + RNA (36 genes): APC, YADI, AXIN2 BARD1, BRCA1, BRCA2, BRIP1, BMPR1A, CDH1, CDK4, CDKN2A, CHEK2, DICER1, EPCAM, GREM1, HOXB13, MLH1, MSH2, MSH3, MSH6, MUTYH, NBN, NF1, NTHL1, PALB2, PMS2, POLD1, POLE, PTEN, RAD51C, RAD51D, RECQL SMAD4, SMARCA4, STK11, TP53      Results take approximately 2-3 weeks to complete once test is started  We will contact Angie Parr once results are available  Additional recommendations for surveillance/medical management will be made pending genetic test results

## 2022-02-24 NOTE — LETTER
2022     Elenita Islas PA-C  [address] [place] [address]    Patient: David Thomson  YOB: 1955  Date of Visit: 2022      Dear Dr Brittany Bhat: Thank you for referring Gayathri Frederick to me for evaluation  Below are my notes for this consultation  If you have questions, please do not hesitate to call me  I look forward to following your patient along with you  Sincerely,        Sae Ponce        CC: Yara Cota DO        Pre-Test Genetic Counseling Consult Note    Patient Name: David Thomson   /Age: 1955/66 y o  Referring Provider: Skyler Santiago PA-C    Date of Service: 2022  Genetic Counselor: Sae Ponce MS, [facility]  Interpretation Services: None  [facility]      Campbell Lee was referred to the [address] [place] [address] and Genetic Assessment Program due to her family history of cancer and recent diagnosis of endometrial cancer  she presents today to discuss the possibility of a hereditary cancer syndrome, options for genetic testing, and implications for her and her family         Cancer History and Treatment:   Oncology History   Endometrial cancer ([facility] [address] )   10/21/2021 Initial Diagnosis     Endometrial cancer ([facility] [address] )      2021 -  Cancer Staged     Staging form: Corpus Uteri - Carcinoma, AJCC 8th Edition  - Clinical stage from 2021: FIGO Stage IA (cT1a, cN0(sn), cM0) - Signed by Jared Sandoval MD on 2021  Method of lymph node assessment: [place] lymph node biopsy  Histologic grade (G): G1  Histologic grading system: 3 grade system  Peritoneal cytology results: Unknown         2021 Surgery     Robotic hysterectomy, BSO, bilateral sentinel LN biopsies, peritoneal biopsies, cystoscopy  -Grade 1, 0 3/1 7cm depth of invasion, indeterminate LVSI (likely artifact), cytology atypical  -MLH1/PMS2 absent  methylation inconclusive (10%)         Screening Hx: Breast:  Mammogram on 2021, normal  Breast biopsy: none    Colon:  Colonoscopy: 5/10/21  7 sessile polyps 1-2cm in size    Skin:  Skin cancer screening none    Other screening: none    Reproductive History  Age at menarche: 15  Parity:   Age at first live birth: 24  Menopause: post (unknown age)  Hormone replacement: none    Medical and Surgical History  Pertinent surgical history:   Past Surgical History:   Procedure Laterality Date    BODY LIFT LOWER N/A 2018    Procedure: LOWER BODY LIFT;  Surgeon: Kait Browne MD;  [facility];  Service: Plastics    CATARACT EXTRACTION Left     CHOLECYSTECTOMY      COLONOSCOPY      CYSTOSCOPY N/A 2021    Procedure: CYSTOSCOPY;  Surgeon: Brigette Marques MD;  [facility];  Service: Gynecology Oncology    ESOPHAGOGASTRODUODENOSCOPY N/A 3/27/2017    Procedure: ESOPHAGOGASTRODUODENOSCOPY (EGD); Surgeon: Karishma Flores MD;  [facility];  Service:     EYE SURGERY      HEMORROIDECTOMY      IR IMAGE GUIDED ASPIRATION / DRAINAGE W TUBE  3/26/2019    JOINT REPLACEMENT      oswald TKR    MI EGD TRANSORAL BIOPSY SINGLE/MULTIPLE N/A 3/8/2017    Procedure: ESOPHAGOGASTRODUODENOSCOPY (EGD); Surgeon: Karishma Flores MD;  [facility]; Service: Bariatrics    MI EXCISE EXCESS SKIN [address] Bilateral 1/10/2019    Procedure: THIGH LIFT;  Surgeon: Kait Browne MD;  [facility];  Service: Plastics    MI INJECTION FOR LYMPHATIC XRAY  2021    Procedure: LYMPHOGRAPHY WITH INDOCYANINE GREEN (ICG);   Surgeon: Brigette Marques MD;  [facility];  Service: Gynecology Oncology    MI LAP, NADYA RESTRICT PROC, LONGITUDINAL GASTRECTOMY N/A 3/27/2017    Procedure: GASTRECTOMY SLEEVE LAPAROSCOPIC;  Surgeon: Karishma Flores MD;  [facility];  Service: [address] <=250 GRAM  W TUBE/OVARY N/A 2021    Procedure: HYSTERECTOMY LAPAROSCOPIC TOTAL ([address]) W/ ROBOTICS, BILATERAL SALPINGO-OOPHORECTOMY, BL SENTINAL LYMPH NODE DISSECTION BIOPSY;  Surgeon: Lizett Maciel MD;  [facility];  Service: Gynecology Oncology    ROTATOR CUFF REPAIR Right     SLEEVE GASTROPLASTY  3/27/2017    TOTAL BODY LIFT        Pertinent medical history:  Past Medical History:   Diagnosis Date    Allergic dermatitis     resolved 01/26/2016    Anxiety     Arthritis     Blind right eye     Depression     Disease of thyroid gland     Encounter for cosmetic surgery 4/30/2018    Added automatically from request for surgery [id number]    Hyperlipidemia     Hypertension     Hypothyroidism (acquired)     hypothyroid    Incontinence of urine in female     Insomnia     Migraine     Morbid obesity ([address] )     TIA (transient ischemic attack)     questionable     Wears glasses          Other History:  Height:   Ht Readings from Last 1 Encounters:   12/30/21 5' 7" (1 702 m)     Weight:   Wt Readings from Last 1 Encounters:   12/30/21 83 9 kg (185 lb)       Relevant Family History     Reported Ancestry: [facility], [facility]    Children: 2 daughters (39, 37)    Siblings: 3 sisters (61, 71, 67)    Maternal Family History: Mother (d 94) history of cervical cancer (dx 76) and a metastatic cancer of unknown primary shortly before she passed  Aunt (d 65) history of brain cancer (dx 72)   Son with history of lung cancer  Uncle (d 89) history of prostate cancer   Son (76) history of pancreatic cancer  Grandmother (d 65) history of breast cancer (dx 72)    Paternal Family History:   Father (d 80) history of skin cancer on face and head (dx 76)      Please refer to the scanned pedigree in the Media Tab for a complete family history     *All history is reported as provided by the patient; records are not available for review, except where indicated  Assessment:  We discussed sporadic, familial and hereditary cancer    We also discussed the many factors that influence our risk for cancer such as age, environmental exposures, lifestyle choices and family history  We reviewed the indications suggestive of a hereditary predisposition to cancer  Genetic testing is indicated for Juvenal López based on the following criteria: Meets NCCN V1 2021 Testing Criteria for the Evaluation of Mirza syndrome: perosnal history of endometrial cancer with abnormal IHC- given the inconclusive results of MLH1 promoter hypermethylation we cannot rule out the presence of a germline MLH1 mutation  The risks, benefits, and limitations of genetic testing were reviewed with the patient, as well as genetic discrimination laws, and possible test results (positive, negative, variants of uncertain significance) and their clinical implications  If positive for a mutation, options for managing cancer risk including increased surveillance, chemoprevention, and in some cases prophylactic surgery were discussed  Juvenal López was informed that if a hereditary cancer syndrome was identified in her, first degree relatives (parents, siblings, and children) have a chance of also inheriting the condition  Genetic testing would allow for predictive genetic testing in other relatives, who may also be at risk depending on their degree of relation  Plan: Patient decided to proceed with testing and provided consent  Summary:     Sample Collection:  The patient's blood sample was drawn in the office on 2/24 by medical assistant Ralf Pham    Genetic Testing Preformed: CancerNext + RNA (36 genes): APC, YADI, AXIN2 BARD1, BRCA1, BRCA2, BRIP1, BMPR1A, CDH1, CDK4, CDKN2A, CHEK2, DICER1, EPCAM, GREM1, HOXB13, MLH1, MSH2, MSH3, MSH6, MUTYH, NBN, NF1, NTHL1, PALB2, PMS2, POLD1, POLE, PTEN, RAD51C, RAD51D, RECQL SMAD4, SMARCA4, STK11, TP53      Results take approximately 2-3 weeks to complete once test is started  We will contact Juvenal López once results are available        Additional recommendations for surveillance/medical management will be made pending genetic test results

## 2022-03-06 DIAGNOSIS — G47.00 INSOMNIA, UNSPECIFIED TYPE: ICD-10-CM

## 2022-03-07 RX ORDER — ESZOPICLONE 3 MG/1
TABLET, FILM COATED ORAL
Qty: 90 TABLET | Refills: 3 | Status: SHIPPED | OUTPATIENT
Start: 2022-03-07

## 2022-03-09 ENCOUNTER — APPOINTMENT (OUTPATIENT)
Dept: LAB | Facility: CLINIC | Age: 67
End: 2022-03-09
Payer: COMMERCIAL

## 2022-03-09 DIAGNOSIS — E78.2 MIXED HYPERLIPIDEMIA: ICD-10-CM

## 2022-03-09 DIAGNOSIS — E55.9 VITAMIN D DEFICIENCY: ICD-10-CM

## 2022-03-09 DIAGNOSIS — E53.8 LOW VITAMIN B12 LEVEL: ICD-10-CM

## 2022-03-09 DIAGNOSIS — E03.9 ACQUIRED HYPOTHYROIDISM: ICD-10-CM

## 2022-03-09 LAB
25(OH)D3 SERPL-MCNC: 39.2 NG/ML (ref 30–100)
ALBUMIN SERPL BCP-MCNC: 3.4 G/DL (ref 3.5–5)
ALP SERPL-CCNC: 82 U/L (ref 46–116)
ALT SERPL W P-5'-P-CCNC: 30 U/L (ref 12–78)
ANION GAP SERPL CALCULATED.3IONS-SCNC: 10 MMOL/L (ref 4–13)
AST SERPL W P-5'-P-CCNC: 18 U/L (ref 5–45)
BILIRUB SERPL-MCNC: 0.68 MG/DL (ref 0.2–1)
BUN SERPL-MCNC: 18 MG/DL (ref 5–25)
CALCIUM ALBUM COR SERPL-MCNC: 9.6 MG/DL (ref 8.3–10.1)
CALCIUM SERPL-MCNC: 9.1 MG/DL (ref 8.3–10.1)
CHLORIDE SERPL-SCNC: 110 MMOL/L (ref 100–108)
CHOLEST SERPL-MCNC: 188 MG/DL
CO2 SERPL-SCNC: 24 MMOL/L (ref 21–32)
CREAT SERPL-MCNC: 0.75 MG/DL (ref 0.6–1.3)
GFR SERPL CREATININE-BSD FRML MDRD: 83 ML/MIN/1.73SQ M
GLUCOSE P FAST SERPL-MCNC: 93 MG/DL (ref 65–99)
HDLC SERPL-MCNC: 80 MG/DL
LDLC SERPL CALC-MCNC: 91 MG/DL (ref 0–100)
POTASSIUM SERPL-SCNC: 4 MMOL/L (ref 3.5–5.3)
PROT SERPL-MCNC: 7.1 G/DL (ref 6.4–8.2)
SODIUM SERPL-SCNC: 144 MMOL/L (ref 136–145)
T4 FREE SERPL-MCNC: 1.17 NG/DL (ref 0.76–1.46)
TRIGL SERPL-MCNC: 84 MG/DL
TSH SERPL DL<=0.05 MIU/L-ACNC: 0.25 UIU/ML (ref 0.36–3.74)
VIT B12 SERPL-MCNC: 620 PG/ML (ref 100–900)

## 2022-03-09 PROCEDURE — 84439 ASSAY OF FREE THYROXINE: CPT

## 2022-03-09 PROCEDURE — 82607 VITAMIN B-12: CPT

## 2022-03-09 PROCEDURE — 80061 LIPID PANEL: CPT

## 2022-03-09 PROCEDURE — 84443 ASSAY THYROID STIM HORMONE: CPT

## 2022-03-09 PROCEDURE — 82306 VITAMIN D 25 HYDROXY: CPT

## 2022-03-09 PROCEDURE — 36415 COLL VENOUS BLD VENIPUNCTURE: CPT

## 2022-03-09 PROCEDURE — 80053 COMPREHEN METABOLIC PANEL: CPT

## 2022-03-16 ENCOUNTER — PROCEDURE VISIT (OUTPATIENT)
Dept: UROLOGY | Facility: CLINIC | Age: 67
End: 2022-03-16
Payer: COMMERCIAL

## 2022-03-16 VITALS
SYSTOLIC BLOOD PRESSURE: 120 MMHG | BODY MASS INDEX: 29.66 KG/M2 | WEIGHT: 189 LBS | HEART RATE: 88 BPM | DIASTOLIC BLOOD PRESSURE: 78 MMHG | HEIGHT: 67 IN | OXYGEN SATURATION: 99 % | RESPIRATION RATE: 18 BRPM

## 2022-03-16 DIAGNOSIS — N39.41 URGE INCONTINENCE OF URINE: Primary | ICD-10-CM

## 2022-03-16 PROCEDURE — 52287 CYSTOSCOPY CHEMODENERVATION: CPT | Performed by: UROLOGY

## 2022-03-16 NOTE — PROGRESS NOTES
Cystoscopy     Date/Time 3/16/2022 7:32 AM     Performed by  Matthew Hollis MD     Authorized by Matthew Hollis MD          Procedure Details:  Procedure type: cystoscopy, injection for chemodenervation          Office Cystoscopy Procedure Note    Indication:     medically refractory overactive bladder    Informed consent   The risks, benefits, complications, treatment options, and expected outcomes were discussed with the patient  The patient concurred with the proposed plan and provided informed consent  Anesthesia  Lidocaine jelly 2%    Antibiotic prophylaxis   None    Procedure  In the presence of a female nurse, the patient was placed in the supine frog-legged position, was prepped and draped in the usual manner using sterile technique, and 2% lidocaine jelly instilled into the urethra  A 20 [place] Thomas catheter was placed under sterile conditions  The bladder was drained and through this 20 mL of 2% plain lidocaine was instilled and allowed to dwell for 10 minutes to provide analgesic      A 17 F flexible cystoscope was then inserted into the urethra and the urethra and bladder carefully examined  The following findings were noted:    Findings:  Urethra:  Normal  Bladder:  Normal  Ureteral orifices:  Normal  Other findings:  None        200 units of Botox were diluted into injectable saline  These were injected using 0 5 mL boluses in multiple locations in a super trigonal pattern  Care was taken to avoid the trigone  The bladder was then observed with no active installation hemostasis was noted to be excellent  Specimens: None                 Complications:    None; patient tolerated the procedure well           Disposition: To home after 30 minute observation  Condition: Stable    Plan:      patient has previously enjoyed a excellent response to Botox for duration of approximately 1 year  Of note she is on no oral pharmacotherapy    We will plan her next installation of 200 units bladder Botox in 1 year's time    Shee was asked to call if the effectiveness wears off sooner and she can be scheduled for sooner appointment or her next injection can be pushed back further if  She continues to do well

## 2022-03-22 ENCOUNTER — TELEPHONE (OUTPATIENT)
Dept: GENETICS | Facility: CLINIC | Age: 67
End: 2022-03-22

## 2022-03-22 ENCOUNTER — OFFICE VISIT (OUTPATIENT)
Dept: FAMILY MEDICINE CLINIC | Facility: CLINIC | Age: 67
End: 2022-03-22
Payer: COMMERCIAL

## 2022-03-22 VITALS
SYSTOLIC BLOOD PRESSURE: 100 MMHG | TEMPERATURE: 98.3 F | HEART RATE: 83 BPM | BODY MASS INDEX: 31.74 KG/M2 | WEIGHT: 202.2 LBS | OXYGEN SATURATION: 100 % | DIASTOLIC BLOOD PRESSURE: 70 MMHG | HEIGHT: 67 IN | RESPIRATION RATE: 16 BRPM

## 2022-03-22 DIAGNOSIS — Z00.00 MEDICARE ANNUAL WELLNESS VISIT, SUBSEQUENT: Primary | ICD-10-CM

## 2022-03-22 DIAGNOSIS — Z12.31 ENCOUNTER FOR SCREENING MAMMOGRAM FOR BREAST CANCER: ICD-10-CM

## 2022-03-22 DIAGNOSIS — C54.1 ENDOMETRIAL CANCER (HCC): ICD-10-CM

## 2022-03-22 DIAGNOSIS — Z78.0 ENCOUNTER FOR OSTEOPOROSIS SCREENING IN ASYMPTOMATIC POSTMENOPAUSAL PATIENT: ICD-10-CM

## 2022-03-22 DIAGNOSIS — Z13.820 ENCOUNTER FOR OSTEOPOROSIS SCREENING IN ASYMPTOMATIC POSTMENOPAUSAL PATIENT: ICD-10-CM

## 2022-03-22 PROCEDURE — 1125F AMNT PAIN NOTED PAIN PRSNT: CPT | Performed by: FAMILY MEDICINE

## 2022-03-22 PROCEDURE — G0439 PPPS, SUBSEQ VISIT: HCPCS | Performed by: FAMILY MEDICINE

## 2022-03-22 PROCEDURE — 3074F SYST BP LT 130 MM HG: CPT | Performed by: FAMILY MEDICINE

## 2022-03-22 PROCEDURE — 1170F FXNL STATUS ASSESSED: CPT | Performed by: FAMILY MEDICINE

## 2022-03-22 PROCEDURE — 1160F RVW MEDS BY RX/DR IN RCRD: CPT | Performed by: FAMILY MEDICINE

## 2022-03-22 PROCEDURE — 3008F BODY MASS INDEX DOCD: CPT | Performed by: FAMILY MEDICINE

## 2022-03-22 PROCEDURE — 3288F FALL RISK ASSESSMENT DOCD: CPT | Performed by: FAMILY MEDICINE

## 2022-03-22 PROCEDURE — 3725F SCREEN DEPRESSION PERFORMED: CPT | Performed by: FAMILY MEDICINE

## 2022-03-22 PROCEDURE — 1101F PT FALLS ASSESS-DOCD LE1/YR: CPT | Performed by: FAMILY MEDICINE

## 2022-03-22 PROCEDURE — 1036F TOBACCO NON-USER: CPT | Performed by: FAMILY MEDICINE

## 2022-03-22 PROCEDURE — 3078F DIAST BP <80 MM HG: CPT | Performed by: FAMILY MEDICINE

## 2022-03-22 NOTE — TELEPHONE ENCOUNTER
Post-Test Genetic Counseling Consult Note  Today I left a voicemail for Herson Torres reviewing the results of her genetic test for hereditary cancer  We met previously on 2/24 for pre-test counseling  I encouraged her to call [phone] to discuss this result further  A copy of this consult note and genetic test result will be shared with the patient  SUMMARY:    Test(s): CancerNext (36 genes): APC, YADI, AXIN2 BARD1, BRCA1, BRCA2, BRIP1, BMPR1A, CDH1, CDK4, CDKN2A, CHEK2, DICER1, EPCAM, GREM1, HOXB13, MLH1, MSH2, MSH3, MSH6, MUTYH, NBN, NF1, NTHL1, PALB2, PMS2, POLD1, POLE, PTEN, RAD51C, RAD51D, RECQL SMAD4, SMARCA4, STK11, TP53    Result: Negative - No Clinically Significant Variants Detected      Assessment:   A negative result significantly reduces the likelihood that Herson Torres has a hereditary cancer syndrome  However, this testing is unable to completely rule out the presence of hereditary cancer  It remains possible that:  - There is a variant in an area of a gene which was not tested or there is a variant not detectable due to technical limitations of this test      - There is a variant in another gene that was not included in this test or in a gene not known to be linked to cancer or tumors  - A family member has a genetic variant that the patient did not inherit  - The cancer in the family is sporadic and is related to non-hereditary factors  Marline's history of endometrial cancer at the age of 77 along with absent MLH1 and PMS2 on IHC with inconclusive MLH1 hypermethylation made us suspicious for Mirza syndrome  The Mirza syndrome genes were analyzed as part of this 36 gene panel and there were no clinically significant variants identified in these genes  This result reduces the chance that Herson Torres has Mirza syndrome however it does not provide an explanation for the loss of the MLH1 and PMS2 on IHC    It remains possible that the Marline's endometrial tumor acquired a somatic mutation in the one of the Mirza syndrome genes which resulted in the absent protein on IHC  There is also a small chance that Marline's has Mirza syndrome and the germline (blood) test was unable to detect the variant due to the technical limitations of the test      Risks and Testing for Family Members:    Despite a negative result, Marline's first-degree relatives may be at increased risk for the cancers based on the family history  We recommend they discuss screening and management recommendations with their healthcare providers  At this time we do not recommend testing for David Flores 's children based on her negative test result  David Adamss children still need to consider the history of cancer on the other side of their family when determining their risks  If David Flores has any affected family members with a cancer diagnosis, especially at a young age, they may still consider genetic testing  Relatives who wish to pursue genetic testing can reach out to the [address] [place] [address] Road ([id number]) to schedule an appointment or visit www [facility] org to identify a local genetic counselor  Plan:   There are no additional recommendations based on Marline's negative result  she should continue cancer screening and medical management as clinically indicated and as determined appropriate by her healthcare providers  Negative Result: David Flores was strongly encouraged to contact us regarding any changes in her personal or family history of cancer as these changes could alter our recommendation regarding genetic testing and/or cancer screening

## 2022-03-22 NOTE — PROGRESS NOTES
Assessment and Plan:     Problem List Items Addressed This Visit        Genitourinary    Endometrial cancer ([address] )     Seeing gyn/onc            Other    Medicare annual wellness visit, subsequent - Primary      Other Visit Diagnoses     Encounter for screening mammogram for breast cancer        Relevant Orders    Mammo screening bilateral w 3d & cad    Encounter for osteoporosis screening in asymptomatic postmenopausal patient        Relevant Orders    DXA bone density spine hip and pelvis        BMI Counseling: Body mass index is 31 77 kg/m²  The BMI is above normal  Nutrition recommendations include encouraging healthy choices of fruits and vegetables  Exercise recommendations include exercising 3-5 times per week  No pharmacotherapy was ordered  Rationale for BMI follow-up plan is due to patient being overweight or obese  Depression Screening and Follow-up Plan: Patient was screened for depression during today's encounter  They screened negative with a PHQ-2 score of 0  Preventive health issues were discussed with patient, and age appropriate screening tests were ordered as noted in patient's After Visit Summary  Personalized health advice and appropriate referrals for health education or preventive services given if needed, as noted in patient's After Visit Summary       History of Present Illness:     Patient presents for Medicare Annual Wellness visit    Patient Care Team:  Davie Salvador DO as PCP - MD Richard Becker Sa, MD Bo Mote, Macdonald Setters, MD Joeann Dredge, MD Gaylene Silver, MD (Obstetrics and Gynecology)     Problem List:     Patient Active Problem List   Diagnosis    Hyperlipidemia    Hypothyroidism (acquired)    Blind right eye    Arthritis    Insomnia    Migraine headache    Postgastrectomy malabsorption    Transient ischemic attack    Urge incontinence of urine    Vulvar atrophy    Bariatric surgery status    Low vitamin B12 level  Medicare annual wellness visit, subsequent    Generalized anxiety disorder    Vitamin D deficiency    Essential hypertension    [facility]omet[facility] [address]    S/P laparoscopic hysterectomy    Peritonitis [facility])      Past Medical and Surgical History:     Past Medical History:   Diagnosis Date    Allergic dermatitis     resolved 01/26/2016    Anxiety     Arthritis     Blind right eye     Depression     Disease of thyroid gland     Encounter for cosmetic surgery 4/30/2018    Added automatically from request for surgery [id number]    Hyperlipidemia     Hypertension     Hypothyroidism (acquired)     hypothyroid    Incontinence of urine in female     Insomnia     Migraine     Morbid obesity ([facility] [address] [facility]     TIA (transient ischemic attack)     questionable     Wears glasses      Past Surgical History:   Procedure Laterality Date    BODY LIFT LOWER N/A 6/28/2018    Procedure: LOWER BODY LIFT;  Surgeon: Kait Browne MD;  [facility];  Service: Plastics    CATARACT EXTRACTION Left     CHOLECYSTECTOMY      COLONOSCOPY      CYSTOSCOPY N/A 11/16/2021    Procedure: CYSTOSCOPY;  Surgeon: Brigette Marques MD;  [facility];  Service: Gynecology Oncology    ESOPHAGOGASTRODUODENOSCOPY N/A 3/27/2017    Procedure: ESOPHAGOGASTRODUODENOSCOPY (EGD); Surgeon: Karishma Flores MD;  [facility];  Service:     EYE SURGERY      HEMORROIDECTOMY      HYSTERECTOMY      IR IMAGE GUIDED ASPIRATION / DRAINAGE W TUBE  3/26/2019    JOINT REPLACEMENT      oswald TKR    MA EGD TRANSORAL BIOPSY SINGLE/MULTIPLE N/A 3/8/2017    Procedure: ESOPHAGOGASTRODUODENOSCOPY (EGD); Surgeon: Karishma Flores MD;  [facility];   Service: Bariatrics    MA EXCISE EXCESS SKIN [address] [facility] [address] Bilateral 1/10/2019    Procedure: THIGH LIFT;  Surgeon: Kait Browne MD;  [facility];  Service: Plastics    MA INJECTION FOR LYMPHATIC XRAY  11/16/2021    Procedure: LYMPHOGRAPHY WITH INDOCYANINE GREEN (ICG);   Surgeon: Bakari Gilliland MD;  [facility];  Service: Gynecology Oncology    NC LAP, NADYA RESTRICT PROC, LONGITUDINAL GASTRECTOMY N/A 3/27/2017    Procedure: GASTRECTOMY SLEEVE LAPAROSCOPIC;  Surgeon: Paramjit Balbuena MD;  [facility];  Service: Bariatrics    NC LAPAROSCOPY W TOT HYSTERECTUTERUS <=250 GRAM  W TUBE/OVARY N/A 11/16/2021    Procedure: HYSTERECTOMY LAPAROSCOPIC TOTAL ([address]) W/ ROBOTICS, BILATERAL SALPINGO-OOPHORECTOMY, BL SENTINAL LYMPH NODE DISSECTION BIOPSY;  Surgeon: Bakari Gilliland MD;  [facility];  Service: Gynecology Oncology    ROTATOR CUFF REPAIR Right     SLEEVE GASTROPLASTY  3/27/2017    TOTAL BODY LIFT        Family History:     Family History   Problem Relation Age of Onset    Diabetes Mother     Skin cancer Mother     Arthritis Mother     Cancer Mother         Cervical    Lung cancer Mother     Alzheimer's disease Father     Heart failure Father     Diabetes Father     Dementia Father     Cancer Father         Skin    Stroke Father     Breast cancer Maternal Grandmother         adenocarcinoma in situ in villous adenoma of the breast      Social History:     Social History     Socioeconomic History    Marital status: /Civil Union     Spouse name: None    Number of children: None    Years of education: None    Highest education level: None   Occupational History    None   Tobacco Use    Smoking status: Never Smoker    Smokeless tobacco: Never Used   Vaping Use    Vaping Use: Never used   Substance and Sexual Activity    Alcohol use: No    Drug use: No    Sexual activity: Not Currently   Other Topics Concern    None   Social History Narrative    Daily coffee consumption    Exercise frequency    Two children         Social Determinants of Health     Financial Resource Strain: Not on file   Food Insecurity: Not on file   Transportation Needs: Not on file   Physical Activity: Not on file   Stress: Not on file   Social Connections: Not on file   Intimate Partner Violence: Not on file   Housing Stability: Not on file      Medications and Allergies:     Current Outpatient Medications   Medication Sig Dispense Refill    aspirin 81 MG tablet Take 1 tablet by mouth daily for 30 days 30 tablet 0    betamethasone valerate (VALISONE) 0 1 % cream Apply topically 2 (two) times a day 30 g 0    calcium citrate-vitamin D (CITRACAL+D) 315-200 MG-UNIT per tablet Take by mouth daily       eszopiclone (LUNESTA) 3 MG tablet TAKE ONE TABLET BY MOUTH AT BEDTIME 90 tablet 3    levothyroxine 125 mcg tablet TAKE 1 TABLET DAILY 90 tablet 3    lisinopril (ZESTRIL) 10 mg tablet Take 1 tablet (10 mg total) by mouth daily 90 tablet 3    Multiple Vitamins-Minerals (CENTRUM WOMEN) TABS Take by mouth Goes between bariatric multi and Centrum      PARoxetine (PAXIL) 20 mg tablet TAKE 1 TABLET DAILY 90 tablet 3    rizatriptan (MAXALT) 10 MG tablet TAKE 1 TABLET DAILY AS NEEDED FOR MIGRAINE 18 tablet 5    simvastatin (ZOCOR) 40 mg tablet TAKE 1 TABLET EVERY EVENING 90 tablet 3    topiramate (TOPAMAX) 50 MG tablet TAKE 1 TABLET BY MOUTH EVERY DAY 30 tablet 4     No current facility-administered medications for this visit       No Known Allergies   Immunizations:     Immunization History   Administered Date(s) Administered    COVID-19 MODERNA VACC 0 5 ML IM 02/04/2021, 03/02/2021    H1N1, All Formulations 01/06/2010    INFLUENZA 09/28/2013, 09/08/2014, 10/08/2015, 10/15/2016, 09/21/2017, 09/05/2018, 09/18/2019    Influenza Injectable, MDCK, Preservative Free, Quadrivalent, 0 5 mL 09/18/2019    Influenza Quadrivalent Preservative Free 3 years and older IM 10/08/2015    Influenza, high dose seasonal 0 7 mL 09/15/2020, 09/21/2021    Influenza, injectable, quadrivalent, preservative free 0 5 mL 09/05/2018    Influenza, seasonal, injectable 09/20/2011, 09/08/2014, 10/15/2016    Influenza, seasonal, injectable, preservative free 09/05/2018    Pneumococcal Polysaccharide PPV23 03/10/2020    Tdap 2014    Zoster Vaccine Recombinant 2018, 2019      Health Maintenance:         Topic Date Due    Colorectal Cancer Screening  05/10/2022    Breast Cancer Screening: Mammogram  2022    Hepatitis C Screening  Completed         Topic Date Due    COVID-19 Vaccine (3 - Booster for Moderna series) 2021      Medicare Health Risk Assessment:     /70 (BP Location: Left arm, Patient Position: Sitting, Cuff Size: Large)   Pulse 83   Temp 98 3 °F (36 8 °C)   Resp 16   Ht 5' 6 89" (1 699 m)   Wt 91 7 kg (202 lb 3 2 oz)   SpO2 100%   BMI 31 77 kg/m²      Juvenal López is here for her Subsequent Wellness visit  Health Risk Assessment:   Patient rates overall health as very good  Patient feels that their physical health rating is same  Patient is very satisfied with their life  Eyesight was rated as same  Hearing was rated as same  Patient feels that their emotional and mental health rating is slightly worse  Patients states they are never, rarely angry  Patient states they are sometimes unusually tired/fatigued  Pain experienced in the last 7 days has been none  Patient states that she has experienced weight loss or gain in last 6 months  My mom  , gained 15 lbs and very sad    Depression Screening:   PHQ-2 Score: 0      Fall Risk Screening: In the past year, patient has experienced: no history of falling in past year      Urinary Incontinence Screening:   Patient has not leaked urine accidently in the last six months  Home Safety:  Patient does not have trouble with stairs inside or outside of their home  Patient has working smoke alarms and has working carbon monoxide detector  Home safety hazards include: none  Nutrition:   Current diet is Regular, Low Saturated Fat and No Added Salt  Medications:   Patient is not currently taking any over-the-counter supplements  Patient is able to manage medications  Activities of Daily Living (ADLs)/Instrumental Activities of Daily Living (IADLs):   Walk and transfer into and out of bed and chair?: Yes  Dress and groom yourself?: Yes    Bathe or shower yourself?: Yes    Feed yourself? Yes  Do your laundry/housekeeping?: Yes  Manage your money, pay your bills and track your expenses?: Yes  Make your own meals?: Yes    Do your own shopping?: Yes    Previous Hospitalizations:   Any hospitalizations or ED visits within the last 12 months?: Yes    How many hospitalizations have you had in the last year?: 1-2    Hospitalization Comments: Uterine cancer, hysterectomy    Advance Care Planning:   Living will: No    Durable POA for healthcare: No    Advanced directive: No      Cognitive Screening:   Provider or family/friend/caregiver concerned regarding cognition?: No    PREVENTIVE SCREENINGS      Cardiovascular Screening:    General: Screening Not Indicated and History Lipid Disorder      Diabetes Screening:     General: Screening Current      Colorectal Cancer Screening:     General: Screening Current      Breast Cancer Screening:     General: Screening Current      Cervical Cancer Screening:    General: Screening Not Indicated      Osteoporosis Screening:    General: Risks and Benefits Discussed      Lung Cancer Screening:     General: Screening Not Indicated      Hepatitis C Screening:    General: Screening Current    Screening, Brief Intervention, and Referral to Treatment (SBIRT)    Screening  Typical number of drinks in a day: 0  Typical number of drinks in a week: 0  Interpretation: Low risk drinking behavior      AUDIT-C Screenin) How often did you have a drink containing alcohol in the past year? never  2) How many drinks did you have on a typical day when you were drinking in the past year? 0  3) How often did you have 6 or more drinks on one occasion in the past year? never    AUDIT-C Score: 0  Interpretation: Score 0-2 (female): Negative screen for alcohol misuse    Single Item Drug Screening:  How often have you used an illegal drug (including marijuana) or a prescription medication for non-medical reasons in the past year? never    Single Item Drug Screen Score: 0  Interpretation: Negative screen for possible drug use disorder      Aguilar Wilder, DO

## 2022-03-22 NOTE — PATIENT INSTRUCTIONS
Medicare Preventive Visit Patient Instructions  Thank you for completing your Welcome to Medicare Visit or Medicare Annual Wellness Visit today  Your next wellness visit will be due in one year (3/23/2023)  The screening/preventive services that you may require over the next 5-10 years are detailed below  Some tests may not apply to you based off risk factors and/or age  Screening tests ordered at today's visit but not completed yet may show as past due  Also, please note that scanned in results may not display below  Preventive Screenings:  Service Recommendations Previous Testing/Comments   Colorectal Cancer Screening  * Colonoscopy    * Fecal Occult Blood Test (FOBT)/Fecal Immunochemical Test (FIT)  * Fecal DNA/Cologuard Test  * Flexible Sigmoidoscopy Age: 54-65 years old   Colonoscopy: every 10 years (may be performed more frequently if at higher risk)  OR  FOBT/FIT: every 1 year  OR  Cologuard: every 3 years  OR  Sigmoidoscopy: every 5 years  Screening may be recommended earlier than age 48 if at higher risk for colorectal cancer  Also, an individualized decision between you and your healthcare provider will decide whether screening between the ages of 74-80 would be appropriate  Colonoscopy: 05/10/2021  FOBT/FIT: Not on file  Cologuard: Not on file  Sigmoidoscopy: Not on file    Screening Current     Breast Cancer Screening Age: 36 years old  Frequency: every 1-2 years  Not required if history of left and right mastectomy Mammogram: 09/01/2021    Screening Current   Cervical Cancer Screening Between the ages of 21-29, pap smear recommended once every 3 years  Between the ages of 33-67, can perform pap smear with HPV co-testing every 5 years     Recommendations may differ for women with a history of total hysterectomy, cervical cancer, or abnormal pap smears in past  Pap Smear: 09/10/2021    Screening Not Indicated   Hepatitis C Screening Once for adults born between 1945 and 1965  More frequently in patients at high risk for Hepatitis C Hep C Antibody: 03/27/2018    Screening Current   Diabetes Screening 1-2 times per year if you're at risk for diabetes or have pre-diabetes Fasting glucose: 93 mg/dL   A1C: 5 2 %    Screening Current   Cholesterol Screening Once every 5 years if you don't have a lipid disorder  May order more often based on risk factors  Lipid panel: 03/09/2022    Screening Not Indicated  History Lipid Disorder     Other Preventive Screenings Covered by Medicare:  1  Abdominal Aortic Aneurysm (AAA) Screening: covered once if your at risk  You're considered to be at risk if you have a family history of AAA  2  Lung Cancer Screening: covers low dose CT scan once per year if you meet all of the following conditions: (1) Age 50-69; (2) No signs or symptoms of lung cancer; (3) Current smoker or have quit smoking within the last 15 years; (4) You have a tobacco smoking history of at least 30 pack years (packs per day multiplied by number of years you smoked); (5) You get a written order from a healthcare provider  3  Glaucoma Screening: covered annually if you're considered high risk: (1) You have diabetes OR (2) Family history of glaucoma OR (3)  aged 48 and older OR (3)  American aged 72 and older  3  Osteoporosis Screening: covered every 2 years if you meet one of the following conditions: (1) You're estrogen deficient and at risk for osteoporosis based off medical history and other findings; (2) Have a vertebral abnormality; (3) On glucocorticoid therapy for more than 3 months; (4) Have primary hyperparathyroidism; (5) On osteoporosis medications and need to assess response to drug therapy  · Last bone density test (DXA Scan): Not on file  5  HIV Screening: covered annually if you're between the age of 12-76  Also covered annually if you are younger than 13 and older than 72 with risk factors for HIV infection   For pregnant patients, it is covered up to 3 times per pregnancy  Immunizations:  Immunization Recommendations   Influenza Vaccine Annual influenza vaccination during flu season is recommended for all persons aged >= 6 months who do not have contraindications   Pneumococcal Vaccine (Prevnar and Pneumovax)  * Prevnar = PCV13  * Pneumovax = PPSV23   Adults 25-60 years old: 1-3 doses may be recommended based on certain risk factors  Adults 72 years old: Prevnar (PCV13) vaccine recommended followed by Pneumovax (PPSV23) vaccine  If already received PPSV23 since turning 65, then PCV13 recommended at least one year after PPSV23 dose  Hepatitis B Vaccine 3 dose series if at intermediate or high risk (ex: diabetes, end stage renal disease, liver disease)   Tetanus (Td) Vaccine - COST NOT COVERED BY MEDICARE PART B Following completion of primary series, a booster dose should be given every 10 years to maintain immunity against tetanus  Td may also be given as tetanus wound prophylaxis  Tdap Vaccine - COST NOT COVERED BY MEDICARE PART B Recommended at least once for all adults  For pregnant patients, recommended with each pregnancy  Shingles Vaccine (Shingrix) - COST NOT COVERED BY MEDICARE PART B  2 shot series recommended in those aged 48 and above     Health Maintenance Due:      Topic Date Due    Colorectal Cancer Screening  05/10/2022    Breast Cancer Screening: Mammogram  09/01/2022    Hepatitis C Screening  Completed     Immunizations Due:      Topic Date Due    COVID-19 Vaccine (3 - Booster for [place] series) 08/02/2021     Advance Directives   What are advance directives? Advance directives are legal documents that state your wishes and plans for medical care  These plans are made ahead of time in case you lose your ability to make decisions for yourself  Advance directives can apply to any medical decision, such as the treatments you want, and if you want to donate organs  What are the types of advance directives?   There are many types of advance directives, and each state has rules about how to use them  You may choose a combination of any of the following:  · Living will: This is a written record of the treatment you want  You can also choose which treatments you do not want, which to limit, and which to stop at a certain time  This includes surgery, medicine, IV fluid, and tube feedings  · Durable power of  for healthcare [facility]): This is a written record that states who you want to make healthcare choices for you when you are unable to make them for yourself  This person, called a proxy, is usually a family member or a friend  You may choose more than 1 proxy  · Do not resuscitate (DNR) order:  A DNR order is used in case your heart stops beating or you stop breathing  It is a request not to have certain forms of treatment, such as CPR  A DNR order may be included in other types of advance directives  · Medical directive: This covers the care that you want if you are in a coma, near death, or unable to make decisions for yourself  You can list the treatments you want for each condition  Treatment may include pain medicine, surgery, blood transfusions, dialysis, IV or tube feedings, and a ventilator (breathing machine)  · Values history: This document has questions about your views, beliefs, and how you feel and think about life  This information can help others choose the care that you would choose  Why are advance directives important? An advance directive helps you control your care  Although spoken wishes may be used, it is better to have your wishes written down  Spoken wishes can be misunderstood, or not followed  Treatments may be given even if you do not want them  An advance directive may make it easier for your family to make difficult choices about your care  Urinary Incontinence   Urinary incontinence (UI)  is when you lose control of your bladder  UI develops because your bladder cannot store or empty urine properly   The 3 most common types of UI are stress incontinence, urge incontinence, or both  Medicines:   · May be given to help strengthen your bladder control  Report any side effects of medication to your healthcare provider  Do pelvic muscle exercises often:  Your pelvic muscles help you stop urinating  Squeeze these muscles tight for 5 seconds, then relax for 5 seconds  Gradually work up to squeezing for 10 seconds  Do 3 sets of 15 repetitions a day, or as directed  This will help strengthen your pelvic muscles and improve bladder control  Train your bladder:  Go to the bathroom at set times, such as every 2 hours, even if you do not feel the urge to go  You can also try to hold your urine when you feel the urge to go  For example, hold your urine for 5 minutes when you feel the urge to go  As that becomes easier, hold your urine for 10 minutes  Self-care:   · Keep a UI record  Write down how often you leak urine and how much you leak  Make a note of what you were doing when you leaked urine  · Drink liquids as directed  You may need to limit the amount of liquid you drink to help control your urine leakage  Do not drink any liquid right before you go to bed  Limit or do not have drinks that contain caffeine or alcohol  · Prevent constipation  Eat a variety of high-fiber foods  Good examples are high-fiber cereals, beans, vegetables, and whole-grain breads  Walking is the best way to trigger your intestines to have a bowel movement  · Exercise regularly and maintain a healthy weight  Weight loss and exercise will decrease pressure on your bladder and help you control your leakage  · Use a catheter as directed  to help empty your bladder  A catheter is a tiny, plastic tube that is put into your bladder to drain your urine  · Go to behavior therapy as directed  Behavior therapy may be used to help you learn to control your urge to urinate      Weight Management   Why it is important to manage your weight: Being overweight increases your risk of health conditions such as heart disease, high blood pressure, type 2 diabetes, and certain types of cancer  It can also increase your risk for osteoarthritis, sleep apnea, and other respiratory problems  Aim for a slow, steady weight loss  Even a small amount of weight loss can lower your risk of health problems  How to lose weight safely:  A safe and healthy way to lose weight is to eat fewer calories and get regular exercise  You can lose up about 1 pound a week by decreasing the number of calories you eat by 500 calories each day  Healthy meal plan for weight management:  A healthy meal plan includes a variety of foods, contains fewer calories, and helps you stay healthy  A healthy meal plan includes the following:  · Eat whole-grain foods more often  A healthy meal plan should contain fiber  Fiber is the part of grains, fruits, and vegetables that is not broken down by your body  Whole-grain foods are healthy and provide extra fiber in your diet  Some examples of whole-grain foods are whole-wheat breads and pastas, oatmeal, brown rice, and bulgur  · Eat a variety of vegetables every day  Include dark, leafy greens such as spinach, kale, deuce greens, and mustard greens  Eat yellow and orange vegetables such as carrots, sweet potatoes, and winter squash  · Eat a variety of fruits every day  Choose fresh or canned fruit (canned in its own juice or light syrup) instead of juice  Fruit juice has very little or no fiber  · Eat low-fat dairy foods  Drink fat-free (skim) milk or 1% milk  Eat fat-free yogurt and low-fat cottage cheese  Try low-fat cheeses such as mozzarella and other reduced-fat cheeses  · Choose meat and other protein foods that are low in fat  Choose beans or other legumes such as split peas or lentils  Choose fish, skinless poultry (chicken or turkey), or lean cuts of red meat (beef or pork)   Before you cook meat or poultry, cut off any visible fat    · Use less fat and oil  Try baking foods instead of frying them  Add less fat, such as margarine, sour cream, regular salad dressing and mayonnaise to foods  Eat fewer high-fat foods  Some examples of high-fat foods include french fries, doughnuts, ice cream, and cakes  · Eat fewer sweets  Limit foods and drinks that are high in sugar  This includes candy, cookies, regular soda, and sweetened drinks  Exercise:  Exercise at least 30 minutes per day on most days of the week  Some examples of exercise include walking, biking, dancing, and swimming  You can also fit in more physical activity by taking the stairs instead of the elevator or parking farther away from stores  Ask your healthcare provider about the best exercise plan for you  © Copyright [employer] 2018 Information is for End User's use only and may not be sold, redistributed or otherwise used for commercial purposes   All illustrations and images included in CareNotes® are the copyrighted property of [employer]  or [address] [place] [address]

## 2022-03-23 ENCOUNTER — TELEPHONE (OUTPATIENT)
Dept: GENETICS | Facility: CLINIC | Age: 67
End: 2022-03-23

## 2022-03-23 NOTE — TELEPHONE ENCOUNTER
----- Message from Jahaira Scales sent at 3/22/2022  2:56 PM EDT -----  Regarding: completed chart  GC Completed Chart     Result Type: Negative    Result Delivery: MyChart    Monthly Review: Does not need monthly review- COMPLETE

## 2022-03-25 DIAGNOSIS — G43.909 MIGRAINE WITHOUT STATUS MIGRAINOSUS, NOT INTRACTABLE, UNSPECIFIED MIGRAINE TYPE: ICD-10-CM

## 2022-03-26 RX ORDER — RIZATRIPTAN BENZOATE 10 MG/1
TABLET ORAL
Qty: 18 TABLET | Refills: 7 | Status: SHIPPED | OUTPATIENT
Start: 2022-03-26

## 2022-04-01 LAB — MISCELLANEOUS LAB TEST RESULT: NORMAL

## 2022-04-28 ENCOUNTER — LAB REQUISITION (OUTPATIENT)
Dept: LAB | Facility: HOSPITAL | Age: 67
End: 2022-04-28
Payer: COMMERCIAL

## 2022-04-28 DIAGNOSIS — K63.5 POLYP OF COLON: ICD-10-CM

## 2022-04-28 PROCEDURE — 88305 TISSUE EXAM BY PATHOLOGIST: CPT | Performed by: PATHOLOGY

## 2022-05-12 ENCOUNTER — OFFICE VISIT (OUTPATIENT)
Dept: FAMILY MEDICINE CLINIC | Facility: CLINIC | Age: 67
End: 2022-05-12
Payer: COMMERCIAL

## 2022-05-12 VITALS
SYSTOLIC BLOOD PRESSURE: 104 MMHG | HEART RATE: 74 BPM | DIASTOLIC BLOOD PRESSURE: 80 MMHG | RESPIRATION RATE: 16 BRPM | OXYGEN SATURATION: 98 % | WEIGHT: 209.4 LBS | HEIGHT: 67 IN | BODY MASS INDEX: 32.87 KG/M2 | TEMPERATURE: 98.6 F

## 2022-05-12 DIAGNOSIS — R35.0 FREQUENCY OF URINATION: Primary | ICD-10-CM

## 2022-05-12 LAB
SL AMB  POCT GLUCOSE, UA: NEGATIVE
SL AMB LEUKOCYTE ESTERASE,UA: ABNORMAL
SL AMB POCT BILIRUBIN,UA: NEGATIVE
SL AMB POCT BLOOD,UA: ABNORMAL
SL AMB POCT CLARITY,UA: ABNORMAL
SL AMB POCT COLOR,UA: YELLOW
SL AMB POCT KETONES,UA: ABNORMAL
SL AMB POCT NITRITE,UA: NEGATIVE
SL AMB POCT PH,UA: 6
SL AMB POCT SPECIFIC GRAVITY,UA: 1.02
SL AMB POCT URINE PROTEIN: ABNORMAL
SL AMB POCT UROBILINOGEN: NEGATIVE

## 2022-05-12 PROCEDURE — 1036F TOBACCO NON-USER: CPT | Performed by: FAMILY MEDICINE

## 2022-05-12 PROCEDURE — 81002 URINALYSIS NONAUTO W/O SCOPE: CPT | Performed by: FAMILY MEDICINE

## 2022-05-12 PROCEDURE — 99213 OFFICE O/P EST LOW 20 MIN: CPT | Performed by: FAMILY MEDICINE

## 2022-05-12 PROCEDURE — 3074F SYST BP LT 130 MM HG: CPT | Performed by: FAMILY MEDICINE

## 2022-05-12 PROCEDURE — 3079F DIAST BP 80-89 MM HG: CPT | Performed by: FAMILY MEDICINE

## 2022-05-12 PROCEDURE — 1160F RVW MEDS BY RX/DR IN RCRD: CPT | Performed by: FAMILY MEDICINE

## 2022-05-12 PROCEDURE — 3008F BODY MASS INDEX DOCD: CPT | Performed by: FAMILY MEDICINE

## 2022-05-12 RX ORDER — SULFAMETHOXAZOLE AND TRIMETHOPRIM 800; 160 MG/1; MG/1
1 TABLET ORAL EVERY 12 HOURS SCHEDULED
Qty: 14 TABLET | Refills: 0 | Status: SHIPPED | OUTPATIENT
Start: 2022-05-12 | End: 2022-05-19

## 2022-05-12 NOTE — PROGRESS NOTES
Assessment/Plan:    1  Frequency of urination  -     POCT urine dip  -     Urine culture  -     sulfamethoxazole-trimethoprim (BACTRIM DS) 800-160 mg per tablet; Take 1 tablet by mouth every 12 (twelve) hours for 7 days       Recent Results (from the past 24 hour(s))   POCT urine dip    Collection Time: 05/12/22 12:30 PM   Result Value Ref Range    LEUKOCYTE ESTERASE,UA 3+     NITRITE,UA Negative     SL AMB POCT UROBILINOGEN Negative     POCT URINE PROTEIN +-      PH,UA 6 0     BLOOD,UA +-     SPECIFIC GRAVITY,UA 1 020     KETONES,UA +-     BILIRUBIN,UA Negative     GLUCOSE, UA Negative      COLOR,UA yellow     CLARITY,UA Cloudy      There are no Patient Instructions on file for this visit  Return if symptoms worsen or fail to improve  Subjective:      Patient ID: Alea Cruz is a 79 y o  female  Chief Complaint   Patient presents with    Urinary Tract Infection     Patient here with frequency of urination burning urgency of urination for 1 week        Started 1 week ago wth urinary burning intermittent  On and off  Came back and worse  Increased frequency    Urinary Tract Infection   This is a new problem  The current episode started in the past 7 days  The problem has been waxing and waning  The quality of the pain is described as burning  The patient is experiencing no pain  There has been no fever  Associated symptoms include frequency and urgency  Pertinent negatives include no hematuria  She has tried increased fluids for the symptoms  The treatment provided no relief  The following portions of the patient's history were reviewed and updated as appropriate:  past social history    Review of Systems   Constitutional: Negative  HENT: Negative  Eyes: Negative  Respiratory: Negative  Cardiovascular: Negative  Gastrointestinal: Negative  Genitourinary: Positive for frequency and urgency  Negative for hematuria  Musculoskeletal: Negative  Allergic/Immunologic: Negative  Neurological: Negative  Hematological: Negative  Psychiatric/Behavioral: Negative  Current Outpatient Medications   Medication Sig Dispense Refill    aspirin 81 MG tablet Take 1 tablet by mouth daily for 30 days 30 tablet 0    betamethasone valerate (VALISONE) 0 1 % cream Apply topically 2 (two) times a day 30 g 0    calcium citrate-vitamin D (CITRACAL+D) 315-200 MG-UNIT per tablet Take by mouth daily       eszopiclone (LUNESTA) 3 MG tablet TAKE ONE TABLET BY MOUTH AT BEDTIME 90 tablet 3    levothyroxine 125 mcg tablet TAKE 1 TABLET DAILY 90 tablet 3    lisinopril (ZESTRIL) 10 mg tablet Take 1 tablet (10 mg total) by mouth daily 90 tablet 3    Multiple Vitamins-Minerals (CENTRUM WOMEN) TABS Take by mouth Goes between bariatric multi and Centrum      PARoxetine (PAXIL) 20 mg tablet TAKE 1 TABLET DAILY 90 tablet 3    rizatriptan (MAXALT) 10 MG tablet TAKE 1 TABLET DAILY AS NEEDED FOR MIGRAINE 18 tablet 7    simvastatin (ZOCOR) 40 mg tablet TAKE 1 TABLET EVERY EVENING 90 tablet 3    sulfamethoxazole-trimethoprim (BACTRIM DS) 800-160 mg per tablet Take 1 tablet by mouth every 12 (twelve) hours for 7 days 14 tablet 0    topiramate (TOPAMAX) 50 MG tablet TAKE 1 TABLET BY MOUTH EVERY DAY 30 tablet 4     No current facility-administered medications for this visit  Objective:    /80 (BP Location: Left arm, Patient Position: Sitting, Cuff Size: Large)   Pulse 74   Temp 98 6 °F (37 °C)   Resp 16   Ht 5' 6 89" (1 699 m)   Wt 95 kg (209 lb 6 4 oz)   SpO2 98%   BMI 32 90 kg/m²      Physical Exam  Vitals reviewed  Constitutional:       Appearance: Normal appearance  Cardiovascular:      Rate and Rhythm: Normal rate and regular rhythm  Pulmonary:      Effort: Pulmonary effort is normal       Breath sounds: Normal breath sounds  Abdominal:      General: Abdomen is flat  Bowel sounds are normal       Palpations: Abdomen is soft     Neurological:      General: No focal deficit present  Mental Status: She is alert and oriented to person, place, and time  Psychiatric:         Mood and Affect: Mood normal          Behavior: Behavior normal          Thought Content:  Thought content normal          Judgment: Judgment normal              June Baer DO

## 2022-05-13 ENCOUNTER — TELEPHONE (OUTPATIENT)
Dept: FAMILY MEDICINE CLINIC | Facility: CLINIC | Age: 67
End: 2022-05-13

## 2022-05-13 NOTE — TELEPHONE ENCOUNTER
Lab called this morning to let us know they are unable to process urine culture due to urine being in the wrong container  They stated it was done in a red marble container but it needed to be in an original or grey top  Do you want the patient to give another sample?     Please advise

## 2022-05-13 NOTE — TELEPHONE ENCOUNTER
Can we confirm urine was sent in the wrong container?  We can just cancel culture if pt started antibiotics

## 2022-05-13 NOTE — TELEPHONE ENCOUNTER
Spoke to patient she said she feels better already so she thinks it's working   but will keep us posted if things change

## 2022-06-02 ENCOUNTER — OFFICE VISIT (OUTPATIENT)
Dept: FAMILY MEDICINE CLINIC | Facility: CLINIC | Age: 67
End: 2022-06-02
Payer: COMMERCIAL

## 2022-06-02 ENCOUNTER — PATIENT MESSAGE (OUTPATIENT)
Dept: FAMILY MEDICINE CLINIC | Facility: CLINIC | Age: 67
End: 2022-06-02

## 2022-06-02 VITALS
HEART RATE: 84 BPM | HEIGHT: 67 IN | BODY MASS INDEX: 32.96 KG/M2 | TEMPERATURE: 98.8 F | OXYGEN SATURATION: 97 % | SYSTOLIC BLOOD PRESSURE: 110 MMHG | DIASTOLIC BLOOD PRESSURE: 78 MMHG | WEIGHT: 210 LBS

## 2022-06-02 DIAGNOSIS — J06.9 UPPER RESPIRATORY TRACT INFECTION, UNSPECIFIED TYPE: Primary | ICD-10-CM

## 2022-06-02 PROCEDURE — 99213 OFFICE O/P EST LOW 20 MIN: CPT | Performed by: NURSE PRACTITIONER

## 2022-06-02 PROCEDURE — 3074F SYST BP LT 130 MM HG: CPT | Performed by: NURSE PRACTITIONER

## 2022-06-02 PROCEDURE — 1036F TOBACCO NON-USER: CPT | Performed by: NURSE PRACTITIONER

## 2022-06-02 PROCEDURE — 3078F DIAST BP <80 MM HG: CPT | Performed by: NURSE PRACTITIONER

## 2022-06-02 PROCEDURE — 1160F RVW MEDS BY RX/DR IN RCRD: CPT | Performed by: NURSE PRACTITIONER

## 2022-06-02 RX ORDER — BENZONATATE 100 MG/1
100 CAPSULE ORAL 3 TIMES DAILY PRN
Qty: 20 CAPSULE | Refills: 0 | Status: SHIPPED | OUTPATIENT
Start: 2022-06-02

## 2022-06-02 RX ORDER — AZITHROMYCIN 250 MG/1
TABLET, FILM COATED ORAL
Qty: 6 TABLET | Refills: 0 | Status: SHIPPED | OUTPATIENT
Start: 2022-06-02 | End: 2022-06-06

## 2022-06-02 NOTE — ASSESSMENT & PLAN NOTE
Cold sx x 1 week without improvement  Lungs are clear, vitals stable  She does have swelling and redness of turbinates on exam   Discussed options for management  Given duration, can try azithromycin, however did explain that this may be viral and will just need to run it's course  Recommended flonase, mucinex as additional interventions  Reinforced hydration  Pt also requesting something for cough, will send in benzonatate    Pt should call if sx worsen or persist

## 2022-06-02 NOTE — PROGRESS NOTES
Assessment/Plan:    Upper respiratory tract infection  Cold sx x 1 week without improvement  Lungs are clear, vitals stable  She does have swelling and redness of turbinates on exam   Discussed options for management  Given duration, can try azithromycin, however did explain that this may be viral and will just need to run it's course  Recommended flonase, mucinex as additional interventions  Reinforced hydration  Pt also requesting something for cough, will send in benzonatate  Pt should call if sx worsen or persist        Diagnoses and all orders for this visit:    Upper respiratory tract infection, unspecified type  -     azithromycin (ZITHROMAX) 250 mg tablet; Take 2 tablets today then 1 tablet daily x 4 days  -     benzonatate (TESSALON PERLES) 100 mg capsule; Take 1 capsule (100 mg total) by mouth 3 (three) times a day as needed for cough        Subjective:      Patient ID: Daniel España is a 79 y o  female  Pt is a 78 yo female here today with c/o productive cough and congestion x 1 week  Cough seems worse in the evening and first thing in the morning  Denies fever, chills, sob, headache, sore throat  Her appetite is normal, denies n/v/d  Using delsym prn without relief  She took a home covid test this morning, which was negative  She denies hx of allergies  The following portions of the patient's history were reviewed and updated as appropriate: allergies, current medications, past family history, past medical history, past social history, past surgical history and problem list     Review of Systems   Constitutional: Negative for appetite change, chills, fatigue and fever  HENT: Positive for congestion  Negative for ear pain, postnasal drip and sinus pressure  Respiratory: Positive for cough  Negative for chest tightness, shortness of breath and wheezing  Cardiovascular: Negative for chest pain, palpitations and leg swelling     Gastrointestinal: Negative for diarrhea, nausea and vomiting  Genitourinary: Negative for dysuria  Musculoskeletal: Negative for myalgias  Neurological: Negative for dizziness and headaches  Hematological: Negative for adenopathy  Psychiatric/Behavioral: Negative for sleep disturbance  Objective:      /78 (BP Location: Left arm, Patient Position: Sitting, Cuff Size: Adult)   Pulse 84   Temp 98 8 °F (37 1 °C) (Probe)   Ht 5' 7" (1 702 m)   Wt 95 3 kg (210 lb)   SpO2 97%   BMI 32 89 kg/m²          Physical Exam  Vitals reviewed  Constitutional:       General: She is awake  She is not in acute distress  Appearance: Normal appearance  She is well-developed and well-groomed  She is not ill-appearing  HENT:      Head: Normocephalic  Right Ear: Hearing, tympanic membrane, ear canal and external ear normal  No middle ear effusion  Tympanic membrane is not bulging  Left Ear: Hearing, tympanic membrane, ear canal and external ear normal   No middle ear effusion  Tympanic membrane is not bulging  Nose: Congestion present  No mucosal edema or rhinorrhea  Right Turbinates: Swollen  Left Turbinates: Swollen  Mouth/Throat:      Mouth: Mucous membranes are not dry  Pharynx: No oropharyngeal exudate or posterior oropharyngeal erythema  Tonsils: No tonsillar abscesses  Eyes:      General: Lids are normal       Conjunctiva/sclera: Conjunctivae normal    Cardiovascular:      Rate and Rhythm: Normal rate and regular rhythm  Heart sounds: Normal heart sounds  No murmur heard  Pulmonary:      Effort: Pulmonary effort is normal  No accessory muscle usage or respiratory distress  Breath sounds: Normal breath sounds  No decreased breath sounds, wheezing, rhonchi or rales  Lymphadenopathy:      Head:      Right side of head: No submental, submandibular, tonsillar, preauricular, posterior auricular or occipital adenopathy        Left side of head: No submental, submandibular, tonsillar, preauricular, posterior auricular or occipital adenopathy  Cervical: No cervical adenopathy  Skin:     General: Skin is warm and dry  Neurological:      Mental Status: She is alert and oriented to person, place, and time  Psychiatric:         Attention and Perception: Attention normal          Mood and Affect: Mood normal          Speech: Speech normal          Behavior: Behavior normal  Behavior is cooperative  Thought Content:  Thought content normal          Cognition and Memory: Cognition normal          Judgment: Judgment normal

## 2022-06-16 ENCOUNTER — TELEPHONE (OUTPATIENT)
Dept: FAMILY MEDICINE CLINIC | Facility: CLINIC | Age: 67
End: 2022-06-16

## 2022-06-28 ENCOUNTER — OFFICE VISIT (OUTPATIENT)
Dept: GYNECOLOGIC ONCOLOGY | Facility: CLINIC | Age: 67
End: 2022-06-28
Payer: COMMERCIAL

## 2022-06-28 VITALS
WEIGHT: 211 LBS | OXYGEN SATURATION: 96 % | BODY MASS INDEX: 33.12 KG/M2 | HEIGHT: 67 IN | TEMPERATURE: 97 F | DIASTOLIC BLOOD PRESSURE: 70 MMHG | HEART RATE: 84 BPM | SYSTOLIC BLOOD PRESSURE: 122 MMHG | RESPIRATION RATE: 16 BRPM

## 2022-06-28 DIAGNOSIS — Z85.42 HISTORY OF ENDOMETRIAL CANCER: Primary | ICD-10-CM

## 2022-06-28 DIAGNOSIS — Z08 ENCOUNTER FOR FOLLOW-UP SURVEILLANCE OF ENDOMETRIAL CANCER: ICD-10-CM

## 2022-06-28 DIAGNOSIS — N90.4 LICHEN SCLEROSUS ET ATROPHICUS OF THE VULVA: ICD-10-CM

## 2022-06-28 DIAGNOSIS — Z85.42 ENCOUNTER FOR FOLLOW-UP SURVEILLANCE OF ENDOMETRIAL CANCER: ICD-10-CM

## 2022-06-28 PROCEDURE — 99214 OFFICE O/P EST MOD 30 MIN: CPT | Performed by: PHYSICIAN ASSISTANT

## 2022-06-28 PROCEDURE — 3008F BODY MASS INDEX DOCD: CPT | Performed by: NURSE PRACTITIONER

## 2022-06-28 RX ORDER — MOMETASONE FUROATE 1 MG/G
OINTMENT TOPICAL
Qty: 45 G | Refills: 1 | Status: SHIPPED | OUTPATIENT
Start: 2022-06-28

## 2022-06-28 RX ORDER — ALBUTEROL SULFATE 90 UG/1
AEROSOL, METERED RESPIRATORY (INHALATION)
COMMUNITY
Start: 2022-06-16

## 2022-06-28 NOTE — ASSESSMENT & PLAN NOTE
History of stage IA, grade 1 endometrial cancer s/p surgical resection in November 2021  She is clinically without evidence of disease recurrence  Survivorship visit performed today  Patient provided with care plan/treatment summary  Return to the office in 6 months for continued cancer surveillance

## 2022-06-28 NOTE — PROGRESS NOTES
Assessment/Plan:    Problem List Items Addressed This Visit        Genitourinary    Lichen sclerosus et atrophicus of the vulva     Clinical exam consistent with lichen sclerosus  To transition topical steroids to 2-3x week  Relevant Medications    mometasone (ELOCON) 0 1 % ointment       Other    History of endometrial cancer - Primary     History of stage IA, grade 1 endometrial cancer s/p surgical resection in November 2021  She is clinically without evidence of disease recurrence  Survivorship visit performed today  Patient provided with care plan/treatment summary  Return to the office in 6 months for continued cancer surveillance  Encounter for follow-up surveillance of endometrial cancer            REASON FOR VISIT:   Survivorship    Problem:  Cancer Staging  Endometrial cancer [facility])  Staging form: Corpus Uteri - Carcinoma, AJCC 8th Edition  - Clinical stage from 11/16/2021: FIGO Stage IA (cT1a, cN0(sn), cM0) - Signed by Moni Powell MD on 12/1/2021        Previous therapy:  Oncology History   History of endometrial cancer   10/21/2021 Initial Diagnosis    Endometrial cancer ([facility] [address] )     11/16/2021 -  Cancer Staged    Staging form: Corpus Uteri - Carcinoma, AJCC 8th Edition  - Clinical stage from 11/16/2021: FIGO Stage IA (cT1a, cN0(sn), cM0) - Signed by Javier Cabral MD on 12/1/2021  Method of lymph node assessment: [place] lymph node biopsy  Histologic grade (G): G1  Histologic grading system: 3 grade system  Peritoneal cytology results: Unknown       11/16/2021 Surgery    Robotic hysterectomy, BSO, bilateral sentinel LN biopsies, peritoneal biopsies, cystoscopy  -Grade 1, 0 3/1 7cm depth of invasion, indeterminate LVSI (likely artifact), cytology atypical  -MLH1/PMS2 absent, methylation pending           Patient ID: Aida Chaparro is a 79 y o  female  who has no new complaints today  No vaginal bleeding, abdominal/pelvic pain  Normal bowel and bladder function   The patient notes occasional vulvar irritation  No interval change in medical history since last visit  Quality of life is good  Review of Systems   Constitutional: Negative  HENT: Negative  Eyes: Negative  Respiratory: Negative  Cardiovascular: Negative  Gastrointestinal: Negative  Genitourinary: Negative  Musculoskeletal: Negative  Skin: Negative  Neurological: Negative  Psychiatric/Behavioral: Negative  Objective:    Blood pressure 122/70, pulse 84, temperature (!) 97 °F (36 1 °C), temperature source Temporal, resp  rate 16, height 5' 7" (1 702 m), weight 95 7 kg (211 lb), SpO2 96 %, not currently breastfeeding  Body mass index is 33 05 kg/m²  Body surface area is 2 07 meters squared  Physical Exam  Vitals reviewed  Exam conducted with a chaperone present  Constitutional:       General: She is not in acute distress  Appearance: Normal appearance  She is not ill-appearing  HENT:      Head: Normocephalic and atraumatic  Mouth/Throat:      Mouth: Mucous membranes are moist    Eyes:      General:         Right eye: No discharge  Left eye: No discharge  Conjunctiva/sclera: Conjunctivae normal    Pulmonary:      Effort: Pulmonary effort is normal    Abdominal:      Palpations: Abdomen is soft  There is no mass  Tenderness: There is no abdominal tenderness  Hernia: No hernia is present  Genitourinary:     Comments: Loss of labia minora bilaterally with hypopigmentation  Skin changes consistent with lichen sclerosus  The bartholin's, uretheral and skenes glands are normal  The urethral meatus is normal (midline with no lesions)  Anus without fissure or lesion  Speculum exam reveals a grossly normal vagina  No masses, lesions,discharge or bleeding  No significant cystocele or rectocele noted  Bimanual exam notes a surgical absent cervix, uterus and adnexal structures  No masses or fullness   Bladder is without fullness, mass or tenderness  Musculoskeletal:      Right lower leg: No edema  Left lower leg: No edema  Skin:     General: Skin is warm and dry  Coloration: Skin is not jaundiced  Findings: No rash  Neurological:      General: No focal deficit present  Mental Status: She is alert and oriented to person, place, and time  Cranial Nerves: No cranial nerve deficit  Sensory: No sensory deficit  Motor: No weakness  Gait: Gait normal    Psychiatric:         Mood and Affect: Mood normal          Behavior: Behavior normal          Thought Content: Thought content normal          Judgment: Judgment normal              Discussed symptoms related to disease recurrence, Yes    Evaluated for late effects related to cancer treatment, Yes, describe: no late effects noted    Screening current for breast cancer, Yes, describe: mammogram current    Screening current for colon cancer, Yes, describe: colonscopy performed May 2022      Management of obesity addressed, No    Screening current for osteoporosis, Yes, describe: upcoming DEXA scan scheduled    Oncology Treatment Summary reviewed with patient and copy provided, Yes    Survivorship Distress Thermometer Reviewed, No

## 2022-08-04 ENCOUNTER — HOSPITAL ENCOUNTER (OUTPATIENT)
Dept: RADIOLOGY | Age: 67
Discharge: HOME | End: 2022-08-04
Payer: COMMERCIAL

## 2022-08-04 DIAGNOSIS — Z78.0 ENCOUNTER FOR OSTEOPOROSIS SCREENING IN ASYMPTOMATIC POSTMENOPAUSAL PATIENT: ICD-10-CM

## 2022-08-04 DIAGNOSIS — Z13.820 ENCOUNTER FOR OSTEOPOROSIS SCREENING IN ASYMPTOMATIC POSTMENOPAUSAL PATIENT: ICD-10-CM

## 2022-08-04 PROCEDURE — 77080 DXA BONE DENSITY AXIAL: CPT

## 2022-09-12 DIAGNOSIS — Z12.31 ENCOUNTER FOR SCREENING MAMMOGRAM FOR MALIGNANT NEOPLASM OF BREAST: ICD-10-CM

## 2022-09-22 ENCOUNTER — OFFICE VISIT (OUTPATIENT)
Dept: FAMILY MEDICINE CLINIC | Facility: CLINIC | Age: 67
End: 2022-09-22
Payer: COMMERCIAL

## 2022-09-22 VITALS
OXYGEN SATURATION: 95 % | HEIGHT: 67 IN | WEIGHT: 215.2 LBS | HEART RATE: 93 BPM | RESPIRATION RATE: 14 BRPM | TEMPERATURE: 99.7 F | SYSTOLIC BLOOD PRESSURE: 100 MMHG | BODY MASS INDEX: 33.78 KG/M2 | DIASTOLIC BLOOD PRESSURE: 74 MMHG

## 2022-09-22 DIAGNOSIS — E78.2 MIXED HYPERLIPIDEMIA: ICD-10-CM

## 2022-09-22 DIAGNOSIS — I10 ESSENTIAL HYPERTENSION: Primary | ICD-10-CM

## 2022-09-22 DIAGNOSIS — F41.1 GENERALIZED ANXIETY DISORDER: ICD-10-CM

## 2022-09-22 DIAGNOSIS — F43.21 GRIEVING: ICD-10-CM

## 2022-09-22 DIAGNOSIS — E03.9 HYPOTHYROIDISM (ACQUIRED): ICD-10-CM

## 2022-09-22 DIAGNOSIS — Z23 NEED FOR VACCINATION: ICD-10-CM

## 2022-09-22 PROBLEM — J06.9 UPPER RESPIRATORY TRACT INFECTION: Status: RESOLVED | Noted: 2022-06-02 | Resolved: 2022-09-22

## 2022-09-22 PROCEDURE — 90677 PCV20 VACCINE IM: CPT

## 2022-09-22 PROCEDURE — G0009 ADMIN PNEUMOCOCCAL VACCINE: HCPCS

## 2022-09-22 PROCEDURE — 99214 OFFICE O/P EST MOD 30 MIN: CPT | Performed by: FAMILY MEDICINE

## 2022-09-22 PROCEDURE — 3725F SCREEN DEPRESSION PERFORMED: CPT | Performed by: FAMILY MEDICINE

## 2022-09-22 RX ORDER — PAROXETINE 10 MG/1
10 TABLET, FILM COATED ORAL DAILY
Qty: 90 TABLET | Refills: 3 | Status: SHIPPED | OUTPATIENT
Start: 2022-09-22

## 2022-09-22 NOTE — PROGRESS NOTES
Assessment/Plan:    1  Essential hypertension  Assessment & Plan:  Stable on lisinopril      2  Hypothyroidism (acquired)  Assessment & Plan:  Stable on levothyroxine    Orders:  -     TSH, 3rd generation with Free T4 reflex; Future; Expected date: 2022    3  Mixed hyperlipidemia  Assessment & Plan:  Stable on zocor    Orders:  -     Comprehensive metabolic panel; Future; Expected date: 2022  -     Lipid Panel with Direct LDL reflex; Future; Expected date: 2022    4  Generalized anxiety disorder  Assessment & Plan:  Increase to 30mg    Orders:  -     PARoxetine (PAXIL) 10 mg tablet; Take 1 tablet (10 mg total) by mouth daily    5  Grieving  -     PARoxetine (PAXIL) 10 mg tablet; Take 1 tablet (10 mg total) by mouth daily    6  Need for vaccination  -     Pneumococcal Conjugate Vaccine 20-valent (Pcv20)        Depression Screening and Follow-up Plan: Patient was screened for depression during today's encounter  They screened negative with a PHQ-2 score of 0  There are no Patient Instructions on file for this visit  Return in about 6 months (around 3/22/2023) for Annual physical     Subjective:      Patient ID: Mitchell Almaraz is a 79 y o  female  Chief Complaint   Patient presents with    Follow-up     Patient here for 6 month follow up       Here follow up  Still sad from losing mom, also lost her cat  Also lost god mother  [de-identified] of mom a lot  Had xanax for when mom     Hypertension  This is a chronic problem  The problem is unchanged  The problem is controlled  There are no associated agents to hypertension  Past treatments include ACE inhibitors  The current treatment provides significant improvement  There are no compliance problems          The following portions of the patient's history were reviewed and updated as appropriate: allergies, current medications, past family history, past medical history, past social history, past surgical history and problem list     Review of Systems   Constitutional: Negative  HENT: Negative  Eyes: Negative  Respiratory: Negative  Cardiovascular: Negative  Gastrointestinal: Negative  Endocrine: Negative  Genitourinary: Negative  Musculoskeletal: Negative  Allergic/Immunologic: Negative  Neurological: Negative  Hematological: Negative  Psychiatric/Behavioral: Positive for dysphoric mood  The patient is nervous/anxious  Current Outpatient Medications   Medication Sig Dispense Refill    aspirin 81 MG tablet Take 1 tablet by mouth daily for 30 days 30 tablet 0    betamethasone valerate (VALISONE) 0 1 % cream Apply topically 2 (two) times a day 30 g 0    calcium citrate-vitamin D (CITRACAL+D) 315-200 MG-UNIT per tablet Take by mouth daily       eszopiclone (LUNESTA) 3 MG tablet TAKE ONE TABLET BY MOUTH AT BEDTIME 90 tablet 3    levothyroxine 125 mcg tablet TAKE 1 TABLET DAILY 90 tablet 3    lisinopril (ZESTRIL) 10 mg tablet Take 1 tablet (10 mg total) by mouth daily 90 tablet 3    Multiple Vitamins-Minerals (CENTRUM WOMEN) TABS Take by mouth Goes between bariatric multi and Centrum      PARoxetine (PAXIL) 10 mg tablet Take 1 tablet (10 mg total) by mouth daily 90 tablet 3    PARoxetine (PAXIL) 20 mg tablet TAKE 1 TABLET DAILY 90 tablet 3    rizatriptan (MAXALT) 10 MG tablet TAKE 1 TABLET DAILY AS NEEDED FOR MIGRAINE 18 tablet 7    simvastatin (ZOCOR) 40 mg tablet TAKE 1 TABLET EVERY EVENING 90 tablet 3    mometasone (ELOCON) 0 1 % ointment Apply nightly to affected area 2-3x a week (Patient not taking: Reported on 9/22/2022) 45 g 1    topiramate (TOPAMAX) 50 MG tablet TAKE 1 TABLET BY MOUTH EVERY DAY (Patient not taking: Reported on 9/22/2022) 30 tablet 4     No current facility-administered medications for this visit         Objective:    /74 (BP Location: Left arm, Patient Position: Sitting, Cuff Size: Large)   Pulse 93   Temp 99 7 °F (37 6 °C) (Tympanic)   Resp 14   Ht 5' 7" (1 702 m) Wt 97 6 kg (215 lb 3 2 oz)   SpO2 95%   BMI 33 71 kg/m²        Physical Exam  Vitals and nursing note reviewed  Constitutional:       Appearance: Normal appearance  HENT:      Head: Normocephalic and atraumatic  Eyes:      Extraocular Movements: Extraocular movements intact  Pupils: Pupils are equal, round, and reactive to light  Cardiovascular:      Rate and Rhythm: Normal rate and regular rhythm  Pulses: Normal pulses  Heart sounds: Normal heart sounds  Pulmonary:      Effort: Pulmonary effort is normal       Breath sounds: Normal breath sounds  Abdominal:      General: Abdomen is flat  Palpations: Abdomen is soft  Musculoskeletal:      Cervical back: Normal range of motion and neck supple  Skin:     General: Skin is warm  Capillary Refill: Capillary refill takes less than 2 seconds  Neurological:      General: No focal deficit present  Mental Status: She is alert and oriented to person, place, and time  Psychiatric:         Mood and Affect: Mood normal          Behavior: Behavior normal          Thought Content:  Thought content normal          Judgment: Judgment normal                 Colie Jewyessenia, DO

## 2022-09-23 ENCOUNTER — APPOINTMENT (OUTPATIENT)
Dept: LAB | Facility: AMBULARY SURGERY CENTER | Age: 67
End: 2022-09-23
Payer: COMMERCIAL

## 2022-09-23 DIAGNOSIS — E03.9 HYPOTHYROIDISM (ACQUIRED): ICD-10-CM

## 2022-09-23 DIAGNOSIS — E78.2 MIXED HYPERLIPIDEMIA: ICD-10-CM

## 2022-09-23 LAB
ALBUMIN SERPL BCP-MCNC: 3.1 G/DL (ref 3.5–5)
ALP SERPL-CCNC: 84 U/L (ref 46–116)
ALT SERPL W P-5'-P-CCNC: 25 U/L (ref 12–78)
ANION GAP SERPL CALCULATED.3IONS-SCNC: 3 MMOL/L (ref 4–13)
AST SERPL W P-5'-P-CCNC: 19 U/L (ref 5–45)
BILIRUB SERPL-MCNC: 0.69 MG/DL (ref 0.2–1)
BUN SERPL-MCNC: 22 MG/DL (ref 5–25)
CALCIUM ALBUM COR SERPL-MCNC: 10.5 MG/DL (ref 8.3–10.1)
CALCIUM SERPL-MCNC: 9.8 MG/DL (ref 8.3–10.1)
CHLORIDE SERPL-SCNC: 111 MMOL/L (ref 96–108)
CHOLEST SERPL-MCNC: 183 MG/DL
CO2 SERPL-SCNC: 28 MMOL/L (ref 21–32)
CREAT SERPL-MCNC: 0.92 MG/DL (ref 0.6–1.3)
GFR SERPL CREATININE-BSD FRML MDRD: 64 ML/MIN/1.73SQ M
GLUCOSE P FAST SERPL-MCNC: 102 MG/DL (ref 65–99)
HDLC SERPL-MCNC: 59 MG/DL
LDLC SERPL CALC-MCNC: 95 MG/DL (ref 0–100)
POTASSIUM SERPL-SCNC: 4.4 MMOL/L (ref 3.5–5.3)
PROT SERPL-MCNC: 7.4 G/DL (ref 6.4–8.4)
SODIUM SERPL-SCNC: 142 MMOL/L (ref 135–147)
T4 FREE SERPL-MCNC: 1.2 NG/DL (ref 0.76–1.46)
TRIGL SERPL-MCNC: 147 MG/DL
TSH SERPL DL<=0.05 MIU/L-ACNC: 0.1 UIU/ML (ref 0.45–4.5)

## 2022-09-23 PROCEDURE — 80061 LIPID PANEL: CPT

## 2022-09-23 PROCEDURE — 36415 COLL VENOUS BLD VENIPUNCTURE: CPT

## 2022-09-23 PROCEDURE — 84439 ASSAY OF FREE THYROXINE: CPT

## 2022-09-23 PROCEDURE — 80053 COMPREHEN METABOLIC PANEL: CPT

## 2022-09-23 PROCEDURE — 84443 ASSAY THYROID STIM HORMONE: CPT

## 2022-09-26 ENCOUNTER — TELEPHONE (OUTPATIENT)
Dept: OTHER | Facility: OTHER | Age: 67
End: 2022-09-26

## 2022-09-26 ENCOUNTER — ANNUAL EXAM (OUTPATIENT)
Dept: OBGYN CLINIC | Facility: CLINIC | Age: 67
End: 2022-09-26
Payer: COMMERCIAL

## 2022-09-26 VITALS
HEIGHT: 66 IN | WEIGHT: 215.8 LBS | SYSTOLIC BLOOD PRESSURE: 102 MMHG | BODY MASS INDEX: 34.68 KG/M2 | DIASTOLIC BLOOD PRESSURE: 64 MMHG

## 2022-09-26 DIAGNOSIS — E03.9 HYPOTHYROIDISM, UNSPECIFIED TYPE: ICD-10-CM

## 2022-09-26 DIAGNOSIS — Z85.42 HISTORY OF ENDOMETRIAL CANCER: Primary | ICD-10-CM

## 2022-09-26 DIAGNOSIS — Z12.31 ENCOUNTER FOR SCREENING MAMMOGRAM FOR MALIGNANT NEOPLASM OF BREAST: ICD-10-CM

## 2022-09-26 DIAGNOSIS — Z01.419 ENCOUNTER FOR ANNUAL ROUTINE GYNECOLOGICAL EXAMINATION: ICD-10-CM

## 2022-09-26 PROCEDURE — 99213 OFFICE O/P EST LOW 20 MIN: CPT | Performed by: OBSTETRICS & GYNECOLOGY

## 2022-09-26 PROCEDURE — 1160F RVW MEDS BY RX/DR IN RCRD: CPT | Performed by: OBSTETRICS & GYNECOLOGY

## 2022-09-26 PROCEDURE — 3074F SYST BP LT 130 MM HG: CPT | Performed by: OBSTETRICS & GYNECOLOGY

## 2022-09-26 PROCEDURE — 0503F POSTPARTUM CARE VISIT: CPT | Performed by: OBSTETRICS & GYNECOLOGY

## 2022-09-26 PROCEDURE — 3078F DIAST BP <80 MM HG: CPT | Performed by: OBSTETRICS & GYNECOLOGY

## 2022-09-26 RX ORDER — LEVOTHYROXINE SODIUM 112 UG/1
112 TABLET ORAL DAILY
Qty: 90 TABLET | Refills: 3 | Status: SHIPPED | OUTPATIENT
Start: 2022-09-26 | End: 2022-12-19 | Stop reason: SDUPTHER

## 2022-09-26 NOTE — PROGRESS NOTES
Assessment/Plan:     Diagnoses and all orders for this visit:    History of endometrial cancer       -      Follow up schedule with Dr Alex Key for screening mammogram for malignant neoplasm of breast  -     Mammo screening bilateral w 3d & cad; Future    Encounter for annual routine gynecological examination         Rosario Singh is a 79 y o  female who presents for annual exam  The patient has no complaints today  The patient is not sexually active  The patient is not taking hormone replacement therapy  Patient denies post-menopausal vaginal bleeding  She has had a TLH/BSO/LN for stage I grade 1 endometrial CA  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  Menstrual History:  OB History        2    Para        Term                AB        Living   2       SAB        IAB        Ectopic        Multiple        Live Births                    No LMP recorded   Patient is postmenopausal      Past Medical History:   Diagnosis Date    Allergic dermatitis     resolved 2016    Anxiety     Arthritis     Blind right eye     Cancer ([facility] [address] ) 2021    Hysterectomy    Depression     Disease of thyroid gland     Encounter for cosmetic surgery 2018    Added automatically from request for surgery [id number]    Hyperlipidemia     Hypertension     Hypothyroidism (acquired)     hypothyroid    Incontinence of urine in female     Insomnia     Migraine     Morbid obesity ([address] )     TIA (transient ischemic attack)     questionable     Wears glasses        Family History   Problem Relation Age of Onset    Diabetes Mother     Skin cancer Mother     Arthritis Mother     Cancer Mother         Cervical, lung, liver, spine    Lung cancer Mother     Alzheimer's disease Father     Heart failure Father     Diabetes Father     Dementia Father     Cancer Father         Skin    Stroke Father     Breast cancer Maternal Grandmother         adenocarcinoma in situ in villous adenoma of the breast       The following portions of the patient's history were reviewed and updated as appropriate: allergies, current medications, past family history, past medical history, past social history, past surgical history and problem list     Review of Systems  Pertinent items are noted in HPI  Objective      /64 (BP Location: Right arm, Patient Position: Sitting, Cuff Size: Large)   Ht 5' 6 25" (1 683 m)   Wt 97 9 kg (215 lb 12 8 oz)   BMI 34 57 kg/m²     General:   alert and oriented, in no acute distress   Heart:  Breasts: regular rate and rhythm   appear normal, no suspicious masses, no skin or nipple changes or axillary nodes  Lungs: effort normal   Abdomen: soft, non-tender, without masses or organomegaly   Vulva: symmetric leukoplakia; no lesions or fissures    + fusion @ clitoral  hopper   Vagina: normal mucosa   Cervix: surgically absent   Uterus: surgically absent   Adnexa: surgically absent and no mass, fullness, tenderness

## 2022-09-26 NOTE — TELEPHONE ENCOUNTER
Patient reports the office contacted her regarding increasing her Levothyroxine and wanted to verify her current dose  Patient is currently taking 125 mcg

## 2022-10-08 ENCOUNTER — IMMUNIZATIONS (OUTPATIENT)
Dept: FAMILY MEDICINE CLINIC | Facility: CLINIC | Age: 67
End: 2022-10-08
Payer: COMMERCIAL

## 2022-10-08 DIAGNOSIS — Z23 ENCOUNTER FOR IMMUNIZATION: Primary | ICD-10-CM

## 2022-10-08 PROCEDURE — G0008 ADMIN INFLUENZA VIRUS VAC: HCPCS

## 2022-10-08 PROCEDURE — 90662 IIV NO PRSV INCREASED AG IM: CPT

## 2022-11-13 DIAGNOSIS — G47.00 INSOMNIA, UNSPECIFIED TYPE: ICD-10-CM

## 2022-11-14 RX ORDER — ESZOPICLONE 3 MG/1
TABLET, FILM COATED ORAL
Qty: 90 TABLET | Refills: 3 | Status: SHIPPED | OUTPATIENT
Start: 2022-11-14

## 2022-11-15 ENCOUNTER — APPOINTMENT (OUTPATIENT)
Dept: LAB | Facility: AMBULARY SURGERY CENTER | Age: 67
End: 2022-11-15

## 2022-11-15 DIAGNOSIS — E03.9 HYPOTHYROIDISM, UNSPECIFIED TYPE: ICD-10-CM

## 2022-11-15 LAB — TSH SERPL DL<=0.05 MIU/L-ACNC: 0.49 UIU/ML (ref 0.45–4.5)

## 2022-11-25 DIAGNOSIS — E03.9 HYPOTHYROIDISM, UNSPECIFIED TYPE: ICD-10-CM

## 2022-11-25 RX ORDER — LEVOTHYROXINE SODIUM 0.12 MG/1
TABLET ORAL
Qty: 90 TABLET | Refills: 3 | Status: SHIPPED | OUTPATIENT
Start: 2022-11-25

## 2022-12-09 ENCOUNTER — TELEMEDICINE (OUTPATIENT)
Dept: FAMILY MEDICINE CLINIC | Facility: CLINIC | Age: 67
End: 2022-12-09

## 2022-12-09 VITALS — TEMPERATURE: 98.4 F

## 2022-12-09 DIAGNOSIS — U07.1 COVID-19: Primary | ICD-10-CM

## 2022-12-09 RX ORDER — NIRMATRELVIR AND RITONAVIR 300-100 MG
3 KIT ORAL 2 TIMES DAILY
Qty: 30 TABLET | Refills: 0 | Status: SHIPPED | OUTPATIENT
Start: 2022-12-09 | End: 2022-12-14

## 2022-12-09 NOTE — PROGRESS NOTES
COVID-19 Outpatient Progress Note    Assessment/Plan:    Problem List Items Addressed This Visit    None  Visit Diagnoses     COVID-19    -  Primary    Pt stable  OTC Cough/Cold Preps PRN, rest, & good hydration  Paxlovid ordered; pt to hold Zocor x 1 week  Precautions given; self-isolation  Relevant Medications    nirmatrelvir & ritonavir (Paxlovid, 300/100,) tablet therapy pack         Disposition:     Patient has asymptomatic or mild COVID-19 infection  Based off CDC guidelines, they were recommended to isolate for 5 days  If they are asymptomatic or symptoms are improving with no fevers in the past 24 hours, isolation may be ended followed by 5 days of wearing a mask when around othes to minimize risk of infecting others  If still have a fever or other symptoms have not improved, continue to isolate until they improve  Regardless of when they end isolation, avoid being around people who are more likely to get very sick from COVID-19 until at least day 11  Patient meets criteria for PAXLOVID and they have been counseled appropriately according to EUA documentation released by the FDA  After discussion, patient agrees to treatment  Lemitar Hole is an investigational medicine used to treat mild-to-moderate COVID-19 in adults and children (15years of age and older weighing at least 80 pounds (40 kg)) with positive results of direct SARS-CoV-2 viral testing, and who are at high risk for progression to severe COVID-19, including hospitalization or death  PAXLOVID is investigational because it is still being studied  There is limited information about the safety and effectiveness of using PAXLOVID to treat people with mild-to-moderate COVID-19      The FDA has authorized the emergency use of PAXLOVID for the treatment of mild-tomoderate COVID-19 in adults and children (15years of age and older weighing at least 80 pounds (40 kg)) with a positive test for the virus that causes COVID-19, and who are at high risk for progression to severe COVID-19, including hospitalization or death, under an EUA  What should I tell my healthcare provider before I take PAXLOVID? Tell your healthcare provider if you:  - Have any allergies  - Have liver or kidney disease  - Are pregnant or plan to become pregnant  - Are breastfeeding a child  - Have any serious illnesses    Tell your healthcare provider about all the medicines you take, including prescription and over-the-counter medicines, vitamins, and herbal supplements  Some medicines may interact with PAXLOVID and may cause serious side effects  Keep a list of your medicines to show your healthcare provider and pharmacist when you get a new medicine  You can ask your healthcare provider or pharmacist for a list of medicines that interact with PAXLOVID  Do not start taking a new medicine without telling your healthcare provider  Your healthcare provider can tell you if it is safe to take PAXLOVID with other medicines  Tell your healthcare provider if you are taking combined hormonal contraceptive  PAXLOVID may affect how your birth control pills work  Females who are able to become pregnant should use another effective alternative form of contraception or an additional barrier method of contraception  Talk to your healthcare provider if you have any questions about contraceptive methods that might be right for you  How do I take PAXLOVID? PAXLOVID consists of 2 medicines: nirmatrelvir and ritonavir  - Take 2 pink tablets of nirmatrelvir with 1 white tablet of ritonavir by mouth 2 times each day (in the morning and in the evening) for 5 days  For each dose, take all 3 tablets at the same time  - If you have kidney disease, talk to your healthcare provider  You may need a different dose  - Swallow the tablets whole  Do not chew, break, or crush the tablets  - Take PAXLOVID with or without food    - Do not stop taking PAXLOVID without talking to your healthcare provider, even if you feel better  - If you miss a dose of PAXLOVID within 8 hours of the time it is usually taken, take it as soon as you remember  If you miss a dose by more than 8 hours, skip the missed dose and take the next dose at your regular time  Do not take 2 doses of PAXLOVID at the same time  - If you take too much PAXLOVID, call your healthcare provider or go to the nearest hospital emergency room right away  - If you are taking a ritonavir- or cobicistat-containing medicine to treat hepatitis C or Human Immunodeficiency Virus (HIV), you should continue to take your medicine as prescribed by your healthcare provider   - Talk to your healthcare provider if you do not feel better or if you feel worse after 5 days  Who should generally not take PAXLOVID? Do not take PAXLOVID if:  You are allergic to nirmatrelvir, ritonavir, or any of the ingredients in PAXLOVID  You are taking any of the following medicines:  - Alfuzosin  - Pethidine, piroxicam, propoxyphene  - Ranolazine  - Amiodarone, dronedarone, flecainide, propafenone, quinidine  - Colchicine  - Lurasidone, pimozide, clozapine  - Dihydroergotamine, ergotamine, methylergonovine  - Lovastatin, simvastatin  - Sildenafil (Revatio®) for pulmonary arterial hypertension (PAH)  - Triazolam, oral midazolam  - Apalutamide  - Carbamazepine, phenobarbital, phenytoin  - Rifampin  - St  Wero’s Wort (hypericum perforatum)    What are the important possible side effects of PAXLOVID? Possible side effects of PAXLOVID are:  - Liver Problems  Tell your healthcare provider right away if you have any of these signs and symptoms of liver problems: loss of appetite, yellowing of your skin and the whites of eyes (jaundice), dark-colored urine, pale colored stools and itchy skin, stomach area (abdominal) pain  - Resistance to HIV Medicines  If you have untreated HIV infection, PAXLOVID may lead to some HIV medicines not working as well in the future    - Other possible side effects include: altered sense of taste, diarrhea, high blood pressure, or muscle aches    These are not all the possible side effects of PAXLOVID  Not many people have taken PAXLOVID  Serious and unexpected side effects may happen  Lazaro Ortega is still being studied, so it is possible that all of the risks are not known at this time  What other treatment choices are there? Like Hortensia Handing may allow for the emergency use of other medicines to treat people with COVID-19  Go to https://[employer]/ for information on the emergency use of other medicines that are authorized by FDA to treat people with COVID-19  Your healthcare provider may talk with you about clinical trials for which you may be eligible  It is your choice to be treated or not to be treated with PAXLOVID  Should you decide not to receive it or for your child not to receive it, it will not change your standard medical care  What if I am pregnant or breastfeeding? There is no experience treating pregnant women or breastfeeding mothers with PAXLOVID  For a mother and unborn baby, the benefit of taking PAXLOVID may be greater than the risk from the treatment  If you are pregnant, discuss your options and specific situation with your healthcare provider  It is recommended that you use effective barrier contraception or do not have sexual activity while taking PAXLOVID  If you are breastfeeding, discuss your options and specific situation with your healthcare provider  How do I report side effects with PAXLOVID? Contact your healthcare provider if you have any side effects that bother you or do not go away  Report side effects to FDA MedWatch at www fda gov/medwatch or call 4-360-LNS7780 or you can report side effects to [facility]  at the contact information provided below      Website Fax number Telephone number www [employer] [phone]     How should I store Huy Mcarthur? Store PAXLOVID tablets at room temperature between 68°F to 77°F (20°C to 25°C)  Full fact sheet for patients, parents, and caregivers can be found at: [facility] [employer]    I have spent 15 minutes directly with the patient  Greater than 50% of this time was spent in counseling/coordination of care regarding: diagnostic results, prognosis, risks and benefits of treatment options, instructions for management, patient and family education, importance of treatment compliance, risk factor reductions and impressions  Encounter provider: Alex Alexander DO     Provider located at: [place] [address]     Recent Visits  No visits were found meeting these conditions  Showing recent visits within past 7 days and meeting all other requirements  Today's Visits  Date Type Provider Dept   12/09/22 Telemedicine Clifton Yeh DO [address] [facility]   Showing today's visits and meeting all other requirements  Future Appointments  No visits were found meeting these conditions  Showing future appointments within next 150 days and meeting all other requirements     This virtual check-in was done via [employer] [address] and patient was informed that this is a secure, HIPAA-compliant platform  She agrees to proceed  Patient agrees to participate in a virtual check in via telephone or video visit instead of presenting to the office to address urgent/immediate medical needs  Patient is aware this is a billable service  She acknowledged consent and understanding of privacy and security of the video platform  The patient has agreed to participate and understands they can discontinue the visit at any time  After connecting through [facility], the patient was identified by name and date of birth   Raghu Frsot was informed that this was a telemedicine visit and that the exam was being conducted confidentially over secure lines  My office door was closed  No one else was in the room  Mitchell Almaraz acknowledged consent and understanding of privacy and security of the telemedicine visit  I informed the patient that I have reviewed her record in Epic and presented the opportunity for her to ask any questions regarding the visit today  The patient agreed to participate  Verification of patient location:  Patient is located in the following state in which I hold an active license: [place]    Subjective:   Mitchell Almaraz is a 79 y o  female who has been screened for COVID-19  Symptom change since last report: unchanged  Patient's symptoms include fatigue, nasal congestion, rhinorrhea and cough  Patient denies fever, sore throat, shortness of breath, nausea, vomiting, diarrhea and myalgias  - Date of symptom onset: 12/8/2022      COVID-19 vaccination status: Fully vaccinated with booster    No results found for: Cameron Silver, [address] [facility], [place], [address] [facility], [facility]    Review of Systems   Constitutional: Positive for fatigue  Negative for fever  HENT: Positive for congestion and rhinorrhea  Negative for sore throat  Respiratory: Positive for cough  Negative for shortness of breath  Gastrointestinal: Negative for diarrhea, nausea and vomiting  Musculoskeletal: Negative for myalgias       Current Outpatient Medications on File Prior to Visit   Medication Sig   • aspirin 81 MG tablet Take 1 tablet by mouth daily for 30 days   • calcium citrate-vitamin D (CITRACAL+D) 315-200 MG-UNIT per tablet Take by mouth daily    • eszopiclone (LUNESTA) 3 MG tablet TAKE 1 TABLET BY MOUTH AT BEDTIME   • levothyroxine 112 mcg tablet Take 1 tablet (112 mcg total) by mouth daily   • lisinopril (ZESTRIL) 10 mg tablet Take 1 tablet (10 mg total) by mouth daily   • mometasone (ELOCON) 0 1 % ointment Apply nightly to affected area 2-3x a week   • Multiple Vitamins-Minerals (CENTRUM WOMEN) TABS Take by mouth Goes between bariatric multi and Centrum   • PARoxetine (PAXIL) 10 mg tablet Take 1 tablet (10 mg total) by mouth daily   • PARoxetine (PAXIL) 20 mg tablet TAKE 1 TABLET DAILY   • rizatriptan (MAXALT) 10 MG tablet TAKE 1 TABLET DAILY AS NEEDED FOR MIGRAINE   • simvastatin (ZOCOR) 40 mg tablet TAKE 1 TABLET EVERY EVENING   • betamethasone valerate (VALISONE) 0 1 % cream Apply topically 2 (two) times a day (Patient not taking: Reported on 9/26/2022)   • levothyroxine 125 mcg tablet TAKE 1 TABLET DAILY (Patient not taking: Reported on 12/9/2022)   • topiramate (TOPAMAX) 50 MG tablet TAKE 1 TABLET BY MOUTH EVERY DAY (Patient not taking: Reported on 9/22/2022)       Objective:    Temp 98 4 °F (36 9 °C)      Physical Exam  Vitals reviewed  Constitutional:       General: She is not in acute distress  Appearance: Normal appearance  She is not ill-appearing, toxic-appearing or diaphoretic  Comments: Pt with mild congestion and hoarseness, but is non-toxic appearing, speaking in full sentences  HENT:      Head: Normocephalic and atraumatic  Eyes:      General: No scleral icterus  Conjunctiva/sclera: Conjunctivae normal    Pulmonary:      Effort: Pulmonary effort is normal  No respiratory distress  Neurological:      Mental Status: She is alert and oriented to person, place, and time  Psychiatric:         Mood and Affect: Mood normal          Behavior: Behavior normal          Thought Content: Thought content normal          Judgment: Judgment normal           Zara Humphrey was seen today for covid-19 and covid-19  Diagnoses and all orders for this visit:    COVID-19  Comments:  Pt stable  OTC Cough/Cold Preps PRN, rest, & good hydration  Paxlovid ordered; pt to hold Zocor x 1 week  Precautions given; self-isolation  Orders:  -     nirmatrelvir & ritonavir (Paxlovid, 300/100,) tablet therapy pack;  Take 3 tablets by mouth 2 (two) times a day for 5 days Take 2 nirmatrelvir tablets + 1 ritonavir tablet together per dose        Clifton Avilez DO

## 2022-12-12 ENCOUNTER — TELEMEDICINE (OUTPATIENT)
Dept: FAMILY MEDICINE CLINIC | Facility: CLINIC | Age: 67
End: 2022-12-12

## 2022-12-12 DIAGNOSIS — U07.1 COVID-19: Primary | ICD-10-CM

## 2022-12-12 NOTE — PROGRESS NOTES
COVID-19 Outpatient Progress Note    Assessment/Plan:    Problem List Items Addressed This Visit    None  Visit Diagnoses     [id number]    -  Primary    Pt is doing well, & cleared from self-isolation after 12/13/22  Continue mask-wearing in public, slowly return to activities, etc  Precautions given  Disposition:     Patient has asymptomatic or mild COVID-19 infection  Based off CDC guidelines, they were recommended to isolate for 5 days  If they are asymptomatic or symptoms are improving with no fevers in the past 24 hours, isolation may be ended followed by 5 days of wearing a mask when around othes to minimize risk of infecting others  If still have a fever or other symptoms have not improved, continue to isolate until they improve  Regardless of when they end isolation, avoid being around people who are more likely to get very sick from COVID-19 until at least day 11  I have spent 15 minutes directly with the patient  Greater than 50% of this time was spent in counseling/coordination of care regarding: prognosis, risks and benefits of treatment options, instructions for management, patient and family education, importance of treatment compliance, risk factor reductions and impressions  Encounter provider: Bertram Faith DO     Provider located at: [address] [place] [id number]     Recent Visits  Date Type Provider Dept   12/09/22 Telemedicine Clifton Lee DO [address] recent visits within past 7 days and meeting all other requirements  Today's Visits  Date Type Provider Dept   12/12/22 Telemedicine DO [facility]   Showing today's visits and meeting all other requirements  Future Appointments  No visits were found meeting these conditions    Showing future appointments within next 150 days and meeting all other requirements     This virtual check-in was done via [address] and patient was informed that this is a secure, HIPAA-compliant platform  She agrees to proceed  Patient agrees to participate in a virtual check in via telephone or video visit instead of presenting to the office to address urgent/immediate medical needs  Patient is aware this is a billable service  She acknowledged consent and understanding of privacy and security of the video platform  The patient has agreed to participate and understands they can discontinue the visit at any time  After connecting through [facility], the patient was identified by name and date of birth  Thuy Wilkinson was informed that this was a telemedicine visit and that the exam was being conducted confidentially over secure lines  My office door was closed  No one else was in the room  Thuy Wilkinson acknowledged consent and understanding of privacy and security of the telemedicine visit  I informed the patient that I have reviewed her record in Epic and presented the opportunity for her to ask any questions regarding the visit today  The patient agreed to participate  Verification of patient location:  Patient is located in the following state in which I hold an active license: [place]    Subjective:   Thuy Wilkinson is a 79 y o  female who has been screened for COVID-19  Symptom change since last report: improving  Patient's symptoms include fatigue and cough  Patient denies fever, sore throat, shortness of breath, nausea and diarrhea  - Date of symptom onset: 12/8/2022      COVID-19 vaccination status: Fully vaccinated with booster    Lodema Sia has been staying home and has isolated themselves in her home  She is taking care to not share personal items and is cleaning all surfaces that are touched often, like counters, tabletops, and doorknobs using household cleaning sprays or wipes  She is wearing a mask when she leaves her room  August Magana is feeling much better    Cough minimal     No results found for: [address] [facility] [address], [address] [facility], [address] [facility] [place], [place], [facility], [facility]    Review of Systems   Constitutional: Positive for fatigue  Negative for fever  HENT: Negative for sore throat  Respiratory: Positive for cough  Negative for shortness of breath  Gastrointestinal: Negative for diarrhea and nausea  Current Outpatient Medications on File Prior to Visit   Medication Sig   • calcium citrate-vitamin D (CITRACAL+D) 315-200 MG-UNIT per tablet Take by mouth daily    • eszopiclone (LUNESTA) 3 MG tablet TAKE 1 TABLET BY MOUTH AT BEDTIME   • levothyroxine 112 mcg tablet Take 1 tablet (112 mcg total) by mouth daily   • lisinopril (ZESTRIL) 10 mg tablet Take 1 tablet (10 mg total) by mouth daily   • mometasone (ELOCON) 0 1 % ointment Apply nightly to affected area 2-3x a week   • Multiple Vitamins-Minerals (CENTRUM WOMEN) TABS Take by mouth Goes between bariatric multi and Centrum   • nirmatrelvir & ritonavir (Paxlovid, 300/100,) tablet therapy pack Take 3 tablets by mouth 2 (two) times a day for 5 days Take 2 nirmatrelvir tablets + 1 ritonavir tablet together per dose   • PARoxetine (PAXIL) 10 mg tablet Take 1 tablet (10 mg total) by mouth daily   • PARoxetine (PAXIL) 20 mg tablet TAKE 1 TABLET DAILY   • rizatriptan (MAXALT) 10 MG tablet TAKE 1 TABLET DAILY AS NEEDED FOR MIGRAINE   • simvastatin (ZOCOR) 40 mg tablet TAKE 1 TABLET EVERY EVENING   • aspirin 81 MG tablet Take 1 tablet by mouth daily for 30 days   • betamethasone valerate (VALISONE) 0 1 % cream Apply topically 2 (two) times a day (Patient not taking: Reported on 9/26/2022)   • levothyroxine 125 mcg tablet TAKE 1 TABLET DAILY (Patient not taking: Reported on 12/9/2022)   • topiramate (TOPAMAX) 50 MG tablet TAKE 1 TABLET BY MOUTH EVERY DAY (Patient not taking: Reported on 9/22/2022)       Objective: There were no vitals taken for this visit  Physical Exam  Constitutional:       General: She is not in acute distress  Appearance: Normal appearance   She is not ill-appearing, toxic-appearing or diaphoretic  HENT:      Head: Normocephalic and atraumatic  Eyes:      General: No scleral icterus  Conjunctiva/sclera: Conjunctivae normal    Pulmonary:      Effort: Pulmonary effort is normal  No respiratory distress  Neurological:      Mental Status: She is alert and oriented to person, place, and time  Psychiatric:         Mood and Affect: Mood normal          Behavior: Behavior normal          Thought Content: Thought content normal          Judgment: Judgment normal           Senthil Lozano was seen today for covid-19 and covid-19  Diagnoses and all orders for this visit:    [id number]  Comments:  Pt is doing well, & cleared from self-isolation after 12/13/22  Continue mask-wearing in public, slowly return to activities, etc  Precautions given          126 Isaura Mae, DO

## 2022-12-19 ENCOUNTER — TELEPHONE (OUTPATIENT)
Dept: UROLOGY | Facility: CLINIC | Age: 67
End: 2022-12-19

## 2022-12-19 DIAGNOSIS — E03.9 HYPOTHYROIDISM, UNSPECIFIED TYPE: ICD-10-CM

## 2022-12-19 RX ORDER — LEVOTHYROXINE SODIUM 112 UG/1
112 TABLET ORAL DAILY
Qty: 90 TABLET | Refills: 3 | Status: SHIPPED | OUTPATIENT
Start: 2022-12-19

## 2023-01-05 DIAGNOSIS — I10 ESSENTIAL HYPERTENSION: ICD-10-CM

## 2023-01-05 DIAGNOSIS — E78.5 HYPERLIPIDEMIA, UNSPECIFIED HYPERLIPIDEMIA TYPE: ICD-10-CM

## 2023-01-05 RX ORDER — LISINOPRIL 10 MG/1
TABLET ORAL
Qty: 90 TABLET | Refills: 3 | Status: SHIPPED | OUTPATIENT
Start: 2023-01-05

## 2023-01-05 RX ORDER — SIMVASTATIN 40 MG
TABLET ORAL
Qty: 90 TABLET | Refills: 3 | Status: SHIPPED | OUTPATIENT
Start: 2023-01-05

## 2023-01-09 ENCOUNTER — OFFICE VISIT (OUTPATIENT)
Dept: GYNECOLOGIC ONCOLOGY | Facility: CLINIC | Age: 68
End: 2023-01-09

## 2023-01-09 VITALS
WEIGHT: 226 LBS | TEMPERATURE: 100.1 F | SYSTOLIC BLOOD PRESSURE: 130 MMHG | DIASTOLIC BLOOD PRESSURE: 70 MMHG | HEIGHT: 66 IN | BODY MASS INDEX: 36.32 KG/M2

## 2023-01-09 DIAGNOSIS — N90.5 VULVAR ATROPHY: ICD-10-CM

## 2023-01-09 DIAGNOSIS — N90.4 LICHEN SCLEROSUS ET ATROPHICUS OF THE VULVA: Primary | ICD-10-CM

## 2023-01-09 DIAGNOSIS — Z85.42 HISTORY OF ENDOMETRIAL CANCER: ICD-10-CM

## 2023-01-09 NOTE — ASSESSMENT & PLAN NOTE
51-year-old with a history of stage Ia grade 1 endometrial cancer  She is clinically without evidence of disease recurrence    Her performance status is 0   1   Return in 6 months for endometrial cancer surveillance

## 2023-01-09 NOTE — PROGRESS NOTES
Assessment/Plan:    Problem List Items Addressed This Visit        Genitourinary    Lichen sclerosus et atrophicus of the vulva - Primary     Clobetasol 2-3 times weekly  No gross evidence of dysplasia or malignancy  Other    History of endometrial cancer     58-year-old with a history of stage Ia grade 1 endometrial cancer  She is clinically without evidence of disease recurrence  Her performance status is 0   1   Return in 6 months for endometrial cancer surveillance              CHIEF COMPLAINT:       Problem:  Cancer Staging   History of endometrial cancer  Staging form: Corpus Uteri - Carcinoma, AJCC 8th Edition  - Clinical stage from 11/16/2021: FIGO Stage IA (cT1a, cN0(sn), cM0) - Signed by Arabella Celis MD on 12/1/2021        Previous therapy:  Oncology History   History of endometrial cancer   10/21/2021 Initial Diagnosis    Endometrial cancer ([facility] [address] )     11/16/2021 -  Cancer Staged    Staging form: Corpus Uteri - Carcinoma, AJCC 8th Edition  - Clinical stage from 11/16/2021: FIGO Stage IA (cT1a, cN0(sn), cM0) - Signed by Bakari Gilliland MD on 12/1/2021  Method of lymph node assessment: [place] lymph node biopsy  Histologic grade (G): G1  Histologic grading system: 3 grade system  Peritoneal cytology results: Unknown       11/16/2021 Surgery    Robotic hysterectomy, BSO, bilateral sentinel LN biopsies, peritoneal biopsies, cystoscopy  -Grade 1, 0 3/1 7cm depth of invasion, indeterminate LVSI (likely artifact), cytology atypical  -MLH1/PMS2 absent, methylation pending           Patient ID: Liliana Castaneda is a 79 y o  female  Who returns for endometrial cancer surveillance and evaluation of vulvar lichen sclerosus  Been using topical steroids approximately once weekly  She does not report any vaginal bleeding, significant vulvar itching, pelvic pain, abdominal pain  She is able to perform her activities of daily living without difficulty    No other interval change in medications or medical history since her last visit to the office  The following portions of the patient's history were reviewed and updated as appropriate: allergies, current medications, past family history, past medical history, past social history, past surgical history and problem list     Review of Systems   Constitutional: Negative for activity change and unexpected weight change  HENT: Negative  Eyes: Negative  Respiratory: Negative  Cardiovascular: Negative  Gastrointestinal: Negative for abdominal distention and abdominal pain  Endocrine: Negative  Genitourinary: Negative for pelvic pain and vaginal bleeding  Musculoskeletal: Negative  Skin: Negative  Allergic/Immunologic: Negative  Neurological: Negative  Hematological: Negative  Psychiatric/Behavioral: Negative          Current Outpatient Medications   Medication Sig Dispense Refill   • aspirin 81 MG tablet Take 1 tablet by mouth daily for 30 days 30 tablet 0   • betamethasone valerate (VALISONE) 0 1 % cream Apply topically 2 (two) times a day (Patient not taking: Reported on 9/26/2022) 30 g 0   • calcium citrate-vitamin D (CITRACAL+D) 315-200 MG-UNIT per tablet Take by mouth daily      • eszopiclone (LUNESTA) 3 MG tablet TAKE 1 TABLET BY MOUTH AT BEDTIME 90 tablet 3   • levothyroxine 112 mcg tablet Take 1 tablet (112 mcg total) by mouth daily 90 tablet 3   • levothyroxine 125 mcg tablet TAKE 1 TABLET DAILY 90 tablet 3   • lisinopril (ZESTRIL) 10 mg tablet TAKE 1 TABLET DAILY 90 tablet 3   • mometasone (ELOCON) 0 1 % ointment Apply nightly to affected area 2-3x a week 45 g 1   • Multiple Vitamins-Minerals (CENTRUM WOMEN) TABS Take by mouth Goes between bariatric multi and Centrum     • PARoxetine (PAXIL) 10 mg tablet Take 1 tablet (10 mg total) by mouth daily 90 tablet 3   • PARoxetine (PAXIL) 20 mg tablet TAKE 1 TABLET DAILY 90 tablet 3   • rizatriptan (MAXALT) 10 MG tablet TAKE 1 TABLET DAILY AS NEEDED FOR MIGRAINE 18 tablet 7 • simvastatin (ZOCOR) 40 mg tablet TAKE 1 TABLET EVERY EVENING 90 tablet 3   • topiramate (TOPAMAX) 50 MG tablet TAKE 1 TABLET BY MOUTH EVERY DAY (Patient not taking: Reported on 9/22/2022) 30 tablet 4     No current facility-administered medications for this visit  Objective:    Blood pressure 130/70, temperature 100 1 °F (37 8 °C), temperature source Tympanic, height 5' 6 25" (1 683 m), weight 103 kg (226 lb), not currently breastfeeding  Body mass index is 36 2 kg/m²  Body surface area is 2 12 meters squared  Physical Exam  Vitals reviewed  Exam conducted with a chaperone present  Constitutional:       General: She is not in acute distress  Appearance: Normal appearance  She is well-developed  She is obese  She is not diaphoretic  HENT:      Head: Normocephalic and atraumatic  Eyes:      Extraocular Movements: Extraocular movements intact  Conjunctiva/sclera: Conjunctivae normal    Neck:      Thyroid: No thyromegaly  Pulmonary:      Effort: Pulmonary effort is normal    Abdominal:      General: There is no distension  Palpations: Abdomen is soft  There is no mass  Tenderness: There is no abdominal tenderness  There is no guarding or rebound  Hernia: No hernia is present  Genitourinary:     Comments: The external female genitalia shows signs of changes from lichen sclerosus  There is loss of labia minora architecture  There is diffuse white epithelium without evidence of ulceration or lesion  Excoriation of present on right labia majora adjacent to the perineal body  The urethral meatus is normal (midline with no lesions)  Anus without fissure or lesion  Speculum exam reveals a grossly normal vagina with atrophy  no masses, lesions,discharge or bleeding  No significant cystocele or rectocele noted  Bimanual exam notes a surgical absent cervix, uterus and adnexal structures  No masses or fullness   Bladder is without fullness, mass or tenderness  Musculoskeletal:      Cervical back: Normal range of motion and neck supple  Right lower leg: No edema  Left lower leg: No edema  Lymphadenopathy:      Cervical: No cervical adenopathy  Skin:     General: Skin is warm and dry  Coloration: Skin is not jaundiced or pale  Findings: No lesion or rash  Neurological:      General: No focal deficit present  Mental Status: She is alert and oriented to person, place, and time  Mental status is at baseline  Cranial Nerves: No cranial nerve deficit  Motor: No weakness  Gait: Gait normal    Psychiatric:         Mood and Affect: Mood normal          Behavior: Behavior normal          Thought Content:  Thought content normal          Judgment: Judgment normal

## 2023-02-09 DIAGNOSIS — F41.1 GENERALIZED ANXIETY DISORDER: ICD-10-CM

## 2023-02-09 RX ORDER — PAROXETINE HYDROCHLORIDE 20 MG/1
TABLET, FILM COATED ORAL
Qty: 90 TABLET | Refills: 3 | Status: SHIPPED | OUTPATIENT
Start: 2023-02-09

## 2023-03-28 ENCOUNTER — OFFICE VISIT (OUTPATIENT)
Dept: FAMILY MEDICINE CLINIC | Facility: CLINIC | Age: 68
End: 2023-03-28

## 2023-03-28 VITALS
DIASTOLIC BLOOD PRESSURE: 80 MMHG | HEIGHT: 67 IN | OXYGEN SATURATION: 96 % | SYSTOLIC BLOOD PRESSURE: 110 MMHG | BODY MASS INDEX: 36.38 KG/M2 | WEIGHT: 231.8 LBS | RESPIRATION RATE: 14 BRPM | HEART RATE: 96 BPM | TEMPERATURE: 99.8 F

## 2023-03-28 DIAGNOSIS — E78.2 MIXED HYPERLIPIDEMIA: ICD-10-CM

## 2023-03-28 DIAGNOSIS — E66.01 CLASS 2 SEVERE OBESITY DUE TO EXCESS CALORIES WITH SERIOUS COMORBIDITY AND BODY MASS INDEX (BMI) OF 36.0 TO 36.9 IN ADULT (HCC): ICD-10-CM

## 2023-03-28 DIAGNOSIS — E55.9 VITAMIN D DEFICIENCY: ICD-10-CM

## 2023-03-28 DIAGNOSIS — E53.8 LOW VITAMIN B12 LEVEL: ICD-10-CM

## 2023-03-28 DIAGNOSIS — F32.0 CURRENT MILD EPISODE OF MAJOR DEPRESSIVE DISORDER WITHOUT PRIOR EPISODE (HCC): ICD-10-CM

## 2023-03-28 DIAGNOSIS — E03.9 HYPOTHYROIDISM (ACQUIRED): ICD-10-CM

## 2023-03-28 DIAGNOSIS — Z00.00 MEDICARE ANNUAL WELLNESS VISIT, SUBSEQUENT: Primary | ICD-10-CM

## 2023-03-28 DIAGNOSIS — C54.1 ENDOMETRIAL CANCER (HCC): ICD-10-CM

## 2023-03-28 PROBLEM — R35.0 FREQUENCY OF URINATION: Status: RESOLVED | Noted: 2022-05-12 | Resolved: 2023-03-28

## 2023-03-28 RX ORDER — BUPROPION HYDROCHLORIDE 150 MG/1
150 TABLET ORAL EVERY MORNING
Qty: 90 TABLET | Refills: 3 | Status: SHIPPED | OUTPATIENT
Start: 2023-03-28 | End: 2023-09-24

## 2023-03-28 NOTE — PROGRESS NOTES
Assessment and Plan:     Problem List Items Addressed This Visit        Endocrine    Hypothyroidism (acquired)     Check tsh         Relevant Orders    TSH, 3rd generation with Free T4 reflex       Genitourinary    RESOLVED: Endometrial cancer ([facility] [address] )       Other    Class 2 severe obesity due to excess calories with serious comorbidity and body mass index (BMI) of 36 0 to 36 9 in adult [facility])     Watching diet  Will start walking when weather changes         Hyperlipidemia     Check lipids         Relevant Orders    CBC    Comprehensive metabolic panel    Lipid Panel with Direct LDL reflex    Low vitamin B12 level    Relevant Orders    Vitamin B12    Medicare annual wellness visit, subsequent - Primary    Vitamin D deficiency    Relevant Orders    Vitamin D 25 hydroxy    Current mild episode of major depressive disorder without prior episode [facility] )     Add wellbutrin         Relevant Medications    buPROPion (WELLBUTRIN XL) 150 mg 24 hr tablet     BMI Counseling: Body mass index is 36 3 kg/m²  The BMI is above normal  Nutrition recommendations include encouraging healthy choices of fruits and vegetables  Exercise recommendations include exercising 3-5 times per week  No pharmacotherapy was ordered  Rationale for BMI follow-up plan is due to patient being overweight or obese  Depression Screening and Follow-up Plan: Patient was screened for depression during today's encounter  They screened negative with a PHQ-2 score of 1  Preventive health issues were discussed with patient, and age appropriate screening tests were ordered as noted in patient's After Visit Summary  Personalized health advice and appropriate referrals for health education or preventive services given if needed, as noted in patient's After Visit Summary       History of Present Illness:     Patient presents for a Medicare Wellness Visit    Here for awv and follow up     Patient Care Team:  Matty Qureshi DO as PCP - General Kristal Grant MD Gavino Raymond MD Beth Katayama, MD     Review of Systems:     Review of Systems   Constitutional: Negative  HENT: Negative  Eyes: Negative  Respiratory: Negative  Cardiovascular: Negative  Gastrointestinal: Negative  Endocrine: Negative  Genitourinary: Negative  Musculoskeletal: Negative  Allergic/Immunologic: Negative  Neurological: Negative  Hematological: Negative  Psychiatric/Behavioral: Positive for dysphoric mood  The patient is nervous/anxious           Problem List:     Patient Active Problem List   Diagnosis   • Class 2 severe obesity due to excess calories with serious comorbidity and body mass index (BMI) of 36 0 to 36 9 in adult [facility])   • Hyperlipidemia   • Hypothyroidism (acquired)   • Blind right eye   • Arthritis   • Insomnia   • Migraine headache   • Postgastrectomy malabsorption   • Transient ischemic attack   • Urge incontinence of urine   • Vulvar atrophy   • Bariatric surgery status   • Low vitamin B12 level   • Medicare annual wellness visit, subsequent   • Generalized anxiety disorder   • Vitamin D deficiency   • Essential hypertension   • History of endometrial cancer   • S/P laparoscopic hysterectomy   • Peritonitis ([facility] )   • Encounter for follow-up surveillance of endometrial cancer   • Lichen sclerosus et atrophicus of the vulva   • Current mild episode of major depressive disorder without prior episode [facility])      Past Medical and Surgical History:     Past Medical History:   Diagnosis Date   • Allergic dermatitis     resolved 01/26/2016   • Anxiety    • Arthritis    • Blind right eye    • Cancer ([facility] ) 11/2021    Hysterectomy   • Depression    • Disease of thyroid gland    • Encounter for cosmetic surgery 04/30/2018    Added automatically from request for surgery [id number]   • Endometrial cancer ([facility] ) 3/28/2023   • Headache(784 0)    • Hyperlipidemia    • Hypertension    • Hypothyroidism (acquired) hypothyroid   • Incontinence of urine in female    • Insomnia    • Migraine    • Morbid obesity ([facility] [address] )    • Obesity    • TIA (transient ischemic attack)     questionable    • Uterine cancer ([facility] [address] ) 11/2021   • Visual impairment    • Wears glasses      Past Surgical History:   Procedure Laterality Date   • BARIATRIC SURGERY  2017   • BODY LIFT LOWER N/A 06/28/2018    Procedure: LOWER BODY LIFT;  Surgeon: Destini Arora MD;  Location: [facility];  Service: Plastics   • CATARACT EXTRACTION Left    • CHOLECYSTECTOMY     • COLONOSCOPY     • CYSTOSCOPY N/A 11/16/2021    Procedure: Riaz Esqueda;  Surgeon: Antwon Wilcox MD;  Location: [facility];  Service: Gynecology Oncology   • ESOPHAGOGASTRODUODENOSCOPY N/A 03/27/2017    Procedure: ESOPHAGOGASTRODUODENOSCOPY (EGD); Surgeon: Cynthia Mckeon MD;  Location[facility];  Service:    • EYE SURGERY     • HEMORROIDECTOMY     • HYSTERECTOMY     • IR IMAGE GUIDED ASPIRATION / DRAINAGE W TUBE  03/26/2019   • JOINT REPLACEMENT      oswald TKR   • WI EGD TRANSORAL BIOPSY SINGLE/MULTIPLE N/A 03/08/2017    Procedure: ESOPHAGOGASTRODUODENOSCOPY (EGD); Surgeon: Cynthia Mckeon MD;  Location: [facility]; Service: Bariatrics   • WI EXCISION EXCESSIVE SKIN & SUBQ TISSUE THIGH Bilateral 01/10/2019    Procedure: THIGH LIFT;  Surgeon: Destini Arora MD;  Location: [facility];  Service: Plastics   • WI INJECTION PROCEDURE LYMPHANGIOGRAPHY  11/16/2021    Procedure: LYMPHOGRAPHY WITH INDOCYANINE GREEN (ICG);   Surgeon: Antwon Wilcox MD;  [facility];  Service: Gynecology Oncology   • WI LAPS [address] [facility] LONGITUDINAL GASTRECTOMY N/A 03/27/2017    Procedure: GASTRECTOMY SLEEVE LAPAROSCOPIC;  Surgeon: Cynthia Mckeon MD;  [facility];  Service: Bariatrics   • WI LAPS TOTAL HYSTERECT 250 GM/< W/RMVL TUBE/OVARY N/A 11/16/2021    Procedure: HYSTERECTOMY LAPAROSCOPIC TOTAL ([address]) W/ ROBOTICS, BILATERAL SALPINGO-OOPHORECTOMY, BL SENTINAL LYMPH NODE DISSECTION BIOPSY; Surgeon: Keisha Chakraborty MD;  [facility];  Service: Gynecology Oncology   • ROTATOR CUFF REPAIR Right    • SLEEVE GASTROPLASTY  03/27/2017   • TOTAL BODY LIFT        Family History:     Family History   Problem Relation Age of Onset   • Diabetes Mother    • Skin cancer Mother    • Arthritis Mother    • Cancer Mother         Cervical, lung, liver, spine   • Lung cancer Mother    • Alzheimer's disease Father    • Heart failure Father    • Diabetes Father    • Dementia Father    • Cancer Father         Skin   • Stroke Father    • Breast cancer Maternal Grandmother         adenocarcinoma in situ in villous adenoma of the breast      Social History:     Social History     Socioeconomic History   • Marital status: /Civil Union     Spouse name: None   • Number of children: None   • Years of education: None   • Highest education level: None   Occupational History   • None   Tobacco Use   • Smoking status: Never   • Smokeless tobacco: Never   Vaping Use   • Vaping Use: Never used   Substance and Sexual Activity   • Alcohol use: No   • Drug use: No   • Sexual activity: Not Currently   Other Topics Concern   • None   Social History Narrative    Daily coffee consumption    Exercise frequency    Two children         Social Determinants of Health     Financial Resource Strain: Low Risk    • Difficulty of Paying Living Expenses: Not hard at all   Food Insecurity: Not on file   Transportation Needs: No Transportation Needs   • Lack of Transportation (Medical): No   • Lack of Transportation (Non-Medical):  No   Physical Activity: Not on file   Stress: Not on file   Social Connections: Not on file   Intimate Partner Violence: Not on file   Housing Stability: Not on file      Medications and Allergies:     Current Outpatient Medications   Medication Sig Dispense Refill   • aspirin 81 MG tablet Take 1 tablet by mouth daily for 30 days 30 tablet 0   • betamethasone valerate (VALISONE) 0 1 % cream Apply topically 2 (two) times a day 30 g 3   • buPROPion (WELLBUTRIN XL) 150 mg 24 hr tablet Take 1 tablet (150 mg total) by mouth every morning 90 tablet 3   • calcium citrate-vitamin D (CITRACAL+D) 315-200 MG-UNIT per tablet Take by mouth daily      • eszopiclone (LUNESTA) 3 MG tablet TAKE 1 TABLET BY MOUTH AT BEDTIME 90 tablet 3   • levothyroxine 112 mcg tablet Take 1 tablet (112 mcg total) by mouth daily 90 tablet 3   • lisinopril (ZESTRIL) 10 mg tablet TAKE 1 TABLET DAILY 90 tablet 3   • mometasone (ELOCON) 0 1 % ointment Apply nightly to affected area 2-3x a week 45 g 1   • Multiple Vitamins-Minerals (CENTRUM WOMEN) TABS Take by mouth Goes between bariatric multi and Centrum     • PARoxetine (PAXIL) 10 mg tablet Take 1 tablet (10 mg total) by mouth daily 90 tablet 3   • PARoxetine (PAXIL) 20 mg tablet TAKE 1 TABLET DAILY 90 tablet 3   • rizatriptan (MAXALT) 10 MG tablet TAKE 1 TABLET DAILY AS NEEDED FOR MIGRAINE 18 tablet 7   • simvastatin (ZOCOR) 40 mg tablet TAKE 1 TABLET EVERY EVENING 90 tablet 3     No current facility-administered medications for this visit       No Known Allergies   Immunizations:     Immunization History   Administered Date(s) Administered   • COVID-19 MODERNA VACC 0 5 ML IM 02/04/2021, 03/02/2021, 10/25/2021   • COVID-19 Moderna Vac BIVALENT 12 Yr+ IM (BOOSTER ONLY) 0 5 ML 09/28/2022   • H1N1, All Formulations 01/06/2010   • INFLUENZA 09/28/2013, 09/08/2014, 10/08/2015, 10/15/2016, 09/21/2017, 09/05/2018, 09/18/2019   • Influenza Injectable, MDCK, Preservative Free, Quadrivalent, 0 5 mL 09/18/2019   • Influenza Quadrivalent Preservative Free 3 years and older IM 10/08/2015   • Influenza, high dose seasonal 0 7 mL 09/15/2020, 09/21/2021, 10/08/2022   • Influenza, injectable, quadrivalent, preservative free 0 5 mL 09/05/2018   • Influenza, seasonal, injectable 09/20/2011, 09/08/2014, 10/15/2016   • Influenza, seasonal, injectable, preservative free 09/05/2018   • Pneumococcal Conjugate Vaccine 20-valent (Pcv20), Polysace 09/22/2022   • Pneumococcal Polysaccharide PPV23 03/10/2020   • Tdap 03/04/2014   • Zoster Vaccine Recombinant 09/22/2018, 03/06/2019      Health Maintenance:         Topic Date Due   • Breast Cancer Screening: Mammogram  09/02/2023   • Colorectal Cancer Screening  04/28/2025   • Hepatitis C Screening  Completed     There are no preventive care reminders to display for this patient  Medicare Screening Tests and Risk Assessments:     Cristo Horvath is here for her Subsequent Wellness visit  Health Risk Assessment:   Patient rates overall health as very good  Patient feels that their physical health rating is same  Patient is very satisfied with their life  Eyesight was rated as same  Hearing was rated as same  Patient feels that their emotional and mental health rating is slightly better  Patients states they are never, rarely angry  Patient states they are often unusually tired/fatigued  Pain experienced in the last 7 days has been none  Patient states that she has experienced no weight loss or gain in last 6 months  Depression Screening:   PHQ-2 Score: 1      Fall Risk Screening: In the past year, patient has experienced: no history of falling in past year      Urinary Incontinence Screening:   Patient has not leaked urine accidently in the last six months  Home Safety:  Patient does not have trouble with stairs inside or outside of their home  Patient has working smoke alarms and has working carbon monoxide detector  Home safety hazards include: none  Nutrition:   Current diet is Regular and No Added Salt  Medications:   Patient is currently taking over-the-counter supplements  OTC medications include: B12, Aspirin low dose, D3  Patient is able to manage medications  Activities of Daily Living (ADLs)/Instrumental Activities of Daily Living (IADLs):   Walk and transfer into and out of bed and chair?: Yes  Dress and groom yourself?: Yes    Bathe or shower yourself?: Yes    Feed yourself? Yes  Do your laundry/housekeeping?: Yes  Manage your money, pay your bills and track your expenses?: Yes  Make your own meals?: Yes    Do your own shopping?: Yes    Previous Hospitalizations:   Any hospitalizations or ED visits within the last 12 months?: No      Advance Care Planning:   Living will: No    Durable POA for healthcare: No    Advanced directive: No      Cognitive Screening:   Provider or family/friend/caregiver concerned regarding cognition?: No    PREVENTIVE SCREENINGS      Cardiovascular Screening:    General: Screening Not Indicated and History Lipid Disorder      Diabetes Screening:     General: Screening Current      Colorectal Cancer Screening:     General: Screening Current      Breast Cancer Screening:     General: Screening Current      Cervical Cancer Screening:    General: Screening Not Indicated      Lung Cancer Screening:     General: Screening Not Indicated      Hepatitis C Screening:    General: Screening Current    Screening, Brief Intervention, and Referral to Treatment (SBIRT)    Screening  Typical number of drinks in a day: 0  Typical number of drinks in a week: 0  Interpretation: Low risk drinking behavior  AUDIT-C Screenin) How often did you have a drink containing alcohol in the past year? never  2) How many drinks did you have on a typical day when you were drinking in the past year? 0  3) How often did you have 6 or more drinks on one occasion in the past year? never    AUDIT-C Score: 0  Interpretation: Score 0-2 (female): Negative screen for alcohol misuse    Single Item Drug Screening:  How often have you used an illegal drug (including marijuana) or a prescription medication for non-medical reasons in the past year? never    Single Item Drug Screen Score: 0  Interpretation: Negative screen for possible drug use disorder    No results found       Physical Exam:     /80 (BP Location: Left arm, Patient Position: Sitting, Cuff Size: Large)   Pulse 96   Temp 99 8 °F "(37 7 °C) (Tympanic)   Resp 14   Ht 5' 7 01\" (1 702 m)   Wt 105 kg (231 lb 12 8 oz)   SpO2 96%   BMI 36 30 kg/m²     Physical Exam  Vitals and nursing note reviewed  Constitutional:       Appearance: Normal appearance  She is well-developed  HENT:      Head: Normocephalic and atraumatic  Right Ear: External ear normal       Left Ear: External ear normal       Nose: Nose normal    Eyes:      Extraocular Movements: Extraocular movements intact  Conjunctiva/sclera: Conjunctivae normal       Pupils: Pupils are equal, round, and reactive to light  Cardiovascular:      Rate and Rhythm: Normal rate and regular rhythm  Heart sounds: Normal heart sounds  Pulmonary:      Effort: Pulmonary effort is normal       Breath sounds: Normal breath sounds  Abdominal:      General: Abdomen is flat  Bowel sounds are normal       Palpations: Abdomen is soft  Musculoskeletal:         General: Normal range of motion  Cervical back: Normal range of motion and neck supple  Skin:     General: Skin is warm and dry  Capillary Refill: Capillary refill takes less than 2 seconds  Neurological:      General: No focal deficit present  Mental Status: She is alert and oriented to person, place, and time  Psychiatric:         Mood and Affect: Mood normal          Behavior: Behavior normal          Thought Content:  Thought content normal          Judgment: Judgment normal           Joanie Sans, DO  "

## 2023-03-28 NOTE — PATIENT INSTRUCTIONS
Medicare Preventive Visit Patient Instructions  Thank you for completing your Welcome to Medicare Visit or Medicare Annual Wellness Visit today  Your next wellness visit will be due in one year (3/28/2024)  The screening/preventive services that you may require over the next 5-10 years are detailed below  Some tests may not apply to you based off risk factors and/or age  Screening tests ordered at today's visit but not completed yet may show as past due  Also, please note that scanned in results may not display below  Preventive Screenings:  Service Recommendations Previous Testing/Comments   Colorectal Cancer Screening  * Colonoscopy    * Fecal Occult Blood Test (FOBT)/Fecal Immunochemical Test (FIT)  * Fecal DNA/Cologuard Test  * Flexible Sigmoidoscopy Age: 39-70 years old   Colonoscopy: every 10 years (may be performed more frequently if at higher risk)  OR  FOBT/FIT: every 1 year  OR  Cologuard: every 3 years  OR  Sigmoidoscopy: every 5 years  Screening may be recommended earlier than age 39 if at higher risk for colorectal cancer  Also, an individualized decision between you and your healthcare provider will decide whether screening between the ages of 74-80 would be appropriate  Colonoscopy: 04/28/2022  FOBT/FIT: Not on file  Cologuard: Not on file  Sigmoidoscopy: Not on file    Screening Current     Breast Cancer Screening Age: 36 years old  Frequency: every 1-2 years  Not required if history of left and right mastectomy Mammogram: 09/02/2022    Screening Current   Cervical Cancer Screening Between the ages of 21-29, pap smear recommended once every 3 years  Between the ages of 33-67, can perform pap smear with HPV co-testing every 5 years     Recommendations may differ for women with a history of total hysterectomy, cervical cancer, or abnormal pap smears in past  Pap Smear: 09/10/2021    Screening Not Indicated   Hepatitis C Screening Once for adults born between 1945 and 1965  More frequently in patients at high risk for Hepatitis C Hep C Antibody: 03/27/2018    Screening Current   Diabetes Screening 1-2 times per year if you're at risk for diabetes or have pre-diabetes Fasting glucose: 102 mg/dL (9/23/2022)  A1C: 5 2 % (10/21/2021)  Screening Current   Cholesterol Screening Once every 5 years if you don't have a lipid disorder  May order more often based on risk factors  Lipid panel: 09/23/2022    Screening Not Indicated  History Lipid Disorder     Other Preventive Screenings Covered by Medicare:  1  Abdominal Aortic Aneurysm (AAA) Screening: covered once if your at risk  You're considered to be at risk if you have a family history of AAA  2  Lung Cancer Screening: covers low dose CT scan once per year if you meet all of the following conditions: (1) Age 50-69; (2) No signs or symptoms of lung cancer; (3) Current smoker or have quit smoking within the last 15 years; (4) You have a tobacco smoking history of at least 20 pack years (packs per day multiplied by number of years you smoked); (5) You get a written order from a healthcare provider  3  Glaucoma Screening: covered annually if you're considered high risk: (1) You have diabetes OR (2) Family history of glaucoma OR (3)  aged 48 and older OR (3)  American aged 72 and older  3  Osteoporosis Screening: covered every 2 years if you meet one of the following conditions: (1) You're estrogen deficient and at risk for osteoporosis based off medical history and other findings; (2) Have a vertebral abnormality; (3) On glucocorticoid therapy for more than 3 months; (4) Have primary hyperparathyroidism; (5) On osteoporosis medications and need to assess response to drug therapy  · Last bone density test (DXA Scan): 08/04/2022   5  HIV Screening: covered annually if you're between the age of 15-65  Also covered annually if you are younger than 13 and older than 72 with risk factors for HIV infection   For pregnant patients, it is covered up to 3 times per pregnancy  Immunizations:  Immunization Recommendations   Influenza Vaccine Annual influenza vaccination during flu season is recommended for all persons aged >= 6 months who do not have contraindications   Pneumococcal Vaccine   * Pneumococcal conjugate vaccine = PCV13 (Prevnar 13), PCV15 (Vaxneuvance), PCV20 (Prevnar 20)  * Pneumococcal polysaccharide vaccine = PPSV23 (Pneumovax) Adults 25-60 years old: 1-3 doses may be recommended based on certain risk factors  Adults 72 years old: 1-2 doses may be recommended based off what pneumonia vaccine you previously received   Hepatitis B Vaccine 3 dose series if at intermediate or high risk (ex: diabetes, end stage renal disease, liver disease)   Tetanus (Td) Vaccine - COST NOT COVERED BY MEDICARE PART B Following completion of primary series, a booster dose should be given every 10 years to maintain immunity against tetanus  Td may also be given as tetanus wound prophylaxis  Tdap Vaccine - COST NOT COVERED BY MEDICARE PART B Recommended at least once for all adults  For pregnant patients, recommended with each pregnancy  Shingles Vaccine (Shingrix) - COST NOT COVERED BY MEDICARE PART B  2 shot series recommended in those aged 48 and above     Health Maintenance Due:      Topic Date Due   • Breast Cancer Screening: Mammogram  09/02/2023   • Colorectal Cancer Screening  04/28/2025   • Hepatitis C Screening  Completed     Immunizations Due:  There are no preventive care reminders to display for this patient  Advance Directives   What are advance directives? Advance directives are legal documents that state your wishes and plans for medical care  These plans are made ahead of time in case you lose your ability to make decisions for yourself  Advance directives can apply to any medical decision, such as the treatments you want, and if you want to donate organs  What are the types of advance directives?   There are many types of advance directives, and each state has rules about how to use them  You may choose a combination of any of the following:  · Living will: This is a written record of the treatment you want  You can also choose which treatments you do not want, which to limit, and which to stop at a certain time  This includes surgery, medicine, IV fluid, and tube feedings  · Durable power of  for healthcare [facility]): This is a written record that states who you want to make healthcare choices for you when you are unable to make them for yourself  This person, called a proxy, is usually a family member or a friend  You may choose more than 1 proxy  · Do not resuscitate (DNR) order:  A DNR order is used in case your heart stops beating or you stop breathing  It is a request not to have certain forms of treatment, such as CPR  A DNR order may be included in other types of advance directives  · Medical directive: This covers the care that you want if you are in a coma, near death, or unable to make decisions for yourself  You can list the treatments you want for each condition  Treatment may include pain medicine, surgery, blood transfusions, dialysis, IV or tube feedings, and a ventilator (breathing machine)  · Values history: This document has questions about your views, beliefs, and how you feel and think about life  This information can help others choose the care that you would choose  Why are advance directives important? An advance directive helps you control your care  Although spoken wishes may be used, it is better to have your wishes written down  Spoken wishes can be misunderstood, or not followed  Treatments may be given even if you do not want them  An advance directive may make it easier for your family to make difficult choices about your care  Urinary Incontinence   Urinary incontinence (UI)  is when you lose control of your bladder  UI develops because your bladder cannot store or empty urine properly   The 3 most common types of UI are stress incontinence, urge incontinence, or both  Medicines:   · May be given to help strengthen your bladder control  Report any side effects of medication to your healthcare provider  Do pelvic muscle exercises often:  Your pelvic muscles help you stop urinating  Squeeze these muscles tight for 5 seconds, then relax for 5 seconds  Gradually work up to squeezing for 10 seconds  Do 3 sets of 15 repetitions a day, or as directed  This will help strengthen your pelvic muscles and improve bladder control  Train your bladder:  Go to the bathroom at set times, such as every 2 hours, even if you do not feel the urge to go  You can also try to hold your urine when you feel the urge to go  For example, hold your urine for 5 minutes when you feel the urge to go  As that becomes easier, hold your urine for 10 minutes  Self-care:   · Keep a UI record  Write down how often you leak urine and how much you leak  Make a note of what you were doing when you leaked urine  · Drink liquids as directed  You may need to limit the amount of liquid you drink to help control your urine leakage  Do not drink any liquid right before you go to bed  Limit or do not have drinks that contain caffeine or alcohol  · Prevent constipation  Eat a variety of high-fiber foods  Good examples are high-fiber cereals, beans, vegetables, and whole-grain breads  Walking is the best way to trigger your intestines to have a bowel movement  · Exercise regularly and maintain a healthy weight  Weight loss and exercise will decrease pressure on your bladder and help you control your leakage  · Use a catheter as directed  to help empty your bladder  A catheter is a tiny, plastic tube that is put into your bladder to drain your urine  · Go to behavior therapy as directed  Behavior therapy may be used to help you learn to control your urge to urinate      Weight Management   Why it is important to manage your weight: Being overweight increases your risk of health conditions such as heart disease, high blood pressure, type 2 diabetes, and certain types of cancer  It can also increase your risk for osteoarthritis, sleep apnea, and other respiratory problems  Aim for a slow, steady weight loss  Even a small amount of weight loss can lower your risk of health problems  How to lose weight safely:  A safe and healthy way to lose weight is to eat fewer calories and get regular exercise  You can lose up about 1 pound a week by decreasing the number of calories you eat by 500 calories each day  Healthy meal plan for weight management:  A healthy meal plan includes a variety of foods, contains fewer calories, and helps you stay healthy  A healthy meal plan includes the following:  · Eat whole-grain foods more often  A healthy meal plan should contain fiber  Fiber is the part of grains, fruits, and vegetables that is not broken down by your body  Whole-grain foods are healthy and provide extra fiber in your diet  Some examples of whole-grain foods are whole-wheat breads and pastas, oatmeal, brown rice, and bulgur  · Eat a variety of vegetables every day  Include dark, leafy greens such as spinach, kale, deuce greens, and mustard greens  Eat yellow and orange vegetables such as carrots, sweet potatoes, and winter squash  · Eat a variety of fruits every day  Choose fresh or canned fruit (canned in its own juice or light syrup) instead of juice  Fruit juice has very little or no fiber  · Eat low-fat dairy foods  Drink fat-free (skim) milk or 1% milk  Eat fat-free yogurt and low-fat cottage cheese  Try low-fat cheeses such as mozzarella and other reduced-fat cheeses  · Choose meat and other protein foods that are low in fat  Choose beans or other legumes such as split peas or lentils  Choose fish, skinless poultry (chicken or turkey), or lean cuts of red meat (beef or pork)   Before you cook meat or poultry, cut off any visible fat    · Use less fat and oil  Try baking foods instead of frying them  Add less fat, such as margarine, sour cream, regular salad dressing and mayonnaise to foods  Eat fewer high-fat foods  Some examples of high-fat foods include french fries, doughnuts, ice cream, and cakes  · Eat fewer sweets  Limit foods and drinks that are high in sugar  This includes candy, cookies, regular soda, and sweetened drinks  Exercise:  Exercise at least 30 minutes per day on most days of the week  Some examples of exercise include walking, biking, dancing, and swimming  You can also fit in more physical activity by taking the stairs instead of the elevator or parking farther away from stores  Ask your healthcare provider about the best exercise plan for you  © Copyright [employer] 2018 Information is for End User's use only and may not be sold, redistributed or otherwise used for commercial purposes   All illustrations and images included in CareNotes® are the copyrighted property of [employer]  or [address] [place] [address]

## 2023-03-30 ENCOUNTER — APPOINTMENT (OUTPATIENT)
Dept: LAB | Facility: AMBULARY SURGERY CENTER | Age: 68
End: 2023-03-30

## 2023-03-30 ENCOUNTER — PROCEDURE VISIT (OUTPATIENT)
Dept: UROLOGY | Facility: CLINIC | Age: 68
End: 2023-03-30

## 2023-03-30 VITALS
SYSTOLIC BLOOD PRESSURE: 128 MMHG | OXYGEN SATURATION: 100 % | WEIGHT: 231 LBS | HEART RATE: 93 BPM | BODY MASS INDEX: 36.26 KG/M2 | HEIGHT: 67 IN | DIASTOLIC BLOOD PRESSURE: 80 MMHG

## 2023-03-30 DIAGNOSIS — E55.9 VITAMIN D DEFICIENCY: ICD-10-CM

## 2023-03-30 DIAGNOSIS — E53.8 LOW VITAMIN B12 LEVEL: ICD-10-CM

## 2023-03-30 DIAGNOSIS — N39.41 URGE INCONTINENCE OF URINE: Primary | ICD-10-CM

## 2023-03-30 DIAGNOSIS — E03.9 HYPOTHYROIDISM (ACQUIRED): ICD-10-CM

## 2023-03-30 DIAGNOSIS — E78.2 MIXED HYPERLIPIDEMIA: ICD-10-CM

## 2023-03-30 LAB
25(OH)D3 SERPL-MCNC: 42.8 NG/ML (ref 30–100)
ALBUMIN SERPL BCP-MCNC: 3.3 G/DL (ref 3.5–5)
ALP SERPL-CCNC: 83 U/L (ref 46–116)
ALT SERPL W P-5'-P-CCNC: 23 U/L (ref 12–78)
ANION GAP SERPL CALCULATED.3IONS-SCNC: 5 MMOL/L (ref 4–13)
AST SERPL W P-5'-P-CCNC: 17 U/L (ref 5–45)
BILIRUB SERPL-MCNC: 0.57 MG/DL (ref 0.2–1)
BUN SERPL-MCNC: 19 MG/DL (ref 5–25)
CALCIUM ALBUM COR SERPL-MCNC: 10.2 MG/DL (ref 8.3–10.1)
CALCIUM SERPL-MCNC: 9.6 MG/DL (ref 8.3–10.1)
CHLORIDE SERPL-SCNC: 112 MMOL/L (ref 96–108)
CHOLEST SERPL-MCNC: 169 MG/DL
CO2 SERPL-SCNC: 22 MMOL/L (ref 21–32)
CREAT SERPL-MCNC: 0.88 MG/DL (ref 0.6–1.3)
ERYTHROCYTE [DISTWIDTH] IN BLOOD BY AUTOMATED COUNT: 13.3 % (ref 11.6–15.1)
GFR SERPL CREATININE-BSD FRML MDRD: 67 ML/MIN/1.73SQ M
GLUCOSE P FAST SERPL-MCNC: 101 MG/DL (ref 65–99)
HCT VFR BLD AUTO: 40.3 % (ref 34.8–46.1)
HDLC SERPL-MCNC: 59 MG/DL
HGB BLD-MCNC: 12.8 G/DL (ref 11.5–15.4)
LDLC SERPL CALC-MCNC: 83 MG/DL (ref 0–100)
MCH RBC QN AUTO: 29.2 PG (ref 26.8–34.3)
MCHC RBC AUTO-ENTMCNC: 31.8 G/DL (ref 31.4–37.4)
MCV RBC AUTO: 92 FL (ref 82–98)
PLATELET # BLD AUTO: 342 THOUSANDS/UL (ref 149–390)
PMV BLD AUTO: 10.4 FL (ref 8.9–12.7)
POTASSIUM SERPL-SCNC: 3.8 MMOL/L (ref 3.5–5.3)
PROT SERPL-MCNC: 7.1 G/DL (ref 6.4–8.4)
RBC # BLD AUTO: 4.39 MILLION/UL (ref 3.81–5.12)
SODIUM SERPL-SCNC: 139 MMOL/L (ref 135–147)
TRIGL SERPL-MCNC: 133 MG/DL
TSH SERPL DL<=0.05 MIU/L-ACNC: 1.06 UIU/ML (ref 0.45–4.5)
VIT B12 SERPL-MCNC: 628 PG/ML (ref 100–900)
WBC # BLD AUTO: 6.48 THOUSAND/UL (ref 4.31–10.16)

## 2023-05-22 DIAGNOSIS — G43.909 MIGRAINE WITHOUT STATUS MIGRAINOSUS, NOT INTRACTABLE, UNSPECIFIED MIGRAINE TYPE: ICD-10-CM

## 2023-05-22 RX ORDER — RIZATRIPTAN BENZOATE 10 MG/1
TABLET ORAL
Qty: 18 TABLET | Refills: 7 | Status: SHIPPED | OUTPATIENT
Start: 2023-05-22

## 2023-06-28 ENCOUNTER — OFFICE VISIT (OUTPATIENT)
Dept: DERMATOLOGY | Facility: CLINIC | Age: 68
End: 2023-06-28
Payer: COMMERCIAL

## 2023-06-28 VITALS — TEMPERATURE: 98 F | BODY MASS INDEX: 36.05 KG/M2 | HEIGHT: 67 IN | WEIGHT: 229.7 LBS

## 2023-06-28 DIAGNOSIS — Z12.83 SCREENING FOR MALIGNANT NEOPLASM OF SKIN: Primary | ICD-10-CM

## 2023-06-28 PROCEDURE — 99203 OFFICE O/P NEW LOW 30 MIN: CPT | Performed by: DERMATOLOGY

## 2023-06-28 NOTE — PATIENT INSTRUCTIONS
What is skin cancer? Skin cancer is unfortunately very common  That's why we are here to help you on your journey to healthy happy skin! There are two main types of skin cancer: melanoma and non-melanoma skin cancer  Melanoma is a form of skin cancer that often arises within an existing nevus or mole  However, this is not always the case  Melanoma can arise anywhere (not only where you have moles right now)  Melanoma can run in families, so letting us know about your family history is important  Non-melanoma skin cancer is the most common type of cancer in the [place]  The two main types of non-melanoma skin cancers are basal cell carcinomas (BCC) and squamous cell carcinoma (SCC)  These cancers tend to be less aggressive than melanomas but are still important to look for and treat  What can I do to prevent skin cancer? One of the largest risk factors for skin cancer is sun exposure or UV radiation  Therefore, sun protection is martinez! Here are some great tips for protecting yourself! Try to avoid direct sun exposure during peak sun hours (10 AM to 2 PM)  Remember you get A LOT of sun even under cloud coverage and through care windows! When choosing a sunscreen, look for one that says “broad spectrum” sunscreen  This means it protects you from more of the harmful UV rays  Choose a sunscreen that is SPF 30 or greater for best protection  Apply sunscreen to all sun-exposed skin and reapply every 2 hours  Consider sun protective clothing! Great additions to your sun protective clothing wardrobe include broad brimmed hats, sunglasses, UPF clothing  Avoid tanning salons  These have been shown to be very harmful in terms of your risk of skin cancer  Avoid “base tans”  We now know that tans are dangerous (not just sun burns)  If you want to have a tan for a trip, consider a spray tan! Should I check my skin at home between my dermatology appointments? Yes!  It's always a great idea to look at your skin on a regular basis  Here are some things to look for when monitoring your skin  For melanoma, look for the ABCDE's! A = Asymmetry  Look for a spot where one half does not match the other! B = Borders  Look for a spot that has jagged, ragged or irregular borders  C = Color  Look for a spot that is not evenly colored and often includes multiple colors, especially true black, red, white, blue, grey  D = Diameter  Look for a spot that is larger than the size of a pencil eraser  E = Evolution  If you ever have a spot that is changing in shape, color, size or symptoms (becomes itchy, painful or starts to bleed), always call us! For non-melanoma skin cancers, look for a new, pink spot that is not going away, especially one that is itchy, painful or bleeding  What should I do if I see a spot that is concerning for melanoma or non-melanoma skin cancer? If you are ever concerned, call us! Do not wait for your next appointment  We want to help!

## 2023-07-10 ENCOUNTER — OFFICE VISIT (OUTPATIENT)
Dept: GYNECOLOGIC ONCOLOGY | Facility: CLINIC | Age: 68
End: 2023-07-10
Payer: COMMERCIAL

## 2023-07-10 VITALS
BODY MASS INDEX: 36.1 KG/M2 | HEART RATE: 92 BPM | OXYGEN SATURATION: 96 % | SYSTOLIC BLOOD PRESSURE: 140 MMHG | HEIGHT: 67 IN | DIASTOLIC BLOOD PRESSURE: 80 MMHG | WEIGHT: 230 LBS | TEMPERATURE: 98.2 F

## 2023-07-10 DIAGNOSIS — N90.4 LICHEN SCLEROSUS ET ATROPHICUS OF THE VULVA: ICD-10-CM

## 2023-07-10 DIAGNOSIS — Z85.42 HISTORY OF ENDOMETRIAL CANCER: Primary | ICD-10-CM

## 2023-07-10 DIAGNOSIS — Z08 ENCOUNTER FOR FOLLOW-UP SURVEILLANCE OF ENDOMETRIAL CANCER: ICD-10-CM

## 2023-07-10 DIAGNOSIS — Z85.42 ENCOUNTER FOR FOLLOW-UP SURVEILLANCE OF ENDOMETRIAL CANCER: ICD-10-CM

## 2023-07-10 PROCEDURE — 99213 OFFICE O/P EST LOW 20 MIN: CPT | Performed by: PHYSICIAN ASSISTANT

## 2023-07-10 NOTE — ASSESSMENT & PLAN NOTE
"Pt told writer that he has another name from his partner \" Josephalice Raephill Yuan\" same . Registration contacted but they could not trace it in any records. Nursing will have to contact [facility] tomorrow to find out complete med list as pt could not remember meds nor dosages,  Pt told writer that he was on civil commitment and deleon.  " Stable, well-controlled with clobetasol ointment.

## 2023-07-10 NOTE — PROGRESS NOTES
Assessment/Plan:    Problem List Items Addressed This Visit        Genitourinary    Lichen sclerosus et atrophicus of the vulva     Stable, well-controlled with clobetasol ointment. Other    History of endometrial cancer - Primary     History of stage IA, grade 1 endometrial cancer s/p surgical resection in November 2021. She is clinically without evidence of disease recurrence. Survivorship visit performed today. Patient provided with care plan/treatment summary.      Return to the office in 1 year for continued cancer surveillance. Encounter for follow-up surveillance of endometrial cancer         CHIEF COMPLAINT:   Endometrial cancer surveillance    Problem:  Cancer Staging   History of endometrial cancer  Staging form: Corpus Uteri - Carcinoma, AJCC 8th Edition  - Clinical stage from 11/16/2021: FIGO Stage IA (cT1a, cN0(sn), cM0) - Signed by Jorden Shine MD on 12/1/2021        Previous therapy:  Oncology History   History of endometrial cancer   10/21/2021 Initial Diagnosis    Endometrial cancer ([address])     11/16/2021 -  Cancer Staged    Staging form: Corpus Uteri - Carcinoma, AJCC 8th Edition  - Clinical stage from 11/16/2021: FIGO Stage IA (cT1a, cN0(sn), cM0) - Signed by Karyle Share, MD on 12/1/2021  Method of lymph node assessment: [place] lymph node biopsy  Histologic grade (G): G1  Histologic grading system: 3 grade system  Peritoneal cytology results: Unknown       11/16/2021 Surgery    Robotic hysterectomy, BSO, bilateral sentinel LN biopsies, peritoneal biopsies, cystoscopy  -Grade 1, 0.3/1.7cm depth of invasion, indeterminate LVSI (likely artifact), cytology atypical  -MLH1/PMS2 absent, methylation pending           Patient ID: Jacinto Merritt is a 76 y.o. female  who has no new complaints today. No vaginal bleeding, abdominal/pelvic pain. Normal bowel and bladder function. She continues to use clobetasol ointment 2-3 times per week.  She denies vulvar itching or irritation. No interval change in medical history since last visit. Quality of life is good. The following portions of the patient's history were reviewed and updated as appropriate: allergies, current medications, past medical history, past surgical history and problem list.    Review of Systems   Constitutional: Negative. HENT: Negative. Eyes: Negative. Respiratory: Negative. Cardiovascular: Negative. Gastrointestinal: Negative. Genitourinary: Negative. Musculoskeletal: Negative. Skin: Negative. Neurological: Negative. Psychiatric/Behavioral: Negative. Current Outpatient Medications   Medication Sig Dispense Refill   • betamethasone valerate (VALISONE) 0.1 % cream Apply topically 2 (two) times a day 30 g 3   • buPROPion (WELLBUTRIN XL) 150 mg 24 hr tablet Take 1 tablet (150 mg total) by mouth every morning 90 tablet 3   • calcium citrate-vitamin D (CITRACAL+D) 315-200 MG-UNIT per tablet Take by mouth daily      • eszopiclone (LUNESTA) 3 MG tablet TAKE 1 TABLET BY MOUTH AT BEDTIME 90 tablet 3   • levothyroxine 112 mcg tablet Take 1 tablet (112 mcg total) by mouth daily 90 tablet 3   • lisinopril (ZESTRIL) 10 mg tablet TAKE 1 TABLET DAILY 90 tablet 3   • mometasone (ELOCON) 0.1 % ointment Apply nightly to affected area 2-3x a week 45 g 1   • Multiple Vitamins-Minerals (CENTRUM WOMEN) TABS Take by mouth Goes between bariatric multi and Centrum     • PARoxetine (PAXIL) 10 mg tablet Take 1 tablet (10 mg total) by mouth daily 90 tablet 3   • PARoxetine (PAXIL) 20 mg tablet TAKE 1 TABLET DAILY 90 tablet 3   • rizatriptan (MAXALT) 10 mg tablet TAKE 1 TABLET DAILY AS NEEDED FOR MIGRAINE 18 tablet 7   • simvastatin (ZOCOR) 40 mg tablet TAKE 1 TABLET EVERY EVENING 90 tablet 3   • aspirin 81 MG tablet Take 1 tablet by mouth daily for 30 days 30 tablet 0     No current facility-administered medications for this visit.            Objective:    Blood pressure 140/80, pulse 92, temperature 98.2 °F (36.8 °C), temperature source Temporal, height 5' 7" (1.702 m), weight 104 kg (230 lb), SpO2 96 %, not currently breastfeeding. Body mass index is 36.02 kg/m². Body surface area is 2.14 meters squared. Physical Exam  Vitals reviewed. Exam conducted with a chaperone present. Constitutional:       General: She is not in acute distress. Appearance: Normal appearance. She is not ill-appearing. HENT:      Head: Normocephalic and atraumatic. Mouth/Throat:      Mouth: Mucous membranes are moist.   Eyes:      General:         Right eye: No discharge. Left eye: No discharge. Conjunctiva/sclera: Conjunctivae normal.   Pulmonary:      Effort: Pulmonary effort is normal.   Abdominal:      Palpations: Abdomen is soft. There is no mass. Tenderness: There is no abdominal tenderness. Hernia: No hernia is present. Genitourinary:     Comments: Loss of labia minora bilaterally with hypopigmentation. No masses or lesions. The bartholin's, uretheral and skenes glands are normal. The urethral meatus is normal (midline with no lesions). Anus without fissure or lesion. Speculum exam reveals a grossly normal vagina. No masses, lesions,discharge or bleeding. No significant cystocele or rectocele noted. Bimanual exam notes a surgical absent cervix, uterus and adnexal structures. No masses or fullness. Bladder is without fullness, mass or tenderness. Musculoskeletal:      Right lower leg: No edema. Left lower leg: No edema. Skin:     General: Skin is warm and dry. Coloration: Skin is not jaundiced. Findings: No rash. Neurological:      General: No focal deficit present. Mental Status: She is alert and oriented to person, place, and time. Cranial Nerves: No cranial nerve deficit. Sensory: No sensory deficit. Motor: No weakness.       Gait: Gait normal.   Psychiatric:         Mood and Affect: Mood normal.         Behavior: Behavior normal.         Thought Content:  Thought content normal.         Judgment: Judgment normal.

## 2023-07-10 NOTE — ASSESSMENT & PLAN NOTE
History of stage IA, grade 1 endometrial cancer s/p surgical resection in November 2021. She is clinically without evidence of disease recurrence. Survivorship visit performed today. Patient provided with care plan/treatment summary.      Return to the office in 1 year for continued cancer surveillance.

## 2023-09-04 DIAGNOSIS — F43.21 GRIEVING: ICD-10-CM

## 2023-09-04 DIAGNOSIS — F41.1 GENERALIZED ANXIETY DISORDER: ICD-10-CM

## 2023-09-04 RX ORDER — PAROXETINE 10 MG/1
TABLET, FILM COATED ORAL
Qty: 90 TABLET | Refills: 3 | Status: SHIPPED | OUTPATIENT
Start: 2023-09-04

## 2023-09-20 ENCOUNTER — RA CDI HCC (OUTPATIENT)
Dept: OTHER | Facility: HOSPITAL | Age: 68
End: 2023-09-20

## 2023-09-20 NOTE — PROGRESS NOTES
[address] [facility] coding opportunities       Chart reviewed, no opportunity found: [address]        Patients Insurance     Medicare Insurance: [employer]

## 2023-09-26 ENCOUNTER — OFFICE VISIT (OUTPATIENT)
Dept: FAMILY MEDICINE CLINIC | Facility: CLINIC | Age: 68
End: 2023-09-26
Payer: COMMERCIAL

## 2023-09-26 VITALS
OXYGEN SATURATION: 97 % | TEMPERATURE: 99 F | WEIGHT: 226 LBS | HEART RATE: 93 BPM | SYSTOLIC BLOOD PRESSURE: 110 MMHG | DIASTOLIC BLOOD PRESSURE: 70 MMHG | HEIGHT: 67 IN | BODY MASS INDEX: 35.47 KG/M2 | RESPIRATION RATE: 14 BRPM

## 2023-09-26 DIAGNOSIS — E03.9 HYPOTHYROIDISM (ACQUIRED): Primary | ICD-10-CM

## 2023-09-26 DIAGNOSIS — R30.0 DYSURIA: ICD-10-CM

## 2023-09-26 DIAGNOSIS — I10 ESSENTIAL HYPERTENSION: ICD-10-CM

## 2023-09-26 DIAGNOSIS — Z23 NEED FOR VACCINATION: ICD-10-CM

## 2023-09-26 DIAGNOSIS — E78.2 MIXED HYPERLIPIDEMIA: ICD-10-CM

## 2023-09-26 PROCEDURE — 90662 IIV NO PRSV INCREASED AG IM: CPT

## 2023-09-26 PROCEDURE — G0008 ADMIN INFLUENZA VIRUS VAC: HCPCS

## 2023-09-26 PROCEDURE — 99214 OFFICE O/P EST MOD 30 MIN: CPT | Performed by: FAMILY MEDICINE

## 2023-09-26 NOTE — PROGRESS NOTES
Assessment/Plan:    1. Hypothyroidism (acquired)  Assessment & Plan:  Stable on levo    Orders:  -     TSH, 3rd generation with Free T4 reflex; Future; Expected date: 09/26/2023    2. Essential hypertension  Assessment & Plan:  Stable on lisinopril    Orders:  -     CBC; Future; Expected date: 09/26/2023  -     Comprehensive metabolic panel; Future; Expected date: 09/26/2023    3. Mixed hyperlipidemia  Assessment & Plan:  Stable on zocor    Orders:  -     Lipid Panel with Direct LDL reflex; Future; Expected date: 09/26/2023    4. Dysuria  -     UA w Reflex to Microscopic w Reflex to Culture -Lab Collect; Future; Expected date: 09/26/2023    5. Need for vaccination  -     FLUZONE HIGH-DOSE: influenza vaccine, high-dose, preservative-free 0.7 mL            There are no Patient Instructions on file for this visit. No follow-ups on file. Subjective:      Patient ID: Damaso Sanchez is a 76 y.o. female. Chief Complaint   Patient presents with   • Follow-up     Patient here for 6 month follow up on Hypertension        Here for follow up  Has been good      Hypertension  This is a chronic problem. The current episode started more than 1 year ago. The problem is unchanged. The problem is controlled. There are no associated agents to hypertension. Past treatments include ACE inhibitors. The current treatment provides significant improvement. There are no compliance problems. Difficulty Urinating   This is a new problem. The current episode started 1 to 4 weeks ago. The problem has been waxing and waning. The quality of the pain is described as burning. Associated symptoms include frequency. She has tried nothing for the symptoms. The following portions of the patient's history were reviewed and updated as appropriate: allergies, current medications, past family history, past medical history, past social history, past surgical history and problem list.    Review of Systems   Constitutional: Negative.     HENT: Negative. Eyes: Negative. Respiratory: Negative. Cardiovascular: Negative. Gastrointestinal: Negative. Endocrine: Negative. Genitourinary: Positive for dysuria and frequency. Musculoskeletal: Negative. Allergic/Immunologic: Negative. Neurological: Negative. Hematological: Negative. Psychiatric/Behavioral: Negative. Current Outpatient Medications   Medication Sig Dispense Refill   • aspirin 81 MG tablet Take 1 tablet by mouth daily for 30 days 30 tablet 0   • betamethasone valerate (VALISONE) 0.1 % cream Apply topically 2 (two) times a day 30 g 3   • buPROPion (WELLBUTRIN XL) 150 mg 24 hr tablet Take 1 tablet (150 mg total) by mouth every morning 90 tablet 3   • calcium citrate-vitamin D (CITRACAL+D) 315-200 MG-UNIT per tablet Take by mouth daily      • eszopiclone (LUNESTA) 3 MG tablet TAKE 1 TABLET BY MOUTH AT BEDTIME 90 tablet 3   • levothyroxine 112 mcg tablet Take 1 tablet (112 mcg total) by mouth daily 90 tablet 3   • lisinopril (ZESTRIL) 10 mg tablet TAKE 1 TABLET DAILY 90 tablet 3   • mometasone (ELOCON) 0.1 % ointment Apply nightly to affected area 2-3x a week 45 g 1   • Multiple Vitamins-Minerals (CENTRUM WOMEN) TABS Take by mouth Goes between bariatric multi and Centrum     • PARoxetine (PAXIL) 10 mg tablet TAKE 1 TABLET DAILY (ADDED TO 20 MG) 90 tablet 3   • PARoxetine (PAXIL) 20 mg tablet TAKE 1 TABLET DAILY 90 tablet 3   • rizatriptan (MAXALT) 10 mg tablet TAKE 1 TABLET DAILY AS NEEDED FOR MIGRAINE 18 tablet 7   • simvastatin (ZOCOR) 40 mg tablet TAKE 1 TABLET EVERY EVENING 90 tablet 3     No current facility-administered medications for this visit. Objective:    /70 (BP Location: Left arm, Patient Position: Sitting, Cuff Size: Large)   Pulse 93   Temp 99 °F (37.2 °C) (Tympanic)   Resp 14   Ht 5' 7" (1.702 m)   Wt 103 kg (226 lb)   SpO2 97%   BMI 35.40 kg/m²        Physical Exam  Vitals and nursing note reviewed.    Constitutional: Appearance: Normal appearance. She is well-developed. HENT:      Head: Normocephalic and atraumatic. Nose: Nose normal.   Eyes:      General: Lids are normal.      Extraocular Movements: Extraocular movements intact. Conjunctiva/sclera: Conjunctivae normal.      Pupils: Pupils are equal, round, and reactive to light. Cardiovascular:      Rate and Rhythm: Normal rate and regular rhythm. Heart sounds: Normal heart sounds, S1 normal and S2 normal.   Pulmonary:      Effort: Pulmonary effort is normal.      Breath sounds: Normal breath sounds. Abdominal:      General: Abdomen is flat. Bowel sounds are normal.      Palpations: Abdomen is soft. Musculoskeletal:         General: Normal range of motion. Cervical back: Normal range of motion and neck supple. Skin:     General: Skin is warm and dry. Neurological:      General: No focal deficit present. Mental Status: She is alert and oriented to person, place, and time. Deep Tendon Reflexes: Reflexes are normal and symmetric. Psychiatric:         Mood and Affect: Mood normal.         Speech: Speech normal.         Behavior: Behavior normal.         Thought Content:  Thought content normal.         Judgment: Judgment normal.                Cassidy Dow DO

## 2023-09-27 ENCOUNTER — APPOINTMENT (OUTPATIENT)
Dept: LAB | Facility: CLINIC | Age: 68
End: 2023-09-27
Payer: COMMERCIAL

## 2023-09-27 DIAGNOSIS — R30.0 DYSURIA: ICD-10-CM

## 2023-09-27 DIAGNOSIS — R30.0 DYSURIA: Primary | ICD-10-CM

## 2023-09-27 DIAGNOSIS — E03.9 HYPOTHYROIDISM (ACQUIRED): ICD-10-CM

## 2023-09-27 DIAGNOSIS — I10 ESSENTIAL HYPERTENSION: ICD-10-CM

## 2023-09-27 DIAGNOSIS — E78.2 MIXED HYPERLIPIDEMIA: ICD-10-CM

## 2023-09-27 LAB
ALBUMIN SERPL BCP-MCNC: 4.1 G/DL (ref 3.5–5)
ALP SERPL-CCNC: 74 U/L (ref 34–104)
ALT SERPL W P-5'-P-CCNC: 13 U/L (ref 7–52)
ANION GAP SERPL CALCULATED.3IONS-SCNC: 8 MMOL/L
AST SERPL W P-5'-P-CCNC: 14 U/L (ref 13–39)
BACTERIA UR QL AUTO: ABNORMAL /HPF
BILIRUB SERPL-MCNC: 0.84 MG/DL (ref 0.2–1)
BILIRUB UR QL STRIP: NEGATIVE
BUN SERPL-MCNC: 23 MG/DL (ref 5–25)
CALCIUM SERPL-MCNC: 9.5 MG/DL (ref 8.4–10.2)
CHLORIDE SERPL-SCNC: 105 MMOL/L (ref 96–108)
CHOLEST SERPL-MCNC: 176 MG/DL
CLARITY UR: ABNORMAL
CO2 SERPL-SCNC: 26 MMOL/L (ref 21–32)
COLOR UR: YELLOW
CREAT SERPL-MCNC: 0.9 MG/DL (ref 0.6–1.3)
ERYTHROCYTE [DISTWIDTH] IN BLOOD BY AUTOMATED COUNT: 13.4 % (ref 11.6–15.1)
GFR SERPL CREATININE-BSD FRML MDRD: 65 ML/MIN/1.73SQ M
GLUCOSE P FAST SERPL-MCNC: 93 MG/DL (ref 65–99)
GLUCOSE UR STRIP-MCNC: NEGATIVE MG/DL
HCT VFR BLD AUTO: 42.2 % (ref 34.8–46.1)
HDLC SERPL-MCNC: 60 MG/DL
HGB BLD-MCNC: 13.4 G/DL (ref 11.5–15.4)
HGB UR QL STRIP.AUTO: ABNORMAL
KETONES UR STRIP-MCNC: NEGATIVE MG/DL
LDLC SERPL CALC-MCNC: 89 MG/DL (ref 0–100)
LEUKOCYTE ESTERASE UR QL STRIP: ABNORMAL
MCH RBC QN AUTO: 29.6 PG (ref 26.8–34.3)
MCHC RBC AUTO-ENTMCNC: 31.8 G/DL (ref 31.4–37.4)
MCV RBC AUTO: 93 FL (ref 82–98)
MUCOUS THREADS UR QL AUTO: ABNORMAL
NITRITE UR QL STRIP: POSITIVE
NON-SQ EPI CELLS URNS QL MICRO: ABNORMAL /HPF
PH UR STRIP.AUTO: 5.5 [PH]
PLATELET # BLD AUTO: 353 THOUSANDS/UL (ref 149–390)
PMV BLD AUTO: 9.8 FL (ref 8.9–12.7)
POTASSIUM SERPL-SCNC: 4.4 MMOL/L (ref 3.5–5.3)
PROT SERPL-MCNC: 7.6 G/DL (ref 6.4–8.4)
PROT UR STRIP-MCNC: ABNORMAL MG/DL
RBC # BLD AUTO: 4.52 MILLION/UL (ref 3.81–5.12)
RBC #/AREA URNS AUTO: ABNORMAL /HPF
SODIUM SERPL-SCNC: 139 MMOL/L (ref 135–147)
SP GR UR STRIP.AUTO: 1.02 (ref 1–1.03)
TRIGL SERPL-MCNC: 137 MG/DL
TSH SERPL DL<=0.05 MIU/L-ACNC: 1.17 UIU/ML (ref 0.45–4.5)
UROBILINOGEN UR STRIP-ACNC: <2 MG/DL
WBC # BLD AUTO: 8.42 THOUSAND/UL (ref 4.31–10.16)
WBC #/AREA URNS AUTO: ABNORMAL /HPF
WBC CLUMPS # UR AUTO: PRESENT /UL

## 2023-09-27 PROCEDURE — 81001 URINALYSIS AUTO W/SCOPE: CPT

## 2023-09-27 PROCEDURE — 36415 COLL VENOUS BLD VENIPUNCTURE: CPT

## 2023-09-27 PROCEDURE — 87186 SC STD MICRODIL/AGAR DIL: CPT

## 2023-09-27 PROCEDURE — 84443 ASSAY THYROID STIM HORMONE: CPT

## 2023-09-27 PROCEDURE — 80061 LIPID PANEL: CPT

## 2023-09-27 PROCEDURE — 85027 COMPLETE CBC AUTOMATED: CPT

## 2023-09-27 PROCEDURE — 87077 CULTURE AEROBIC IDENTIFY: CPT

## 2023-09-27 PROCEDURE — 87086 URINE CULTURE/COLONY COUNT: CPT

## 2023-09-27 PROCEDURE — 80053 COMPREHEN METABOLIC PANEL: CPT

## 2023-09-27 RX ORDER — CIPROFLOXACIN 500 MG/1
500 TABLET, FILM COATED ORAL EVERY 12 HOURS SCHEDULED
Qty: 14 TABLET | Refills: 0 | Status: SHIPPED | OUTPATIENT
Start: 2023-09-27 | End: 2023-10-04

## 2023-09-29 ENCOUNTER — ANNUAL EXAM (OUTPATIENT)
Age: 68
End: 2023-09-29
Payer: COMMERCIAL

## 2023-09-29 VITALS
SYSTOLIC BLOOD PRESSURE: 112 MMHG | BODY MASS INDEX: 35.66 KG/M2 | WEIGHT: 227.2 LBS | HEIGHT: 67 IN | DIASTOLIC BLOOD PRESSURE: 82 MMHG

## 2023-09-29 DIAGNOSIS — Z85.42 HISTORY OF ENDOMETRIAL CANCER: ICD-10-CM

## 2023-09-29 DIAGNOSIS — Z91.89 ENCOUNTER FOR GYNECOLOGIC EXAMINATION FOR HIGH-RISK PATIENT COVERED BY MEDICARE: Primary | ICD-10-CM

## 2023-09-29 DIAGNOSIS — N90.4 LICHEN SCLEROSUS ET ATROPHICUS OF THE VULVA: ICD-10-CM

## 2023-09-29 DIAGNOSIS — Z12.31 ENCOUNTER FOR SCREENING MAMMOGRAM FOR MALIGNANT NEOPLASM OF BREAST: ICD-10-CM

## 2023-09-29 LAB — BACTERIA UR CULT: ABNORMAL

## 2023-09-29 PROCEDURE — G0101 CA SCREEN;PELVIC/BREAST EXAM: HCPCS | Performed by: OBSTETRICS & GYNECOLOGY

## 2023-09-29 NOTE — PROGRESS NOTES
Assessment/Plan:     Diagnoses and all orders for this visit:    Encounter for gynecologic examination for high-risk patient covered by Medicare    Encounter for screening mammogram for malignant neoplasm of breast  -     Mammo screening bilateral w 3d & cad; Future    Lichen sclerosus et atrophicus of the vulva    History of endometrial cancer         Rosario Frye is a 76 y.o. female who presents for annual exam. The patient has no complaints today. The patient is not sexually active. The patient is not taking hormone replacement therapy. Patient denies post-menopausal vaginal bleeding. She still sees GYN ONC q 6 months for surveillance. No vulvar symptoms. Urinary leakage stable. Menstrual History:  OB History        2    Para   2    Term   2       0    AB   0    Living   2       SAB   0    IAB   0    Ectopic   0    Multiple   0    Live Births   2                No LMP recorded. Patient has had a hysterectomy.      Past Medical History:   Diagnosis Date   • Allergic dermatitis     resolved 2016   • Anxiety    • Arthritis    • Blind right eye    • Cancer ([address]) 2021    Hysterectomy   • Depression    • Disease of thyroid gland    • Encounter for cosmetic surgery 2018    Added automatically from request for surgery [id number]   • Endometrial cancer ([address]) 3/28/2023   • Headache(784.0)    • Hyperlipidemia    • Hypertension    • Hypothyroidism (acquired)     hypothyroid   • Incontinence of urine in female    • Insomnia    • Migraine    • Morbid obesity ([address])    • Obesity    • TIA (transient ischemic attack)     questionable    • Uterine cancer ([address]) 2021   • Visual impairment    • Wears glasses        Family History   Problem Relation Age of Onset   • Diabetes Mother    • Skin cancer Mother    • Arthritis Mother    • Cancer Mother         Cervical, lung, liver, spine   • Lung cancer Mother    • Alzheimer's disease Father    • Heart failure Father    • Diabetes Father • Dementia Father    • Cancer Father         Skin   • Stroke Father    • Breast cancer Maternal Grandmother         adenocarcinoma in situ in villous adenoma of the breast       The following portions of the patient's history were reviewed and updated as appropriate: allergies, current medications, past family history, past medical history, past social history, past surgical history and problem list.    Review of Systems  Pertinent items are noted in HPI. Objective      /82 (BP Location: Right arm, Patient Position: Sitting, Cuff Size: Large)   Ht 5' 6.5" (1.689 m)   Wt 103 kg (227 lb 3.2 oz)   BMI 36.12 kg/m²     General:   alert and oriented, in no acute distress   Heart:  Breasts: regular rate and rhythm   appear normal, no suspicious masses, no skin or nipple changes or axillary nodes.    Lungs: effort normal   Abdomen: soft, non-tender, without masses or organomegaly   Vulva: Lichenification and hypopigmentation; no lesions   Vagina: normal mucosa   Cervix: surgically absent   Uterus: surgically absent   Adnexa: surgically absent and no mass, fullness, tenderness

## 2023-10-10 ENCOUNTER — TELEPHONE (OUTPATIENT)
Age: 68
End: 2023-10-10

## 2023-10-10 NOTE — TELEPHONE ENCOUNTER
Pt called to see when we were getting the Covid vaccines in the office. Please advise pt if we are and schedule an appt.

## 2023-11-12 DIAGNOSIS — G47.00 INSOMNIA, UNSPECIFIED TYPE: ICD-10-CM

## 2023-11-13 RX ORDER — ESZOPICLONE 3 MG/1
3 TABLET, FILM COATED ORAL
Qty: 90 TABLET | Refills: 3 | Status: SHIPPED | OUTPATIENT
Start: 2023-11-13

## 2023-11-13 NOTE — TELEPHONE ENCOUNTER
Medication:  [facility] [id number] 05/01/2023 11/14/2022 Eszopiclone (Tablet) 90.0 90 3 MG NA NICHOLAS RUTH  Active agreement on file -No

## 2023-11-30 ENCOUNTER — TELEPHONE (OUTPATIENT)
Dept: UROLOGY | Facility: CLINIC | Age: 68
End: 2023-11-30

## 2023-12-04 DIAGNOSIS — E03.9 HYPOTHYROIDISM, UNSPECIFIED TYPE: ICD-10-CM

## 2023-12-04 RX ORDER — LEVOTHYROXINE SODIUM 112 UG/1
112 TABLET ORAL DAILY
Qty: 90 TABLET | Refills: 1 | Status: SHIPPED | OUTPATIENT
Start: 2023-12-04

## 2024-01-01 DIAGNOSIS — E78.5 HYPERLIPIDEMIA, UNSPECIFIED HYPERLIPIDEMIA TYPE: ICD-10-CM

## 2024-01-01 DIAGNOSIS — I10 ESSENTIAL HYPERTENSION: ICD-10-CM

## 2024-01-02 RX ORDER — LISINOPRIL 10 MG/1
TABLET ORAL
Qty: 90 TABLET | Refills: 1 | Status: SHIPPED | OUTPATIENT
Start: 2024-01-02

## 2024-01-02 RX ORDER — SIMVASTATIN 40 MG
TABLET ORAL
Qty: 90 TABLET | Refills: 1 | Status: SHIPPED | OUTPATIENT
Start: 2024-01-02

## 2024-02-05 DIAGNOSIS — F41.1 GENERALIZED ANXIETY DISORDER: ICD-10-CM

## 2024-02-05 RX ORDER — PAROXETINE HYDROCHLORIDE 20 MG/1
TABLET, FILM COATED ORAL
Qty: 90 TABLET | Refills: 3 | Status: SHIPPED | OUTPATIENT
Start: 2024-02-05

## 2024-02-21 PROBLEM — Z00.00 MEDICARE ANNUAL WELLNESS VISIT, SUBSEQUENT: Status: RESOLVED | Noted: 2019-03-06 | Resolved: 2024-02-21

## 2024-03-04 DIAGNOSIS — F32.0 CURRENT MILD EPISODE OF MAJOR DEPRESSIVE DISORDER WITHOUT PRIOR EPISODE (HCC): ICD-10-CM

## 2024-03-04 RX ORDER — BUPROPION HYDROCHLORIDE 150 MG/1
150 TABLET ORAL EVERY MORNING
Qty: 90 TABLET | Refills: 3 | Status: SHIPPED | OUTPATIENT
Start: 2024-03-04

## 2024-03-18 ENCOUNTER — TELEPHONE (OUTPATIENT)
Dept: UROLOGY | Facility: CLINIC | Age: 69
End: 2024-03-18

## 2024-03-20 ENCOUNTER — PROCEDURE VISIT (OUTPATIENT)
Dept: UROLOGY | Facility: CLINIC | Age: 69
End: 2024-03-20
Payer: COMMERCIAL

## 2024-03-20 VITALS
DIASTOLIC BLOOD PRESSURE: 90 MMHG | HEIGHT: 67 IN | WEIGHT: 234 LBS | BODY MASS INDEX: 36.73 KG/M2 | OXYGEN SATURATION: 94 % | SYSTOLIC BLOOD PRESSURE: 144 MMHG | HEART RATE: 98 BPM

## 2024-03-20 DIAGNOSIS — N39.41 URGE INCONTINENCE OF URINE: Primary | ICD-10-CM

## 2024-03-20 PROCEDURE — 52287 CYSTOSCOPY CHEMODENERVATION: CPT | Performed by: UROLOGY

## 2024-03-20 NOTE — PROGRESS NOTES
Cystoscopy     Date/Time  3/20/2024 9:00 AM     Performed by  Enrique Silverman MD   Authorized by  Enrique Silverman MD         Procedure Details:  Procedure type: cystoscopy, injection for chemodenervation          Office Cystoscopy Procedure Note    Indication:     medically refractory overactive bladder    Informed consent   The risks, benefits, complications, treatment options, and expected outcomes were discussed with the patient. The patient concurred with the proposed plan and provided informed consent.    Anesthesia  Lidocaine jelly 2%    Antibiotic prophylaxis   None    Procedure  In the presence of a female nurse, the patient was placed in the supine frog-legged position, was prepped and draped in the usual manner using sterile technique, and 2% lidocaine jelly instilled into the urethra.        A 20 Slov[place] Thomas catheter was placed under sterile conditions.  The bladder was drained and through this 20 mL of 2% plain lidocaine was instilled and allowed to dwell for 10 minutes to provide analgesic      A 17 F flexible cystoscope was then inserted into the urethra and the urethra and bladder carefully examined.  The following findings were noted:    Findings:  Urethra:  Normal  Bladder:  Normal  Ureteral orifices:  Normal  Other findings:  None        200 units of Botox were diluted into injectable saline.  These were injected using 0.5 mL boluses in multiple locations in a super trigonal pattern.  Care was taken to avoid the trigone.  The bladder was then observed with no active installation hemostasis was noted to be excellent.      Specimens: None                 Complications:    None; patient tolerated the procedure well    Return in 1 year for repeat injection with 200 units           Disposition: To home after 30 minute observation.           Condition: Stable    Plan:      patient has previously enjoyed a excellent response to Botox for duration of approximately 1 year.  Of note she is on  no oral pharmacotherapy.  We will plan her next installation of 200 units bladder Botox in 1 year's time.  Teena was asked to call if the effectiveness wears off sooner and she can be scheduled for sooner appointment or her next injection can be pushed back further if  She continues to do well

## 2024-03-26 ENCOUNTER — RA CDI HCC (OUTPATIENT)
Dept: OTHER | Facility: HOSPITAL | Age: 69
End: 2024-03-26

## 2024-03-26 NOTE — PROGRESS NOTES
HCC coding opportunities          Chart Reviewed number of suggestions sent to Provider: 1   K65.9     Patients Insurance     Medicare Insurance: [employer]

## 2024-04-01 ENCOUNTER — TELEPHONE (OUTPATIENT)
Dept: FAMILY MEDICINE CLINIC | Facility: CLINIC | Age: 69
End: 2024-04-01

## 2024-04-01 ENCOUNTER — OFFICE VISIT (OUTPATIENT)
Dept: FAMILY MEDICINE CLINIC | Facility: CLINIC | Age: 69
End: 2024-04-01
Payer: COMMERCIAL

## 2024-04-01 ENCOUNTER — TELEPHONE (OUTPATIENT)
Age: 69
End: 2024-04-01

## 2024-04-01 VITALS
WEIGHT: 234.4 LBS | HEIGHT: 67 IN | OXYGEN SATURATION: 93 % | TEMPERATURE: 99.5 F | RESPIRATION RATE: 16 BRPM | DIASTOLIC BLOOD PRESSURE: 84 MMHG | BODY MASS INDEX: 36.79 KG/M2 | HEART RATE: 86 BPM | SYSTOLIC BLOOD PRESSURE: 122 MMHG

## 2024-04-01 DIAGNOSIS — E78.2 MIXED HYPERLIPIDEMIA: ICD-10-CM

## 2024-04-01 DIAGNOSIS — G62.9 NEUROPATHY: ICD-10-CM

## 2024-04-01 DIAGNOSIS — F32.0 CURRENT MILD EPISODE OF MAJOR DEPRESSIVE DISORDER WITHOUT PRIOR EPISODE (HCC): ICD-10-CM

## 2024-04-01 DIAGNOSIS — E66.01 CLASS 2 SEVERE OBESITY DUE TO EXCESS CALORIES WITH SERIOUS COMORBIDITY AND BODY MASS INDEX (BMI) OF 36.0 TO 36.9 IN ADULT (HCC): ICD-10-CM

## 2024-04-01 DIAGNOSIS — R79.89 LOW VITAMIN B12 LEVEL: ICD-10-CM

## 2024-04-01 DIAGNOSIS — R73.01 IFG (IMPAIRED FASTING GLUCOSE): ICD-10-CM

## 2024-04-01 DIAGNOSIS — E53.8 VITAMIN B12 DEFICIENCY: ICD-10-CM

## 2024-04-01 DIAGNOSIS — I10 ESSENTIAL HYPERTENSION: ICD-10-CM

## 2024-04-01 DIAGNOSIS — Z00.00 MEDICARE ANNUAL WELLNESS VISIT, SUBSEQUENT: Primary | ICD-10-CM

## 2024-04-01 DIAGNOSIS — E03.9 HYPOTHYROIDISM (ACQUIRED): ICD-10-CM

## 2024-04-01 PROBLEM — R30.0 DYSURIA: Status: RESOLVED | Noted: 2023-09-26 | Resolved: 2024-04-01

## 2024-04-01 PROBLEM — K65.9 PERITONITIS (HCC): Status: RESOLVED | Noted: 2021-12-30 | Resolved: 2024-04-01

## 2024-04-01 PROCEDURE — G0439 PPPS, SUBSEQ VISIT: HCPCS | Performed by: FAMILY MEDICINE

## 2024-04-01 PROCEDURE — 99214 OFFICE O/P EST MOD 30 MIN: CPT | Performed by: FAMILY MEDICINE

## 2024-04-01 RX ORDER — GABAPENTIN 100 MG/1
CAPSULE ORAL
Qty: 90 CAPSULE | Refills: 3 | Status: SHIPPED | OUTPATIENT
Start: 2024-04-01

## 2024-04-01 NOTE — TELEPHONE ENCOUNTER
Pt called again about the gabapentin. I explained it needs a PA, which has been started and she will be notified of the insurance decision as soon as possible. There is another PA message for this medication as well.

## 2024-04-01 NOTE — TELEPHONE ENCOUNTER
The patient called to report that the pharmacy did not receive the gabapentin (Neurontin) 100 mg capsule, please resend it.

## 2024-04-01 NOTE — PATIENT INSTRUCTIONS
Medicare Preventive Visit Patient Instructions  Thank you for completing your Welcome to Medicare Visit or Medicare Annual Wellness Visit today. Your next wellness visit will be due in one year (4/2/2025).  The screening/preventive services that you may require over the next 5-10 years are detailed below. Some tests may not apply to you based off risk factors and/or age. Screening tests ordered at today's visit but not completed yet may show as past due. Also, please note that scanned in results may not display below.  Preventive Screenings:  Service Recommendations Previous Testing/Comments   Colorectal Cancer Screening  * Colonoscopy    * Fecal Occult Blood Test (FOBT)/Fecal Immunochemical Test (FIT)  * Fecal DNA/Cologuard Test  * Flexible Sigmoidoscopy Age: 45-75 years old   Colonoscopy: every 10 years (may be performed more frequently if at higher risk)  OR  FOBT/FIT: every 1 year  OR  Cologuard: every 3 years  OR  Sigmoidoscopy: every 5 years  Screening may be recommended earlier than age 45 if at higher risk for colorectal cancer. Also, an individualized decision between you and your healthcare provider will decide whether screening between the ages of 76-85 would be appropriate. Colonoscopy: 04/28/2022  FOBT/FIT: Not on file  Cologuard: Not on file  Sigmoidoscopy: Not on file    Screening Current     Breast Cancer Screening Age: 40+ years old  Frequency: every 1-2 years  Not required if history of left and right mastectomy Mammogram: 09/06/2023    Screening Current   Cervical Cancer Screening Between the ages of 21-29, pap smear recommended once every 3 years.   Between the ages of 30-65, can perform pap smear with HPV co-testing every 5 years.   Recommendations may differ for women with a history of total hysterectomy, cervical cancer, or abnormal pap smears in past. Pap Smear: 09/29/2023    Screening Not Indicated   Hepatitis C Screening Once for adults born between 1945 and 1965  More frequently in  patients at high risk for Hepatitis C Hep C Antibody: 03/27/2018    Screening Current   Diabetes Screening 1-2 times per year if you're at risk for diabetes or have pre-diabetes Fasting glucose: 93 mg/dL (9/27/2023)  A1C: 5.2 % (10/21/2021)  Screening Current   Cholesterol Screening Once every 5 years if you don't have a lipid disorder. May order more often based on risk factors. Lipid panel: 09/27/2023    Screening Not Indicated  History Lipid Disorder     Other Preventive Screenings Covered by Medicare:  Abdominal Aortic Aneurysm (AAA) Screening: covered once if your at risk. You're considered to be at risk if you have a family history of AAA.  Lung Cancer Screening: covers low dose CT scan once per year if you meet all of the following conditions: (1) Age 55-77; (2) No signs or symptoms of lung cancer; (3) Current smoker or have quit smoking within the last 15 years; (4) You have a tobacco smoking history of at least 20 pack years (packs per day multiplied by number of years you smoked); (5) You get a written order from a healthcare provider.  Glaucoma Screening: covered annually if you're considered high risk: (1) You have diabetes OR (2) Family history of glaucoma OR (3)  aged 50 and older OR (4)  American aged 65 and older  Osteoporosis Screening: covered every 2 years if you meet one of the following conditions: (1) You're estrogen deficient and at risk for osteoporosis based off medical history and other findings; (2) Have a vertebral abnormality; (3) On glucocorticoid therapy for more than 3 months; (4) Have primary hyperparathyroidism; (5) On osteoporosis medications and need to assess response to drug therapy.   Last bone density test (DXA Scan): 08/04/2022.  HIV Screening: covered annually if you're between the age of 15-65. Also covered annually if you are younger than 15 and older than 65 with risk factors for HIV infection. For pregnant patients, it is covered up to 3 times per  pregnancy.    Immunizations:  Immunization Recommendations   Influenza Vaccine Annual influenza vaccination during flu season is recommended for all persons aged >= 6 months who do not have contraindications   Pneumococcal Vaccine   * Pneumococcal conjugate vaccine = PCV13 (Prevnar 13), PCV15 (Vaxneuvance), PCV20 (Prevnar 20)  * Pneumococcal polysaccharide vaccine = PPSV23 (Pneumovax) Adults 19-63 yo with certain risk factors or if 65+ yo  If never received any pneumonia vaccine: recommend Prevnar 20 (PCV20)  Give PCV20 if previously received 1 dose of PCV13 or PPSV23   Hepatitis B Vaccine 3 dose series if at intermediate or high risk (ex: diabetes, end stage renal disease, liver disease)   Respiratory syncytial virus (RSV) Vaccine - COVERED BY MEDICARE PART D  * RSVPreF3 (Arexvy) CDC recommends that adults 60 years of age and older may receive a single dose of RSV vaccine using shared clinical decision-making (SCDM)   Tetanus (Td) Vaccine - COST NOT COVERED BY MEDICARE PART B Following completion of primary series, a booster dose should be given every 10 years to maintain immunity against tetanus. Td may also be given as tetanus wound prophylaxis.   Tdap Vaccine - COST NOT COVERED BY MEDICARE PART B Recommended at least once for all adults. For pregnant patients, recommended with each pregnancy.   Shingles Vaccine (Shingrix) - COST NOT COVERED BY MEDICARE PART B  2 shot series recommended in those 19 years and older who have or will have weakened immune systems or those 50 years and older     Health Maintenance Due:      Topic Date Due   • Breast Cancer Screening: Mammogram  09/06/2024   • Colorectal Cancer Screening  04/28/2025   • Hepatitis C Screening  Completed     Immunizations Due:      Topic Date Due   • COVID-19 Vaccine (7 - 2023-24 season) 12/06/2023     Advance Directives   What are advance directives?  Advance directives are legal documents that state your wishes and plans for medical care. These plans  are made ahead of time in case you lose your ability to make decisions for yourself. Advance directives can apply to any medical decision, such as the treatments you want, and if you want to donate organs.   What are the types of advance directives?  There are many types of advance directives, and each state has rules about how to use them. You may choose a combination of any of the following:  Living will:  This is a written record of the treatment you want. You can also choose which treatments you do not want, which to limit, and which to stop at a certain time. This includes surgery, medicine, IV fluid, and tube feedings.   Durable power of  for healthcare (DPAHC):  This is a written record that states who you want to make healthcare choices for you when you are unable to make them for yourself. This person, called a proxy, is usually a family member or a friend. You may choose more than 1 proxy.  Do not resuscitate (DNR) order:  A DNR order is used in case your heart stops beating or you stop breathing. It is a request not to have certain forms of treatment, such as CPR. A DNR order may be included in other types of advance directives.  Medical directive:  This covers the care that you want if you are in a coma, near death, or unable to make decisions for yourself. You can list the treatments you want for each condition. Treatment may include pain medicine, surgery, blood transfusions, dialysis, IV or tube feedings, and a ventilator (breathing machine).  Values history:  This document has questions about your views, beliefs, and how you feel and think about life. This information can help others choose the care that you would choose.  Why are advance directives important?  An advance directive helps you control your care. Although spoken wishes may be used, it is better to have your wishes written down. Spoken wishes can be misunderstood, or not followed. Treatments may be given even if you do not want  them. An advance directive may make it easier for your family to make difficult choices about your care.   Urinary Incontinence   Urinary incontinence (UI)  is when you lose control of your bladder. UI develops because your bladder cannot store or empty urine properly. The 3 most common types of UI are stress incontinence, urge incontinence, or both.  Medicines:   May be given to help strengthen your bladder control. Report any side effects of medication to your healthcare provider.  Do pelvic muscle exercises often:  Your pelvic muscles help you stop urinating. Squeeze these muscles tight for 5 seconds, then relax for 5 seconds. Gradually work up to squeezing for 10 seconds. Do 3 sets of 15 repetitions a day, or as directed. This will help strengthen your pelvic muscles and improve bladder control.  Train your bladder:  Go to the bathroom at set times, such as every 2 hours, even if you do not feel the urge to go. You can also try to hold your urine when you feel the urge to go. For example, hold your urine for 5 minutes when you feel the urge to go. As that becomes easier, hold your urine for 10 minutes.   Self-care:   Keep a UI record.  Write down how often you leak urine and how much you leak. Make a note of what you were doing when you leaked urine.  Drink liquids as directed. You may need to limit the amount of liquid you drink to help control your urine leakage. Do not drink any liquid right before you go to bed. Limit or do not have drinks that contain caffeine or alcohol.   Prevent constipation.  Eat a variety of high-fiber foods. Good examples are high-fiber cereals, beans, vegetables, and whole-grain breads. Walking is the best way to trigger your intestines to have a bowel movement.  Exercise regularly and maintain a healthy weight.  Weight loss and exercise will decrease pressure on your bladder and help you control your leakage.   Use a catheter as directed  to help empty your bladder. A catheter is a  tiny, plastic tube that is put into your bladder to drain your urine.   Go to behavior therapy as directed.  Behavior therapy may be used to help you learn to control your urge to urinate.    Weight Management   Why it is important to manage your weight:  Being overweight increases your risk of health conditions such as heart disease, high blood pressure, type 2 diabetes, and certain types of cancer. It can also increase your risk for osteoarthritis, sleep apnea, and other respiratory problems. Aim for a slow, steady weight loss. Even a small amount of weight loss can lower your risk of health problems.  How to lose weight safely:  A safe and healthy way to lose weight is to eat fewer calories and get regular exercise. You can lose up about 1 pound a week by decreasing the number of calories you eat by 500 calories each day.   Healthy meal plan for weight management:  A healthy meal plan includes a variety of foods, contains fewer calories, and helps you stay healthy. A healthy meal plan includes the following:  Eat whole-grain foods more often.  A healthy meal plan should contain fiber. Fiber is the part of grains, fruits, and vegetables that is not broken down by your body. Whole-grain foods are healthy and provide extra fiber in your diet. Some examples of whole-grain foods are whole-wheat breads and pastas, oatmeal, brown rice, and bulgur.  Eat a variety of vegetables every day.  Include dark, leafy greens such as spinach, kale, deuce greens, and mustard greens. Eat yellow and orange vegetables such as carrots, sweet potatoes, and winter squash.   Eat a variety of fruits every day.  Choose fresh or canned fruit (canned in its own juice or light syrup) instead of juice. Fruit juice has very little or no fiber.  Eat low-fat dairy foods.  Drink fat-free (skim) milk or 1% milk. Eat fat-free yogurt and low-fat cottage cheese. Try low-fat cheeses such as mozzarella and other reduced-fat cheeses.  Choose meat and  other protein foods that are low in fat.  Choose beans or other legumes such as split peas or lentils. Choose fish, skinless poultry (chicken or turkey), or lean cuts of red meat (beef or pork). Before you cook meat or poultry, cut off any visible fat.   Use less fat and oil.  Try baking foods instead of frying them. Add less fat, such as margarine, sour cream, regular salad dressing and mayonnaise to foods. Eat fewer high-fat foods. Some examples of high-fat foods include french fries, doughnuts, ice cream, and cakes.  Eat fewer sweets.  Limit foods and drinks that are high in sugar. This includes candy, cookies, regular soda, and sweetened drinks.  Exercise:  Exercise at least 30 minutes per day on most days of the week. Some examples of exercise include walking, biking, dancing, and swimming. You can also fit in more physical activity by taking the stairs instead of the elevator or parking farther away from stores. Ask your healthcare provider about the best exercise plan for you.      © Copyright [employer] 2018 Information is for End User's use only and may not be sold, redistributed or otherwise used for commercial purposes. All illustrations and images included in CareNotes® are the copyrighted property of [employer] or [employer]

## 2024-04-01 NOTE — PROGRESS NOTES
Assessment and Plan:     Problem List Items Addressed This Visit       Class 2 severe obesity due to excess calories with serious comorbidity and body mass index (BMI) of 36.0 to 36.9 in adult (HCC)     Watching diet  Increase walking         Hyperlipidemia     Check lipids         Relevant Orders    CBC    Comprehensive metabolic panel    Lipid Panel with Direct LDL reflex    Hypothyroidism (acquired)     Check tsh         Relevant Orders    TSH, 3rd generation with Free T4 reflex    Low vitamin B12 level     Check b12         Essential hypertension     Stable on lisinopril         Current mild episode of major depressive disorder without prior episode (HCC)     Cont wellbutrin and paxil at current doses         Medicare annual wellness visit, subsequent - Primary     Needs tdap- will go to pharmacy  Mammogram through [facility]         Neuropathy     Start gabapentin         Relevant Medications    gabapentin (Neurontin) 100 mg capsule    Other Relevant Orders    Hemoglobin A1C     Other Visit Diagnoses       Vitamin B12 deficiency        Relevant Orders    Vitamin B12    IFG (impaired fasting glucose)        Relevant Orders    Hemoglobin A1C            Depression Screening and Follow-up Plan: Patient's depression screening was positive with a PHQ-9 score of 7. Patient with underlying depression and was advised to continue current medications as prescribed.       Preventive health issues were discussed with patient, and age appropriate screening tests were ordered as noted in patient's After Visit Summary.  Personalized health advice and appropriate referrals for health education or preventive services given if needed, as noted in patient's After Visit Summary.     History of Present Illness:     Patient presents for a Medicare Wellness Visit    Here for awv  B/l foot numbness worse at night- neuropathy cream         Patient Care Team:  Saranya Cantrell DO as PCP - General  MD Luis Felipe Layton MD Tiana  DO Butch Cantrell MD Leonardo Claros, MD     Review of Systems:     Review of Systems   Constitutional: Negative.    HENT: Negative.     Eyes: Negative.    Respiratory: Negative.     Cardiovascular: Negative.    Gastrointestinal: Negative.    Endocrine: Negative.    Genitourinary: Negative.    Musculoskeletal: Negative.    Allergic/Immunologic: Negative.    Neurological:  Positive for numbness (feet).   Hematological: Negative.    Psychiatric/Behavioral: Negative.          Problem List:     Patient Active Problem List   Diagnosis    Class 2 severe obesity due to excess calories with serious comorbidity and body mass index (BMI) of 36.0 to 36.9 in adult (HCC)    Hyperlipidemia    Hypothyroidism (acquired)    Blind right eye    Arthritis    Insomnia    Migraine headache    Postgastrectomy malabsorption    Transient ischemic attack    Urge incontinence of urine    Vulvar atrophy    Bariatric surgery status    Low vitamin B12 level    Generalized anxiety disorder    Vitamin D deficiency    Essential hypertension    History of endometrial cancer    S/P laparoscopic hysterectomy    Encounter for follow-up surveillance of endometrial cancer    Lichen sclerosus et atrophicus of the vulva    Current mild episode of major depressive disorder without prior episode ([facility])    Medicare annual wellness visit, subsequent    Neuropathy      Past Medical and Surgical History:     Past Medical History:   Diagnosis Date    Allergic dermatitis     resolved 01/26/2016    Anxiety     Arthritis     Blind right eye     Cancer ([facility]) 11/2021    Hysterectomy    Depression     Disease of thyroid gland     Encounter for cosmetic surgery 04/30/2018    Added automatically from request for surgery [id number]    Endometrial cancer ([facility]) 03/28/2023    Headache(784.0)     Hyperlipidemia     Hypertension     Hypothyroidism (acquired)     hypothyroid    Incontinence of urine in female     Insomnia     Migraine     Morbid obesity ([facility])      Obesity     Peritonitis (HCC) 12/30/2021    TIA (transient ischemic attack)     questionable     Uterine cancer (HCC) 11/2021    Visual impairment     Wears glasses      Past Surgical History:   Procedure Laterality Date    BARIATRIC SURGERY  2017    BODY LIFT LOWER N/A 06/28/2018    Procedure: LOWER BODY LIFT;  Surgeon: Luis Felipe Chawla MD;  Location: [facility];  Service: Plastics    CATARACT EXTRACTION Left     CHOLECYSTECTOMY      COLONOSCOPY      CYSTOSCOPY N/A 11/16/2021    Procedure: CYSTOSCOPY;  Surgeon: Ben Stroud MD;  [facility]: [facility];  Service: Gynecology Oncology    ESOPHAGOGASTRODUODENOSCOPY N/A 03/27/2017    Procedure: ESOPHAGOGASTRODUODENOSCOPY (EGD);  Surgeon: Og Lara MD;  Location: [facility];  Service:     EYE SURGERY      HEMORROIDECTOMY      HYSTERECTOMY      IR IMAGE GUIDED ASPIRATION / DRAINAGE W TUBE  03/26/2019    JOINT REPLACEMENT      oswald TKR    OH EGD TRANSORAL BIOPSY SINGLE/MULTIPLE N/A 03/08/2017    Procedure: ESOPHAGOGASTRODUODENOSCOPY (EGD);  Surgeon: Og Lara MD;  Location: [facility];  Service: Bariatrics    OH EXCISION EXCESSIVE SKIN & SUBQ TISSUE THIGH Bilateral 01/10/2019    Procedure: THIGH LIFT;  Surgeon: Luis Felipe Chawla MD;  [facility]: [facility];  Service: Plastics    OH INJECTION PROCEDURE LYMPHANGIOGRAPHY  11/16/2021    Procedure: LYMPHOGRAPHY WITH INDOCYANINE GREEN (ICG);  Surgeon: Ben Stroud MD;  [facility];  Service: Gynecology Oncology    OH LAPS GSTRC RSTRICTIV PX LONGITUDINAL GASTRECTOMY N/A 03/27/2017    Procedure: GASTRECTOMY SLEEVE LAPAROSCOPIC;  Surgeon: Og Lara MD;  [facility];  Service: Bariatrics    OH LAPS TOTAL HYSTERECT 250 GM/< W/RMVL TUBE/OVARY N/A 11/16/2021    Procedure: HYSTERECTOMY LAPAROSCOPIC TOTAL (LTH) W/ ROBOTICS, BILATERAL SALPINGO-OOPHORECTOMY, BL SENTINAL LYMPH NODE DISSECTION BIOPSY;  Surgeon: Ben Stroud MD;  [facility];  Service: Gynecology Oncology    ROTATOR  CUFF REPAIR Right     SLEEVE GASTROPLASTY  03/27/2017    TOTAL BODY LIFT        Family History:     Family History   Problem Relation Age of Onset    Diabetes Mother     Skin cancer Mother     Arthritis Mother     Cancer Mother         Cervical, lung, liver, spine    Lung cancer Mother     Alzheimer's disease Father     Heart failure Father     Diabetes Father     Dementia Father     Cancer Father         Skin    Stroke Father     Breast cancer Maternal Grandmother         adenocarcinoma in situ in villous adenoma of the breast      Social History:     Social History     Socioeconomic History    Marital status: /Civil Union     Spouse name: None    Number of children: None    Years of education: None    Highest education level: None   Occupational History    None   Tobacco Use    Smoking status: Never     Passive exposure: Never    Smokeless tobacco: Never   Vaping Use    Vaping status: Never Used   Substance and Sexual Activity    Alcohol use: No    Drug use: No    Sexual activity: Not Currently     Partners: Male   Other Topics Concern    None   Social History Narrative    Daily coffee consumption    Exercise frequency    Two children         Social Determinants of Health     Financial Resource Strain: Low Risk  (3/23/2023)    Overall Financial Resource Strain (CARDIA)     Difficulty of Paying Living Expenses: Not hard at all   Food Insecurity: No Food Insecurity (3/28/2024)    Hunger Vital Sign     Worried About Running Out of Food in the Last Year: Never true     Ran Out of Food in the Last Year: Never true   Transportation Needs: No Transportation Needs (3/28/2024)    PRAPARE - Transportation     Lack of Transportation (Medical): No     Lack of Transportation (Non-Medical): No   Physical Activity: Not on file   Stress: Not on file   Social Connections: Not on file   Intimate Partner Violence: Not on file   Housing Stability: Low Risk  (3/28/2024)    Housing Stability Vital Sign     Unable to Pay for  Housing in the Last Year: No     Number of Places Lived in the Last Year: 1     Unstable Housing in the Last Year: No      Medications and Allergies:     Current Outpatient Medications   Medication Sig Dispense Refill    aspirin 81 MG tablet Take 1 tablet by mouth daily for 30 days 30 tablet 0    betamethasone valerate (VALISONE) 0.1 % cream Apply topically 2 (two) times a day 30 g 3    buPROPion (WELLBUTRIN XL) 150 mg 24 hr tablet TAKE 1 TABLET EVERY MORNING 90 tablet 3    calcium citrate-vitamin D (CITRACAL+D) 315-200 MG-UNIT per tablet Take by mouth daily       eszopiclone (LUNESTA) 3 MG tablet TAKE ONE TABLET BY MOUTH EVERY DAY AT BEDTIME 90 tablet 3    gabapentin (Neurontin) 100 mg capsule Start with 1 pills at bedtime for 2-3 nights, increase to 2 pills at night for next 2 nights, then 3 pills at bedtime thereafter 90 capsule 3    levothyroxine 112 mcg tablet TAKE 1 TABLET DAILY 90 tablet 1    lisinopril (ZESTRIL) 10 mg tablet TAKE 1 TABLET DAILY 90 tablet 1    mometasone (ELOCON) 0.1 % ointment Apply nightly to affected area 2-3x a week 45 g 1    Multiple Vitamins-Minerals (CENTRUM WOMEN) TABS Take by mouth Goes between bariatric multi and Centrum      PARoxetine (PAXIL) 10 mg tablet TAKE 1 TABLET DAILY (ADDED TO 20 MG) 90 tablet 3    PARoxetine (PAXIL) 20 mg tablet TAKE 1 TABLET DAILY 90 tablet 3    rizatriptan (MAXALT) 10 mg tablet TAKE 1 TABLET DAILY AS NEEDED FOR MIGRAINE 18 tablet 7    simvastatin (ZOCOR) 40 mg tablet TAKE 1 TABLET EVERY EVENING 90 tablet 1     No current facility-administered medications for this visit.     No Known Allergies   Immunizations:     Immunization History   Administered Date(s) Administered    COVID-19 MODERNA VACC 0.5 ML IM 02/04/2021, 03/02/2021, 10/25/2021, 04/04/2022    COVID-19 Moderna Vac BIVALENT 12 Yr+ IM 0.5 ML 09/28/2022    COVID-19 Moderna mRNA Vaccine 12 Yr+ 50 mcg/0.5 mL (Spikevax) 10/11/2023    H1N1, All Formulations 01/06/2010    INFLUENZA 09/28/2013,  09/08/2014, 10/08/2015, 10/15/2016, 09/21/2017, 09/05/2018, 09/18/2019    Influenza Injectable, MDCK, Preservative Free, Quadrivalent, 0.5 mL 09/18/2019    Influenza Quadrivalent Preservative Free 3 years and older IM 10/08/2015    Influenza, high dose seasonal 0.7 mL 09/15/2020, 09/21/2021, 10/08/2022, 09/26/2023    Influenza, injectable, quadrivalent, preservative free 0.5 mL 09/05/2018    Influenza, seasonal, injectable 09/20/2011, 09/08/2014, 10/15/2016    Influenza, seasonal, injectable, preservative free 09/05/2018    Pneumococcal Conjugate Vaccine 20-valent (Pcv20), Polysace 09/22/2022    Pneumococcal Polysaccharide PPV23 03/10/2020    Tdap 03/04/2014    Zoster Vaccine Recombinant 09/22/2018, 03/06/2019      Health Maintenance:         Topic Date Due    Breast Cancer Screening: Mammogram  09/06/2024    Colorectal Cancer Screening  04/28/2025    Hepatitis C Screening  Completed         Topic Date Due    COVID-19 Vaccine (7 - 2023-24 season) 12/06/2023      Medicare Screening Tests and Risk Assessments:     Marline is here for her Subsequent Wellness visit.     Health Risk Assessment:   Patient rates overall health as very good. Patient feels that their physical health rating is same. Patient is very satisfied with their life. Eyesight was rated as same. Hearing was rated as same. Patient feels that their emotional and mental health rating is slightly better. Patients states they are never, rarely angry. Patient states they are often unusually tired/fatigued. Pain experienced in the last 7 days has been some. Patient's pain rating has been 5/10. Patient states that she has experienced weight loss or gain in last 6 months.     Depression Screening:   PHQ-9 Score: 7      Fall Risk Screening:   In the past year, patient has experienced: no history of falling in past year      Urinary Incontinence Screening:   Patient has not leaked urine accidently in the last six months. I get botox injections    Home  Safety:  Patient does not have trouble with stairs inside or outside of their home. Patient has working smoke alarms and has working carbon monoxide detector. Home safety hazards include: none.     Nutrition:   Current diet is Regular and No Added Salt.     Medications:   Patient is not currently taking any over-the-counter supplements. Patient is able to manage medications.     Activities of Daily Living (ADLs)/Instrumental Activities of Daily Living (IADLs):   Walk and transfer into and out of bed and chair?: Yes  Dress and groom yourself?: Yes    Bathe or shower yourself?: Yes    Feed yourself? Yes  Do your laundry/housekeeping?: Yes  Manage your money, pay your bills and track your expenses?: Yes  Make your own meals?: Yes    Do your own shopping?: Yes    Previous Hospitalizations:   Any hospitalizations or ED visits within the last 12 months?: No      Advance Care Planning:   Living will: No    Durable POA for healthcare: No    Advanced directive: No      Cognitive Screening:   Provider or family/friend/caregiver concerned regarding cognition?: No    PREVENTIVE SCREENINGS      Cardiovascular Screening:    General: Screening Not Indicated and History Lipid Disorder      Diabetes Screening:     General: Screening Current      Colorectal Cancer Screening:     General: Screening Current      Breast Cancer Screening:     General: Screening Current      Cervical Cancer Screening:    General: Screening Not Indicated      Lung Cancer Screening:     General: Screening Not Indicated      Hepatitis C Screening:    General: Screening Current    Screening, Brief Intervention, and Referral to Treatment (SBIRT)    Screening  Typical number of drinks in a day: 0  Typical number of drinks in a week: 0  Interpretation: Low risk drinking behavior.    AUDIT-C Screenin) How often did you have a drink containing alcohol in the past year? never  2) How many drinks did you have on a typical day when you were drinking in the past  "year? 0  3) How often did you have 6 or more drinks on one occasion in the past year? never    AUDIT-C Score: 0  Interpretation: Score 0-2 (female): Negative screen for alcohol misuse    Single Item Drug Screening:  How often have you used an illegal drug (including marijuana) or a prescription medication for non-medical reasons in the past year? never    Single Item Drug Screen Score: 0  Interpretation: Negative screen for possible drug use disorder    No results found.     Physical Exam:     /84 (BP Location: Left arm, Patient Position: Sitting, Cuff Size: Large)   Pulse 86   Temp 99.5 °F (37.5 °C) (Tympanic)   Resp 16   Ht 5' 6.93\" (1.7 m)   Wt 106 kg (234 lb 6.4 oz)   SpO2 93%   BMI 36.79 kg/m²     Physical Exam  Vitals and nursing note reviewed.   Constitutional:       Appearance: Normal appearance. She is well-developed.   HENT:      Head: Normocephalic and atraumatic.      Right Ear: External ear normal.      Left Ear: External ear normal.      Nose: Nose normal.   Eyes:      Conjunctiva/sclera: Conjunctivae normal.      Pupils: Pupils are equal, round, and reactive to light.   Cardiovascular:      Rate and Rhythm: Normal rate and regular rhythm.      Heart sounds: Normal heart sounds.   Pulmonary:      Effort: Pulmonary effort is normal.      Breath sounds: Normal breath sounds.   Abdominal:      General: Bowel sounds are normal.      Palpations: Abdomen is soft.   Musculoskeletal:         General: Normal range of motion.      Cervical back: Normal range of motion and neck supple.   Skin:     General: Skin is warm and dry.      Capillary Refill: Capillary refill takes less than 2 seconds.   Neurological:      Mental Status: She is alert and oriented to person, place, and time.   Psychiatric:         Behavior: Behavior normal.         Thought Content: Thought content normal.         Judgment: Judgment normal.          Saranya Cantrell, DO  "

## 2024-04-04 NOTE — TELEPHONE ENCOUNTER
PA for Gabapentin   100 mg  Submitted via    [x]CMM-KEY [id number]  []Surescripts-Case ID #   []Faxed to plan   []Other website   []Phone call Case ID #     Office notes sent, clinical questions answered. Awaiting determination    Turnaround time for your insurance to make a decision on your Prior Authorization can take 7-21 business days.

## 2024-04-05 NOTE — TELEPHONE ENCOUNTER
PA for Gabapentin 100 mg  Approved   Date(s) approved 04/03/2024-04/03/2025      Patient advised by [x] [employer] Message                      [x] Phone call       Pharmacy advised by [x]Fax                                     []Phone call    Approval letter scanned into Media Yes

## 2024-04-15 ENCOUNTER — APPOINTMENT (OUTPATIENT)
Dept: LAB | Facility: AMBULARY SURGERY CENTER | Age: 69
End: 2024-04-15
Payer: COMMERCIAL

## 2024-04-15 DIAGNOSIS — E53.8 VITAMIN B12 DEFICIENCY: ICD-10-CM

## 2024-04-15 DIAGNOSIS — R73.01 IFG (IMPAIRED FASTING GLUCOSE): ICD-10-CM

## 2024-04-15 DIAGNOSIS — E78.2 MIXED HYPERLIPIDEMIA: ICD-10-CM

## 2024-04-15 DIAGNOSIS — G62.9 NEUROPATHY: ICD-10-CM

## 2024-04-15 DIAGNOSIS — E03.9 HYPOTHYROIDISM (ACQUIRED): ICD-10-CM

## 2024-04-15 LAB
ALBUMIN SERPL BCP-MCNC: 3.9 G/DL (ref 3.5–5)
ALP SERPL-CCNC: 71 U/L (ref 34–104)
ALT SERPL W P-5'-P-CCNC: 15 U/L (ref 7–52)
ANION GAP SERPL CALCULATED.3IONS-SCNC: 13 MMOL/L (ref 4–13)
AST SERPL W P-5'-P-CCNC: 19 U/L (ref 13–39)
BILIRUB SERPL-MCNC: 0.66 MG/DL (ref 0.2–1)
BUN SERPL-MCNC: 18 MG/DL (ref 5–25)
CALCIUM SERPL-MCNC: 8.9 MG/DL (ref 8.4–10.2)
CHLORIDE SERPL-SCNC: 107 MMOL/L (ref 96–108)
CHOLEST SERPL-MCNC: 161 MG/DL
CO2 SERPL-SCNC: 21 MMOL/L (ref 21–32)
CREAT SERPL-MCNC: 0.83 MG/DL (ref 0.6–1.3)
ERYTHROCYTE [DISTWIDTH] IN BLOOD BY AUTOMATED COUNT: 13.9 % (ref 11.6–15.1)
EST. AVERAGE GLUCOSE BLD GHB EST-MCNC: 123 MG/DL
GFR SERPL CREATININE-BSD FRML MDRD: 72 ML/MIN/1.73SQ M
GLUCOSE P FAST SERPL-MCNC: 102 MG/DL (ref 65–99)
HBA1C MFR BLD: 5.9 %
HCT VFR BLD AUTO: 40.4 % (ref 34.8–46.1)
HDLC SERPL-MCNC: 55 MG/DL
HGB BLD-MCNC: 12.6 G/DL (ref 11.5–15.4)
LDLC SERPL CALC-MCNC: 78 MG/DL (ref 0–100)
MCH RBC QN AUTO: 29.1 PG (ref 26.8–34.3)
MCHC RBC AUTO-ENTMCNC: 31.2 G/DL (ref 31.4–37.4)
MCV RBC AUTO: 93 FL (ref 82–98)
PLATELET # BLD AUTO: 334 THOUSANDS/UL (ref 149–390)
PMV BLD AUTO: 10.3 FL (ref 8.9–12.7)
POTASSIUM SERPL-SCNC: 4.2 MMOL/L (ref 3.5–5.3)
PROT SERPL-MCNC: 7 G/DL (ref 6.4–8.4)
RBC # BLD AUTO: 4.33 MILLION/UL (ref 3.81–5.12)
SODIUM SERPL-SCNC: 141 MMOL/L (ref 135–147)
TRIGL SERPL-MCNC: 138 MG/DL
TSH SERPL DL<=0.05 MIU/L-ACNC: 1.61 UIU/ML (ref 0.45–4.5)
VIT B12 SERPL-MCNC: 374 PG/ML (ref 180–914)
WBC # BLD AUTO: 7.24 THOUSAND/UL (ref 4.31–10.16)

## 2024-04-15 PROCEDURE — 80053 COMPREHEN METABOLIC PANEL: CPT

## 2024-04-15 PROCEDURE — 82607 VITAMIN B-12: CPT

## 2024-04-15 PROCEDURE — 84443 ASSAY THYROID STIM HORMONE: CPT

## 2024-04-15 PROCEDURE — 85027 COMPLETE CBC AUTOMATED: CPT

## 2024-04-15 PROCEDURE — 36415 COLL VENOUS BLD VENIPUNCTURE: CPT

## 2024-04-15 PROCEDURE — 83036 HEMOGLOBIN GLYCOSYLATED A1C: CPT

## 2024-04-15 PROCEDURE — 80061 LIPID PANEL: CPT

## 2024-04-15 RX ORDER — GABAPENTIN 100 MG/1
CAPSULE ORAL
Qty: 270 CAPSULE | Refills: 3 | Status: SHIPPED | OUTPATIENT
Start: 2024-04-15

## 2024-05-01 PROBLEM — Z00.00 MEDICARE ANNUAL WELLNESS VISIT, SUBSEQUENT: Status: RESOLVED | Noted: 2024-04-01 | Resolved: 2024-05-01

## 2024-05-31 DIAGNOSIS — E03.9 HYPOTHYROIDISM, UNSPECIFIED TYPE: ICD-10-CM

## 2024-05-31 RX ORDER — LEVOTHYROXINE SODIUM 112 UG/1
112 TABLET ORAL DAILY
Qty: 90 TABLET | Refills: 1 | Status: SHIPPED | OUTPATIENT
Start: 2024-05-31

## 2024-06-28 DIAGNOSIS — I10 ESSENTIAL HYPERTENSION: ICD-10-CM

## 2024-06-28 DIAGNOSIS — E78.5 HYPERLIPIDEMIA, UNSPECIFIED HYPERLIPIDEMIA TYPE: ICD-10-CM

## 2024-06-28 RX ORDER — LISINOPRIL 10 MG/1
TABLET ORAL
Qty: 90 TABLET | Refills: 1 | Status: SHIPPED | OUTPATIENT
Start: 2024-06-28

## 2024-06-28 RX ORDER — SIMVASTATIN 40 MG
TABLET ORAL
Qty: 90 TABLET | Refills: 1 | Status: SHIPPED | OUTPATIENT
Start: 2024-06-28

## 2024-07-12 NOTE — PROGRESS NOTES
Assessment/Plan:    Problem List Items Addressed This Visit          Genitourinary    Lichen sclerosus et atrophicus of the vulva     Exam findings stable. Asymptomatic.  Encouraged clobetasol once weekly.            Oncology    History of endometrial cancer     History of stage IA, grade 1 endometrial cancer s/p surgical resection in November 2021. She is clinically without evidence of disease recurrence.     Return to the office in 1 year for continued cancer surveillance.             Encounter for follow-up surveillance of endometrial cancer - Primary         CHIEF COMPLAINT:   Endometrial cancer surveillance    Problem:  Cancer Staging   History of endometrial cancer  Staging form: Corpus Uteri - Carcinoma, AJCC 8th Edition  - Clinical stage from 11/16/2021: FIGO Stage IA (cT1a, cN0(sn), cM0) - Signed by Ben Stroud MD on 12/1/2021        Previous therapy:  Oncology History   History of endometrial cancer   10/21/2021 Initial Diagnosis    Endometrial cancer (HCC)     11/16/2021 -  Cancer Staged    Staging form: Corpus Uteri - Carcinoma, AJCC 8th Edition  - Clinical stage from 11/16/2021: FIGO Stage IA (cT1a, cN0(sn), cM0) - Signed by Ben Stroud MD on 12/1/2021  Method of lymph node assessment: [place] lymph node biopsy  Histologic grade (G): G1  Histologic grading system: 3 grade system  Peritoneal cytology results: Unknown       11/16/2021 Surgery    Robotic hysterectomy, BSO, bilateral sentinel LN biopsies, peritoneal biopsies, cystoscopy  -Grade 1, 0.3/1.7cm depth of invasion, indeterminate LVSI (likely artifact), cytology atypical  -MLH1/PMS2 absent, methylation pending           Patient ID: Marline Powers is a 69 y.o. female  who has no new complaints today. No vaginal bleeding, abdominal/pelvic pain. Normal bowel and bladder function. She denies vulvar itching or irritation. She occasionally uses clobetasol. She is current on screening mammograms. They are performed at [facility]. No interval  change in medical history since last visit. Quality of life is good.        The following portions of the patient's history were reviewed and updated as appropriate: allergies, current medications, past medical history, past surgical history, and problem list.    Review of Systems   Constitutional: Negative.    HENT: Negative.     Eyes: Negative.    Respiratory: Negative.     Cardiovascular: Negative.    Gastrointestinal: Negative.    Genitourinary: Negative.    Musculoskeletal: Negative.    Skin: Negative.    Neurological: Negative.    Psychiatric/Behavioral: Negative.         Current Outpatient Medications   Medication Sig Dispense Refill    betamethasone valerate (VALISONE) 0.1 % cream Apply topically 2 (two) times a day 30 g 3    buPROPion (WELLBUTRIN XL) 150 mg 24 hr tablet TAKE 1 TABLET EVERY MORNING 90 tablet 3    calcium citrate-vitamin D (CITRACAL+D) 315-200 MG-UNIT per tablet Take by mouth daily       eszopiclone (LUNESTA) 3 MG tablet TAKE ONE TABLET BY MOUTH EVERY DAY AT BEDTIME 90 tablet 3    gabapentin (Neurontin) 100 mg capsule Start with 1 pills at bedtime for 2-3 nights, increase to 2 pills at night for next 2 nights, then 3 pills at bedtime thereafter 270 capsule 3    levothyroxine 112 mcg tablet TAKE 1 TABLET DAILY 90 tablet 1    lisinopril (ZESTRIL) 10 mg tablet TAKE 1 TABLET DAILY 90 tablet 1    mometasone (ELOCON) 0.1 % ointment Apply nightly to affected area 2-3x a week 45 g 1    Multiple Vitamins-Minerals (CENTRUM WOMEN) TABS Take by mouth Goes between bariatric multi and Centrum      PARoxetine (PAXIL) 10 mg tablet TAKE 1 TABLET DAILY (ADDED TO 20 MG) 90 tablet 3    PARoxetine (PAXIL) 20 mg tablet TAKE 1 TABLET DAILY 90 tablet 3    rizatriptan (MAXALT) 10 mg tablet TAKE 1 TABLET DAILY AS NEEDED FOR MIGRAINE 18 tablet 7    simvastatin (ZOCOR) 40 mg tablet TAKE 1 TABLET EVERY EVENING 90 tablet 1    aspirin 81 MG tablet Take 1 tablet by mouth daily for 30 days 30 tablet 0     No current  facility-administered medications for this visit.           Objective:    Blood pressure 110/72, pulse 99, temperature 97.5 °F (36.4 °C), temperature source Temporal, weight 106 kg (233 lb), SpO2 94%, not currently breastfeeding.  Body mass index is 36.57 kg/m².  Body surface area is 2.16 meters squared.    Physical Exam  Vitals reviewed. Exam conducted with a chaperone present.   Constitutional:       General: She is not in acute distress.     Appearance: Normal appearance. She is not ill-appearing.   HENT:      Head: Normocephalic and atraumatic.      Mouth/Throat:      Mouth: Mucous membranes are moist.   Eyes:      General:         Right eye: No discharge.         Left eye: No discharge.      Conjunctiva/sclera: Conjunctivae normal.   Pulmonary:      Effort: Pulmonary effort is normal.   Abdominal:      Palpations: Abdomen is soft. There is no mass.      Tenderness: There is no abdominal tenderness.      Hernia: No hernia is present.   Genitourinary:     Comments: The external female is with loss of labia minora bilaterally and hypopigmentation. Exam findings are stable. The bartholin's, uretheral and skenes glands are normal. The urethral meatus is normal (midline with no lesions). Anus without fissure or lesion. Speculum exam reveals a grossly normal vagina. No masses, lesions,discharge or bleeding. No significant cystocele or rectocele noted. Bimanual exam notes a surgical absent cervix, uterus and adnexal structures. No masses or fullness. Bladder is without fullness, mass or tenderness.  Musculoskeletal:      Right lower leg: No edema.      Left lower leg: No edema.   Skin:     General: Skin is warm and dry.      Coloration: Skin is not jaundiced.      Findings: No rash.   Neurological:      General: No focal deficit present.      Mental Status: She is alert and oriented to person, place, and time.      Cranial Nerves: No cranial nerve deficit.      Sensory: No sensory deficit.      Motor: No weakness.       Gait: Gait normal.   Psychiatric:         Mood and Affect: Mood normal.         Behavior: Behavior normal.         Thought Content: Thought content normal.         Judgment: Judgment normal.

## 2024-07-12 NOTE — ASSESSMENT & PLAN NOTE
History of stage IA, grade 1 endometrial cancer s/p surgical resection in November 2021. She is clinically without evidence of disease recurrence.     Return to the office in 1 year for continued cancer surveillance.

## 2024-07-15 ENCOUNTER — OFFICE VISIT (OUTPATIENT)
Dept: GYNECOLOGIC ONCOLOGY | Facility: CLINIC | Age: 69
End: 2024-07-15
Payer: COMMERCIAL

## 2024-07-15 VITALS
DIASTOLIC BLOOD PRESSURE: 72 MMHG | SYSTOLIC BLOOD PRESSURE: 110 MMHG | HEART RATE: 99 BPM | BODY MASS INDEX: 36.57 KG/M2 | TEMPERATURE: 97.5 F | OXYGEN SATURATION: 94 % | WEIGHT: 233 LBS

## 2024-07-15 DIAGNOSIS — Z85.42 HISTORY OF ENDOMETRIAL CANCER: ICD-10-CM

## 2024-07-15 DIAGNOSIS — Z85.42 ENCOUNTER FOR FOLLOW-UP SURVEILLANCE OF ENDOMETRIAL CANCER: Primary | ICD-10-CM

## 2024-07-15 DIAGNOSIS — N90.4 LICHEN SCLEROSUS ET ATROPHICUS OF THE VULVA: ICD-10-CM

## 2024-07-15 DIAGNOSIS — Z08 ENCOUNTER FOR FOLLOW-UP SURVEILLANCE OF ENDOMETRIAL CANCER: Primary | ICD-10-CM

## 2024-07-15 PROCEDURE — 99213 OFFICE O/P EST LOW 20 MIN: CPT | Performed by: PHYSICIAN ASSISTANT

## 2024-08-29 DIAGNOSIS — F41.1 GENERALIZED ANXIETY DISORDER: ICD-10-CM

## 2024-08-29 DIAGNOSIS — F43.21 GRIEVING: ICD-10-CM

## 2024-08-29 RX ORDER — PAROXETINE 10 MG/1
TABLET, FILM COATED ORAL
Qty: 90 TABLET | Refills: 1 | Status: SHIPPED | OUTPATIENT
Start: 2024-08-29

## 2024-10-02 ENCOUNTER — ANNUAL EXAM (OUTPATIENT)
Age: 69
End: 2024-10-02
Payer: COMMERCIAL

## 2024-10-02 ENCOUNTER — OFFICE VISIT (OUTPATIENT)
Dept: FAMILY MEDICINE CLINIC | Facility: CLINIC | Age: 69
End: 2024-10-02
Payer: COMMERCIAL

## 2024-10-02 VITALS
DIASTOLIC BLOOD PRESSURE: 84 MMHG | WEIGHT: 232.4 LBS | SYSTOLIC BLOOD PRESSURE: 124 MMHG | HEIGHT: 66 IN | BODY MASS INDEX: 37.35 KG/M2

## 2024-10-02 VITALS
SYSTOLIC BLOOD PRESSURE: 108 MMHG | BODY MASS INDEX: 37.54 KG/M2 | TEMPERATURE: 99.1 F | HEIGHT: 66 IN | HEART RATE: 95 BPM | WEIGHT: 233.6 LBS | DIASTOLIC BLOOD PRESSURE: 80 MMHG | OXYGEN SATURATION: 93 % | RESPIRATION RATE: 18 BRPM

## 2024-10-02 DIAGNOSIS — E78.2 MIXED HYPERLIPIDEMIA: ICD-10-CM

## 2024-10-02 DIAGNOSIS — R73.01 IFG (IMPAIRED FASTING GLUCOSE): ICD-10-CM

## 2024-10-02 DIAGNOSIS — E55.9 VITAMIN D DEFICIENCY: ICD-10-CM

## 2024-10-02 DIAGNOSIS — E03.9 HYPOTHYROIDISM (ACQUIRED): ICD-10-CM

## 2024-10-02 DIAGNOSIS — I10 ESSENTIAL HYPERTENSION: Primary | ICD-10-CM

## 2024-10-02 DIAGNOSIS — E66.01 CLASS 2 SEVERE OBESITY DUE TO EXCESS CALORIES WITH SERIOUS COMORBIDITY AND BODY MASS INDEX (BMI) OF 36.0 TO 36.9 IN ADULT (HCC): ICD-10-CM

## 2024-10-02 DIAGNOSIS — Z85.42 HISTORY OF ENDOMETRIAL CANCER: ICD-10-CM

## 2024-10-02 DIAGNOSIS — F32.0 CURRENT MILD EPISODE OF MAJOR DEPRESSIVE DISORDER WITHOUT PRIOR EPISODE (HCC): ICD-10-CM

## 2024-10-02 DIAGNOSIS — F41.1 GENERALIZED ANXIETY DISORDER: ICD-10-CM

## 2024-10-02 DIAGNOSIS — Z23 ENCOUNTER FOR IMMUNIZATION: ICD-10-CM

## 2024-10-02 DIAGNOSIS — Z78.0 MENOPAUSE: ICD-10-CM

## 2024-10-02 DIAGNOSIS — N90.4 LICHEN SCLEROSUS ET ATROPHICUS OF THE VULVA: ICD-10-CM

## 2024-10-02 DIAGNOSIS — Z13.820 SCREENING FOR OSTEOPOROSIS: ICD-10-CM

## 2024-10-02 DIAGNOSIS — Z91.89 ENCOUNTER FOR GYNECOLOGIC EXAMINATION FOR HIGH-RISK PATIENT COVERED BY MEDICARE: Primary | ICD-10-CM

## 2024-10-02 DIAGNOSIS — E66.812 CLASS 2 SEVERE OBESITY DUE TO EXCESS CALORIES WITH SERIOUS COMORBIDITY AND BODY MASS INDEX (BMI) OF 36.0 TO 36.9 IN ADULT (HCC): ICD-10-CM

## 2024-10-02 DIAGNOSIS — Z12.31 ENCOUNTER FOR SCREENING MAMMOGRAM FOR MALIGNANT NEOPLASM OF BREAST: ICD-10-CM

## 2024-10-02 PROCEDURE — G0008 ADMIN INFLUENZA VIRUS VAC: HCPCS

## 2024-10-02 PROCEDURE — G0101 CA SCREEN;PELVIC/BREAST EXAM: HCPCS | Performed by: OBSTETRICS & GYNECOLOGY

## 2024-10-02 PROCEDURE — 99214 OFFICE O/P EST MOD 30 MIN: CPT | Performed by: FAMILY MEDICINE

## 2024-10-02 PROCEDURE — 90662 IIV NO PRSV INCREASED AG IM: CPT

## 2024-10-02 NOTE — PROGRESS NOTES
"Ambulatory Visit  Name: Marline Powers      : 1955      MRN: [id number]  Encounter Provider: Saranya Cantrell DO  Encounter Date: 10/2/2024   Encounter department: [facility]    Assessment & Plan  Essential hypertension  Stable on current meds  Orders:    CBC; Future    Comprehensive metabolic panel; Future    Hypothyroidism (acquired)  Stable on levo  Orders:    TSH, 3rd generation with Free T4 reflex; Future    Current mild episode of major depressive disorder without prior episode (HCC)  Stable on current meds         Generalized anxiety disorder  Stable on current meds       Class 2 severe obesity due to excess calories with serious comorbidity and body mass index (BMI) of 36.0 to 36.9 in adult (HCC)  Watching red meat  Did cut out carbs at some point which was successful       Mixed hyperlipidemia  Stable on current meds  Orders:    Lipid Panel with Direct LDL reflex; Future    IFG (impaired fasting glucose)    Orders:    Hemoglobin A1C; Future    Vitamin D deficiency    Orders:    Vitamin D 25 hydroxy; Future    Encounter for immunization    Orders:    influenza vaccine, high-dose, PF 0.5 mL (Fluzone High Dose)         History of Present Illness     History of Present Illness  Here for follow up  Had a nice summer  Feeling good  No major health changes     Review of Systems   Constitutional: Negative.    HENT: Negative.     Eyes: Negative.    Respiratory: Negative.     Cardiovascular: Negative.    Gastrointestinal: Negative.    Endocrine: Negative.    Genitourinary: Negative.    Musculoskeletal:  Positive for arthralgias.   Allergic/Immunologic: Negative.    Neurological: Negative.    Hematological: Negative.    Psychiatric/Behavioral: Negative.       Objective     /80 (BP Location: Left arm, Patient Position: Sitting, Cuff Size: Large)   Pulse 95   Temp 99.1 °F (37.3 °C) (Tympanic)   Resp 18   Ht 5' 6.25\" (1.683 m)   Wt 106 kg (233 lb 9.6 oz)   SpO2 93%   BMI 37.42 kg/m² "     Physical Exam    Physical Exam  Vitals and nursing note reviewed.   Constitutional:       Appearance: Normal appearance. She is well-developed.   HENT:      Head: Normocephalic and atraumatic.      Right Ear: External ear normal.      Left Ear: External ear normal.      Nose: Nose normal.   Eyes:      Extraocular Movements: Extraocular movements intact.      Conjunctiva/sclera: Conjunctivae normal.      Pupils: Pupils are equal, round, and reactive to light.   Cardiovascular:      Rate and Rhythm: Normal rate and regular rhythm.      Heart sounds: Normal heart sounds.   Pulmonary:      Effort: Pulmonary effort is normal.      Breath sounds: Normal breath sounds.   Abdominal:      General: Bowel sounds are normal.      Palpations: Abdomen is soft.   Musculoskeletal:         General: Normal range of motion.      Cervical back: Normal range of motion and neck supple.   Skin:     General: Skin is warm and dry.      Capillary Refill: Capillary refill takes less than 2 seconds.   Neurological:      General: No focal deficit present.      Mental Status: She is alert and oriented to person, place, and time.   Psychiatric:         Mood and Affect: Mood normal.         Behavior: Behavior normal.         Thought Content: Thought content normal.         Judgment: Judgment normal.

## 2024-10-02 NOTE — PROGRESS NOTES
Assessment/Plan:      Encounter for gynecologic examination for high-risk patient covered by Medicare    Encounter for screening mammogram for malignant neoplasm of breast  -     Mammo screening bilateral w 3d and cad; Future    Menopause  -     DXA bone density spine hip and pelvis; Future    Screening for osteoporosis  -     DXA bone density spine hip and pelvis; Future    Lichen sclerosus et atrophicus of the vulva    History of endometrial cancer         Subjective      Marline Powers is a 69 y.o. female who presents for annual exam. The patient has no complaints today. The patient is not sexually active.  The patient is not taking hormone replacement therapy. Patient denies post-menopausal vaginal bleeding. She denies any breast concerns.  Yearly botox injections are working well for urinary incontinence.  Saw GYN ONC July - evaluation normal; was told to use clobetasol yearly.  Next follow up July.   Menstrual History:  OB History          2    Para   2    Term   2       0    AB   0    Living   2         SAB   0    IAB   0    Ectopic   0    Multiple   0    Live Births   2                No LMP recorded. Patient has had a hysterectomy.     Past Medical History:   Diagnosis Date    Allergic dermatitis     resolved 2016    Anxiety     Arthritis     Blind right eye     Cancer ([facility]) 2021    Hysterectomy    Depression     Disease of thyroid gland     Encounter for cosmetic surgery 2018    Added automatically from request for surgery [id number]    Endometrial cancer ([facility]) 2023    Headache(784.0)     Hyperlipidemia     Hypertension     Hypothyroidism (acquired)     hypothyroid    Incontinence of urine in female     Insomnia     Migraine     Morbid obesity (HCC)     Obesity     Peritonitis ([facility]) 2021    TIA (transient ischemic attack)     questionable     Uterine cancer ([facility]) 2021    Visual impairment     Wears glasses        Family History   Problem Relation Age of Onset     "Diabetes Mother     Skin cancer Mother     Arthritis Mother     Cancer Mother         Cervical, lung, liver, spine    Lung cancer Mother     Alzheimer's disease Father     Heart failure Father     Diabetes Father     Dementia Father     Cancer Father         Skin    Stroke Father     Breast cancer Maternal Grandmother         adenocarcinoma in situ in villous adenoma of the breast       The following portions of the patient's history were reviewed and updated as appropriate: allergies, current medications, past family history, past medical history, past social history, past surgical history, and problem list.    Review of Systems  Pertinent items are noted in HPI.     Objective      /84 (BP Location: Right arm, Patient Position: Sitting, Cuff Size: Large)   Ht 5' 6.25\" (1.683 m)   Wt 105 kg (232 lb 6.4 oz)   BMI 37.23 kg/m²     General:   alert and oriented, in no acute distress   Heart:  Breasts: regular rate and rhythm  appear normal, no suspicious masses, no skin or nipple changes or axillary nodes.   Lungs: Effort normal   Abdomen: soft, non-tender, without masses or organomegaly   Vulva: Upper half erythematous; no lesions.  Lower laura lichenified and hypopigmented.     Vagina: normal mucosa   Cervix: surgically absent   Uterus: surgically absent   Adnexa:  Bladder: surgically absent and no mass, fullness, tenderness  Non-tender, no prolapse            "

## 2024-10-05 ENCOUNTER — APPOINTMENT (OUTPATIENT)
Dept: LAB | Facility: CLINIC | Age: 69
End: 2024-10-05
Payer: COMMERCIAL

## 2024-10-05 DIAGNOSIS — E55.9 VITAMIN D DEFICIENCY: ICD-10-CM

## 2024-10-05 DIAGNOSIS — R73.01 IFG (IMPAIRED FASTING GLUCOSE): ICD-10-CM

## 2024-10-05 DIAGNOSIS — I10 ESSENTIAL HYPERTENSION: ICD-10-CM

## 2024-10-05 DIAGNOSIS — E78.2 MIXED HYPERLIPIDEMIA: ICD-10-CM

## 2024-10-05 DIAGNOSIS — E03.9 HYPOTHYROIDISM (ACQUIRED): ICD-10-CM

## 2024-10-05 LAB
25(OH)D3 SERPL-MCNC: 77 NG/ML (ref 30–100)
ALBUMIN SERPL BCG-MCNC: 4 G/DL (ref 3.5–5)
ALP SERPL-CCNC: 77 U/L (ref 34–104)
ALT SERPL W P-5'-P-CCNC: 18 U/L (ref 7–52)
ANION GAP SERPL CALCULATED.3IONS-SCNC: 11 MMOL/L (ref 4–13)
AST SERPL W P-5'-P-CCNC: 18 U/L (ref 13–39)
BILIRUB SERPL-MCNC: 0.76 MG/DL (ref 0.2–1)
BUN SERPL-MCNC: 21 MG/DL (ref 5–25)
CALCIUM SERPL-MCNC: 9.6 MG/DL (ref 8.4–10.2)
CHLORIDE SERPL-SCNC: 106 MMOL/L (ref 96–108)
CHOLEST SERPL-MCNC: 163 MG/DL
CO2 SERPL-SCNC: 22 MMOL/L (ref 21–32)
CREAT SERPL-MCNC: 1.01 MG/DL (ref 0.6–1.3)
ERYTHROCYTE [DISTWIDTH] IN BLOOD BY AUTOMATED COUNT: 13.5 % (ref 11.6–15.1)
EST. AVERAGE GLUCOSE BLD GHB EST-MCNC: 120 MG/DL
GFR SERPL CREATININE-BSD FRML MDRD: 56 ML/MIN/1.73SQ M
GLUCOSE P FAST SERPL-MCNC: 127 MG/DL (ref 65–99)
HBA1C MFR BLD: 5.8 %
HCT VFR BLD AUTO: 40.9 % (ref 34.8–46.1)
HDLC SERPL-MCNC: 59 MG/DL
HGB BLD-MCNC: 12.9 G/DL (ref 11.5–15.4)
LDLC SERPL CALC-MCNC: 68 MG/DL (ref 0–100)
MCH RBC QN AUTO: 29.3 PG (ref 26.8–34.3)
MCHC RBC AUTO-ENTMCNC: 31.5 G/DL (ref 31.4–37.4)
MCV RBC AUTO: 93 FL (ref 82–98)
PLATELET # BLD AUTO: 340 THOUSANDS/UL (ref 149–390)
PMV BLD AUTO: 10 FL (ref 8.9–12.7)
POTASSIUM SERPL-SCNC: 3.8 MMOL/L (ref 3.5–5.3)
PROT SERPL-MCNC: 7.5 G/DL (ref 6.4–8.4)
RBC # BLD AUTO: 4.41 MILLION/UL (ref 3.81–5.12)
SODIUM SERPL-SCNC: 139 MMOL/L (ref 135–147)
TRIGL SERPL-MCNC: 178 MG/DL
TSH SERPL DL<=0.05 MIU/L-ACNC: 2.24 UIU/ML (ref 0.45–4.5)
WBC # BLD AUTO: 5.95 THOUSAND/UL (ref 4.31–10.16)

## 2024-10-05 PROCEDURE — 36415 COLL VENOUS BLD VENIPUNCTURE: CPT

## 2024-10-05 PROCEDURE — 80061 LIPID PANEL: CPT

## 2024-10-05 PROCEDURE — 83036 HEMOGLOBIN GLYCOSYLATED A1C: CPT

## 2024-10-05 PROCEDURE — 84443 ASSAY THYROID STIM HORMONE: CPT

## 2024-10-05 PROCEDURE — 82306 VITAMIN D 25 HYDROXY: CPT

## 2024-10-05 PROCEDURE — 80053 COMPREHEN METABOLIC PANEL: CPT

## 2024-10-05 PROCEDURE — 85027 COMPLETE CBC AUTOMATED: CPT

## 2024-10-10 DIAGNOSIS — Z23 NEED FOR COVID-19 VACCINE: Primary | ICD-10-CM

## 2024-10-16 ENCOUNTER — CLINICAL SUPPORT (OUTPATIENT)
Dept: FAMILY MEDICINE CLINIC | Facility: CLINIC | Age: 69
End: 2024-10-16
Payer: COMMERCIAL

## 2024-10-16 ENCOUNTER — TELEPHONE (OUTPATIENT)
Age: 69
End: 2024-10-16

## 2024-10-16 DIAGNOSIS — Z23 ENCOUNTER FOR IMMUNIZATION: Primary | ICD-10-CM

## 2024-10-16 PROCEDURE — 90480 ADMN SARSCOV2 VAC 1/ONLY CMP: CPT

## 2024-10-16 PROCEDURE — 91320 SARSCV2 VAC 30MCG TRS-SUC IM: CPT

## 2024-10-16 NOTE — PROGRESS NOTES
Assessment/Plan:      Diagnoses and all orders for this visit:    Encounter for immunization          Subjective:     Patient ID: Marline Powers is a 69 y.o. female.          Objective:      There were no vitals taken for this visit.

## 2024-10-20 DIAGNOSIS — G43.909 MIGRAINE WITHOUT STATUS MIGRAINOSUS, NOT INTRACTABLE, UNSPECIFIED MIGRAINE TYPE: ICD-10-CM

## 2024-10-22 RX ORDER — RIZATRIPTAN BENZOATE 10 MG/1
TABLET ORAL
Qty: 18 TABLET | Refills: 5 | Status: SHIPPED | OUTPATIENT
Start: 2024-10-22

## 2024-11-27 DIAGNOSIS — E03.9 HYPOTHYROIDISM, UNSPECIFIED TYPE: ICD-10-CM

## 2024-11-27 RX ORDER — LEVOTHYROXINE SODIUM 112 UG/1
112 TABLET ORAL DAILY
Qty: 90 TABLET | Refills: 1 | Status: SHIPPED | OUTPATIENT
Start: 2024-11-27

## 2024-12-25 DIAGNOSIS — E78.5 HYPERLIPIDEMIA, UNSPECIFIED HYPERLIPIDEMIA TYPE: ICD-10-CM

## 2024-12-25 DIAGNOSIS — I10 ESSENTIAL HYPERTENSION: ICD-10-CM

## 2024-12-26 RX ORDER — LISINOPRIL 10 MG/1
10 TABLET ORAL DAILY
Qty: 90 TABLET | Refills: 1 | Status: SHIPPED | OUTPATIENT
Start: 2024-12-26

## 2024-12-26 RX ORDER — SIMVASTATIN 40 MG
40 TABLET ORAL EVERY EVENING
Qty: 90 TABLET | Refills: 1 | Status: SHIPPED | OUTPATIENT
Start: 2024-12-26

## 2025-01-08 ENCOUNTER — TELEPHONE (OUTPATIENT)
Dept: UROLOGY | Facility: CLINIC | Age: 70
End: 2025-01-08

## 2025-01-08 NOTE — TELEPHONE ENCOUNTER
Communication History     User: CARMENCITAALVARO BILLY Date/time: 12/20/2024 10:17 AM   Comment: I called and LVM for patient to call back and schedule.   Context: Lyndsey Recall Report Outcome:     Phone number: [phone] Phone type: Mobile   Comm. type: Telephone Call type: Outgoing   Contact: Marline Powers Relation to patient:        User: ALVARO MEZA Date/time: 10/11/2024 1:38 PM   Comment: I called and LVM for patient to call back and schedule.   Context: Lyndsey Recall Report Outcome:     Phone number: [phone] Phone type: Mobile   Comm. type: Telephone Call type: Outgoing   Contact: Marline Powers Relation to patient:        User: SUSANA ALVARO Date/time: 10/4/2024 8:51 AM   Comment: I called and LVM for patient to call back and schedule.   Context: Lyndsey Recall Report Outcome:     Phone number: [phone] Phone type: Mobile   Comm. type: Telephone Call type: Outgoing   Contact: Marline Powers Relation to patient:

## 2025-01-14 NOTE — TELEPHONE ENCOUNTER
Patient called stating she received a certified letter to schedule her botox injection.  She stated she does not remember getting a call.  She did verified her phone number. [phone].      Patient scheduled appointment for first available on 07/01/25 at 9 am with DR. Silverman for cysto/botox injection. Nurse visit at 9 am.  Please review and see if ok to schedule.      Patient can be reached at [phone]

## 2025-01-17 ENCOUNTER — CONSULT (OUTPATIENT)
Dept: FAMILY MEDICINE CLINIC | Facility: CLINIC | Age: 70
End: 2025-01-17
Payer: COMMERCIAL

## 2025-01-17 VITALS
TEMPERATURE: 98.4 F | HEIGHT: 66 IN | WEIGHT: 227.2 LBS | OXYGEN SATURATION: 96 % | RESPIRATION RATE: 16 BRPM | DIASTOLIC BLOOD PRESSURE: 84 MMHG | HEART RATE: 80 BPM | SYSTOLIC BLOOD PRESSURE: 122 MMHG | BODY MASS INDEX: 36.52 KG/M2

## 2025-01-17 DIAGNOSIS — M19.011 PRIMARY OSTEOARTHRITIS OF RIGHT SHOULDER: ICD-10-CM

## 2025-01-17 DIAGNOSIS — Z01.818 PRE-OP EXAMINATION: Primary | ICD-10-CM

## 2025-01-17 PROCEDURE — 99213 OFFICE O/P EST LOW 20 MIN: CPT | Performed by: FAMILY MEDICINE

## 2025-01-17 PROCEDURE — 93000 ELECTROCARDIOGRAM COMPLETE: CPT | Performed by: FAMILY MEDICINE

## 2025-01-17 RX ORDER — CEPHALEXIN 500 MG/1
500 CAPSULE ORAL
COMMUNITY
Start: 2025-01-17

## 2025-01-17 NOTE — PATIENT INSTRUCTIONS
Pre-operative Medication Instructions    Avoid herbs or non-directed vitamins one week prior to surgery  Avoid aspirin containing medications or non-steroidal anti-inflammatory drugs one week preceding surgery  May take tylenol for pain up until the night before surgery    ACE Inhibitors or ARBs     Medication Name     lisinopril (ZESTRIL) 10 mg tablet      Continue this medication up to the evening before surgery/procedure, but do not take the morning of the day of surgery.    Antidepressant Meds     Medication Name     buPROPion (WELLBUTRIN XL) 150 mg 24 hr tablet     PARoxetine (PAXIL) 10 mg tablet     PARoxetine (PAXIL) 20 mg tablet      Continue to take this medication on your normal schedule.  If this is an oral medication and you take it in the morning, then you may take this medicine with a sip of water.    Seizure Medication     Medication Name     gabapentin (Neurontin) 100 mg capsule      Continue to take this medication on your normal schedule.  If this is an oral medication and you take it in the morning, then you may take this medicine with a sip of water.    Cholesterol lowering meds     Medication Name     simvastatin (ZOCOR) 40 mg tablet      Continue to take this medication on your normal schedule.  If this is an oral medication and you take it in the morning, then you may take this medicine with a sip of water.    Sleep Medication     Medication Name     eszopiclone (LUNESTA) 3 MG tablet      Continue this medication up to the evening before surgery/procedure, but do not take the morning of the day of surgery.    Thyroid Medications     Medication Name     levothyroxine 112 mcg tablet      Continue to take this medication on your normal schedule.  If this is an oral medication and you take it in the morning, then you may take this medicine with a sip of water.

## 2025-01-17 NOTE — PROGRESS NOTES
Pre-operative Clearance  Name: Marline Powers      : 1955      MRN: [id number]  Encounter Provider: Anderson Siegel DO  Encounter Date: 2025   Encounter department: [facility]    Assessment & Plan  Pre-op examination    Orders:    POCT ECG    Primary osteoarthritis of right shoulder           Pre-operative Clearance:     Revised Cardiac Risk Index:  RCI RISK CLASS II (1 risk factor, risk of major cardiac complications approximately 1.3%)    Clearance:  Patient is medically optimized (CLEARED) for proposed surgery without any additional cardiac testing.      Medication Instructions:   - ACE Inhibitors or ARBs: Continue this medication up to the evening before surgery/procedure, but do not take the morning of the day of surgery.  - Antidepressants: Continue to take this medication on your normal schedule.  - Antiepileptic meds: Continue to take this medication on your normal schedule.  - Hyperlipidemia meds: Continue to take this medication on your normal schedule.  - Sleep meds (zolpidem, zaleplon, eszopiclone): Continue taking medication up to the evening before procedure/surgery, but do not take this medication on the morning of procedure/surgery.  - Thyroid meds: Continue to take this medication on your normal schedule.       History of Present Illness     Pre-op Exam  Surgery: R Reverse Total Shoulder  Anticipated Date of Surgery: 2025  Surgeon: Dr Patel    Previous history of bleeding disorders or clots?: No  Previous Anesthesia reaction?: No  Prolonged steroid use in the last 6 months?: No    Assessment of Cardiac Risk:   - Unstable or severe angina or MI in the last 6 weeks or history of stent placement in the last year?: No   - Decompensated heart failure (e.g. New onset heart failure, NYHA  Class IV heart failure, or worsening existing heart failure)?: No  - Significant arrhythmias such as high grade AV block, symptomatic ventricular arrhythmia, newly recognized  ventricular tachycardia, supraventricular tachycardia with resting heart rate >100, or symptomatic bradycardia?: No  - Severe heart valve disease including aortic stenosis or symptomatic mitral stenosis?: No      Pre-operative Risk Factors:  Elevated-risk surgery: No    History of cerebrovascular disease: Yes  History of ischemic heart disease: No  Pre-operative treatment with insulin: No  Pre-operative creatinine >2 mg/dL: No    History of congestive heart failure: No    Review of Systems   Constitutional:  Negative for chills and fever.   HENT:  Negative for ear pain and sore throat.    Eyes:  Negative for pain and visual disturbance.   Respiratory:  Negative for cough and shortness of breath.    Cardiovascular:  Negative for chest pain and palpitations.   Gastrointestinal:  Negative for abdominal pain and vomiting.   Genitourinary:  Negative for dysuria and hematuria.   Musculoskeletal:  Positive for arthralgias (R shoulder pain). Negative for back pain.   Skin:  Negative for color change and rash.   Neurological:  Negative for seizures and syncope.   All other systems reviewed and are negative.    Past Medical History   Past Medical History:   Diagnosis Date    Allergic dermatitis     resolved 01/26/2016    Anxiety     Arthritis     Blind right eye     Cancer (HCC) 11/2021    Hysterectomy    Depression     Disease of thyroid gland     Encounter for cosmetic surgery 04/30/2018    Added automatically from request for surgery [id number]    Endometrial cancer (HCC) 03/28/2023    Headache(784.0)     Hyperlipidemia     Hypertension     Hypothyroidism (acquired)     hypothyroid    Incontinence of urine in female     Insomnia     Migraine     Morbid obesity (HCC)     Obesity     Peritonitis (HCC) 12/30/2021    TIA (transient ischemic attack)     questionable     Uterine cancer ([facility]) 11/2021    Visual impairment     Wears glasses      Past Surgical History:   Procedure Laterality Date    BARIATRIC SURGERY  2017    BODY LIFT  LOWER N/A 06/28/2018    Procedure: LOWER BODY LIFT;  Surgeon: Luis Felipe Chawla MD;  Location: [facility];  Service: Plastics    CATARACT EXTRACTION Left     CHOLECYSTECTOMY      COLONOSCOPY      CYSTOSCOPY N/A 11/16/2021    Procedure: CYSTOSCOPY;  Surgeon: Ben Stroud MD;  Location: [facility];  Service: Gynecology Oncology    ESOPHAGOGASTRODUODENOSCOPY N/A 03/27/2017    Procedure: ESOPHAGOGASTRODUODENOSCOPY (EGD);  Surgeon: Og Lara MD;  Location: [facility];  Service:     EYE SURGERY      HEMORROIDECTOMY      HYSTERECTOMY      IR IMAGE GUIDED ASPIRATION / DRAINAGE W TUBE  03/26/2019    JOINT REPLACEMENT      oswald TKR    MA EGD TRANSORAL BIOPSY SINGLE/MULTIPLE N/A 03/08/2017    Procedure: ESOPHAGOGASTRODUODENOSCOPY (EGD);  Surgeon: Og Lara MD;  Location: [facility];  Service: Bariatrics    MA EXCISION EXCESSIVE SKIN & SUBQ TISSUE THIGH Bilateral 01/10/2019    Procedure: THIGH LIFT;  Surgeon: Luis Felipe Chawla MD;  Location: [facility];  Service: Plastics    MA INJECTION PROCEDURE LYMPHANGIOGRAPHY  11/16/2021    Procedure: LYMPHOGRAPHY WITH INDOCYANINE GREEN (ICG);  Surgeon: Ben Stroud MD;  [facility]: [facility];  Service: Gynecology Oncology    MA LAPS GSTRC RSTRICTIV PX LONGITUDINAL GASTRECTOMY N/A 03/27/2017    Procedure: GASTRECTOMY SLEEVE LAPAROSCOPIC;  Surgeon: Og Lara MD;  [facility];  Service: Bariatrics    MA LAPS TOTAL HYSTERECT 250 GM/< W/RMVL TUBE/OVARY N/A 11/16/2021    Procedure: HYSTERECTOMY LAPAROSCOPIC TOTAL (LTH) W/ ROBOTICS, BILATERAL SALPINGO-OOPHORECTOMY, BL SENTINAL LYMPH NODE DISSECTION BIOPSY;  Surgeon: Ben Stroud MD;  [facility];  Service: Gynecology Oncology    ROTATOR CUFF REPAIR Right     SLEEVE GASTROPLASTY  03/27/2017    TOTAL BODY LIFT       Family History   Problem Relation Age of Onset    Diabetes Mother     Skin cancer Mother     Arthritis Mother     Cancer Mother         Cervical, lung, liver, spine    Lung cancer  Mother     Alzheimer's disease Father     Heart failure Father     Diabetes Father     Dementia Father     Cancer Father         Skin    Stroke Father     Breast cancer Maternal Grandmother         adenocarcinoma in situ in villous adenoma of the breast     Social History     Tobacco Use    Smoking status: Never     Passive exposure: Never    Smokeless tobacco: Never   Vaping Use    Vaping status: Never Used   Substance and Sexual Activity    Alcohol use: No    Drug use: No    Sexual activity: Not Currently     Partners: Male     Current Outpatient Medications on File Prior to Visit   Medication Sig    aspirin 81 MG tablet Take 1 tablet by mouth daily for 30 days    betamethasone valerate (VALISONE) 0.1 % cream Apply topically 2 (two) times a day    buPROPion (WELLBUTRIN XL) 150 mg 24 hr tablet TAKE 1 TABLET EVERY MORNING    calcium citrate-vitamin D (CITRACAL+D) 315-200 MG-UNIT per tablet Take by mouth daily     cephalexin (KEFLEX) 500 mg capsule Take 500 mg by mouth    eszopiclone (LUNESTA) 3 MG tablet TAKE ONE TABLET BY MOUTH EVERY DAY AT BEDTIME    gabapentin (Neurontin) 100 mg capsule Start with 1 pills at bedtime for 2-3 nights, increase to 2 pills at night for next 2 nights, then 3 pills at bedtime thereafter    levothyroxine 112 mcg tablet TAKE 1 TABLET DAILY    lisinopril (ZESTRIL) 10 mg tablet TAKE 1 TABLET DAILY    mometasone (ELOCON) 0.1 % ointment Apply nightly to affected area 2-3x a week    Multiple Vitamins-Minerals (CENTRUM WOMEN) TABS Take by mouth Goes between bariatric multi and Centrum    PARoxetine (PAXIL) 10 mg tablet TAKE 1 TABLET DAILY (ADDED TO 20 MG)    PARoxetine (PAXIL) 20 mg tablet TAKE 1 TABLET DAILY    rizatriptan (MAXALT) 10 mg tablet TAKE 1 TABLET DAILY AS NEEDED FOR MIGRAINE    simvastatin (ZOCOR) 40 mg tablet TAKE 1 TABLET EVERY EVENING     No Known Allergies  Objective   /84 (BP Location: Left arm, Patient Position: Sitting, Cuff Size: Large)   Pulse 80   Temp 98.4 °F  "(36.9 °C) (Tympanic)   Resp 16   Ht 5' 6.25\" (1.683 m)   Wt 103 kg (227 lb 3.2 oz)   SpO2 96%   BMI 36.39 kg/m²     Physical Exam  Vitals and nursing note reviewed.   Constitutional:       General: She is not in acute distress.     Appearance: Normal appearance.   HENT:      Head: Normocephalic and atraumatic.      Right Ear: Tympanic membrane and external ear normal.      Left Ear: Tympanic membrane and external ear normal.      Nose: Nose normal.      Mouth/Throat:      Mouth: Mucous membranes are moist.   Eyes:      Extraocular Movements: Extraocular movements intact.      Conjunctiva/sclera: Conjunctivae normal.      Pupils: Pupils are equal, round, and reactive to light.   Cardiovascular:      Rate and Rhythm: Normal rate and regular rhythm.      Pulses: Normal pulses.      Heart sounds: No murmur heard.  Pulmonary:      Effort: Pulmonary effort is normal.      Breath sounds: Normal breath sounds. No wheezing, rhonchi or rales.   Abdominal:      General: Bowel sounds are normal.      Palpations: Abdomen is soft.      Tenderness: There is no abdominal tenderness. There is no guarding.   Musculoskeletal:         General: Normal range of motion.      Cervical back: Normal range of motion.      Right lower leg: No edema.      Left lower leg: No edema.   Lymphadenopathy:      Cervical: No cervical adenopathy.   Skin:     General: Skin is warm.      Capillary Refill: Capillary refill takes less than 2 seconds.   Neurological:      General: No focal deficit present.      Mental Status: She is alert and oriented to person, place, and time.   Psychiatric:         Mood and Affect: Mood normal.         Behavior: Behavior normal.           Anderson Siegel, DO  "

## 2025-01-29 DIAGNOSIS — F41.1 GENERALIZED ANXIETY DISORDER: ICD-10-CM

## 2025-01-29 RX ORDER — PAROXETINE 20 MG/1
20 TABLET, FILM COATED ORAL DAILY
Qty: 90 TABLET | Refills: 1 | Status: SHIPPED | OUTPATIENT
Start: 2025-01-29

## 2025-02-25 DIAGNOSIS — F32.0 CURRENT MILD EPISODE OF MAJOR DEPRESSIVE DISORDER WITHOUT PRIOR EPISODE (HCC): ICD-10-CM

## 2025-02-25 DIAGNOSIS — F41.1 GENERALIZED ANXIETY DISORDER: ICD-10-CM

## 2025-02-25 DIAGNOSIS — F43.21 GRIEVING: ICD-10-CM

## 2025-02-25 RX ORDER — PAROXETINE 10 MG/1
TABLET, FILM COATED ORAL
Qty: 90 TABLET | Refills: 1 | Status: SHIPPED | OUTPATIENT
Start: 2025-02-25

## 2025-02-25 RX ORDER — BUPROPION HYDROCHLORIDE 150 MG/1
150 TABLET ORAL EVERY MORNING
Qty: 90 TABLET | Refills: 1 | Status: SHIPPED | OUTPATIENT
Start: 2025-02-25

## 2025-03-27 ENCOUNTER — RA CDI HCC (OUTPATIENT)
Dept: OTHER | Facility: HOSPITAL | Age: 70
End: 2025-03-27

## 2025-04-02 ENCOUNTER — OFFICE VISIT (OUTPATIENT)
Dept: FAMILY MEDICINE CLINIC | Facility: CLINIC | Age: 70
End: 2025-04-02
Payer: COMMERCIAL

## 2025-04-02 VITALS
WEIGHT: 205.2 LBS | OXYGEN SATURATION: 94 % | RESPIRATION RATE: 18 BRPM | SYSTOLIC BLOOD PRESSURE: 114 MMHG | TEMPERATURE: 98.6 F | HEIGHT: 66 IN | DIASTOLIC BLOOD PRESSURE: 80 MMHG | HEART RATE: 101 BPM | BODY MASS INDEX: 32.98 KG/M2

## 2025-04-02 DIAGNOSIS — E66.09 CLASS 1 OBESITY DUE TO EXCESS CALORIES WITH SERIOUS COMORBIDITY AND BODY MASS INDEX (BMI) OF 33.0 TO 33.9 IN ADULT: ICD-10-CM

## 2025-04-02 DIAGNOSIS — I10 ESSENTIAL HYPERTENSION: ICD-10-CM

## 2025-04-02 DIAGNOSIS — R73.01 IFG (IMPAIRED FASTING GLUCOSE): ICD-10-CM

## 2025-04-02 DIAGNOSIS — E78.2 MIXED HYPERLIPIDEMIA: ICD-10-CM

## 2025-04-02 DIAGNOSIS — E66.811 CLASS 1 OBESITY DUE TO EXCESS CALORIES WITH SERIOUS COMORBIDITY AND BODY MASS INDEX (BMI) OF 33.0 TO 33.9 IN ADULT: ICD-10-CM

## 2025-04-02 DIAGNOSIS — E03.9 HYPOTHYROIDISM (ACQUIRED): ICD-10-CM

## 2025-04-02 DIAGNOSIS — E53.8 VITAMIN B12 DEFICIENCY: ICD-10-CM

## 2025-04-02 DIAGNOSIS — R79.89 LOW VITAMIN B12 LEVEL: ICD-10-CM

## 2025-04-02 DIAGNOSIS — F32.0 CURRENT MILD EPISODE OF MAJOR DEPRESSIVE DISORDER WITHOUT PRIOR EPISODE (HCC): ICD-10-CM

## 2025-04-02 DIAGNOSIS — Z00.00 MEDICARE ANNUAL WELLNESS VISIT, SUBSEQUENT: Primary | ICD-10-CM

## 2025-04-02 PROCEDURE — G0439 PPPS, SUBSEQ VISIT: HCPCS | Performed by: FAMILY MEDICINE

## 2025-04-02 PROCEDURE — 99214 OFFICE O/P EST MOD 30 MIN: CPT | Performed by: FAMILY MEDICINE

## 2025-04-02 PROCEDURE — G2211 COMPLEX E/M VISIT ADD ON: HCPCS | Performed by: FAMILY MEDICINE

## 2025-04-02 NOTE — PATIENT INSTRUCTIONS
Medicare Preventive Visit Patient Instructions  Thank you for completing your Welcome to Medicare Visit or Medicare Annual Wellness Visit today. Your next wellness visit will be due in one year (4/3/2026).  The screening/preventive services that you may require over the next 5-10 years are detailed below. Some tests may not apply to you based off risk factors and/or age. Screening tests ordered at today's visit but not completed yet may show as past due. Also, please note that scanned in results may not display below.  Preventive Screenings:  Service Recommendations Previous Testing/Comments   Colorectal Cancer Screening  * Colonoscopy    * Fecal Occult Blood Test (FOBT)/Fecal Immunochemical Test (FIT)  * Fecal DNA/Cologuard Test  * Flexible Sigmoidoscopy Age: 45-75 years old   Colonoscopy: every 10 years (may be performed more frequently if at higher risk)  OR  FOBT/FIT: every 1 year  OR  Cologuard: every 3 years  OR  Sigmoidoscopy: every 5 years  Screening may be recommended earlier than age 45 if at higher risk for colorectal cancer. Also, an individualized decision between you and your healthcare provider will decide whether screening between the ages of 76-85 would be appropriate. Colonoscopy: 04/28/2022  FOBT/FIT: Not on file  Cologuard: Not on file  Sigmoidoscopy: Not on file    Screening Current     Breast Cancer Screening Age: 40+ years old  Frequency: every 1-2 years  Not required if history of left and right mastectomy Mammogram: 09/10/2024    Screening Current   Cervical Cancer Screening Between the ages of 21-29, pap smear recommended once every 3 years.   Between the ages of 30-65, can perform pap smear with HPV co-testing every 5 years.   Recommendations may differ for women with a history of total hysterectomy, cervical cancer, or abnormal pap smears in past. Pap Smear: 10/02/2024    Screening Not Indicated   Hepatitis C Screening Once for adults born between 1945 and 1965  More frequently in  patients at high risk for Hepatitis C Hep C Antibody: 03/27/2018    Screening Current   Diabetes Screening 1-2 times per year if you're at risk for diabetes or have pre-diabetes Fasting glucose: 127 mg/dL (10/5/2024)  A1C: 5.8 % (10/5/2024)  Screening Current   Cholesterol Screening Once every 5 years if you don't have a lipid disorder. May order more often based on risk factors. Lipid panel: 10/05/2024    Screening Not Indicated  History Lipid Disorder     Other Preventive Screenings Covered by Medicare:  Abdominal Aortic Aneurysm (AAA) Screening: covered once if your at risk. You're considered to be at risk if you have a family history of AAA.  Lung Cancer Screening: covers low dose CT scan once per year if you meet all of the following conditions: (1) Age 55-77; (2) No signs or symptoms of lung cancer; (3) Current smoker or have quit smoking within the last 15 years; (4) You have a tobacco smoking history of at least 20 pack years (packs per day multiplied by number of years you smoked); (5) You get a written order from a healthcare provider.  Glaucoma Screening: covered annually if you're considered high risk: (1) You have diabetes OR (2) Family history of glaucoma OR (3)  aged 50 and older OR (4)  American aged 65 and older  Osteoporosis Screening: covered every 2 years if you meet one of the following conditions: (1) You're estrogen deficient and at risk for osteoporosis based off medical history and other findings; (2) Have a vertebral abnormality; (3) On glucocorticoid therapy for more than 3 months; (4) Have primary hyperparathyroidism; (5) On osteoporosis medications and need to assess response to drug therapy.   Last bone density test (DXA Scan): 08/04/2022.  HIV Screening: covered annually if you're between the age of 15-65. Also covered annually if you are younger than 15 and older than 65 with risk factors for HIV infection. For pregnant patients, it is covered up to 3 times per  pregnancy.    Immunizations:  Immunization Recommendations   Influenza Vaccine Annual influenza vaccination during flu season is recommended for all persons aged >= 6 months who do not have contraindications   Pneumococcal Vaccine   * Pneumococcal conjugate vaccine = PCV13 (Prevnar 13), PCV15 (Vaxneuvance), PCV20 (Prevnar 20)  * Pneumococcal polysaccharide vaccine = PPSV23 (Pneumovax) Adults 19-65 yo with certain risk factors or if 65+ yo  If never received any pneumonia vaccine: recommend Prevnar 20 (PCV20)  Give PCV20 if previously received 1 dose of PCV13 or PPSV23   Hepatitis B Vaccine 3 dose series if at intermediate or high risk (ex: diabetes, end stage renal disease, liver disease)   Respiratory syncytial virus (RSV) Vaccine - COVERED BY MEDICARE PART D  * RSVPreF3 (Arexvy) CDC recommends that adults 60 years of age and older may receive a single dose of RSV vaccine using shared clinical decision-making (SCDM)   Tetanus (Td) Vaccine - COST NOT COVERED BY MEDICARE PART B Following completion of primary series, a booster dose should be given every 10 years to maintain immunity against tetanus. Td may also be given as tetanus wound prophylaxis.   Tdap Vaccine - COST NOT COVERED BY MEDICARE PART B Recommended at least once for all adults. For pregnant patients, recommended with each pregnancy.   Shingles Vaccine (Shingrix) - COST NOT COVERED BY MEDICARE PART B  2 shot series recommended in those 19 years and older who have or will have weakened immune systems or those 50 years and older     Health Maintenance Due:      Topic Date Due   • Colorectal Cancer Screening  04/28/2025   • Breast Cancer Screening: Mammogram  09/10/2025   • Hepatitis C Screening  Completed     Immunizations Due:  There are no preventive care reminders to display for this patient.  Advance Directives   What are advance directives?  Advance directives are legal documents that state your wishes and plans for medical care. These plans are made  ahead of time in case you lose your ability to make decisions for yourself. Advance directives can apply to any medical decision, such as the treatments you want, and if you want to donate organs.   What are the types of advance directives?  There are many types of advance directives, and each state has rules about how to use them. You may choose a combination of any of the following:  Living will:  This is a written record of the treatment you want. You can also choose which treatments you do not want, which to limit, and which to stop at a certain time. This includes surgery, medicine, IV fluid, and tube feedings.   Durable power of  for healthcare (DPAHC):  This is a written record that states who you want to make healthcare choices for you when you are unable to make them for yourself. This person, called a proxy, is usually a family member or a friend. You may choose more than 1 proxy.  Do not resuscitate (DNR) order:  A DNR order is used in case your heart stops beating or you stop breathing. It is a request not to have certain forms of treatment, such as CPR. A DNR order may be included in other types of advance directives.  Medical directive:  This covers the care that you want if you are in a coma, near death, or unable to make decisions for yourself. You can list the treatments you want for each condition. Treatment may include pain medicine, surgery, blood transfusions, dialysis, IV or tube feedings, and a ventilator (breathing machine).  Values history:  This document has questions about your views, beliefs, and how you feel and think about life. This information can help others choose the care that you would choose.  Why are advance directives important?  An advance directive helps you control your care. Although spoken wishes may be used, it is better to have your wishes written down. Spoken wishes can be misunderstood, or not followed. Treatments may be given even if you do not want them. An  advance directive may make it easier for your family to make difficult choices about your care.   Urinary Incontinence   Urinary incontinence (UI)  is when you lose control of your bladder. UI develops because your bladder cannot store or empty urine properly. The 3 most common types of UI are stress incontinence, urge incontinence, or both.  Medicines:   May be given to help strengthen your bladder control. Report any side effects of medication to your healthcare provider.  Do pelvic muscle exercises often:  Your pelvic muscles help you stop urinating. Squeeze these muscles tight for 5 seconds, then relax for 5 seconds. Gradually work up to squeezing for 10 seconds. Do 3 sets of 15 repetitions a day, or as directed. This will help strengthen your pelvic muscles and improve bladder control.  Train your bladder:  Go to the bathroom at set times, such as every 2 hours, even if you do not feel the urge to go. You can also try to hold your urine when you feel the urge to go. For example, hold your urine for 5 minutes when you feel the urge to go. As that becomes easier, hold your urine for 10 minutes.   Self-care:   Keep a UI record.  Write down how often you leak urine and how much you leak. Make a note of what you were doing when you leaked urine.  Drink liquids as directed. You may need to limit the amount of liquid you drink to help control your urine leakage. Do not drink any liquid right before you go to bed. Limit or do not have drinks that contain caffeine or alcohol.   Prevent constipation.  Eat a variety of high-fiber foods. Good examples are high-fiber cereals, beans, vegetables, and whole-grain breads. Walking is the best way to trigger your intestines to have a bowel movement.  Exercise regularly and maintain a healthy weight.  Weight loss and exercise will decrease pressure on your bladder and help you control your leakage.   Use a catheter as directed  to help empty your bladder. A catheter is a tiny,  plastic tube that is put into your bladder to drain your urine.   Go to behavior therapy as directed.  Behavior therapy may be used to help you learn to control your urge to urinate.    Weight Management   Why it is important to manage your weight:  Being overweight increases your risk of health conditions such as heart disease, high blood pressure, type 2 diabetes, and certain types of cancer. It can also increase your risk for osteoarthritis, sleep apnea, and other respiratory problems. Aim for a slow, steady weight loss. Even a small amount of weight loss can lower your risk of health problems.  How to lose weight safely:  A safe and healthy way to lose weight is to eat fewer calories and get regular exercise. You can lose up about 1 pound a week by decreasing the number of calories you eat by 500 calories each day.   Healthy meal plan for weight management:  A healthy meal plan includes a variety of foods, contains fewer calories, and helps you stay healthy. A healthy meal plan includes the following:  Eat whole-grain foods more often.  A healthy meal plan should contain fiber. Fiber is the part of grains, fruits, and vegetables that is not broken down by your body. Whole-grain foods are healthy and provide extra fiber in your diet. Some examples of whole-grain foods are whole-wheat breads and pastas, oatmeal, brown rice, and bulgur.  Eat a variety of vegetables every day.  Include dark, leafy greens such as spinach, kale, deuce greens, and mustard greens. Eat yellow and orange vegetables such as carrots, sweet potatoes, and winter squash.   Eat a variety of fruits every day.  Choose fresh or canned fruit (canned in its own juice or light syrup) instead of juice. Fruit juice has very little or no fiber.  Eat low-fat dairy foods.  Drink fat-free (skim) milk or 1% milk. Eat fat-free yogurt and low-fat cottage cheese. Try low-fat cheeses such as mozzarella and other reduced-fat cheeses.  Choose meat and other  protein foods that are low in fat.  Choose beans or other legumes such as split peas or lentils. Choose fish, skinless poultry (chicken or turkey), or lean cuts of red meat (beef or pork). Before you cook meat or poultry, cut off any visible fat.   Use less fat and oil.  Try baking foods instead of frying them. Add less fat, such as margarine, sour cream, regular salad dressing and mayonnaise to foods. Eat fewer high-fat foods. Some examples of high-fat foods include french fries, doughnuts, ice cream, and cakes.  Eat fewer sweets.  Limit foods and drinks that are high in sugar. This includes candy, cookies, regular soda, and sweetened drinks.  Exercise:  Exercise at least 30 minutes per day on most days of the week. Some examples of exercise include walking, biking, dancing, and swimming. You can also fit in more physical activity by taking the stairs instead of the elevator or parking farther away from stores. Ask your healthcare provider about the best exercise plan for you.      © Copyright [employer] 2018 Information is for End User's use only and may not be sold, redistributed or otherwise used for commercial purposes. All illustrations and images included in CareNotes® are the copyrighted property of A.D.A.M., Inc. or [employer]

## 2025-04-02 NOTE — ASSESSMENT & PLAN NOTE
Has mammogram scheduled      Patient's spouse called asking about follow up appointment for patient  due too patient was in hospital last week. Writer told patient spouse Isrrael Case, will speak to patient's coordinator and we will contact patient back if an appointment is needed and labs. Writer explained request from patient spouse to Pam-coordinator, per Noemí Mireles, will contact patient  Today.

## 2025-04-02 NOTE — PROGRESS NOTES
Name: Marline Powers      : 1955      MRN: [id number]  Encounter Provider: Saranya Cantrell DO  Encounter Date: 2025   Encounter department: [facility]    Assessment & Plan  Medicare annual wellness visit, subsequent  Has mammogram scheduled       Low vitamin B12 level         Current mild episode of major depressive disorder without prior episode (HCC)           Class 1 obesity due to excess calories with serious comorbidity and body mass index (BMI) of 33.0 to 33.9 in adult           Mixed hyperlipidemia    Orders:  •  Lipid Panel with Direct LDL reflex; Future    Essential hypertension    Orders:  •  CBC; Future  •  Comprehensive metabolic panel; Future    IFG (impaired fasting glucose)    Orders:  •  Hemoglobin A1C; Future    Hypothyroidism (acquired)    Orders:  •  TSH, 3rd generation with Free T4 reflex; Future    Vitamin B12 deficiency    Orders:  •  Vitamin B12; Future      Depression Screening and Follow-up Plan: Patient was screened for depression during today's encounter. They screened negative with a PHQ-9 score of 2.        Preventive health issues were discussed with patient, and age appropriate screening tests were ordered as noted in patient's After Visit Summary. Personalized health advice and appropriate referrals for health education or preventive services given if needed, as noted in patient's After Visit Summary.    History of Present Illness     Here for wellness  Lost 30 pounds since surgery  Shoulder still has decreased ROM       Patient Care Team:  Saranya Cantrell DO as PCP - General  MD Luis Felipe Layton MD Tiana Shekari, DO Richard Kolecki, MD Leonardo Claros, MD Jenna Lauren Synnamon, PA-C (Gynecologic Oncology)    Review of Systems   Constitutional: Negative.    HENT: Negative.     Eyes: Negative.    Respiratory: Negative.     Cardiovascular: Negative.    Gastrointestinal: Negative.    Endocrine: Negative.    Genitourinary: Negative.     Musculoskeletal:  Positive for arthralgias (shoulder pain).   Allergic/Immunologic: Negative.    Neurological: Negative.    Hematological: Negative.    Psychiatric/Behavioral: Negative.       Medical History Reviewed by provider this encounter:  Tobacco  Allergies  Meds  Problems  Med Hx  Surg Hx  Fam Hx       Annual Wellness Visit Questionnaire   Marline is here for her Subsequent Wellness visit.     Health Risk Assessment:   Patient rates overall health as very good. Patient feels that their physical health rating is same. Patient is very satisfied with their life. Eyesight was rated as same. Hearing was rated as same. Patient feels that their emotional and mental health rating is same. Patients states they are never, rarely angry. Patient states they are sometimes unusually tired/fatigued. Pain experienced in the last 7 days has been none. Patient states that she has experienced no weight loss or gain in last 6 months.     Depression Screening:   PHQ-9 Score: 2      Fall Risk Screening:   In the past year, patient has experienced: no history of falling in past year      Urinary Incontinence Screening:   Patient has not leaked urine accidently in the last six months.     Home Safety:  Patient does not have trouble with stairs inside or outside of their home. Patient has working smoke alarms and has working carbon monoxide detector. Home safety hazards include: none.     Nutrition:   Current diet is Regular, Low Cholesterol and No Added Salt.     Medications:   Patient is currently taking over-the-counter supplements. OTC medications include: see medication list. Patient is able to manage medications.     Activities of Daily Living (ADLs)/Instrumental Activities of Daily Living (IADLs):   Walk and transfer into and out of bed and chair?: Yes  Dress and groom yourself?: Yes    Bathe or shower yourself?: Yes    Feed yourself? Yes  Do your laundry/housekeeping?: Yes  Manage your money, pay your bills and track  your expenses?: Yes  Make your own meals?: Yes    Do your own shopping?: Yes    Previous Hospitalizations:   Any hospitalizations or ED visits within the last 12 months?: No      Advance Care Planning:   Living will: No    Durable POA for healthcare: No    Advanced directive: No      Cognitive Screening:   Provider or family/friend/caregiver concerned regarding cognition?: No    PREVENTIVE SCREENINGS      Cardiovascular Screening:    General: Screening Not Indicated and History Lipid Disorder      Diabetes Screening:     General: Screening Current      Colorectal Cancer Screening:     General: Screening Current      Breast Cancer Screening:     General: Screening Current      Cervical Cancer Screening:    General: Screening Not Indicated      Lung Cancer Screening:     General: Screening Not Indicated      Hepatitis C Screening:    General: Screening Current    Screening, Brief Intervention, and Referral to Treatment (SBIRT)     Screening  Typical number of drinks in a day: 0  Typical number of drinks in a week: 0  Interpretation: Low risk drinking behavior.    AUDIT-C Screenin) How often did you have a drink containing alcohol in the past year? never  2) How many drinks did you have on a typical day when you were drinking in the past year? 0  3) How often did you have 6 or more drinks on one occasion in the past year? never    AUDIT-C Score: 0  Interpretation: Score 0-2 (female): Negative screen for alcohol misuse    Single Item Drug Screening:  How often have you used an illegal drug (including marijuana) or a prescription medication for non-medical reasons in the past year? never    Single Item Drug Screen Score: 0  Interpretation: Negative screen for possible drug use disorder    Social Drivers of Health     Financial Resource Strain: Not At Risk (2025)    Received from [facility]    Financial Insecurity    • In the last 12 months did you skip medications to save money?: No    • In  "the last 12 months was there a time when you needed to see a doctor but could not because of cost?: No   Food Insecurity: No Food Insecurity (4/2/2025)    Hunger Vital Sign    • Worried About Running Out of Food in the Last Year: Never true    • Ran Out of Food in the Last Year: Never true   Transportation Needs: No Transportation Needs (4/2/2025)    PRAPARE - Transportation    • Lack of Transportation (Medical): No    • Lack of Transportation (Non-Medical): No   Housing Stability: Low Risk  (4/2/2025)    Housing Stability Vital Sign    • Unable to Pay for Housing in the Last Year: No    • Number of Times Moved in the Last Year: 0    • Homeless in the Last Year: No   Utilities: Not At Risk (4/2/2025)    [facility] Utilities    • Threatened with loss of utilities: No     No results found.    Objective   /80 (BP Location: Left arm, Patient Position: Sitting, Cuff Size: Large)   Pulse 101   Temp 98.6 °F (37 °C) (Tympanic)   Resp 18   Ht 5' 6\" (1.676 m)   Wt 93.1 kg (205 lb 3.2 oz)   SpO2 94%   BMI 33.12 kg/m²     Physical Exam  Vitals and nursing note reviewed.   Constitutional:       Appearance: She is well-developed.   HENT:      Head: Normocephalic and atraumatic.      Right Ear: External ear normal.      Left Ear: External ear normal.      Nose: Nose normal.   Eyes:      Conjunctiva/sclera: Conjunctivae normal.      Pupils: Pupils are equal, round, and reactive to light.   Cardiovascular:      Rate and Rhythm: Normal rate and regular rhythm.      Heart sounds: Normal heart sounds.   Pulmonary:      Effort: Pulmonary effort is normal.      Breath sounds: Normal breath sounds.   Abdominal:      General: Bowel sounds are normal.      Palpations: Abdomen is soft.   Musculoskeletal:         General: Normal range of motion.      Cervical back: Normal range of motion and neck supple.   Skin:     General: Skin is warm and dry.      Capillary Refill: Capillary refill takes less than 2 seconds.   Neurological:      " Mental Status: She is alert and oriented to person, place, and time.   Psychiatric:         Behavior: Behavior normal.         Thought Content: Thought content normal.         Judgment: Judgment normal.

## 2025-04-07 DIAGNOSIS — G62.9 NEUROPATHY: ICD-10-CM

## 2025-04-07 RX ORDER — GABAPENTIN 100 MG/1
CAPSULE ORAL
Qty: 270 CAPSULE | Refills: 3 | Status: SHIPPED | OUTPATIENT
Start: 2025-04-07

## 2025-04-08 ENCOUNTER — TELEPHONE (OUTPATIENT)
Age: 70
End: 2025-04-08

## 2025-04-08 NOTE — TELEPHONE ENCOUNTER
PA for GABAPENTIN 100MG SUBMITTED to [employer]    via    [x]CMM-KEY: [id number]  []Surescripts-Case ID #   []Availity-Auth ID # NDC #   []Faxed to plan   []Other website   []Phone call Case ID #     [x]PA sent as URGENT    All office notes, labs and other pertaining documents and studies sent. Clinical questions answered. Awaiting determination from insurance company.     Turnaround time for your insurance to make a decision on your Prior Authorization can take 7-21 business days.

## 2025-04-08 NOTE — TELEPHONE ENCOUNTER
PA for GABAPENTIN 100MG APPROVED     Date(s) approved 4/8/25-NO END DATE    Case # [id number]    Patient advised by          [x]MyChart Message  []Phone call   []LMOM  []L/M to call office as no active Communication consent on file  []Unable to leave detailed message as VM not approved on Communication consent       Pharmacy advised by    [x]Fax  []Phone call  []Secure Chat    Specialty Pharmacy    []     Approval letter scanned into Media Yes

## 2025-04-30 ENCOUNTER — PREP FOR PROCEDURE (OUTPATIENT)
Age: 70
End: 2025-04-30

## 2025-04-30 ENCOUNTER — TELEPHONE (OUTPATIENT)
Age: 70
End: 2025-04-30

## 2025-04-30 DIAGNOSIS — Z12.11 SCREENING FOR COLON CANCER: ICD-10-CM

## 2025-04-30 DIAGNOSIS — Z86.0100 HISTORY OF COLON POLYPS: Primary | ICD-10-CM

## 2025-04-30 NOTE — TELEPHONE ENCOUNTER
Scheduled date of colonoscopy (as of today):6/16/2025  Physician performing colonoscopy:Dr Garcia  Location of colonoscopy:[place]  Bowel prep reviewed with patient:Miralax/Dulcolax  Instructions reviewed with patient by:sent via letter  Clearances: N/A

## 2025-04-30 NOTE — LETTER
Attached are your prep instructions for your upcoming procedure on 6/16/2025. If you have any questions or concerns please contact us at [phone].              Thank you,      [facility]

## 2025-04-30 NOTE — TELEPHONE ENCOUNTER
04/30/25  Screened by: Sheri Fuchs    Referring Provider     Pre- Screening:     There is no height or weight on file to calculate BMI.205lbs 33.12  Has patient been referred for a routine screening Colonoscopy? yes  Is the patient between 45-75 years old? yes      Previous Colonoscopy yes   If yes:3 years    Date:     Facility:     Reason:       Does the patient want to see a Gastroenterologist prior to their procedure OR are they having any GI symptoms? no    Has the patient been hospitalized or had abdominal surgery in the past 6 months? no    Does the patient use supplemental oxygen? no    Does the patient take Coumadin, Lovenox, Plavix, Elliquis, Xarelto, or other blood thinning medication? no    Has the patient had a stroke, cardiac event, or stent placed in the past year? no        If patient is between 45yrs - 49yrs, please advise patient that we will have to confirm benefits & coverage with their insurance company for a routine screening colonoscopy.

## 2025-05-02 PROBLEM — Z00.00 MEDICARE ANNUAL WELLNESS VISIT, SUBSEQUENT: Status: RESOLVED | Noted: 2019-03-06 | Resolved: 2025-05-02

## 2025-05-09 ENCOUNTER — TELEPHONE (OUTPATIENT)
Age: 70
End: 2025-05-09

## 2025-05-26 DIAGNOSIS — E03.9 HYPOTHYROIDISM, UNSPECIFIED TYPE: ICD-10-CM

## 2025-05-26 RX ORDER — LEVOTHYROXINE SODIUM 112 UG/1
112 TABLET ORAL DAILY
Qty: 90 TABLET | Refills: 1 | Status: SHIPPED | OUTPATIENT
Start: 2025-05-26

## 2025-06-05 ENCOUNTER — APPOINTMENT (OUTPATIENT)
Dept: LAB | Facility: AMBULARY SURGERY CENTER | Age: 70
End: 2025-06-05
Attending: FAMILY MEDICINE
Payer: COMMERCIAL

## 2025-06-05 DIAGNOSIS — I10 ESSENTIAL HYPERTENSION: ICD-10-CM

## 2025-06-05 DIAGNOSIS — E53.8 VITAMIN B12 DEFICIENCY: ICD-10-CM

## 2025-06-05 DIAGNOSIS — R73.01 IFG (IMPAIRED FASTING GLUCOSE): ICD-10-CM

## 2025-06-05 DIAGNOSIS — E78.2 MIXED HYPERLIPIDEMIA: ICD-10-CM

## 2025-06-05 DIAGNOSIS — E03.9 HYPOTHYROIDISM (ACQUIRED): ICD-10-CM

## 2025-06-05 LAB
ALBUMIN SERPL BCG-MCNC: 4.2 G/DL (ref 3.5–5)
ALP SERPL-CCNC: 72 U/L (ref 34–104)
ALT SERPL W P-5'-P-CCNC: 14 U/L (ref 7–52)
ANION GAP SERPL CALCULATED.3IONS-SCNC: 10 MMOL/L (ref 4–13)
AST SERPL W P-5'-P-CCNC: 20 U/L (ref 13–39)
BILIRUB SERPL-MCNC: 0.75 MG/DL (ref 0.2–1)
BUN SERPL-MCNC: 20 MG/DL (ref 5–25)
CALCIUM SERPL-MCNC: 9.7 MG/DL (ref 8.4–10.2)
CHLORIDE SERPL-SCNC: 105 MMOL/L (ref 96–108)
CHOLEST SERPL-MCNC: 164 MG/DL (ref ?–200)
CO2 SERPL-SCNC: 25 MMOL/L (ref 21–32)
CREAT SERPL-MCNC: 0.89 MG/DL (ref 0.6–1.3)
ERYTHROCYTE [DISTWIDTH] IN BLOOD BY AUTOMATED COUNT: 14.4 % (ref 11.6–15.1)
EST. AVERAGE GLUCOSE BLD GHB EST-MCNC: 114 MG/DL
GFR SERPL CREATININE-BSD FRML MDRD: 65 ML/MIN/1.73SQ M
GLUCOSE P FAST SERPL-MCNC: 84 MG/DL (ref 65–99)
HBA1C MFR BLD: 5.6 %
HCT VFR BLD AUTO: 40 % (ref 34.8–46.1)
HDLC SERPL-MCNC: 61 MG/DL
HGB BLD-MCNC: 12.6 G/DL (ref 11.5–15.4)
LDLC SERPL CALC-MCNC: 84 MG/DL (ref 0–100)
MCH RBC QN AUTO: 29.1 PG (ref 26.8–34.3)
MCHC RBC AUTO-ENTMCNC: 31.5 G/DL (ref 31.4–37.4)
MCV RBC AUTO: 92 FL (ref 82–98)
PLATELET # BLD AUTO: 341 THOUSANDS/UL (ref 149–390)
PMV BLD AUTO: 10.8 FL (ref 8.9–12.7)
POTASSIUM SERPL-SCNC: 4.3 MMOL/L (ref 3.5–5.3)
PROT SERPL-MCNC: 7.4 G/DL (ref 6.4–8.4)
RBC # BLD AUTO: 4.33 MILLION/UL (ref 3.81–5.12)
SODIUM SERPL-SCNC: 140 MMOL/L (ref 135–147)
TRIGL SERPL-MCNC: 93 MG/DL (ref ?–150)
TSH SERPL DL<=0.05 MIU/L-ACNC: 1.76 UIU/ML (ref 0.45–4.5)
VIT B12 SERPL-MCNC: 636 PG/ML (ref 180–914)
WBC # BLD AUTO: 5.84 THOUSAND/UL (ref 4.31–10.16)

## 2025-06-05 PROCEDURE — 82607 VITAMIN B-12: CPT

## 2025-06-05 PROCEDURE — 80053 COMPREHEN METABOLIC PANEL: CPT

## 2025-06-05 PROCEDURE — 83036 HEMOGLOBIN GLYCOSYLATED A1C: CPT

## 2025-06-05 PROCEDURE — 80061 LIPID PANEL: CPT

## 2025-06-05 PROCEDURE — 36415 COLL VENOUS BLD VENIPUNCTURE: CPT

## 2025-06-05 PROCEDURE — 85027 COMPLETE CBC AUTOMATED: CPT

## 2025-06-05 PROCEDURE — 84443 ASSAY THYROID STIM HORMONE: CPT

## 2025-06-06 ENCOUNTER — RESULTS FOLLOW-UP (OUTPATIENT)
Dept: FAMILY MEDICINE CLINIC | Facility: CLINIC | Age: 70
End: 2025-06-06

## 2025-06-16 ENCOUNTER — ANESTHESIA (OUTPATIENT)
Dept: GASTROENTEROLOGY | Facility: HOSPITAL | Age: 70
End: 2025-06-16
Payer: COMMERCIAL

## 2025-06-16 ENCOUNTER — HOSPITAL ENCOUNTER (OUTPATIENT)
Dept: GASTROENTEROLOGY | Facility: HOSPITAL | Age: 70
Setting detail: OUTPATIENT SURGERY
Discharge: HOME | End: 2025-06-16
Attending: COLON & RECTAL SURGERY
Payer: COMMERCIAL

## 2025-06-16 ENCOUNTER — ANESTHESIA EVENT (OUTPATIENT)
Dept: GASTROENTEROLOGY | Facility: HOSPITAL | Age: 70
End: 2025-06-16
Payer: COMMERCIAL

## 2025-06-16 VITALS
RESPIRATION RATE: 16 BRPM | BODY MASS INDEX: 30.53 KG/M2 | OXYGEN SATURATION: 100 % | WEIGHT: 190 LBS | TEMPERATURE: 96.3 F | SYSTOLIC BLOOD PRESSURE: 99 MMHG | HEIGHT: 66 IN | HEART RATE: 74 BPM | DIASTOLIC BLOOD PRESSURE: 50 MMHG

## 2025-06-16 DIAGNOSIS — Z86.0100 HISTORY OF COLON POLYPS: ICD-10-CM

## 2025-06-16 DIAGNOSIS — Z12.11 SCREENING FOR COLON CANCER: ICD-10-CM

## 2025-06-16 PROCEDURE — 45385 COLONOSCOPY W/LESION REMOVAL: CPT | Performed by: COLON & RECTAL SURGERY

## 2025-06-16 PROCEDURE — 88305 TISSUE EXAM BY PATHOLOGIST: CPT | Performed by: PATHOLOGY

## 2025-06-16 RX ORDER — SODIUM CHLORIDE 9 MG/ML
INJECTION, SOLUTION INTRAVENOUS CONTINUOUS PRN
Status: DISCONTINUED | OUTPATIENT
Start: 2025-06-16 | End: 2025-06-16

## 2025-06-16 RX ORDER — PROPOFOL 10 MG/ML
INJECTION, EMULSION INTRAVENOUS CONTINUOUS PRN
Status: DISCONTINUED | OUTPATIENT
Start: 2025-06-16 | End: 2025-06-16

## 2025-06-16 RX ORDER — LIDOCAINE HYDROCHLORIDE 10 MG/ML
INJECTION, SOLUTION EPIDURAL; INFILTRATION; INTRACAUDAL; PERINEURAL AS NEEDED
Status: DISCONTINUED | OUTPATIENT
Start: 2025-06-16 | End: 2025-06-16

## 2025-06-16 RX ORDER — PROPOFOL 10 MG/ML
INJECTION, EMULSION INTRAVENOUS AS NEEDED
Status: DISCONTINUED | OUTPATIENT
Start: 2025-06-16 | End: 2025-06-16

## 2025-06-16 RX ADMIN — LIDOCAINE HYDROCHLORIDE 5 ML: 10 INJECTION, SOLUTION EPIDURAL; INFILTRATION; INTRACAUDAL; PERINEURAL at 08:01

## 2025-06-16 RX ADMIN — SODIUM CHLORIDE: 9 INJECTION, SOLUTION INTRAVENOUS at 07:55

## 2025-06-16 RX ADMIN — PROPOFOL 120 MG: 10 INJECTION, EMULSION INTRAVENOUS at 08:01

## 2025-06-16 RX ADMIN — PHENYLEPHRINE HYDROCHLORIDE 20 MCG/MIN: 50 INJECTION INTRAVENOUS at 08:02

## 2025-06-16 RX ADMIN — PROPOFOL 120 MCG/KG/MIN: 10 INJECTION, EMULSION INTRAVENOUS at 08:01

## 2025-06-16 NOTE — ANESTHESIA PREPROCEDURE EVALUATION
Procedure:  COLONOSCOPY    Relevant Problems   CARDIO   (+) Essential hypertension   (+) Hyperlipidemia   (+) Migraine headache      ENDO   (+) Hypothyroidism (acquired)      MUSCULOSKELETAL   (+) Arthritis   (+) Primary osteoarthritis of right shoulder      NEURO/PSYCH   (+) Blind right eye   (+) Current mild episode of major depressive disorder without prior episode (HCC)   (+) Generalized anxiety disorder   (+) Migraine headache   (+) Transient ischemic attack        Physical Exam    Airway     Mallampati score: II  TM Distance: >3 FB  Neck ROM: full      Cardiovascular  Rhythm: regular, Rate: normalCardiovascular exam normal    Dental   No notable dental hx     Pulmonary   Decreased breath sounds    Neurological    She appears awake, alert and oriented x3.      Other Findings  post-pubertal.      Anesthesia Plan  ASA Score- 2     Anesthesia Type- IV sedation with anesthesia with ASA Monitors.         Additional Monitors:     Airway Plan: natural airway.           Plan Factors-Exercise tolerance (METS): >4 METS.    Chart reviewed. EKG reviewed. Imaging results reviewed. Existing labs reviewed. Patient summary reviewed.    Patient is not a current smoker.  Patient did not smoke on day of surgery.            Induction- intravenous.    Postoperative Plan- .   Monitoring Plan - Monitoring plan - standard ASA monitoring      Perioperative Resuscitation Plan - Level 1 - Full Code.       Informed Consent- Anesthetic plan and risks discussed with patient.  I personally reviewed this patient with the CRNA. Discussed and agreed on the Anesthesia Plan with the CRNA..      NPO Status:  No vitals data found for the desired time range.

## 2025-06-16 NOTE — H&P
"History and Physical   Colon and Rectal Surgery   Marline Powers 70 y.o. female MRN: [id number]  Unit/Bed#:  Encounter: [id number]  06/16/25   7:49 AM      CC:  History of colon polyps    History of Present Illness   HPI:  Marline Powers is a 70 y.o. female with no GI symptoms.  Historical Information   Past Medical History[1]  Past Surgical History[2]    Meds/Allergies     Not in a hospital admission.    Current Medications[3]    Allergies[4]      Social History   Social History     Substance and Sexual Activity   Alcohol Use No     Social History     Substance and Sexual Activity   Drug Use No     Tobacco Use History[5]      Family History: Family History[6]      Objective     Current Vitals:   Blood Pressure: 106/58 (06/16/25 0743)  Pulse: 81 (06/16/25 0743)  Temperature: (!) 96.9 °F (36.1 °C) (06/16/25 0743)  Temp Source: Tympanic (06/16/25 0743)  Respirations: 20 (06/16/25 0743)  Height: 5' 6\" (167.6 cm) (06/16/25 0737)  Weight - Scale: 86.2 kg (190 lb) (06/16/25 0737)  SpO2: 95 % (06/16/25 0743)  No intake or output data in the 24 hours ending 06/16/25 0749    Physical Exam:  General:  Well nourished, no distress.  Neuro: Alert and oriented  Eyes:Sclera anicteric, conjunctiva pink.  Pulm: Clear to auscultation bilaterally. No respiratory Distress.   CV:  Regular rate and rhythm. No murmurs.  Abdomen:  Soft, flat, non-tender, without masses or hepatosplenomegaly.    Lab Results:       ASSESSMENT:  Marline Powers is a 70 y.o. female for surveillance.  PLAN:  Colonoscopy.  Risks , including, but not limited to, bleeding, perforation, missed lesions, and potential need for surgery, were reviewed. Alternatives to colonoscopy were discussed.  MARISOL Garcia MD       [1]   Past Medical History:  Diagnosis Date    Allergic dermatitis     resolved 01/26/2016    Anxiety     Arthritis     Blind right eye     Cancer (HCC) 11/2021    Hysterectomy    Depression     Disease of thyroid gland     Encounter for cosmetic surgery " 04/30/2018    Added automatically from request for surgery [id number]    Endometrial cancer (HCC) 03/28/2023    Headache(784.0)     Hyperlipidemia     Hypertension     Hypothyroidism (acquired)     hypothyroid    Incontinence of urine in female     Insomnia     Migraine     Morbid obesity (HCC)     Obesity     Peritonitis (HCC) 12/30/2021    TIA (transient ischemic attack)     questionable     Uterine cancer (HCC) 11/2021    Visual impairment     Wears glasses    [2]   Past Surgical History:  Procedure Laterality Date    BARIATRIC SURGERY  2017    BODY LIFT LOWER N/A 06/28/2018    Procedure: LOWER BODY LIFT;  Surgeon: Luis Felipe Chawla MD;  [facility];  Service: Plastics    CATARACT EXTRACTION Left     CHOLECYSTECTOMY      COLONOSCOPY      CYSTOSCOPY N/A 11/16/2021    Procedure: CYSTOSCOPY;  Surgeon: Ben Stroud MD;  [facility];  Service: Gynecology Oncology    ESOPHAGOGASTRODUODENOSCOPY N/A 03/27/2017    Procedure: ESOPHAGOGASTRODUODENOSCOPY (EGD);  Surgeon: Og Lara MD;  [facility];  Service:     EYE SURGERY      HEMORROIDECTOMY      HYSTERECTOMY      IR IMAGE GUIDED ASPIRATION / DRAINAGE W TUBE  03/26/2019    JOINT REPLACEMENT      oswald TKR    NH EGD TRANSORAL BIOPSY SINGLE/MULTIPLE N/A 03/08/2017    Procedure: ESOPHAGOGASTRODUODENOSCOPY (EGD);  Surgeon: Og Lara MD;  [facility];  Service: Bariatrics    NH EXCISION EXCESSIVE SKIN & SUBQ TISSUE THIGH Bilateral 01/10/2019    Procedure: THIGH LIFT;  Surgeon: Luis Felipe Chawla MD;  [facility];  Service: Plastics    NH INJECTION PROCEDURE LYMPHANGIOGRAPHY  11/16/2021    Procedure: LYMPHOGRAPHY WITH INDOCYANINE GREEN (ICG);  Surgeon: Ben Stroud MD;  [facility];  Service: Gynecology Oncology    NH LAPS [facility] RSTRICTIV PX LONGITUDINAL GASTRECTOMY N/A 03/27/2017    Procedure: GASTRECTOMY SLEEVE LAPAROSCOPIC;  Surgeon: Og Lara MD;  [facility];  Service: Bariatrics    NH  LAPS TOTAL HYSTERECT 250 GM/< W/RMVL TUBE/OVARY N/A 11/16/2021    Procedure: HYSTERECTOMY LAPAROSCOPIC TOTAL (LTH) W/ ROBOTICS, BILATERAL SALPINGO-OOPHORECTOMY, BL SENTINAL LYMPH NODE DISSECTION BIOPSY;  Surgeon: Ben Stroud MD;  [facility];  Service: Gynecology Oncology    ROTATOR CUFF REPAIR Right     SHOULDER ARTHROPLASTY Right     SLEEVE GASTROPLASTY  3/27/2017    TOTAL BODY LIFT     [3]   Current Outpatient Medications:     buPROPion (WELLBUTRIN XL) 150 mg 24 hr tablet, TAKE 1 TABLET EVERY MORNING, Disp: 90 tablet, Rfl: 1    gabapentin (Neurontin) 100 mg capsule, Start with 1 pills at bedtime for 2-3 nights, increase to 2 pills at night for next 2 nights, then 3 pills at bedtime thereafter, Disp: 270 capsule, Rfl: 3    levothyroxine 112 mcg tablet, TAKE 1 TABLET DAILY, Disp: 90 tablet, Rfl: 1    lisinopril (ZESTRIL) 10 mg tablet, TAKE 1 TABLET DAILY, Disp: 90 tablet, Rfl: 1    PARoxetine (PAXIL) 10 mg tablet, TAKE 1 TABLET DAILY (ADDED TO 20 MG), Disp: 90 tablet, Rfl: 1    PARoxetine (PAXIL) 20 mg tablet, TAKE 1 TABLET DAILY, Disp: 90 tablet, Rfl: 1    rizatriptan (MAXALT) 10 mg tablet, TAKE 1 TABLET DAILY AS NEEDED FOR MIGRAINE, Disp: 18 tablet, Rfl: 5    simvastatin (ZOCOR) 40 mg tablet, TAKE 1 TABLET EVERY EVENING, Disp: 90 tablet, Rfl: 1    aspirin 81 MG tablet, Take 1 tablet by mouth daily for 30 days, Disp: 30 tablet, Rfl: 0    betamethasone valerate (VALISONE) 0.1 % cream, Apply topically 2 (two) times a day, Disp: 30 g, Rfl: 3    calcium citrate-vitamin D (CITRACAL+D) 315-200 MG-UNIT per tablet, Take by mouth daily , Disp: , Rfl:     cephalexin (KEFLEX) 500 mg capsule, Take 500 mg by mouth, Disp: , Rfl:     eszopiclone (LUNESTA) 3 MG tablet, TAKE ONE TABLET BY MOUTH EVERY DAY AT BEDTIME, Disp: 90 tablet, Rfl: 3    mometasone (ELOCON) 0.1 % ointment, Apply nightly to affected area 2-3x a week, Disp: 45 g, Rfl: 1    Multiple Vitamins-Minerals (CENTRUM WOMEN) TABS, Take by mouth Goes between  bariatric multi and Centrum, Disp: , Rfl:   [4] No Known Allergies  [5]   Social History  Tobacco Use   Smoking Status Never    Passive exposure: Never   Smokeless Tobacco Never   [6]   Family History  Problem Relation Name Age of Onset    Diabetes Mother Adrienne Sanderson     Skin cancer Mother Adrienne Sanderson     Arthritis Mother Adrienne Sanderson     Cancer Mother Adrienne Sanderson         Cervical, lung, liver, spine    Lung cancer Mother Adrienne Sanderson     Osteoarthritis Mother Adrienne Sanderson     Alzheimer's disease Father Wero Sanderson     Heart failure Father Wero Sanderson     Diabetes Father Wero Sanderson     Dementia Father Wero Sanderson     Cancer Father Wero Sanderson         Skin    Stroke Father Wero Sanderson     Breast cancer Maternal Grandmother Stacey Hurtado         adenocarcinoma in situ in villous adenoma of the breast

## 2025-06-18 ENCOUNTER — RESULTS FOLLOW-UP (OUTPATIENT)
Dept: OTHER | Facility: HOSPITAL | Age: 70
End: 2025-06-18

## 2025-06-18 PROCEDURE — 88305 TISSUE EXAM BY PATHOLOGIST: CPT | Performed by: PATHOLOGY

## 2025-06-23 DIAGNOSIS — I10 ESSENTIAL HYPERTENSION: ICD-10-CM

## 2025-06-23 DIAGNOSIS — E78.5 HYPERLIPIDEMIA, UNSPECIFIED HYPERLIPIDEMIA TYPE: ICD-10-CM

## 2025-06-23 RX ORDER — LISINOPRIL 10 MG/1
10 TABLET ORAL DAILY
Qty: 90 TABLET | Refills: 1 | Status: SHIPPED | OUTPATIENT
Start: 2025-06-23

## 2025-06-23 RX ORDER — SIMVASTATIN 40 MG
40 TABLET ORAL EVERY EVENING
Qty: 90 TABLET | Refills: 1 | Status: SHIPPED | OUTPATIENT
Start: 2025-06-23

## 2025-06-24 ENCOUNTER — PATIENT MESSAGE (OUTPATIENT)
Dept: FAMILY MEDICINE CLINIC | Facility: CLINIC | Age: 70
End: 2025-06-24

## 2025-06-25 ENCOUNTER — OFFICE VISIT (OUTPATIENT)
Dept: FAMILY MEDICINE CLINIC | Facility: CLINIC | Age: 70
End: 2025-06-25
Payer: COMMERCIAL

## 2025-06-25 VITALS
SYSTOLIC BLOOD PRESSURE: 96 MMHG | OXYGEN SATURATION: 97 % | DIASTOLIC BLOOD PRESSURE: 60 MMHG | BODY MASS INDEX: 30.76 KG/M2 | HEART RATE: 88 BPM | RESPIRATION RATE: 16 BRPM | WEIGHT: 191.4 LBS | TEMPERATURE: 98.1 F | HEIGHT: 66 IN

## 2025-06-25 DIAGNOSIS — R39.15 URGENCY OF URINATION: ICD-10-CM

## 2025-06-25 DIAGNOSIS — N30.00 ACUTE CYSTITIS WITHOUT HEMATURIA: Primary | ICD-10-CM

## 2025-06-25 LAB
SL AMB  POCT GLUCOSE, UA: ABNORMAL
SL AMB LEUKOCYTE ESTERASE,UA: ABNORMAL
SL AMB POCT BILIRUBIN,UA: ABNORMAL
SL AMB POCT BLOOD,UA: ABNORMAL
SL AMB POCT CLARITY,UA: ABNORMAL
SL AMB POCT COLOR,UA: YELLOW
SL AMB POCT KETONES,UA: ABNORMAL
SL AMB POCT NITRITE,UA: ABNORMAL
SL AMB POCT PH,UA: 6
SL AMB POCT SPECIFIC GRAVITY,UA: 1.03
SL AMB POCT URINE PROTEIN: ABNORMAL
SL AMB POCT UROBILINOGEN: ABNORMAL

## 2025-06-25 PROCEDURE — 99213 OFFICE O/P EST LOW 20 MIN: CPT | Performed by: FAMILY MEDICINE

## 2025-06-25 PROCEDURE — G2211 COMPLEX E/M VISIT ADD ON: HCPCS | Performed by: FAMILY MEDICINE

## 2025-06-25 PROCEDURE — 81002 URINALYSIS NONAUTO W/O SCOPE: CPT | Performed by: FAMILY MEDICINE

## 2025-06-25 RX ORDER — NITROFURANTOIN 25; 75 MG/1; MG/1
100 CAPSULE ORAL 2 TIMES DAILY
Qty: 10 CAPSULE | Refills: 0 | Status: SHIPPED | OUTPATIENT
Start: 2025-06-25 | End: 2025-06-30

## 2025-06-25 NOTE — PROGRESS NOTES
Name: Marline Powers      : 1955      MRN: [id number]  Encounter Provider: Anderson Siegel DO  Encounter Date: 2025   Encounter department: [facility]    Assessment & Plan  Acute cystitis without hematuria  - Dysuria and frequency for the past 3 days.  -Urine dip shows positive leuks, negative nitrites  -Start Macrobid twice daily for 5 days.  -Unfortunately did not provide enough urine for culture.  -Will treat clinically for now.  If no improvement will send for urine culture  -Continue adequate hydration as reviewed.  -Can use Pyridium for continued analgesia  -Follow-up as needed  Orders:  •  nitrofurantoin (MACROBID) 100 mg capsule; Take 1 capsule (100 mg total) by mouth 2 (two) times a day for 5 days    Urgency of urination    Orders:  •  POCT urine dip         History of Present Illness     Urinary Tract Infection   This is a new problem. The current episode started in the past 7 days. The problem occurs every urination. The problem has been waxing and waning. The quality of the pain is described as burning. There has been no fever. She is Not sexually active. There is No history of pyelonephritis. Associated symptoms include frequency, hematuria, hesitancy and urgency. Pertinent negatives include no chills, discharge, flank pain or vomiting. She has tried nothing for the symptoms. Her past medical history is significant for recurrent UTIs.     Review of Systems   Constitutional:  Negative for chills and fever.   HENT:  Negative for ear pain and sore throat.    Eyes:  Negative for pain and visual disturbance.   Respiratory:  Negative for cough and shortness of breath.    Cardiovascular:  Negative for chest pain and palpitations.   Gastrointestinal:  Negative for abdominal pain and vomiting.   Genitourinary:  Positive for frequency, hematuria, hesitancy and urgency. Negative for dysuria and flank pain.   Musculoskeletal:  Negative for arthralgias and back pain.   Skin:   "Negative for color change and rash.   Neurological:  Negative for seizures and syncope.   Psychiatric/Behavioral:  Negative for confusion. The patient is not nervous/anxious.    All other systems reviewed and are negative.    Past Medical History[1]  Past Surgical History[2]  Family History[3]  Social History[4]  Medications[5]  No Known Allergies  Immunization History   Administered Date(s) Administered   • COVID-19 MODERNA VACC 0.5 ML IM 02/04/2021, 03/02/2021, 10/25/2021, 04/04/2022   • COVID-19 Moderna Vac BIVALENT 12 Yr+ IM 0.5 ML 09/28/2022   • COVID-19 Moderna mRNA Vaccine 12 Yr+ 50 mcg/0.5 mL (Spikevax) 10/11/2023   • COVID-19 Pfizer mRNA vacc PF jennie-sucrose 12 yr and older (Comirnaty) 10/16/2024   • H1N1, All Formulations 01/06/2010   • INFLUENZA 09/28/2013, 09/08/2014, 10/08/2015, 10/15/2016, 09/21/2017, 09/05/2018, 09/18/2019   • Influenza Injectable, MDCK, Preservative Free, Quadrivalent, 0.5 mL 09/18/2019   • Influenza Quadrivalent Preservative Free 3 years and older IM 10/08/2015   • Influenza Split High Dose Preservative Free IM 10/02/2024   • Influenza, high dose seasonal 0.7 mL 09/15/2020, 09/21/2021, 10/08/2022, 09/26/2023   • Influenza, injectable, quadrivalent, preservative free 0.5 mL 09/05/2018   • Influenza, seasonal, injectable 09/20/2011, 09/08/2014, 10/15/2016   • Influenza, seasonal, injectable, preservative free 09/05/2018   • Pneumococcal Conjugate Vaccine 20-valent (Pcv20), Polysace 09/22/2022   • Pneumococcal Polysaccharide PPV23 03/10/2020   • Tdap 03/04/2014, 04/07/2024   • Zoster Vaccine Recombinant 09/22/2018, 03/06/2019     Objective   BP 96/60 (BP Location: Left arm, Patient Position: Sitting, Cuff Size: Large)   Pulse 88   Temp 98.1 °F (36.7 °C) (Tympanic)   Resp 16   Ht 5' 6\" (1.676 m)   Wt 86.8 kg (191 lb 6.4 oz)   SpO2 97%   BMI 30.89 kg/m²     Physical Exam  Vitals and nursing note reviewed.   Constitutional:       General: She is not in acute distress.     " Appearance: Normal appearance.   HENT:      Head: Normocephalic and atraumatic.      Right Ear: Tympanic membrane and external ear normal.      Left Ear: Tympanic membrane and external ear normal.      Nose: Nose normal.      Mouth/Throat:      Mouth: Mucous membranes are moist.     Eyes:      Extraocular Movements: Extraocular movements intact.      Conjunctiva/sclera: Conjunctivae normal.      Pupils: Pupils are equal, round, and reactive to light.       Cardiovascular:      Rate and Rhythm: Normal rate and regular rhythm.      Pulses: Normal pulses.      Heart sounds: Normal heart sounds. No murmur heard.  Pulmonary:      Effort: Pulmonary effort is normal.      Breath sounds: Normal breath sounds. No wheezing, rhonchi or rales.   Abdominal:      General: Bowel sounds are normal.      Palpations: Abdomen is soft.      Tenderness: There is no right CVA tenderness, left CVA tenderness, guarding or rebound.      Comments: Mild suprapubic tenderness     Musculoskeletal:         General: Normal range of motion.      Cervical back: Normal range of motion.      Right lower leg: No edema.      Left lower leg: No edema.   Lymphadenopathy:      Cervical: No cervical adenopathy.     Skin:     General: Skin is warm.      Capillary Refill: Capillary refill takes less than 2 seconds.     Neurological:      General: No focal deficit present.      Mental Status: She is alert and oriented to person, place, and time.     Psychiatric:         Mood and Affect: Mood normal.         Behavior: Behavior normal.                [1]  Past Medical History:  Diagnosis Date   • Allergic dermatitis     resolved 01/26/2016   • Anxiety    • Arthritis    • Blind right eye    • Cancer ([facility]) 11/2021    Hysterectomy   • Depression    • Disease of thyroid gland    • Encounter for cosmetic surgery 04/30/2018    Added automatically from request for surgery [id number]   • Endometrial cancer ([facility]) 03/28/2023   • Headache(784.0)    • Hyperlipidemia    •  Hypertension    • Hypothyroidism (acquired)     hypothyroid   • Incontinence of urine in female    • Insomnia    • Migraine    • Morbid obesity (HCC)    • Obesity    • Peritonitis (HCC) 12/30/2021   • TIA (transient ischemic attack)     questionable    • Uterine cancer (HCC) 11/2021   • Visual impairment    • Wears glasses    [2]  Past Surgical History:  Procedure Laterality Date   • BARIATRIC SURGERY  2017   • BODY LIFT LOWER N/A 06/28/2018    Procedure: LOWER BODY LIFT;  Surgeon: Luis Felipe Chawla MD;  [facility];  Service: Plastics   • CATARACT EXTRACTION Left    • CHOLECYSTECTOMY     • COLONOSCOPY     • CYSTOSCOPY N/A 11/16/2021    Procedure: CYSTOSCOPY;  Surgeon: Ben Stroud MD;  [facility];  Service: Gynecology Oncology   • ESOPHAGOGASTRODUODENOSCOPY N/A 03/27/2017    Procedure: ESOPHAGOGASTRODUODENOSCOPY (EGD);  Surgeon: Og Lara MD;  [facility];  Service:    • EYE SURGERY     • HEMORROIDECTOMY     • HYSTERECTOMY     • IR IMAGE GUIDED ASPIRATION / DRAINAGE W TUBE  03/26/2019   • JOINT REPLACEMENT      oswald TKR   • OH EGD TRANSORAL BIOPSY SINGLE/MULTIPLE N/A 03/08/2017    Procedure: ESOPHAGOGASTRODUODENOSCOPY (EGD);  Surgeon: Og Lara MD;  [facility];  Service: Bariatrics   • OH EXCISION EXCESSIVE SKIN & SUBQ TISSUE THIGH Bilateral 01/10/2019    Procedure: THIGH LIFT;  Surgeon: Luis Felipe Chawla MD;  [facility];  Service: Plastics   • OH INJECTION PROCEDURE LYMPHANGIOGRAPHY  11/16/2021    Procedure: LYMPHOGRAPHY WITH INDOCYANINE GREEN (ICG);  Surgeon: Ben Stroud MD;  [facility];  Service: Gynecology Oncology   • OH LAPS GSTRC RSTRICTIV PX LONGITUDINAL GASTRECTOMY N/A 03/27/2017    Procedure: GASTRECTOMY SLEEVE LAPAROSCOPIC;  Surgeon: Og Lara MD;  [facility];  Service: Bariatrics   • OH LAPS TOTAL HYSTERECT 250 GM/< W/RMVL TUBE/OVARY N/A 11/16/2021    Procedure: HYSTERECTOMY LAPAROSCOPIC TOTAL (LTH) W/  ROBOTICS, BILATERAL SALPINGO-OOPHORECTOMY, BL SENTINAL LYMPH NODE DISSECTION BIOPSY;  Surgeon: Ben Stroud MD;  [facility];  Service: Gynecology Oncology   • ROTATOR CUFF REPAIR Right    • SHOULDER ARTHROPLASTY Right    • SLEEVE GASTROPLASTY  3/27/2017   • TOTAL BODY LIFT     [3]  Family History  Problem Relation Name Age of Onset   • Diabetes Mother Adrienne Sanderson    • Skin cancer Mother Adrienne Sanderson    • Arthritis Mother Adrienne Sanderson    • Cancer Mother Adrienne Sanderson         Cervical, lung, liver, spine   • Lung cancer Mother Adrienne Sanderson    • Osteoarthritis Mother Adrienne Sanderson    • Alzheimer's disease Father Wero Sanderson    • Heart failure Father Wero Sanderson    • Diabetes Father Wero Sanderson    • Dementia Father Wero Sanderson    • Cancer Father Wero Sanderson         Skin   • Stroke Father Wero Sanderson    • Breast cancer Maternal Grandmother Stacey Hurtado         adenocarcinoma in situ in villous adenoma of the breast   [4]  Social History  Tobacco Use   • Smoking status: Never     Passive exposure: Never   • Smokeless tobacco: Never   Vaping Use   • Vaping status: Never Used   Substance and Sexual Activity   • Alcohol use: No   • Drug use: No   • Sexual activity: Not Currently     Partners: Male   [5]  Current Outpatient Medications on File Prior to Visit   Medication Sig   • aspirin 81 MG tablet Take 1 tablet by mouth daily for 30 days   • betamethasone valerate (VALISONE) 0.1 % cream Apply topically 2 (two) times a day   • buPROPion (WELLBUTRIN XL) 150 mg 24 hr tablet TAKE 1 TABLET EVERY MORNING   • calcium citrate-vitamin D (CITRACAL+D) 315-200 MG-UNIT per tablet Take by mouth in the morning.   • eszopiclone (LUNESTA) 3 MG tablet TAKE ONE TABLET BY MOUTH EVERY DAY AT BEDTIME   • gabapentin (Neurontin) 100 mg capsule Start with 1 pills at bedtime for 2-3 nights, increase to 2 pills at night for next 2 nights, then 3 pills at bedtime thereafter   • levothyroxine 112 mcg tablet TAKE 1 TABLET DAILY   •  lisinopril (ZESTRIL) 10 mg tablet TAKE 1 TABLET DAILY   • mometasone (ELOCON) 0.1 % ointment Apply nightly to affected area 2-3x a week   • Multiple Vitamins-Minerals (CENTRUM WOMEN) TABS Take by mouth Goes between bariatric multi and Centrum   • PARoxetine (PAXIL) 10 mg tablet TAKE 1 TABLET DAILY (ADDED TO 20 MG)   • PARoxetine (PAXIL) 20 mg tablet TAKE 1 TABLET DAILY   • rizatriptan (MAXALT) 10 mg tablet TAKE 1 TABLET DAILY AS NEEDED FOR MIGRAINE   • simvastatin (ZOCOR) 40 mg tablet TAKE 1 TABLET EVERY EVENING   • cephalexin (KEFLEX) 500 mg capsule Take 500 mg by mouth (Patient not taking: Reported on 6/25/2025)

## 2025-06-30 NOTE — PROGRESS NOTES
Cystoscopy     Date/Time  7/1/2025 9:00 AM     Performed by  Enrique Silverman MD   Authorized by  Enrique Silverman MD         Procedure Details:  Procedure type: cystoscopy, injection for chemodenervation          Office Cystoscopy Procedure Note    Indication:     medically refractory overactive bladder    Informed consent   The risks, benefits, complications, treatment options, and expected outcomes were discussed with the patient. The patient concurred with the proposed plan and provided informed consent.    Anesthesia  Lidocaine jelly 2%    Antibiotic prophylaxis   None    Procedure  In the presence of a female nurse, the patient was placed in the supine frog-legged position, was prepped and draped in the usual manner using sterile technique, and 2% lidocaine jelly instilled into the urethra.        A 20 [place] Thomas catheter was placed under sterile conditions.  The bladder was drained and through this 20 mL of 2% plain lidocaine was instilled and allowed to dwell for 10 minutes to provide analgesic      A 17 F flexible cystoscope was then inserted into the urethra and the urethra and bladder carefully examined.  The following findings were noted:    Findings:  Urethra:  Normal  Bladder:  Normal  Ureteral orifices:  Normal  Other findings:  None        200 units of Botox were diluted into injectable saline.  These were injected using 0.5 mL boluses in multiple locations in a super trigonal pattern.  Care was taken to avoid the trigone.  The bladder was then observed with no active installation hemostasis was noted to be excellent.      Specimens: None                 Complications:    None; patient tolerated the procedure well    Return in 1 year for repeat injection with 200 units           Disposition: To home after 30 minute observation.           Condition: Stable    Plan:      patient has previously enjoyed a excellent response to Botox for duration of approximately 1 year.  Of note she is on no  oral pharmacotherapy.      I did offer topical estrogen replacement cream for her occasional urinary tract infections, vaginal dryness and dyspareunia.  She is amenable.  Dosing adverse effects and self administration reviewed and prescription applied    She will follow-up in 1 year for her next cystoscopy with bladder Botox.

## 2025-07-01 ENCOUNTER — PROCEDURE VISIT (OUTPATIENT)
Dept: UROLOGY | Facility: CLINIC | Age: 70
End: 2025-07-01
Payer: COMMERCIAL

## 2025-07-01 VITALS
WEIGHT: 191 LBS | BODY MASS INDEX: 30.7 KG/M2 | HEART RATE: 91 BPM | DIASTOLIC BLOOD PRESSURE: 76 MMHG | HEIGHT: 66 IN | SYSTOLIC BLOOD PRESSURE: 102 MMHG | OXYGEN SATURATION: 96 %

## 2025-07-01 DIAGNOSIS — N39.41 URGE INCONTINENCE OF URINE: Primary | ICD-10-CM

## 2025-07-01 PROCEDURE — 52287 CYSTOSCOPY CHEMODENERVATION: CPT | Performed by: UROLOGY

## 2025-07-01 RX ORDER — ESTRADIOL 0.1 MG/G
0.3 CREAM VAGINAL 3 TIMES WEEKLY
Qty: 42.5 G | Refills: 6 | Status: SHIPPED | OUTPATIENT
Start: 2025-07-02

## 2025-07-07 DIAGNOSIS — R30.0 DYSURIA: Primary | ICD-10-CM

## 2025-07-08 ENCOUNTER — APPOINTMENT (OUTPATIENT)
Dept: LAB | Facility: AMBULARY SURGERY CENTER | Age: 70
End: 2025-07-08
Payer: COMMERCIAL

## 2025-07-08 DIAGNOSIS — R30.0 DYSURIA: ICD-10-CM

## 2025-07-08 LAB
BACTERIA UR QL AUTO: ABNORMAL /HPF
BILIRUB UR QL STRIP: NEGATIVE
CLARITY UR: ABNORMAL
COLOR UR: YELLOW
GLUCOSE UR STRIP-MCNC: NEGATIVE MG/DL
HGB UR QL STRIP.AUTO: ABNORMAL
KETONES UR STRIP-MCNC: ABNORMAL MG/DL
LEUKOCYTE ESTERASE UR QL STRIP: ABNORMAL
NITRITE UR QL STRIP: NEGATIVE
NON-SQ EPI CELLS URNS QL MICRO: ABNORMAL /HPF
PH UR STRIP.AUTO: 6.5 [PH]
PROT UR STRIP-MCNC: ABNORMAL MG/DL
RBC #/AREA URNS AUTO: ABNORMAL /HPF
SP GR UR STRIP.AUTO: 1.02 (ref 1–1.03)
UROBILINOGEN UR STRIP-ACNC: <2 MG/DL
WBC #/AREA URNS AUTO: ABNORMAL /HPF
WBC CLUMPS # UR AUTO: PRESENT /UL

## 2025-07-08 PROCEDURE — 87186 SC STD MICRODIL/AGAR DIL: CPT

## 2025-07-08 PROCEDURE — 87077 CULTURE AEROBIC IDENTIFY: CPT

## 2025-07-08 PROCEDURE — 87086 URINE CULTURE/COLONY COUNT: CPT

## 2025-07-08 PROCEDURE — 81001 URINALYSIS AUTO W/SCOPE: CPT

## 2025-07-10 LAB — BACTERIA UR CULT: ABNORMAL

## 2025-07-11 ENCOUNTER — RESULTS FOLLOW-UP (OUTPATIENT)
Dept: FAMILY MEDICINE CLINIC | Facility: CLINIC | Age: 70
End: 2025-07-11

## 2025-07-11 DIAGNOSIS — N30.00 ACUTE CYSTITIS WITHOUT HEMATURIA: Primary | ICD-10-CM

## 2025-07-11 NOTE — RESULT ENCOUNTER NOTE
Can we please call Marline and let her know that the culture and bacterial susceptibility results are back.  I have sent over another antibiotic for her to take for the next 7 days.  Please have her know if she has any other questions.

## 2025-07-21 ENCOUNTER — OFFICE VISIT (OUTPATIENT)
Dept: GYNECOLOGIC ONCOLOGY | Facility: CLINIC | Age: 70
End: 2025-07-21
Payer: COMMERCIAL

## 2025-07-21 VITALS
TEMPERATURE: 97.8 F | SYSTOLIC BLOOD PRESSURE: 112 MMHG | BODY MASS INDEX: 30.67 KG/M2 | DIASTOLIC BLOOD PRESSURE: 70 MMHG | HEART RATE: 85 BPM | OXYGEN SATURATION: 95 % | WEIGHT: 190 LBS

## 2025-07-21 DIAGNOSIS — Z85.42 HISTORY OF ENDOMETRIAL CANCER: ICD-10-CM

## 2025-07-21 DIAGNOSIS — Z08 ENCOUNTER FOR FOLLOW-UP SURVEILLANCE OF ENDOMETRIAL CANCER: Primary | ICD-10-CM

## 2025-07-21 DIAGNOSIS — N90.4 LICHEN SCLEROSUS ET ATROPHICUS OF THE VULVA: ICD-10-CM

## 2025-07-21 DIAGNOSIS — Z85.42 ENCOUNTER FOR FOLLOW-UP SURVEILLANCE OF ENDOMETRIAL CANCER: Primary | ICD-10-CM

## 2025-07-21 PROCEDURE — 99213 OFFICE O/P EST LOW 20 MIN: CPT | Performed by: PHYSICIAN ASSISTANT

## 2025-07-21 PROCEDURE — 99459 PELVIC EXAMINATION: CPT | Performed by: PHYSICIAN ASSISTANT

## 2025-07-21 NOTE — PROGRESS NOTES
Name: Marline Powers      : 1955      MRN: [id number]  Encounter Provider: Rafia Dalton PA-C  Encounter Date: 2025   Encounter department: [facility]  :  Assessment & Plan  Encounter for follow-up surveillance of endometrial cancer         Lichen sclerosus et atrophicus of the vulva  Exam stable.   Well managed with intermittent clobetasol use.        History of endometrial cancer  History of stage IA, grade 1 endometrial cancer s/p surgical resection in 2021. She is clinically without evidence of disease recurrence.      Return to the office in 1 year for continued cancer surveillance.    Screening mammogram due 2025.               History of Present Illness     Reason for Visit / CC:  Survivorship / Surveillance Visit    Marline Powers is a 70 y.o. female   who has no new complaints today. No vaginal bleeding, abdominal/pelvic pain. Normal bowel and bladder function. She notes her vulvar irritation is well managed with clobetasol. No interval change in medical history since last visit. Quality of life is good.           Oncology History   Cancer Staging   History of endometrial cancer  Staging form: Corpus Uteri - Carcinoma, AJCC 8th Edition  - Clinical stage from 2021: FIGO Stage IA (cT1a, cN0(sn), cM0) - Signed by Ben Stroud MD on 2021  Method of lymph node assessment: [place] lymph node biopsy  Histologic grade (G): G1  Histologic grading system: 3 grade system  Peritoneal cytology results: Unknown  Oncology History   History of endometrial cancer   10/21/2021 Initial Diagnosis    Endometrial cancer (HCC)     2021 -  Cancer Staged    Staging form: Corpus Uteri - Carcinoma, AJCC 8th Edition  - Clinical stage from 2021: FIGO Stage IA (cT1a, cN0(sn), cM0) - Signed by Ben Stroud MD on 2021  Method of lymph node assessment: [place] lymph node biopsy  Histologic grade (G): G1  Histologic  grading system: 3 grade system  Peritoneal cytology results: Unknown       11/16/2021 Surgery    Robotic hysterectomy, BSO, bilateral sentinel LN biopsies, peritoneal biopsies, cystoscopy  -Grade 1, 0.3/1.7cm depth of invasion, indeterminate LVSI (likely artifact), cytology atypical  -MLH1/PMS2 absent, methylation pending        Review of Systems   Constitutional: Negative.    HENT: Negative.     Eyes: Negative.    Respiratory: Negative.     Cardiovascular: Negative.    Gastrointestinal: Negative.    Genitourinary: Negative.    Musculoskeletal: Negative.    Skin: Negative.    Neurological: Negative.    Psychiatric/Behavioral: Negative.      A complete review of systems is negative other than that noted above in the HPI.  Medical History Reviewed by provider this encounter:  Tobacco  Allergies  Meds  Problems  Med Hx  Surg Hx  Fam Hx     .  Medications Ordered Prior to Encounter[1]      Objective   /70 (BP Location: Left arm, Patient Position: Sitting, Cuff Size: Large)   Pulse 85   Temp 97.8 °F (36.6 °C) (Temporal)   Wt 86.2 kg (190 lb)   SpO2 95%   BMI 30.67 kg/m²     Body mass index is 30.67 kg/m².  Pain Screening:  Pain Score: 0-No pain    Physical Exam  Vitals reviewed. Exam conducted with a chaperone present.   Constitutional:       General: She is not in acute distress.     Appearance: Normal appearance. She is not ill-appearing.   HENT:      Head: Normocephalic and atraumatic.      Mouth/Throat:      Mouth: Mucous membranes are moist.     Eyes:      General:         Right eye: No discharge.         Left eye: No discharge.      Conjunctiva/sclera: Conjunctivae normal.     Pulmonary:      Effort: Pulmonary effort is normal.   Abdominal:      Palpations: Abdomen is soft. There is no mass.      Tenderness: There is no abdominal tenderness.      Hernia: No hernia is present.   Genitourinary:     Comments: The external female genitalia with loss of labia minora bilaterally and hypopigmentation.  Exam stable. The bartholin's, uretheral and skenes glands are normal. The urethral meatus is normal (midline with no lesions). Anus without fissure or lesion. Speculum exam reveals a grossly normal vagina. No masses, lesions,discharge or bleeding. No significant cystocele or rectocele noted. Bimanual exam notes a surgical absent cervix, uterus and adnexal structures. No masses or fullness. Bladder is without fullness, mass or tenderness.    Musculoskeletal:      Right lower leg: No edema.      Left lower leg: No edema.     Skin:     General: Skin is warm and dry.      Coloration: Skin is not jaundiced.      Findings: No rash.     Neurological:      General: No focal deficit present.      Mental Status: She is alert and oriented to person, place, and time.      Cranial Nerves: No cranial nerve deficit.      Sensory: No sensory deficit.      Motor: No weakness.      Gait: Gait normal.     Psychiatric:         Mood and Affect: Mood normal.         Behavior: Behavior normal.         Thought Content: Thought content normal.         Judgment: Judgment normal.                           [1]  Current Outpatient Medications on File Prior to Visit   Medication Sig Dispense Refill   • betamethasone valerate (VALISONE) 0.1 % cream Apply topically 2 (two) times a day 30 g 3   • buPROPion (WELLBUTRIN XL) 150 mg 24 hr tablet TAKE 1 TABLET EVERY MORNING 90 tablet 1   • calcium citrate-vitamin D (CITRACAL+D) 315-200 MG-UNIT per tablet Take by mouth in the morning.     • estradiol (ESTRACE VAGINAL) 0.1 mg/g vaginal cream Insert 0.3 g into the vagina 3 (three) times a week Apply a pea-sized amount every Monday Wednesday Friday at bedtime 42.5 g 6   • eszopiclone (LUNESTA) 3 MG tablet TAKE ONE TABLET BY MOUTH EVERY DAY AT BEDTIME 90 tablet 3   • gabapentin (Neurontin) 100 mg capsule Start with 1 pills at bedtime for 2-3 nights, increase to 2 pills at night for next 2 nights, then 3 pills at bedtime thereafter 270 capsule 3   •  levothyroxine 112 mcg tablet TAKE 1 TABLET DAILY 90 tablet 1   • lisinopril (ZESTRIL) 10 mg tablet TAKE 1 TABLET DAILY 90 tablet 1   • mometasone (ELOCON) 0.1 % ointment Apply nightly to affected area 2-3x a week 45 g 1   • Multiple Vitamins-Minerals (CENTRUM WOMEN) TABS Take by mouth Goes between bariatric multi and Centrum     • PARoxetine (PAXIL) 10 mg tablet TAKE 1 TABLET DAILY (ADDED TO 20 MG) 90 tablet 1   • PARoxetine (PAXIL) 20 mg tablet TAKE 1 TABLET DAILY 90 tablet 1   • rizatriptan (MAXALT) 10 mg tablet TAKE 1 TABLET DAILY AS NEEDED FOR MIGRAINE 18 tablet 5   • simvastatin (ZOCOR) 40 mg tablet TAKE 1 TABLET EVERY EVENING 90 tablet 1   • aspirin 81 MG tablet Take 1 tablet by mouth daily for 30 days 30 tablet 0   • cephalexin (KEFLEX) 500 mg capsule Take 500 mg by mouth (Patient not taking: Reported on 6/25/2025)       No current facility-administered medications on file prior to visit.

## 2025-07-21 NOTE — ASSESSMENT & PLAN NOTE
History of stage IA, grade 1 endometrial cancer s/p surgical resection in November 2021. She is clinically without evidence of disease recurrence.      Return to the office in 1 year for continued cancer surveillance.    Screening mammogram due September 2025.

## 2025-07-28 DIAGNOSIS — F41.1 GENERALIZED ANXIETY DISORDER: ICD-10-CM

## 2025-07-29 RX ORDER — PAROXETINE 20 MG/1
20 TABLET, FILM COATED ORAL DAILY
Qty: 90 TABLET | Refills: 3 | Status: SHIPPED | OUTPATIENT
Start: 2025-07-29

## 2025-08-06 DIAGNOSIS — F43.21 GRIEVING: ICD-10-CM

## 2025-08-06 DIAGNOSIS — F32.0 CURRENT MILD EPISODE OF MAJOR DEPRESSIVE DISORDER WITHOUT PRIOR EPISODE (HCC): ICD-10-CM

## 2025-08-06 DIAGNOSIS — F41.1 GENERALIZED ANXIETY DISORDER: ICD-10-CM

## 2025-08-07 RX ORDER — BUPROPION HYDROCHLORIDE 150 MG/1
150 TABLET ORAL EVERY MORNING
Qty: 90 TABLET | Refills: 1 | Status: SHIPPED | OUTPATIENT
Start: 2025-08-07

## 2025-08-07 RX ORDER — PAROXETINE 10 MG/1
TABLET, FILM COATED ORAL
Qty: 90 TABLET | Refills: 1 | Status: SHIPPED | OUTPATIENT
Start: 2025-08-07

## 2025-08-15 ENCOUNTER — TELEPHONE (OUTPATIENT)
Age: 70
End: 2025-08-15

## (undated) DEVICE — SKIN MARKER DUAL TIP WITH RULER CAP, FLEXIBLE RULER AND LABELS: Brand: DEVON

## (undated) DEVICE — CHLORAPREP HI-LITE 26ML ORANGE

## (undated) DEVICE — GLOVE SRG BIOGEL 7

## (undated) DEVICE — [HIGH FLOW INSUFFLATOR,  DO NOT USE IF PACKAGE IS DAMAGED,  KEEP DRY,  KEEP AWAY FROM SUNLIGHT,  PROTECT FROM HEAT AND RADIOACTIVE SOURCES.]: Brand: PNEUMOSURE

## (undated) DEVICE — GLOVE SRG BIOGEL 6.5

## (undated) DEVICE — GLOVE SRG BIOGEL 8

## (undated) DEVICE — PROXIMATE SKIN STAPLERS (35 WIDE) CONTAINS 35 STAINLESS STEEL STAPLES (FIXED HEAD): Brand: PROXIMATE

## (undated) DEVICE — PACK UNIVERSAL DRAPES SUB-Q ICD

## (undated) DEVICE — TRAY FOLEY 16FR URIMETER SURESTEP

## (undated) DEVICE — OCCLUSIVE GAUZE STRIP,3% BISMUTH TRIBROMOPHENATE IN PETROLATUM BLEND: Brand: XEROFORM

## (undated) DEVICE — JP CHAN DRN SIL HUBLESS 15FR W/TRO: Brand: CARDINAL HEALTH

## (undated) DEVICE — 1820 FOAM BLOCK NEEDLE COUNTER: Brand: DEVON

## (undated) DEVICE — VISIGI 3D®  CALIBRATION SYSTEM  SIZE 36FR SLEEVE/STD: Brand: BOEHRINGER® VISIGI 3D™ SLEEVE GASTRECTOMY CALIBRATION SYSTEM, SIZE 36FR

## (undated) DEVICE — SCD SEQUENTIAL COMPRESSION COMFORT SLEEVE MEDIUM KNEE LENGTH: Brand: KENDALL SCD

## (undated) DEVICE — SUT MONOCRYL 3-0 PS-2 27 IN Y427H

## (undated) DEVICE — ANTIBACTERIAL UNDYED BRAIDED (POLYGLACTIN 910), SYNTHETIC ABSORBABLE SUTURE: Brand: COATED VICRYL

## (undated) DEVICE — MEDI-VAC YANKAUER SUCTION HANDLE W/BULBOUS AND CONTROL VENT: Brand: CARDINAL HEALTH

## (undated) DEVICE — COVIDIEN GIA BLACK (XTRA THICK) RELOAD

## (undated) DEVICE — SUT MONOCRYL 4-0 PS-2 27 IN Y426H

## (undated) DEVICE — ADHESIVE SKN CLSR HISTOACRYL FLEX 0.5ML LF

## (undated) DEVICE — PAD GROUNDING ADULT

## (undated) DEVICE — TELFA NON-ADHERENT ABSORBENT DRESSING: Brand: TELFA

## (undated) DEVICE — GLOVE PI ULTRA TOUCH SZ.7.5

## (undated) DEVICE — SUT PDS II 2-0 CT-1 27 IN Z339H

## (undated) DEVICE — LUBRICANT INST ELECTROLUBE ANTISTK WO PAD

## (undated) DEVICE — APPLICATOR SURG DUPLOSPRAY 20CM SNAPLOCK

## (undated) DEVICE — MONOPOLAR CURVED SCISSORS: Brand: ENDOWRIST

## (undated) DEVICE — SURGICAL GOWN, XL SMARTSLEEVE: Brand: CONVERTORS

## (undated) DEVICE — COVIDIEN ENDO GIA PURPLE (MED) RELOAD 45MM

## (undated) DEVICE — MAYO STAND COVER: Brand: CONVERTORS

## (undated) DEVICE — SYRINGE 10ML LL CONTROL TOP

## (undated) DEVICE — STANDARD SURGICAL GOWN, L: Brand: CONVERTORS

## (undated) DEVICE — SUT STRATAFIX SPIRAL 2-0 CT-1 30 CM SXPP1B410

## (undated) DEVICE — LAP AEM SCISSOR TIP .75 IN

## (undated) DEVICE — TUBING SMOKE EVAC W/FILTRATION DEVICE PLUMEPORT ACTIV

## (undated) DEVICE — ABDOMINAL PAD: Brand: DERMACEA

## (undated) DEVICE — ENDOPATH XCEL BLADELESS TROCARS WITH STABILITY SLEEVES: Brand: ENDOPATH XCEL

## (undated) DEVICE — TIP COVER ACCESSORY

## (undated) DEVICE — SUT VICRYL 3-0 SH 27 IN J416H

## (undated) DEVICE — INTENDED FOR TISSUE SEPARATION, AND OTHER PROCEDURES THAT REQUIRE A SHARP SURGICAL BLADE TO PUNCTURE OR CUT.: Brand: BARD-PARKER ® CARBON RIB-BACK BLADES

## (undated) DEVICE — SUT ETHIBOND 0 CT-1 30 IN X424H

## (undated) DEVICE — SEAMGUARD STPL REINF ENDO GIA ULTRA UNV 60 BLACK

## (undated) DEVICE — SUT STRATAFIX SPIRAL MONOCRYL PLUS 3-0 PS-2 45CM SXMP1B107

## (undated) DEVICE — DRAPE TOWEL: Brand: CONVERTORS

## (undated) DEVICE — UTERINE MANIPULATOR RUMI 6.7 X 8 CM

## (undated) DEVICE — JACKSON-PRATT 100CC BULB RESERVOIR: Brand: CARDINAL HEALTH

## (undated) DEVICE — URETERAL CATHETER ADAPTOR TIP

## (undated) DEVICE — 3M™ TEGADERM™ CHG DRESSING 25/CARTON 4 CARTONS/CASE 1659: Brand: TEGADERM™

## (undated) DEVICE — 3000CC GUARDIAN II: Brand: GUARDIAN

## (undated) DEVICE — CHLORHEXIDINE 4PCT 4 OZ

## (undated) DEVICE — GLOVE INDICATOR PI UNDERGLOVE SZ 6.5 BLUE

## (undated) DEVICE — REM POLYHESIVE ADULT PATIENT RETURN ELECTRODE: Brand: VALLEYLAB

## (undated) DEVICE — 2000CC GUARDIAN II: Brand: GUARDIAN

## (undated) DEVICE — GLOVE INDICATOR PI UNDERGLOVE SZ 7.5 BLUE

## (undated) DEVICE — NEEDLE HYPO 22G X 1-1/2 IN

## (undated) DEVICE — WEBRIL 6 IN UNSTERILE

## (undated) DEVICE — ALLENTOWN LAP CHOLE APP PACK: Brand: CARDINAL HEALTH

## (undated) DEVICE — BLUE HEAT SCOPE WARMER

## (undated) DEVICE — TRAVELKIT CONTAINS FIRST STEP KIT (200ML EP-4 KIT) AND SOILED SCOPE BAG - 1 KIT: Brand: TRAVELKIT CONTAINS FIRST STEP KIT AND SOILED SCOPE BAG

## (undated) DEVICE — TROCAR VISIPORT

## (undated) DEVICE — FENESTRATED BIPOLAR FORCEPS: Brand: ENDOWRIST

## (undated) DEVICE — COVIDIEN ENDO GIA PURPLE (MED) RELOAD 60MM

## (undated) DEVICE — TROCAR PORT ACCESS 12 X120MM W/BLDLS OPTICAL TIP AIRSEAL

## (undated) DEVICE — SEAMGUARD STPL REINF ENDO GIA ULTRA UNIV 60 PURPLE

## (undated) DEVICE — VIOLET BRAIDED (POLYGLACTIN 910), SYNTHETIC ABSORBABLE SUTURE: Brand: COATED VICRYL

## (undated) DEVICE — OCCLUDER COLPO-PNEUMO

## (undated) DEVICE — HEAVY DUTY TABLE COVER: Brand: CONVERTORS

## (undated) DEVICE — PREMIUM DRY TRAY LF: Brand: MEDLINE INDUSTRIES, INC.

## (undated) DEVICE — INTENDED FOR TISSUE SEPARATION, AND OTHER PROCEDURES THAT REQUIRE A SHARP SURGICAL BLADE TO PUNCTURE OR CUT.: Brand: BARD-PARKER SAFETY BLADES SIZE 11, STERILE

## (undated) DEVICE — BLADELESS OBTURATOR: Brand: WECK VISTA

## (undated) DEVICE — ULTRACLEAN ACCESSORY ELECTRODE 1" (2.54 CM) COATED BLADE: Brand: ULTRACLEAN

## (undated) DEVICE — GAUZE SPONGES,16 PLY: Brand: CURITY

## (undated) DEVICE — TUBING SUCTION 5MM X 12 FT

## (undated) DEVICE — AIRSEAL TUBE SMOKE EVAC LUMENX3 FILTERED

## (undated) DEVICE — INTENDED FOR TISSUE SEPARATION, AND OTHER PROCEDURES THAT REQUIRE A SHARP SURGICAL BLADE TO PUNCTURE OR CUT.: Brand: BARD-PARKER SAFETY BLADES SIZE 15, STERILE

## (undated) DEVICE — PMI DISPOSABLE PUNCTURE CLOSURE DEVICE / SUTURE GRASPER: Brand: PMI

## (undated) DEVICE — CANNULA SEAL

## (undated) DEVICE — AEM CORD

## (undated) DEVICE — SUT CHROMIC 5-0 P-3 18 IN 687G

## (undated) DEVICE — SUT ETHILON 3-0 PS-1 18 IN 1663H

## (undated) DEVICE — SUT ETHILON 4-0 PS-2 18 IN 1667H

## (undated) DEVICE — SUT ETHILON 2-0 FSLX 30 IN 1674H

## (undated) DEVICE — SYRINGE 50ML LL

## (undated) DEVICE — HARMONIC ACE +7 LAPAROSCOPIC SHEARS ADVANCED HEMOSTASIS 5MM DIAMETER 36CM SHAFT LENGTH  FOR USE WITH GRAY HAND PIECE ONLY: Brand: HARMONIC ACE

## (undated) DEVICE — 3M™ TEGADERM™ TRANSPARENT FILM DRESSING FRAME STYLE, 1627, 4 IN X 10 IN (10 CM X 25 CM), 20/CT 4CT/CASE: Brand: 3M™ TEGADERM™

## (undated) DEVICE — PROGRASP FORCEPS: Brand: ENDOWRIST

## (undated) DEVICE — SPECIMEN TRAP: Brand: ARGYLE

## (undated) DEVICE — DRAPE SHEET THREE QUARTER

## (undated) DEVICE — GLOVE SRG BIOGEL 7.5

## (undated) DEVICE — ANTIBACTERIAL VIOLET BRAIDED (POLYGLACTIN 910), SYNTHETIC ABSORBABLE SUTURE: Brand: COATED VICRYL

## (undated) DEVICE — GLOVE INDICATOR PI UNDERGLOVE SZ 8 BLUE

## (undated) DEVICE — ENDOPATH XCEL UNIVERSAL TROCAR STABLILITY SLEEVES: Brand: ENDOPATH XCEL

## (undated) DEVICE — STERILE CYSTO PACK: Brand: CARDINAL HEALTH

## (undated) DEVICE — VISUALIZATION SYSTEM: Brand: CLEARIFY

## (undated) DEVICE — TIBURON TRANSVERSE LAPAROTOMY SHEET: Brand: CONVERTORS

## (undated) DEVICE — Device

## (undated) DEVICE — SUT PDS II 3-0 PS-2 18 IN Z497G

## (undated) DEVICE — TRAY FOLEY 16FR URIMETER SILICONE SURESTEP

## (undated) DEVICE — SUT PDS II 2-0 SH 27 IN Z317H

## (undated) DEVICE — BETHLEHEM UNIVERSAL MINOR GEN: Brand: CARDINAL HEALTH

## (undated) DEVICE — COLUMN DRAPE

## (undated) DEVICE — NEEDLE 25G X 1 1/2

## (undated) DEVICE — ADHESIVE SKIN HIGH VISCOSITY EXOFIN 1ML

## (undated) DEVICE — VESSEL SEALER EXTEND: Brand: ENDOWRIST

## (undated) DEVICE — SUT STRATAFIX SPIRAL 4-0 PGA/PCL 30 X 30 CM SXMD2B409

## (undated) DEVICE — TISSEEL FIBRIN 10 ML FROZEN

## (undated) DEVICE — 3M™ TEGADERM™ TRANSPARENT FILM DRESSING FRAME STYLE, 1626W, 4 IN X 4-3/4 IN (10 CM X 12 CM), 50/CT 4CT/CASE: Brand: 3M™ TEGADERM™

## (undated) DEVICE — TIBURON LAPAROSCOPIC ABDOMINAL DRAPE: Brand: CONVERTORS

## (undated) DEVICE — MONOPOLAR CAUTERY: Brand: ENDOWRIST;DAVINCI SI

## (undated) DEVICE — SUT VICRYL 2-0 CT-1 27 IN J259H

## (undated) DEVICE — ARM DRAPE

## (undated) DEVICE — IRRIG ENDO FLO TUBING

## (undated) DEVICE — GLOVE INDICATOR PI UNDERGLOVE SZ 7 BLUE

## (undated) DEVICE — LARGE NEEDLE DRIVER: Brand: ENDOWRIST

## (undated) DEVICE — KIT, BETHLEHEM ROBOTIC PROST: Brand: CARDINAL HEALTH